# Patient Record
Sex: FEMALE | Race: WHITE | NOT HISPANIC OR LATINO | Employment: OTHER | URBAN - METROPOLITAN AREA
[De-identification: names, ages, dates, MRNs, and addresses within clinical notes are randomized per-mention and may not be internally consistent; named-entity substitution may affect disease eponyms.]

---

## 2017-01-24 ENCOUNTER — APPOINTMENT (OUTPATIENT)
Dept: WOMENS IMAGING | Facility: HOSPITAL | Age: 72
End: 2017-01-24

## 2017-01-24 PROCEDURE — G0279 TOMOSYNTHESIS, MAMMO: HCPCS | Performed by: RADIOLOGY

## 2017-01-24 PROCEDURE — 76641 ULTRASOUND BREAST COMPLETE: CPT | Performed by: RADIOLOGY

## 2017-01-24 PROCEDURE — G0206 DX MAMMO INCL CAD UNI: HCPCS | Performed by: RADIOLOGY

## 2017-01-24 PROCEDURE — MDREVIEWSP: Performed by: RADIOLOGY

## 2017-03-22 ENCOUNTER — APPOINTMENT (OUTPATIENT)
Dept: WOMENS IMAGING | Facility: HOSPITAL | Age: 72
End: 2017-03-22

## 2017-03-22 PROCEDURE — G0202 SCR MAMMO BI INCL CAD: HCPCS | Performed by: RADIOLOGY

## 2017-03-22 PROCEDURE — 77063 BREAST TOMOSYNTHESIS BI: CPT | Performed by: RADIOLOGY

## 2017-07-24 ENCOUNTER — APPOINTMENT (OUTPATIENT)
Dept: WOMENS IMAGING | Facility: HOSPITAL | Age: 72
End: 2017-07-24

## 2017-07-24 PROCEDURE — 76641 ULTRASOUND BREAST COMPLETE: CPT | Performed by: RADIOLOGY

## 2017-10-27 ENCOUNTER — OFFICE VISIT (OUTPATIENT)
Dept: FAMILY MEDICINE CLINIC | Facility: CLINIC | Age: 72
End: 2017-10-27

## 2017-10-27 VITALS
RESPIRATION RATE: 16 BRPM | HEIGHT: 64 IN | WEIGHT: 145 LBS | DIASTOLIC BLOOD PRESSURE: 60 MMHG | HEART RATE: 62 BPM | TEMPERATURE: 98.5 F | OXYGEN SATURATION: 98 % | BODY MASS INDEX: 24.75 KG/M2 | SYSTOLIC BLOOD PRESSURE: 120 MMHG

## 2017-10-27 DIAGNOSIS — E78.2 MIXED HYPERLIPIDEMIA: Primary | ICD-10-CM

## 2017-10-27 DIAGNOSIS — Z12.31 ENCOUNTER FOR SCREENING MAMMOGRAM FOR BREAST CANCER: ICD-10-CM

## 2017-10-27 DIAGNOSIS — J30.2 CHRONIC SEASONAL ALLERGIC RHINITIS, UNSPECIFIED TRIGGER: ICD-10-CM

## 2017-10-27 DIAGNOSIS — N60.02 BREAST CYST, LEFT: ICD-10-CM

## 2017-10-27 DIAGNOSIS — Z87.898 HX OF ABNORMAL MAMMOGRAM: ICD-10-CM

## 2017-10-27 DIAGNOSIS — E55.9 VITAMIN D DEFICIENCY: ICD-10-CM

## 2017-10-27 DIAGNOSIS — E89.0 S/P THYROIDECTOMY: ICD-10-CM

## 2017-10-27 DIAGNOSIS — R73.01 IMPAIRED FASTING GLUCOSE: ICD-10-CM

## 2017-10-27 PROBLEM — I11.9 HYPERTENSIVE LEFT VENTRICULAR HYPERTROPHY, WITHOUT HEART FAILURE: Status: ACTIVE | Noted: 2017-10-27

## 2017-10-27 PROBLEM — M85.89 OSTEOPENIA OF MULTIPLE SITES: Status: ACTIVE | Noted: 2017-10-27

## 2017-10-27 PROBLEM — Z86.79 HISTORY OF HEART DISEASE: Status: ACTIVE | Noted: 2017-10-27

## 2017-10-27 PROBLEM — E03.9 HYPOTHYROIDISM: Status: ACTIVE | Noted: 2017-10-27

## 2017-10-27 PROBLEM — Z90.89 S/P THYROIDECTOMY: Status: ACTIVE | Noted: 2017-10-27

## 2017-10-27 PROBLEM — Z87.19 HISTORY OF DIVERTICULOSIS: Status: ACTIVE | Noted: 2017-10-27

## 2017-10-27 PROBLEM — Z98.890 S/P THYROIDECTOMY: Status: ACTIVE | Noted: 2017-10-27

## 2017-10-27 PROBLEM — R91.1 NODULE OF LEFT LUNG: Status: ACTIVE | Noted: 2017-10-27

## 2017-10-27 PROCEDURE — 99203 OFFICE O/P NEW LOW 30 MIN: CPT | Performed by: PHYSICIAN ASSISTANT

## 2017-10-27 RX ORDER — LEVOTHYROXINE SODIUM 0.07 MG/1
75 TABLET ORAL DAILY
COMMUNITY
End: 2018-01-05 | Stop reason: SDUPTHER

## 2017-10-27 RX ORDER — ASPIRIN 81 MG/1
81 TABLET ORAL DAILY
COMMUNITY

## 2017-10-27 RX ORDER — LISINOPRIL AND HYDROCHLOROTHIAZIDE 12.5; 1 MG/1; MG/1
1 TABLET ORAL DAILY
COMMUNITY
End: 2017-12-15 | Stop reason: SDUPTHER

## 2017-10-27 RX ORDER — CETIRIZINE HYDROCHLORIDE 10 MG/1
10 TABLET ORAL AS NEEDED
COMMUNITY

## 2017-10-27 RX ORDER — ATORVASTATIN CALCIUM 40 MG/1
40 TABLET, FILM COATED ORAL DAILY
COMMUNITY
End: 2018-01-19 | Stop reason: SDUPTHER

## 2017-10-27 RX ORDER — ALENDRONATE SODIUM 70 MG/1
70 TABLET ORAL
COMMUNITY
End: 2018-01-19 | Stop reason: SDUPTHER

## 2017-10-27 NOTE — PATIENT INSTRUCTIONS
Low glycemic index diet  Exercise 30 minutes most days of the week  Make sure you get results on any labs or tests we ordered today  We discussed medications and how to take them as prescribed  Sleep 6-8 hours each night if possible  If you have not signed up for [employer], please activate your code ASAP from your After Visit Summary today    LDL goal <100  LDL goal if heart disease <70  HDL goal >60  Triglyceride goal <150  BP goal =<130/80  Fasting glucose <100

## 2017-10-27 NOTE — PROGRESS NOTES
Subjective   Mami Srivastava is a 72 y.o. female.     History of Present Illness     Mami Srivastava 72 y.o. female who presents today for a new patient appointment.    she has a history of   Patient Active Problem List   Diagnosis   • S/P thyroidectomy   • Mixed hyperlipidemia   • Vitamin D deficiency   • Impaired fasting glucose   • Hypothyroidism   • Osteopenia of multiple sites   • Hypertensive left ventricular hypertrophy, without heart failure   • Chronic seasonal allergic rhinitis   • History of heart disease   • Hx of abnormal mammogram   • Nodule of left lung   • History of diverticulosis   .  she is here to establish care I reviewed the PFSH recorded today by my MA/LPN staff.   she   She has been feeling well.    Mami Srivastvaa 72 y.o. female who presents today for routine follow up check and medication refills.  she has a history of   Patient Active Problem List   Diagnosis   • S/P thyroidectomy   • Mixed hyperlipidemia   • Vitamin D deficiency   • Impaired fasting glucose   • Hypothyroidism   • Osteopenia of multiple sites   • Hypertensive left ventricular hypertrophy, without heart failure   • Chronic seasonal allergic rhinitis   • History of heart disease   • Hx of abnormal mammogram   • Nodule of left lung   • History of diverticulosis   .   she has overall felt well.  She has Hypertenision and is well controlled on medication, Hyperlipidemia and is well controlled on medication, CAD and is under care of their Cardiologist for medical management and hypothyroidism and is well controlled on Rx.  Labs are in desired treatment range.  she has been compliant with current medications have reviewed them.  The patient denies medication side effects.    Had labs 9-6-17:  CBC with hbg 12.7, gluc 117,creat 0.92, K+ 4.2, ALT 34; LDL at 58.  TSH 2.51,  A1C March 2017 was 5.6  9-2015 neg RBC urine  She is having breast u/s for left breast July and probably benign and repeat 6mos;  [facility]  On Fosamax for about 3 years    Niece had breast cancer with BRCA  AAA screen neg 10-4-16    Hx skin basal cell cancer and sees Dr Wilson  Sees cardio once a year, Dr Rick and Dx LVH, diastolic dysfunction, CAD by calcium scan    I spent 30 minutes with her and reviewing records; I will order labs for March; reviewed several labs    She goes to the gym or exercise with working  Was on Lexapro once  The following portions of the patient's history were reviewed and updated as appropriate: allergies, current medications, past family history, past medical history, past social history, past surgical history and problem list.    Review of Systems   Constitutional: Negative for activity change, appetite change and unexpected weight change.   HENT: Negative for nosebleeds and trouble swallowing.    Eyes: Negative for pain and visual disturbance.   Respiratory: Negative for chest tightness, shortness of breath and wheezing.    Cardiovascular: Negative for chest pain and palpitations.   Gastrointestinal: Negative for abdominal pain and blood in stool.   Endocrine: Negative.    Genitourinary: Negative for difficulty urinating and hematuria.   Musculoskeletal: Positive for back pain. Negative for joint swelling.   Skin: Negative for color change and rash.   Allergic/Immunologic: Positive for environmental allergies.   Neurological: Negative for syncope and speech difficulty.   Hematological: Negative for adenopathy.   Psychiatric/Behavioral: Negative for agitation and confusion.   All other systems reviewed and are negative.      Objective   Physical Exam   Constitutional: She is oriented to person, place, and time. She appears well-developed and well-nourished. No distress.   HENT:   Head: Normocephalic and atraumatic.   Right Ear: External ear normal.   Left Ear: External ear normal.   Nose: Nose normal.   Mouth/Throat: Oropharynx is clear and moist. No oropharyngeal exudate.   Eyes: Conjunctivae and EOM  are normal. Pupils are equal, round, and reactive to light. Right eye exhibits no discharge. Left eye exhibits no discharge. No scleral icterus.   Neck: Normal range of motion. Neck supple. No tracheal deviation present. No thyromegaly present.   Cardiovascular: Normal rate, regular rhythm, normal heart sounds, intact distal pulses and normal pulses.  Exam reveals no gallop.    No murmur heard.  Pulmonary/Chest: Effort normal and breath sounds normal. No respiratory distress. She has no wheezes. She has no rales.   Abdominal: Soft. Bowel sounds are normal. She exhibits no distension and no mass. There is no tenderness. There is no rebound and no guarding. No hernia.   Musculoskeletal: Normal range of motion.   Lymphadenopathy:     She has no cervical adenopathy.   Neurological: She is alert and oriented to person, place, and time. She exhibits normal muscle tone. Coordination normal.   Skin: Skin is warm. No rash noted. No erythema. No pallor.   Psychiatric: She has a normal mood and affect. Her behavior is normal. Judgment and thought content normal.   Nursing note and vitals reviewed.      Assessment/Plan   Mami was seen today for establish care and hypertension.    Diagnoses and all orders for this visit:    Mixed hyperlipidemia  -     Comprehensive Metabolic Panel; Future  -     Hemoglobin A1c; Future  -     Vitamin D 25 Hydroxy; Future    S/P thyroidectomy  -     Comprehensive Metabolic Panel; Future  -     Hemoglobin A1c; Future  -     Vitamin D 25 Hydroxy; Future    Vitamin D deficiency  -     Comprehensive Metabolic Panel; Future  -     Hemoglobin A1c; Future  -     Vitamin D 25 Hydroxy; Future    Impaired fasting glucose  -     Comprehensive Metabolic Panel; Future  -     Hemoglobin A1c; Future  -     Vitamin D 25 Hydroxy; Future    Chronic seasonal allergic rhinitis, unspecified trigger  -     Comprehensive Metabolic Panel; Future  -     Hemoglobin A1c; Future  -     Vitamin D 25 Hydroxy; Future    Hx  of abnormal mammogram    Breast cyst, left  -     US Breast Left Complete; Future    Encounter for screening mammogram for breast cancer  -     Mammo Screening Bilateral With CAD; Future

## 2017-12-15 RX ORDER — LISINOPRIL AND HYDROCHLOROTHIAZIDE 12.5; 1 MG/1; MG/1
1 TABLET ORAL DAILY
Qty: 30 TABLET | Refills: 4 | Status: SHIPPED | OUTPATIENT
Start: 2017-12-15 | End: 2018-01-19 | Stop reason: SDUPTHER

## 2017-12-21 ENCOUNTER — OFFICE VISIT (OUTPATIENT)
Dept: RETAIL CLINIC | Facility: CLINIC | Age: 72
End: 2017-12-21

## 2017-12-21 VITALS
SYSTOLIC BLOOD PRESSURE: 117 MMHG | HEART RATE: 77 BPM | DIASTOLIC BLOOD PRESSURE: 68 MMHG | OXYGEN SATURATION: 98 % | RESPIRATION RATE: 18 BRPM | TEMPERATURE: 98 F

## 2017-12-21 DIAGNOSIS — J06.9 ACUTE URI: Primary | ICD-10-CM

## 2017-12-21 DIAGNOSIS — J32.9 SINUSITIS, UNSPECIFIED CHRONICITY, UNSPECIFIED LOCATION: ICD-10-CM

## 2017-12-21 PROCEDURE — 99213 OFFICE O/P EST LOW 20 MIN: CPT | Performed by: NURSE PRACTITIONER

## 2017-12-21 RX ORDER — GUAIFENESIN 600 MG/1
600 TABLET, EXTENDED RELEASE ORAL EVERY 12 HOURS SCHEDULED
Qty: 28 TABLET | Refills: 0 | Status: SHIPPED | OUTPATIENT
Start: 2017-12-21 | End: 2018-01-04

## 2017-12-21 RX ORDER — AZITHROMYCIN 250 MG/1
TABLET, FILM COATED ORAL
Qty: 6 TABLET | Refills: 0 | Status: SHIPPED | OUTPATIENT
Start: 2017-12-21 | End: 2018-04-02

## 2017-12-21 NOTE — PATIENT INSTRUCTIONS
Sinusitis, Adult  Sinusitis is soreness and inflammation of your sinuses. Sinuses are hollow spaces in the bones around your face. Your sinuses are located:  · Around your eyes.  · In the middle of your forehead.  · Behind your nose.  · In your cheekbones.  Your sinuses and nasal passages are lined with a stringy fluid (mucus). Mucus normally drains out of your sinuses. When your nasal tissues become inflamed or swollen, the mucus can become trapped or blocked so air cannot flow through your sinuses. This allows bacteria, viruses, and funguses to grow, which leads to infection.  Sinusitis can develop quickly and last for 7-10 days (acute) or for more than 12 weeks (chronic). Sinusitis often develops after a cold.  CAUSES  This condition is caused by anything that creates swelling in the sinuses or stops mucus from draining, including:  · Allergies.  · Asthma.  · Bacterial or viral infection.  · Abnormally shaped bones between the nasal passages.  · Nasal growths that contain mucus (nasal polyps).  · Narrow sinus openings.  · Pollutants, such as chemicals or irritants in the air.  · A foreign object stuck in the nose.  · A fungal infection. This is rare.  RISK FACTORS  The following factors may make you more likely to develop this condition:  · Having allergies or asthma.  · Having had a recent cold or respiratory tract infection.  · Having structural deformities or blockages in your nose or sinuses.  · Having a weak immune system.  · Doing a lot of swimming or diving.  · Overusing nasal sprays.  · Smoking.  SYMPTOMS  The main symptoms of this condition are pain and a feeling of pressure around the affected sinuses. Other symptoms include:  · Upper toothache.  · Earache.  · Headache.  · Bad breath.  · Decreased sense of smell and taste.  · A cough that may get worse at night.  · Fatigue.  · Fever.  · Thick drainage from your nose. The drainage is often green and it may contain pus (purulent).  · Stuffy nose or  congestion.  · Postnasal drip. This is when extra mucus collects in the throat or back of the nose.  · Swelling and warmth over the affected sinuses.  · Sore throat.  · Sensitivity to light.  DIAGNOSIS  This condition is diagnosed based on symptoms, a medical history, and a physical exam. To find out if your condition is acute or chronic, your health care provider may:  · Look in your nose for signs of nasal polyps.  · Tap over the affected sinus to check for signs of infection.  · View the inside of your sinuses using an imaging device that has a light attached (endoscope).  If your health care provider suspects that you have chronic sinusitis, you may also:  · Be tested for allergies.  · Have a sample of mucus taken from your nose (nasal culture) and checked for bacteria.  · Have a mucus sample examined to see if your sinusitis is related to an allergy.  If your sinusitis does not respond to treatment and it lasts longer than 8 weeks, you may have an MRI or CT scan to check your sinuses. These scans also help to determine how severe your infection is.  In rare cases, a bone biopsy may be done to rule out more serious types of fungal sinus disease.  TREATMENT  Treatment for sinusitis depends on the cause and whether your condition is chronic or acute. If a virus is causing your sinusitis, your symptoms will go away on their own within 10 days. You may be given medicines to relieve your symptoms, including:  · Topical nasal decongestants. They shrink swollen nasal passages and let mucus drain from your sinuses.  · Antihistamines. These drugs block inflammation that is triggered by allergies. This can help to ease swelling in your nose and sinuses.  · Topical nasal corticosteroids. These are nasal sprays that ease inflammation and swelling in your nose and sinuses.  · Nasal saline washes. These rinses can help to get rid of thick mucus in your nose.  If your condition is caused by bacteria, you will be given an  antibiotic medicine. If your condition is caused by a fungus, you will be given an antifungal medicine.  Surgery may be needed to correct underlying conditions, such as narrow nasal passages. Surgery may also be needed to remove polyps.  HOME CARE INSTRUCTIONS  Medicines  · Take, use, or apply over-the-counter and prescription medicines only as told by your health care provider. These may include nasal sprays.  · If you were prescribed an antibiotic medicine, take it as told by your health care provider. Do not stop taking the antibiotic even if you start to feel better.  Hydrate and Humidify  · Drink enough water to keep your urine clear or pale yellow. Staying hydrated will help to thin your mucus.  · Use a cool mist humidifier to keep the humidity level in your home above 50%.  · Inhale steam for 10-15 minutes, 3-4 times a day or as told by your health care provider. You can do this in the bathroom while a hot shower is running.  · Limit your exposure to cool or dry air.  Rest  · Rest as much as possible.  · Sleep with your head raised (elevated).  · Make sure to get enough sleep each night.  General Instructions  · Apply a warm, moist washcloth to your face 3-4 times a day or as told by your health care provider. This will help with discomfort.  · Wash your hands often with soap and water to reduce your exposure to viruses and other germs. If soap and water are not available, use hand .  · Do not smoke. Avoid being around people who are smoking (secondhand smoke).  · Keep all follow-up visits as told by your health care provider. This is important.  SEEK MEDICAL CARE IF:  · You have a fever.  · Your symptoms get worse.  · Your symptoms do not improve within 10 days.  SEEK IMMEDIATE MEDICAL CARE IF:  · You have a severe headache.  · You have persistent vomiting.  · You have pain or swelling around your face or eyes.  · You have vision problems.  · You develop confusion.  · Your neck is stiff.  · You have  "trouble breathing.     This information is not intended to replace advice given to you by your health care provider. Make sure you discuss any questions you have with your health care provider.     Document Released: 12/18/2006 Document Revised: 04/10/2017 Document Reviewed: 10/12/2016  [employer] Interactive Patient Education ©2017 [employer]  Upper Respiratory Infection, Adult  Most upper respiratory infections (URIs) are a viral infection of the air passages leading to the lungs. A URI affects the nose, throat, and upper air passages. The most common type of URI is nasopharyngitis and is typically referred to as \"the common cold.\"  URIs run their course and usually go away on their own. Most of the time, a URI does not require medical attention, but sometimes a bacterial infection in the upper airways can follow a viral infection. This is called a secondary infection. Sinus and middle ear infections are common types of secondary upper respiratory infections.  Bacterial pneumonia can also complicate a URI. A URI can worsen asthma and chronic obstructive pulmonary disease (COPD). Sometimes, these complications can require emergency medical care and may be life threatening.   CAUSES  Almost all URIs are caused by viruses. A virus is a type of germ and can spread from one person to another.   RISKS FACTORS  You may be at risk for a URI if:   · You smoke.    · You have chronic heart or lung disease.  · You have a weakened defense (immune) system.    · You are very young or very old.    · You have nasal allergies or asthma.  · You work in crowded or poorly ventilated areas.  · You work in health care facilities or schools.  SIGNS AND SYMPTOMS   Symptoms typically develop 2-3 days after you come in contact with a cold virus. Most viral URIs last 7-10 days. However, viral URIs from the influenza virus (flu virus) can last 14-18 days and are typically more severe. Symptoms may include:   · Runny or stuffy (congested) " nose.    · Sneezing.    · Cough.    · Sore throat.    · Headache.    · Fatigue.    · Fever.    · Loss of appetite.    · Pain in your forehead, behind your eyes, and over your cheekbones (sinus pain).  · Muscle aches.    DIAGNOSIS   Your health care provider may diagnose a URI by:  · Physical exam.  · Tests to check that your symptoms are not due to another condition such as:  ¨ Strep throat.  ¨ Sinusitis.  ¨ Pneumonia.  ¨ Asthma.  TREATMENT   A URI goes away on its own with time. It cannot be cured with medicines, but medicines may be prescribed or recommended to relieve symptoms. Medicines may help:  · Reduce your fever.  · Reduce your cough.  · Relieve nasal congestion.  HOME CARE INSTRUCTIONS   · Take medicines only as directed by your health care provider.    · Gargle warm saltwater or take cough drops to comfort your throat as directed by your health care provider.  · Use a warm mist humidifier or inhale steam from a shower to increase air moisture. This may make it easier to breathe.  · Drink enough fluid to keep your urine clear or pale yellow.    · Eat soups and other clear broths and maintain good nutrition.    · Rest as needed.    · Return to work when your temperature has returned to normal or as your health care provider advises. You may need to stay home longer to avoid infecting others. You can also use a face mask and careful hand washing to prevent spread of the virus.  · Increase the usage of your inhaler if you have asthma.    · Do not use any tobacco products, including cigarettes, chewing tobacco, or electronic cigarettes. If you need help quitting, ask your health care provider.  PREVENTION   The best way to protect yourself from getting a cold is to practice good hygiene.   · Avoid oral or hand contact with people with cold symptoms.    · Wash your hands often if contact occurs.    There is no clear evidence that vitamin C, vitamin E, echinacea, or exercise reduces the chance of developing a  cold. However, it is always recommended to get plenty of rest, exercise, and practice good nutrition.   SEEK MEDICAL CARE IF:   · You are getting worse rather than better.    · Your symptoms are not controlled by medicine.    · You have chills.  · You have worsening shortness of breath.  · You have brown or red mucus.  · You have yellow or brown nasal discharge.  · You have pain in your face, especially when you bend forward.  · You have a fever.  · You have swollen neck glands.  · You have pain while swallowing.  · You have white areas in the back of your throat.  SEEK IMMEDIATE MEDICAL CARE IF:   · You have severe or persistent:    Headache.    Ear pain.    Sinus pain.    Chest pain.  · You have chronic lung disease and any of the following:    Wheezing.    Prolonged cough.    Coughing up blood.    A change in your usual mucus.  · You have a stiff neck.  · You have changes in your:    Vision.    Hearing.    Thinking.    Mood.  MAKE SURE YOU:   · Understand these instructions.  · Will watch your condition.  · Will get help right away if you are not doing well or get worse.     This information is not intended to replace advice given to you by your health care provider. Make sure you discuss any questions you have with your health care provider.     Document Released: 06/13/2002 Document Revised: 05/03/2016 Document Reviewed: 03/25/2015  [employer] Patient Education ©2017 [employer]

## 2017-12-21 NOTE — PROGRESS NOTES
"Subjective:     Mami Srivastava is a 72 y.o.     Sinus Problem   This is a new problem. The current episode started 1 to 4 weeks ago. There has been no fever. Associated symptoms include congestion, ear pain and sinus pressure (left sided). Pertinent negatives include no shortness of breath. Cough: from drainage. Headaches: \"sinus headache\" Sore throat: from drainage. Treatments tried: jenni pot, mucinex. The treatment provided mild relief.         The following portions of the patient's history were reviewed and updated as appropriate: allergies, current medications, past family history, past medical history, past social history, past surgical history and problem list.      Review of Systems   Constitutional: Negative for appetite change, fatigue and fever.   HENT: Positive for congestion, ear pain and sinus pressure (left sided). Sore throat: from drainage.    Eyes: Negative for discharge and redness.   Respiratory: Negative for shortness of breath and wheezing. Cough: from drainage.    Cardiovascular: Negative.         Hx: hypertension   Gastrointestinal: Negative for diarrhea, nausea and vomiting.   Musculoskeletal: Negative for myalgias.   Skin: Negative for color change, pallor and rash.   Neurological: Negative for dizziness and numbness. Headaches: \"sinus headache\"         Objective:      Physical Exam   Constitutional: She is oriented to person, place, and time. She appears well-developed and well-nourished. She is cooperative.   HENT:   Head: Normocephalic and atraumatic.   Right Ear: Ear canal normal. A middle ear effusion (mild mucoid effusion) is present.   Left Ear: Ear canal normal. A middle ear effusion (mild mucoid effusion) is present.   Nose: Right sinus exhibits frontal sinus tenderness. Left sinus exhibits maxillary sinus tenderness and frontal sinus tenderness.   Mouth/Throat: No oropharyngeal exudate.   Mild purulent nasal congestion   Eyes: EOM and lids are normal. Pupils are equal, " round, and reactive to light.   Neck: Normal range of motion. Neck supple.   Cardiovascular: Normal rate, regular rhythm, S1 normal, S2 normal and normal heart sounds.    Pulmonary/Chest: Effort normal and breath sounds normal.   Abdominal: Soft. Normal appearance and bowel sounds are normal.   Musculoskeletal: Normal range of motion.   Lymphadenopathy:     She has no cervical adenopathy.   Neurological: She is alert and oriented to person, place, and time.   Skin: Skin is warm, dry and intact.   Psychiatric: She has a normal mood and affect. Her speech is normal and behavior is normal. Thought content normal.   Vitals reviewed.          Diagnoses and all orders for this visit:    Acute URI    Sinusitis, unspecified chronicity, unspecified location    Other orders  -     azithromycin (ZITHROMAX) 250 MG tablet; Take 2 tablets the first day, then 1 tablet daily for 4 days.  -     guaiFENesin (MUCINEX) 600 MG 12 hr tablet; Take 1 tablet by mouth Every 12 (Twelve) Hours for 14 days.  If symptoms worsen or persist follow up with primary care provider.

## 2018-01-05 RX ORDER — LEVOTHYROXINE SODIUM 0.07 MG/1
75 TABLET ORAL DAILY
Qty: 90 TABLET | Refills: 1 | Status: SHIPPED | OUTPATIENT
Start: 2018-01-05 | End: 2018-01-31 | Stop reason: SDUPTHER

## 2018-01-19 RX ORDER — ATORVASTATIN CALCIUM 40 MG/1
40 TABLET, FILM COATED ORAL DAILY
Qty: 30 TABLET | Refills: 0 | Status: SHIPPED | OUTPATIENT
Start: 2018-01-19 | End: 2018-01-31 | Stop reason: SDUPTHER

## 2018-01-19 RX ORDER — ALENDRONATE SODIUM 70 MG/1
70 TABLET ORAL
Qty: 4 TABLET | Refills: 0 | Status: SHIPPED | OUTPATIENT
Start: 2018-01-19 | End: 2018-01-31 | Stop reason: SDUPTHER

## 2018-01-19 RX ORDER — LISINOPRIL AND HYDROCHLOROTHIAZIDE 12.5; 1 MG/1; MG/1
1 TABLET ORAL DAILY
Qty: 30 TABLET | Refills: 0 | Status: SHIPPED | OUTPATIENT
Start: 2018-01-19 | End: 2018-01-31 | Stop reason: SDUPTHER

## 2018-01-26 ENCOUNTER — APPOINTMENT (OUTPATIENT)
Dept: WOMENS IMAGING | Facility: HOSPITAL | Age: 73
End: 2018-01-26

## 2018-01-26 PROCEDURE — 76641 ULTRASOUND BREAST COMPLETE: CPT | Performed by: RADIOLOGY

## 2018-01-31 RX ORDER — ATORVASTATIN CALCIUM 40 MG/1
40 TABLET, FILM COATED ORAL DAILY
Qty: 90 TABLET | Refills: 0 | Status: SHIPPED | OUTPATIENT
Start: 2018-01-31 | End: 2018-03-18 | Stop reason: SDUPTHER

## 2018-01-31 RX ORDER — ALENDRONATE SODIUM 70 MG/1
70 TABLET ORAL
Qty: 13 TABLET | Refills: 0 | Status: SHIPPED | OUTPATIENT
Start: 2018-01-31 | End: 2018-02-14

## 2018-01-31 RX ORDER — LEVOTHYROXINE SODIUM 0.07 MG/1
75 TABLET ORAL DAILY
Qty: 90 TABLET | Refills: 1 | Status: SHIPPED | OUTPATIENT
Start: 2018-01-31 | End: 2018-02-05 | Stop reason: SDUPTHER

## 2018-01-31 RX ORDER — LISINOPRIL AND HYDROCHLOROTHIAZIDE 12.5; 1 MG/1; MG/1
1 TABLET ORAL DAILY
Qty: 90 TABLET | Refills: 0 | Status: SHIPPED | OUTPATIENT
Start: 2018-01-31 | End: 2018-03-18 | Stop reason: SDUPTHER

## 2018-02-05 RX ORDER — LEVOTHYROXINE SODIUM 0.07 MG/1
75 TABLET ORAL DAILY
Qty: 30 TABLET | Refills: 0 | Status: SHIPPED | OUTPATIENT
Start: 2018-02-05 | End: 2018-02-09 | Stop reason: SDUPTHER

## 2018-02-09 ENCOUNTER — TELEPHONE (OUTPATIENT)
Dept: FAMILY MEDICINE CLINIC | Facility: CLINIC | Age: 73
End: 2018-02-09

## 2018-02-09 RX ORDER — LEVOTHYROXINE SODIUM 0.07 MG/1
75 TABLET ORAL DAILY
Qty: 90 TABLET | Refills: 1 | Status: SHIPPED | OUTPATIENT
Start: 2018-02-09 | End: 2018-04-02 | Stop reason: SDUPTHER

## 2018-02-14 RX ORDER — ALENDRONATE SODIUM 70 MG/1
TABLET ORAL
Qty: 4 TABLET | Refills: 0 | Status: SHIPPED | OUTPATIENT
Start: 2018-02-14 | End: 2018-04-02 | Stop reason: SDUPTHER

## 2018-03-01 ENCOUNTER — RESULTS ENCOUNTER (OUTPATIENT)
Dept: FAMILY MEDICINE CLINIC | Facility: CLINIC | Age: 73
End: 2018-03-01

## 2018-03-01 DIAGNOSIS — E78.2 MIXED HYPERLIPIDEMIA: ICD-10-CM

## 2018-03-01 DIAGNOSIS — J30.2 CHRONIC SEASONAL ALLERGIC RHINITIS, UNSPECIFIED TRIGGER: ICD-10-CM

## 2018-03-01 DIAGNOSIS — E89.0 S/P THYROIDECTOMY: ICD-10-CM

## 2018-03-01 DIAGNOSIS — E55.9 VITAMIN D DEFICIENCY: ICD-10-CM

## 2018-03-01 DIAGNOSIS — R73.01 IMPAIRED FASTING GLUCOSE: ICD-10-CM

## 2018-03-18 RX ORDER — LISINOPRIL AND HYDROCHLOROTHIAZIDE 12.5; 1 MG/1; MG/1
1 TABLET ORAL DAILY
Qty: 30 TABLET | Refills: 0 | Status: SHIPPED | OUTPATIENT
Start: 2018-03-18 | End: 2018-04-02 | Stop reason: SDUPTHER

## 2018-03-18 RX ORDER — ATORVASTATIN CALCIUM 40 MG/1
40 TABLET, FILM COATED ORAL DAILY
Qty: 30 TABLET | Refills: 0 | Status: SHIPPED | OUTPATIENT
Start: 2018-03-18 | End: 2018-04-02 | Stop reason: SDUPTHER

## 2018-03-20 ENCOUNTER — CLINICAL SUPPORT (OUTPATIENT)
Dept: FAMILY MEDICINE CLINIC | Facility: CLINIC | Age: 73
End: 2018-03-20

## 2018-03-20 DIAGNOSIS — Z23 IMMUNIZATION DUE: Primary | ICD-10-CM

## 2018-03-20 PROCEDURE — 90471 IMMUNIZATION ADMIN: CPT | Performed by: PHYSICIAN ASSISTANT

## 2018-03-20 PROCEDURE — 90715 TDAP VACCINE 7 YRS/> IM: CPT | Performed by: PHYSICIAN ASSISTANT

## 2018-03-26 ENCOUNTER — APPOINTMENT (OUTPATIENT)
Dept: WOMENS IMAGING | Facility: HOSPITAL | Age: 73
End: 2018-03-26

## 2018-03-26 PROCEDURE — MDREVIEWSP: Performed by: RADIOLOGY

## 2018-03-26 PROCEDURE — 77063 BREAST TOMOSYNTHESIS BI: CPT | Performed by: RADIOLOGY

## 2018-03-26 PROCEDURE — 77067 SCR MAMMO BI INCL CAD: CPT | Performed by: RADIOLOGY

## 2018-04-02 ENCOUNTER — OFFICE VISIT (OUTPATIENT)
Dept: FAMILY MEDICINE CLINIC | Facility: CLINIC | Age: 73
End: 2018-04-02

## 2018-04-02 VITALS
WEIGHT: 143 LBS | SYSTOLIC BLOOD PRESSURE: 128 MMHG | HEIGHT: 64 IN | RESPIRATION RATE: 16 BRPM | OXYGEN SATURATION: 98 % | DIASTOLIC BLOOD PRESSURE: 72 MMHG | BODY MASS INDEX: 24.41 KG/M2 | TEMPERATURE: 97.6 F | HEART RATE: 70 BPM

## 2018-04-02 DIAGNOSIS — M85.89 OSTEOPENIA OF MULTIPLE SITES: ICD-10-CM

## 2018-04-02 DIAGNOSIS — Z00.00 MEDICARE ANNUAL WELLNESS VISIT, SUBSEQUENT: Primary | ICD-10-CM

## 2018-04-02 DIAGNOSIS — E03.9 ACQUIRED HYPOTHYROIDISM: ICD-10-CM

## 2018-04-02 DIAGNOSIS — E78.2 MIXED HYPERLIPIDEMIA: ICD-10-CM

## 2018-04-02 DIAGNOSIS — R73.01 IMPAIRED FASTING GLUCOSE: ICD-10-CM

## 2018-04-02 DIAGNOSIS — E55.9 VITAMIN D DEFICIENCY: ICD-10-CM

## 2018-04-02 DIAGNOSIS — Z86.79 HISTORY OF HEART DISEASE: ICD-10-CM

## 2018-04-02 DIAGNOSIS — Z78.0 POSTMENOPAUSAL: ICD-10-CM

## 2018-04-02 PROCEDURE — G0439 PPPS, SUBSEQ VISIT: HCPCS | Performed by: PHYSICIAN ASSISTANT

## 2018-04-02 PROCEDURE — 99214 OFFICE O/P EST MOD 30 MIN: CPT | Performed by: PHYSICIAN ASSISTANT

## 2018-04-02 PROCEDURE — 90732 PPSV23 VACC 2 YRS+ SUBQ/IM: CPT | Performed by: PHYSICIAN ASSISTANT

## 2018-04-02 PROCEDURE — G0009 ADMIN PNEUMOCOCCAL VACCINE: HCPCS | Performed by: PHYSICIAN ASSISTANT

## 2018-04-02 PROCEDURE — 96160 PT-FOCUSED HLTH RISK ASSMT: CPT | Performed by: PHYSICIAN ASSISTANT

## 2018-04-02 RX ORDER — LISINOPRIL AND HYDROCHLOROTHIAZIDE 12.5; 1 MG/1; MG/1
1 TABLET ORAL DAILY
Qty: 90 TABLET | Refills: 1 | Status: SHIPPED | OUTPATIENT
Start: 2018-04-02 | End: 2018-09-05 | Stop reason: SDUPTHER

## 2018-04-02 RX ORDER — LEVOTHYROXINE SODIUM 0.07 MG/1
75 TABLET ORAL DAILY
Qty: 90 TABLET | Refills: 3 | Status: SHIPPED | OUTPATIENT
Start: 2018-04-02 | End: 2019-05-28 | Stop reason: SDUPTHER

## 2018-04-02 RX ORDER — ATORVASTATIN CALCIUM 40 MG/1
40 TABLET, FILM COATED ORAL DAILY
Qty: 90 TABLET | Refills: 3 | Status: SHIPPED | OUTPATIENT
Start: 2018-04-02 | End: 2018-06-01 | Stop reason: SDUPTHER

## 2018-04-02 RX ORDER — ALENDRONATE SODIUM 70 MG/1
70 TABLET ORAL
Qty: 16 TABLET | Refills: 3 | Status: SHIPPED | OUTPATIENT
Start: 2018-04-02 | End: 2018-10-24

## 2018-04-02 NOTE — PROGRESS NOTES
QUICK REFERENCE INFORMATION:  The ABCs of the Annual Wellness Visit    Subsequent Medicare Wellness Visit    HEALTH RISK ASSESSMENT    1945    Recent Hospitalizations:  No hospitalization(s) within the last year..        Current Medical Providers:  Patient Care Team:  Jacqui Booth PA-C as PCP - General (Family Medicine)  No Known Provider as PCP - Family Medicine  Tressa Howard MD as Consulting Physician (Internal Medicine)  Pradip Wilson MD (Dermatology)  Luis Rick MD as Consulting Physician (Cardiology)  Shiva Hein MD as Consulting Physician (Gastroenterology)        Smoking Status:  History   Smoking Status   • Former Smoker   Smokeless Tobacco   • Never Used       Alcohol Consumption:  History   Alcohol Use   • Yes       Depression Screen:   PHQ-2/PHQ-9 Depression Screening 4/2/2018   Little interest or pleasure in doing things 0   Feeling down, depressed, or hopeless 2   Trouble falling or staying asleep, or sleeping too much 1   Feeling tired or having little energy 1   Poor appetite or overeating 0   Feeling bad about yourself - or that you are a failure or have let yourself or your family down 0   Trouble concentrating on things, such as reading the newspaper or watching television 0   Moving or speaking so slowly that other people could have noticed. Or the opposite - being so fidgety or restless that you have been moving around a lot more than usual 0   Thoughts that you would be better off dead, or of hurting yourself in some way 0   Total Score 4       Health Habits and Functional and Cognitive Screening:  Functional & Cognitive Status 4/2/2018   Do you have difficulty preparing food and eating? No   Do you have difficulty bathing yourself, getting dressed or grooming yourself? No   Do you have difficulty using the toilet? No   Do you have difficulty moving around from place to place? No   Do you have trouble with steps or getting out of a bed or a chair? No   In  the past year have you fallen or experienced a near fall? No   Current Diet Well Balanced Diet   Dental Exam Up to date   Eye Exam Up to date   Exercise (times per week) 0 times per week   Do you need help using the phone?  No   Are you deaf or do you have serious difficulty hearing?  No   Do you need help with transportation? No   Do you need help shopping? No   Do you need help preparing meals?  No   Do you need help with housework?  No   Do you need help with laundry? No   Do you need help taking your medications? No   Do you need help managing money? No   Do you ever drive or ride in a car without wearing a seat belt? No   Have you felt unusual stress, anger or loneliness in the last month? Yes   If you need help, do you have trouble finding someone available to you? No   Have you been bothered in the last four weeks by sexual problems? No   Do you have difficulty concentrating, remembering or making decisions? No           Does the patient have evidence of cognitive impairment? No    Aspirin use counseling: Taking ASA appropriately as indicated      Recent Lab Results:  CMP:     Lipid Panel:     HbA1c:       Visual Acuity:  No exam data present    Age-appropriate Screening Schedule:  Refer to the list below for future screening recommendations based on patient's age, sex and/or medical conditions. Orders for these recommended tests are listed in the plan section. The patient has been provided with a written plan.    Health Maintenance   Topic Date Due   • LIPID PANEL  10/27/2017   • PNEUMOCOCCAL VACCINES (65+ LOW/MEDIUM RISK) (2 of 2 - PPSV23) 11/01/2017   • DXA SCAN  10/03/2018   • MAMMOGRAM  03/26/2020   • COLONOSCOPY  10/27/2024   • TDAP/TD VACCINES (2 - Td) 03/20/2028   • INFLUENZA VACCINE  Completed   • ZOSTER VACCINE  Completed        Subjective   History of Present Illness    Mami Srivastava is a 72 y.o. female who presents for an Subsequent Wellness Visit.    The following portions of the patient's  history were reviewed and updated as appropriate: allergies, current medications, past family history, past medical history, past social history, past surgical history and problem list.    Outpatient Medications Prior to Visit   Medication Sig Dispense Refill   • alendronate (FOSAMAX) 70 MG tablet TAKE 1 TABLET BY MOUTH EVERY 7 (SEVEN) DAYS. 4 tablet 0   • aspirin 81 MG EC tablet Take 81 mg by mouth Daily.     • atorvastatin (LIPITOR) 40 MG tablet TAKE 1 TABLET BY MOUTH DAILY. 30 tablet 0   • cetirizine (zyrTEC) 10 MG tablet Take 10 mg by mouth Daily.     • fluticasone (VERAMYST) 27.5 MCG/SPRAY nasal spray 2 sprays into each nostril Daily.     • levothyroxine (SYNTHROID, LEVOTHROID) 75 MCG tablet Take 1 tablet by mouth Daily. 90 tablet 1   • lisinopril-hydrochlorothiazide (PRINZIDE,ZESTORETIC) 10-12.5 MG per tablet TAKE 1 TABLET BY MOUTH DAILY. 30 tablet 0   • Multiple Vitamin (MULTI VITAMIN DAILY PO) Take  by mouth.     • azithromycin (ZITHROMAX) 250 MG tablet Take 2 tablets the first day, then 1 tablet daily for 4 days. 6 tablet 0     No facility-administered medications prior to visit.        Patient Active Problem List   Diagnosis   • S/P thyroidectomy   • Mixed hyperlipidemia   • Vitamin D deficiency   • Impaired fasting glucose   • Hypothyroidism   • Osteopenia of multiple sites   • Hypertensive left ventricular hypertrophy, without heart failure   • Chronic seasonal allergic rhinitis   • History of heart disease   • Hx of abnormal mammogram   • Nodule of left lung   • History of diverticulosis       Advance Care Planning:  has NO advance directive - not interested in additional information    Identification of Risk Factors:  Risk factors include: weight .    Review of Systems    Compared to one year ago, the patient feels her physical health is the same.  Compared to one year ago, the patient feels her mental health is the same.    Objective     Physical Exam    Vitals:    04/02/18 1108   BP: 128/72   BP  "Location: Right arm   Patient Position: Sitting   Cuff Size: Adult   Pulse: 70   Resp: 16   Temp: 97.6 °F (36.4 °C)   TempSrc: Oral   SpO2: 98%   Weight: 64.9 kg (143 lb)   Height: 162.6 cm (64\")   PainSc: 0-No pain       Body mass index is 24.55 kg/m².  Discussed the patient's BMI with her. BMI is above normal parameters. Follow-up plan includes:  educational material.    Assessment/Plan   Patient Self-Management and Personalized Health Advice  The patient has been provided with information about: exercise, fall prevention and designing advance directives and preventive services including:   · Advance directive, Bone densitometry screening, Pneumococcal vaccine , the pneumovax was at age 63 and needs repeat on this d/t given before age 65 and 5 years has passed.    Visit Diagnoses:  No diagnosis found.    No orders of the defined types were placed in this encounter.      Outpatient Encounter Prescriptions as of 4/2/2018   Medication Sig Dispense Refill   • alendronate (FOSAMAX) 70 MG tablet TAKE 1 TABLET BY MOUTH EVERY 7 (SEVEN) DAYS. 4 tablet 0   • aspirin 81 MG EC tablet Take 81 mg by mouth Daily.     • atorvastatin (LIPITOR) 40 MG tablet TAKE 1 TABLET BY MOUTH DAILY. 30 tablet 0   • cetirizine (zyrTEC) 10 MG tablet Take 10 mg by mouth Daily.     • fluticasone (VERAMYST) 27.5 MCG/SPRAY nasal spray 2 sprays into each nostril Daily.     • levothyroxine (SYNTHROID, LEVOTHROID) 75 MCG tablet Take 1 tablet by mouth Daily. 90 tablet 1   • lisinopril-hydrochlorothiazide (PRINZIDE,ZESTORETIC) 10-12.5 MG per tablet TAKE 1 TABLET BY MOUTH DAILY. 30 tablet 0   • Multiple Vitamin (MULTI VITAMIN DAILY PO) Take  by mouth.     • [DISCONTINUED] azithromycin (ZITHROMAX) 250 MG tablet Take 2 tablets the first day, then 1 tablet daily for 4 days. 6 tablet 0     No facility-administered encounter medications on file as of 4/2/2018.        Reviewed use of high risk medication in the elderly: yes  Reviewed for potential of harmful " drug interactions in the elderly: yes    Follow Up:  No Follow-up on file.     An After Visit Summary and PPPS with all of these plans were given to the patient.

## 2018-04-02 NOTE — PROGRESS NOTES
Subjective   Mami Srivastava is a 72 y.o. female.     History of Present Illness   Mami Srivastava 72 y.o. female who presents today for routine follow up check and medication refills.  she has a history of   Patient Active Problem List   Diagnosis   • S/P thyroidectomy   • Mixed hyperlipidemia   • Vitamin D deficiency   • Impaired fasting glucose   • Hypothyroidism   • Osteopenia of multiple sites   • Hypertensive left ventricular hypertrophy, without heart failure   • Chronic seasonal allergic rhinitis   • History of heart disease   • Hx of abnormal mammogram   • Nodule of left lung   • History of diverticulosis   .  Since the last visit, she has overall felt well.  She has Hypertenision and is well controlled on medication, Impaired fasting glucose and will continue close lab follow up to watch for DMII, Hyperlipidemia and is well controlled on medication and Hypothyroidism and is well controlled on Rx.  Labs are in desired treatment range.  she has been compliant with current medications have reviewed them.  The patient denies medication side effects.  I will order labs and refill meds as separate visit  No results found for this or any previous visit.  I will get DEXA for Oct; pneumovax 23 was done before age 65 and needs this today  Will update labs  Had labs 9-6-17:  CBC with hbg 12.7, gluc 117,creat 0.92, K+ 4.2, ALT 34; LDL at 58.  TSH 2.51,  A1C March 2017 was 5.6  Had f/u breast and was neg     Niece had breast cancer with BRCA  AAA screen neg 10-4-16     Hx skin basal cell cancer and sees Dr Wilson  Sees cardio once a year, Dr Rick and Dx LVH, diastolic dysfunction, CAD by calcium scan  The following portions of the patient's history were reviewed and updated as appropriate: allergies, current medications, past family history, past medical history, past social history, past surgical history and problem list.    Review of Systems   Constitutional: Negative for activity change, appetite change and  unexpected weight change.   HENT: Positive for congestion and postnasal drip. Negative for nosebleeds and trouble swallowing.    Eyes: Negative for pain and visual disturbance.   Respiratory: Negative for chest tightness, shortness of breath and wheezing.    Cardiovascular: Negative for chest pain and palpitations.   Gastrointestinal: Negative for abdominal pain and blood in stool.   Endocrine: Negative.    Genitourinary: Negative for difficulty urinating and hematuria.   Musculoskeletal: Negative for joint swelling.   Skin: Negative for color change and rash.   Allergic/Immunologic: Negative.    Neurological: Negative for syncope and speech difficulty.   Hematological: Negative for adenopathy.   Psychiatric/Behavioral: Negative for agitation and confusion.   All other systems reviewed and are negative.      Objective   Physical Exam   Constitutional: She is oriented to person, place, and time. She appears well-developed and well-nourished. No distress.   HENT:   Head: Normocephalic and atraumatic.   Eyes: Conjunctivae and EOM are normal. Pupils are equal, round, and reactive to light. Right eye exhibits no discharge. Left eye exhibits no discharge. No scleral icterus.   Neck: Normal range of motion. Neck supple. No tracheal deviation present. No thyromegaly present.   Cardiovascular: Normal rate, regular rhythm, normal heart sounds, intact distal pulses and normal pulses.  Exam reveals no gallop.    No murmur heard.  Pulmonary/Chest: Effort normal and breath sounds normal. No respiratory distress. She has no wheezes. She has no rales.   Musculoskeletal: Normal range of motion.   Neurological: She is alert and oriented to person, place, and time. She exhibits normal muscle tone. Coordination normal.   Skin: Skin is warm. No rash noted. No erythema. No pallor.   Psychiatric: She has a normal mood and affect. Her behavior is normal. Judgment and thought content normal.   Nursing note and vitals  reviewed.      Assessment/Plan   Diagnoses and all orders for this visit:    Medicare annual wellness visit, subsequent  -     Comprehensive metabolic panel  -     Lipid panel  -     CBC and Differential  -     TSH  -     T4, Free  -     Hemoglobin A1c  -     Vitamin D 25 Hydroxy  -     Vitamin B12  -     Folate    Postmenopausal  -     Cancel: Dexa Bone Density, Axial (Every 2 Years); Future  -     Comprehensive metabolic panel  -     Lipid panel  -     CBC and Differential  -     TSH  -     T4, Free  -     Hemoglobin A1c  -     Vitamin D 25 Hydroxy  -     Vitamin B12  -     Folate  -     DEXA Bone Density Axial; Future    Vitamin D deficiency  -     Comprehensive metabolic panel  -     Lipid panel  -     CBC and Differential  -     TSH  -     T4, Free  -     Hemoglobin A1c  -     Vitamin D 25 Hydroxy  -     Vitamin B12  -     Folate    History of heart disease  -     Comprehensive metabolic panel  -     Lipid panel  -     CBC and Differential  -     TSH  -     T4, Free  -     Hemoglobin A1c  -     Vitamin D 25 Hydroxy  -     Vitamin B12  -     Folate    Acquired hypothyroidism  -     Comprehensive metabolic panel  -     Lipid panel  -     CBC and Differential  -     TSH  -     T4, Free  -     Hemoglobin A1c  -     Vitamin D 25 Hydroxy  -     Vitamin B12  -     Folate    Impaired fasting glucose  -     Comprehensive metabolic panel  -     Lipid panel  -     CBC and Differential  -     TSH  -     T4, Free  -     Hemoglobin A1c  -     Vitamin D 25 Hydroxy  -     Vitamin B12  -     Folate    Mixed hyperlipidemia  -     Comprehensive metabolic panel  -     Lipid panel  -     CBC and Differential  -     TSH  -     T4, Free  -     Hemoglobin A1c  -     Vitamin D 25 Hydroxy  -     Vitamin B12  -     Folate    Osteopenia of multiple sites  -     Comprehensive metabolic panel  -     Lipid panel  -     CBC and Differential  -     TSH  -     T4, Free  -     Hemoglobin A1c  -     Vitamin D 25 Hydroxy  -     Vitamin B12  -      Folate    Other orders  -     alendronate (FOSAMAX) 70 MG tablet; Take 1 tablet by mouth Every 7 (Seven) Days. For Osteopenia  -     lisinopril-hydrochlorothiazide (PRINZIDE,ZESTORETIC) 10-12.5 MG per tablet; Take 1 tablet by mouth Daily. For BP  -     levothyroxine (SYNTHROID, LEVOTHROID) 75 MCG tablet; Take 1 tablet by mouth Daily. For thyroid (generic accepted)  -     atorvastatin (LIPITOR) 40 MG tablet; Take 1 tablet by mouth Daily. For cholesterol  -     Pneumococcal Polysaccharide Vaccine 23-Valent Greater Than or Equal To 1yo Subcutaneous / IM

## 2018-04-02 NOTE — PATIENT INSTRUCTIONS
Exercising to Lose Weight  Exercising can help you to lose weight. In order to lose weight through exercise, you need to do vigorous-intensity exercise. You can tell that you are exercising with vigorous intensity if you are breathing very hard and fast and cannot hold a conversation while exercising.  Moderate-intensity exercise helps to maintain your current weight. You can tell that you are exercising at a moderate level if you have a higher heart rate and faster breathing, but you are still able to hold a conversation.  How often should I exercise?  Choose an activity that you enjoy and set realistic goals. Your health care provider can help you to make an activity plan that works for you. Exercise regularly as directed by your health care provider. This may include:  · Doing resistance training twice each week, such as:  ¨ Push-ups.  ¨ Sit-ups.  ¨ Lifting weights.  ¨ Using resistance bands.  · Doing a given intensity of exercise for a given amount of time. Choose from these options:  ¨ 150 minutes of moderate-intensity exercise every week.  ¨ 75 minutes of vigorous-intensity exercise every week.  ¨ A mix of moderate-intensity and vigorous-intensity exercise every week.  Children, pregnant women, people who are out of shape, people who are overweight, and older adults may need to consult a health care provider for individual recommendations. If you have any sort of medical condition, be sure to consult your health care provider before starting a new exercise program.  What are some activities that can help me to lose weight?  · Walking at a rate of at least 4.5 miles an hour.  · Jogging or running at a rate of 5 miles per hour.  · Biking at a rate of at least 10 miles per hour.  · Lap swimming.  · Roller-skating or in-line skating.  · Cross-country skiing.  · Vigorous competitive sports, such as football, basketball, and soccer.  · Jumping rope.  · Aerobic dancing.  How can I be more active in my day-to-day  activities?  · Use the stairs instead of the elevator.  · Take a walk during your lunch break.  · If you drive, park your car farther away from work or school.  · If you take public transportation, get off one stop early and walk the rest of the way.  · Make all of your phone calls while standing up and walking around.  · Get up, stretch, and walk around every 30 minutes throughout the day.  What guidelines should I follow while exercising?  · Do not exercise so much that you hurt yourself, feel dizzy, or get very short of breath.  · Consult your health care provider prior to starting a new exercise program.  · Wear comfortable clothes and shoes with good support.  · Drink plenty of water while you exercise to prevent dehydration or heat stroke. Body water is lost during exercise and must be replaced.  · Work out until you breathe faster and your heart beats faster.  This information is not intended to replace advice given to you by your health care provider. Make sure you discuss any questions you have with your health care provider.  Document Released: 01/20/2012 Document Revised: 05/25/2017 Document Reviewed: 05/21/2015  [employer] Interactive Patient Education © 2017 [employer]    Fall Prevention in the Home  Falls can cause injuries and can affect people from all age groups. There are many simple things that you can do to make your home safe and to help prevent falls.  What can I do on the outside of my home?  · Regularly repair the edges of walkways and driveways and fix any cracks.  · Remove high doorway thresholds.  · Trim any shrubbery on the main path into your home.  · Use bright outdoor lighting.  · Clear walkways of debris and clutter, including tools and rocks.  · Regularly check that handrails are securely fastened and in good repair. Both sides of any steps should have handrails.  · Install guardrails along the edges of any raised decks or porches.  · Have leaves, snow, and ice cleared  regularly.  · Use sand or salt on walkways during winter months.  · In the garage, clean up any spills right away, including grease or oil spills.  What can I do in the bathroom?  · Use night lights.  · Install grab bars by the toilet and in the tub and shower. Do not use towel bars as grab bars.  · Use non-skid mats or decals on the floor of the tub or shower.  · If you need to sit down while you are in the shower, use a plastic, non-slip stool.  · Keep the floor dry. Immediately clean up any water that spills on the floor.  · Remove soap buildup in the tub or shower on a regular basis.  · Attach bath mats securely with double-sided non-slip rug tape.  · Remove throw rugs and other tripping hazards from the floor.  What can I do in the bedroom?  · Use night lights.  · Make sure that a bedside light is easy to reach.  · Do not use oversized bedding that drapes onto the floor.  · Have a firm chair that has side arms to use for getting dressed.  · Remove throw rugs and other tripping hazards from the floor.  What can I do in the kitchen?  · Clean up any spills right away.  · Avoid walking on wet floors.  · Place frequently used items in easy-to-reach places.  · If you need to reach for something above you, use a sturdy step stool that has a grab bar.  · Keep electrical cables out of the way.  · Do not use floor polish or wax that makes floors slippery. If you have to use wax, make sure that it is non-skid floor wax.  · Remove throw rugs and other tripping hazards from the floor.  What can I do in the stairways?  · Do not leave any items on the stairs.  · Make sure that there are handrails on both sides of the stairs. Fix handrails that are broken or loose. Make sure that handrails are as long as the stairways.  · Check any carpeting to make sure that it is firmly attached to the stairs. Fix any carpet that is loose or worn.  · Avoid having throw rugs at the top or bottom of stairways, or secure the rugs with carpet  tape to prevent them from moving.  · Make sure that you have a light switch at the top of the stairs and the bottom of the stairs. If you do not have them, have them installed.  What are some other fall prevention tips?  · Wear closed-toe shoes that fit well and support your feet. Wear shoes that have rubber soles or low heels.  · When you use a stepladder, make sure that it is completely opened and that the sides are firmly locked. Have someone hold the ladder while you are using it. Do not climb a closed stepladder.  · Add color or contrast paint or tape to grab bars and handrails in your home. Place contrasting color strips on the first and last steps.  · Use mobility aids as needed, such as canes, walkers, scooters, and crutches.  · Turn on lights if it is dark. Replace any light bulbs that burn out.  · Set up furniture so that there are clear paths. Keep the furniture in the same spot.  · Fix any uneven floor surfaces.  · Choose a carpet design that does not hide the edge of steps of a stairway.  · Be aware of any and all pets.  · Review your medicines with your healthcare provider. Some medicines can cause dizziness or changes in blood pressure, which increase your risk of falling.  Talk with your health care provider about other ways that you can decrease your risk of falls. This may include working with a physical therapist or  to improve your strength, balance, and endurance.  This information is not intended to replace advice given to you by your health care provider. Make sure you discuss any questions you have with your health care provider.  Document Released: 12/08/2003 Document Revised: 05/16/2017 Document Reviewed: 01/22/2016  [employer] Interactive Patient Education © 2017 [employer]

## 2018-04-16 LAB
25(OH)D3+25(OH)D2 SERPL-MCNC: 38.5 NG/ML (ref 30–100)
ALBUMIN SERPL-MCNC: 4.4 G/DL (ref 3.5–4.8)
ALBUMIN/GLOB SERPL: 1.9 {RATIO} (ref 1.2–2.2)
ALP SERPL-CCNC: 88 IU/L (ref 39–117)
ALT SERPL-CCNC: 51 IU/L (ref 0–32)
AST SERPL-CCNC: 48 IU/L (ref 0–40)
BILIRUB SERPL-MCNC: 1.4 MG/DL (ref 0–1.2)
BUN SERPL-MCNC: 17 MG/DL (ref 8–27)
BUN/CREAT SERPL: 18 (ref 12–28)
CALCIUM SERPL-MCNC: 9.6 MG/DL (ref 8.7–10.3)
CHLORIDE SERPL-SCNC: 99 MMOL/L (ref 96–106)
CO2 SERPL-SCNC: 28 MMOL/L (ref 18–29)
CREAT SERPL-MCNC: 0.96 MG/DL (ref 0.57–1)
GFR SERPLBLD CREATININE-BSD FMLA CKD-EPI: 59 ML/MIN/1.73
GFR SERPLBLD CREATININE-BSD FMLA CKD-EPI: 68 ML/MIN/1.73
GLOBULIN SER CALC-MCNC: 2.3 G/DL (ref 1.5–4.5)
GLUCOSE SERPL-MCNC: 105 MG/DL (ref 65–99)
HBA1C MFR BLD: 5.8 % (ref 4.8–5.6)
POTASSIUM SERPL-SCNC: 4.3 MMOL/L (ref 3.5–5.2)
PROT SERPL-MCNC: 6.7 G/DL (ref 6–8.5)
SODIUM SERPL-SCNC: 142 MMOL/L (ref 134–144)

## 2018-04-18 DIAGNOSIS — R79.89 LFT ELEVATION: Primary | ICD-10-CM

## 2018-04-18 LAB
HAV IGM SERPL QL IA: ABNORMAL
HBV CORE IGM SERPL QL IA: NEGATIVE
HBV SURFACE AG SERPL QL IA: NEGATIVE
HCV AB S/CO SERPL IA: <0.1 S/CO RATIO (ref 0–0.9)
Lab: NORMAL
WRITTEN AUTHORIZATION: NORMAL

## 2018-04-20 LAB
ALBUMIN SERPL-MCNC: 4.8 G/DL (ref 3.5–5.2)
ALP SERPL-CCNC: 70 U/L (ref 39–117)
ALT SERPL-CCNC: 45 U/L (ref 1–33)
AST SERPL-CCNC: 48 U/L (ref 1–32)
BILIRUB DIRECT SERPL-MCNC: 0.3 MG/DL (ref 0–0.3)
BILIRUB SERPL-MCNC: 1.7 MG/DL (ref 0.1–1.2)
HAV IGM SERPL QL IA: ABNORMAL

## 2018-04-23 ENCOUNTER — TELEPHONE (OUTPATIENT)
Dept: FAMILY MEDICINE CLINIC | Facility: CLINIC | Age: 73
End: 2018-04-23

## 2018-04-23 NOTE — TELEPHONE ENCOUNTER
Pt was sent home from work due to her test results. She called Dr Hein office for appointment which is not until May 1. She is wanting to know if we could try to get her in earlier or have another test done so she can go back to work.

## 2018-04-23 NOTE — PROGRESS NOTES
I sent message to Dr Hein about this and if I can order additional labs to prep for May 1 appt and that she cannot work.

## 2018-04-23 NOTE — TELEPHONE ENCOUNTER
This patient tested intermediate for Hep A and not allowed to work until sees you May 1.  LFTs were up and that is why I did lab .  Is there a lab I can do now to prep for appt?;  I want to be ready  Lab Results   Component Value Date    BUN 17 04/14/2018    CREATININE 0.96 04/14/2018    EGFRIFNONA 59 (L) 04/14/2018    EGFRIFAFRI 68 04/14/2018    BCR 18 04/14/2018    K 4.3 04/14/2018    CO2 28 04/14/2018    CALCIUM 9.6 04/14/2018    PROTENTOTREF 6.7 04/14/2018    ALBUMIN 4.80 04/19/2018    LABIL2 1.9 04/14/2018    AST 48 (H) 04/19/2018    ALT 45 (H) 04/19/2018     ThanksJacqui

## 2018-04-25 ENCOUNTER — HOSPITAL ENCOUNTER (OUTPATIENT)
Dept: ULTRASOUND IMAGING | Facility: HOSPITAL | Age: 73
Discharge: HOME | End: 2018-04-25
Admitting: PHYSICIAN ASSISTANT

## 2018-04-25 DIAGNOSIS — R79.89 LFT ELEVATION: ICD-10-CM

## 2018-04-25 PROCEDURE — 76705 ECHO EXAM OF ABDOMEN: CPT

## 2018-04-27 ENCOUNTER — OFFICE VISIT (OUTPATIENT)
Dept: FAMILY MEDICINE CLINIC | Facility: CLINIC | Age: 73
End: 2018-04-27

## 2018-04-27 VITALS
DIASTOLIC BLOOD PRESSURE: 60 MMHG | SYSTOLIC BLOOD PRESSURE: 110 MMHG | HEART RATE: 82 BPM | BODY MASS INDEX: 23.9 KG/M2 | HEIGHT: 64 IN | WEIGHT: 140 LBS | OXYGEN SATURATION: 97 % | RESPIRATION RATE: 16 BRPM | TEMPERATURE: 97.4 F

## 2018-04-27 DIAGNOSIS — I10 ESSENTIAL HYPERTENSION: ICD-10-CM

## 2018-04-27 DIAGNOSIS — E89.0 S/P THYROIDECTOMY: ICD-10-CM

## 2018-04-27 DIAGNOSIS — M85.89 OSTEOPENIA OF MULTIPLE SITES: ICD-10-CM

## 2018-04-27 DIAGNOSIS — E78.2 MIXED HYPERLIPIDEMIA: ICD-10-CM

## 2018-04-27 DIAGNOSIS — R73.01 IMPAIRED FASTING GLUCOSE: ICD-10-CM

## 2018-04-27 DIAGNOSIS — Z86.79 HISTORY OF HEART DISEASE: ICD-10-CM

## 2018-04-27 DIAGNOSIS — E03.9 ACQUIRED HYPOTHYROIDISM: Primary | ICD-10-CM

## 2018-04-27 PROCEDURE — 99214 OFFICE O/P EST MOD 30 MIN: CPT | Performed by: PHYSICIAN ASSISTANT

## 2018-04-27 NOTE — PATIENT INSTRUCTIONS
Low glycemic index diet  Exercise 30 minutes most days of the week  Make sure you get results on any labs or tests we ordered today  We discussed medications and how to take them as prescribed  Sleep 6-8 hours each night if possible  If you have not signed up for [employer], please activate your code ASAP from your After Visit Summary today    LDL goal <100  LDL goal if heart disease <70  HDL goal >60  Triglyceride goal <150  BP goal =<130/80  Fasting glucose <100

## 2018-04-27 NOTE — PROGRESS NOTES
Subjective   Mami Srivastava is a 72 y.o. female.     History of Present Illness     Mami Srivastava 72 y.o. female who presents today for routine follow up check and medication refills.  she has a history of   Patient Active Problem List   Diagnosis   • S/P thyroidectomy   • Mixed hyperlipidemia   • Vitamin D deficiency   • Impaired fasting glucose   • Hypothyroidism   • Osteopenia of multiple sites   • Hypertensive left ventricular hypertrophy, without heart failure   • Chronic seasonal allergic rhinitis   • History of heart disease   • Hx of abnormal mammogram   • Nodule of left lung   • History of diverticulosis   • Hypertension   .  Since the last visit, she has overall felt well.  She has Hypertenision and is well controlled on medication, Impaired fasting glucose and will continue close lab follow up to watch for DMII, Hyperlipidemia and is well controlled on medication and Vitamin D deficiency and well controlled on medication and labs at goal >30.  she has been compliant with current medications have reviewed them.  The patient denies medication side effects.  LDL on 9-12-17 was 58 on prior labs    Results for orders placed or performed in visit on 04/18/18   Hepatitis A Antibody, IgM   Result Value Ref Range    Hep A IgM Indeterminate (A) Negative   Hepatic Function Panel (6) ([facility])   Result Value Ref Range    Albumin 4.80 3.50 - 5.20 g/dL    Total Bilirubin 1.7 (H) 0.1 - 1.2 mg/dL    Bilirubin, Direct 0.3 0.0 - 0.3 mg/dL    Alkaline Phosphatase 70 39 - 117 U/L    AST (SGOT) 48 (H) 1 - 32 U/L    ALT (SGPT) 45 (H) 1 - 33 U/L     Lab Results   Component Value Date    BUN 17 04/14/2018    CREATININE 0.96 04/14/2018    EGFRIFNONA 59 (L) 04/14/2018    EGFRIFAFRI 68 04/14/2018    BCR 18 04/14/2018    K 4.3 04/14/2018    CO2 28 04/14/2018    CALCIUM 9.6 04/14/2018    PROTENTOTREF 6.7 04/14/2018    ALBUMIN 4.80 04/19/2018    LABIL2 1.9 04/14/2018    AST 48 (H) 04/19/2018    ALT 45 (H) 04/19/2018     Lab  Results   Component Value Date    HGBA1C 5.8 (H) 04/14/2018     No results found for: CHOL, CHLPL, TRIG, HDL, LDL, LDLDIRECT        Her LFTs have been up and I did acute hepatitis profile d/t this and hep A indeterminate and repeated test and same result  I have referral in to GI, has appt Dr Hein 5-1-18.  I sent Dr Hein a message on [employer] about if I could order labs or any studies prior to appt to prep.  He did NOT answer me.  She is not allowed to work during this time.    Her u/s showed gallstones and no current symptoms; occas RUQ and refers to back pain;  Will talk to Dr Hein about this at appt  No fatty liver    I will have her hold Atorvastatin with elevated LFTs    The following portions of the patient's history were reviewed and updated as appropriate: allergies, current medications, past family history, past medical history, past social history, past surgical history and problem list.    Review of Systems   Constitutional: Negative for activity change, appetite change and unexpected weight change.   HENT: Negative for nosebleeds and trouble swallowing.    Eyes: Negative for pain and visual disturbance.   Respiratory: Negative for chest tightness, shortness of breath and wheezing.    Cardiovascular: Negative for chest pain and palpitations.   Gastrointestinal: Negative for abdominal pain and blood in stool.   Endocrine: Negative.    Genitourinary: Negative for difficulty urinating and hematuria.   Musculoskeletal: Negative for joint swelling.   Skin: Negative for color change and rash.   Allergic/Immunologic: Negative.    Neurological: Negative for syncope and speech difficulty.   Hematological: Negative for adenopathy.   Psychiatric/Behavioral: Negative for agitation and confusion.   All other systems reviewed and are negative.      Objective   Physical Exam   Constitutional: She is oriented to person, place, and time. She appears well-developed and well-nourished. No distress.   HENT:   Head:  Normocephalic and atraumatic.   Eyes: Conjunctivae and EOM are normal. Pupils are equal, round, and reactive to light. Right eye exhibits no discharge. Left eye exhibits no discharge. No scleral icterus.   Neck: Normal range of motion. Neck supple. No tracheal deviation present. No thyromegaly present.   Cardiovascular: Normal rate, regular rhythm, normal heart sounds, intact distal pulses and normal pulses.  Exam reveals no gallop.    No murmur heard.  Pulmonary/Chest: Effort normal and breath sounds normal. No respiratory distress. She has no wheezes. She has no rales.   Musculoskeletal: Normal range of motion.   Neurological: She is alert and oriented to person, place, and time. She exhibits normal muscle tone. Coordination normal.   Skin: Skin is warm. No rash noted. No erythema. No pallor.   Psychiatric: She has a normal mood and affect. Her behavior is normal. Judgment and thought content normal.   Nursing note and vitals reviewed.      Assessment/Plan   Mami was seen today for hyperlipidemia.    Diagnoses and all orders for this visit:    Acquired hypothyroidism  -     Comprehensive metabolic panel; Future  -     Lipid panel; Future  -     CBC and Differential; Future  -     TSH; Future  -     T4, Free; Future  -     Vitamin B12; Future  -     Folate; Future  -     Hemoglobin A1c; Future    Essential hypertension  -     Comprehensive metabolic panel; Future  -     Lipid panel; Future  -     CBC and Differential; Future  -     TSH; Future  -     T4, Free; Future  -     Vitamin B12; Future  -     Folate; Future  -     Hemoglobin A1c; Future    History of heart disease  -     Comprehensive metabolic panel; Future  -     Lipid panel; Future  -     CBC and Differential; Future  -     TSH; Future  -     T4, Free; Future  -     Vitamin B12; Future  -     Folate; Future  -     Hemoglobin A1c; Future    S/P thyroidectomy  -     Comprehensive metabolic panel; Future  -     Lipid panel; Future  -     CBC and  Differential; Future  -     TSH; Future  -     T4, Free; Future  -     Vitamin B12; Future  -     Folate; Future  -     Hemoglobin A1c; Future    Mixed hyperlipidemia  -     Comprehensive metabolic panel; Future  -     Lipid panel; Future  -     CBC and Differential; Future  -     TSH; Future  -     T4, Free; Future  -     Vitamin B12; Future  -     Folate; Future  -     Hemoglobin A1c; Future    Impaired fasting glucose  -     Comprehensive metabolic panel; Future  -     Lipid panel; Future  -     CBC and Differential; Future  -     TSH; Future  -     T4, Free; Future  -     Vitamin B12; Future  -     Folate; Future  -     Hemoglobin A1c; Future    Osteopenia of multiple sites  -     Comprehensive metabolic panel; Future  -     Lipid panel; Future  -     CBC and Differential; Future  -     TSH; Future  -     T4, Free; Future  -     Vitamin B12; Future  -     Folate; Future  -     Hemoglobin A1c; Future

## 2018-05-01 ENCOUNTER — LAB (OUTPATIENT)
Dept: LAB | Facility: HOSPITAL | Age: 73
End: 2018-05-01

## 2018-05-01 ENCOUNTER — OFFICE VISIT (OUTPATIENT)
Dept: GASTROENTEROLOGY | Facility: CLINIC | Age: 73
End: 2018-05-01

## 2018-05-01 VITALS
TEMPERATURE: 97.8 F | HEIGHT: 64 IN | BODY MASS INDEX: 23.9 KG/M2 | SYSTOLIC BLOOD PRESSURE: 142 MMHG | WEIGHT: 140 LBS | DIASTOLIC BLOOD PRESSURE: 82 MMHG

## 2018-05-01 DIAGNOSIS — R79.89 ELEVATED LFTS: ICD-10-CM

## 2018-05-01 DIAGNOSIS — R79.89 ELEVATED LFTS: Primary | ICD-10-CM

## 2018-05-01 LAB
ALBUMIN SERPL-MCNC: 4.5 G/DL (ref 3.5–5.2)
ALBUMIN/GLOB SERPL: 1.4 G/DL
ALP SERPL-CCNC: 78 U/L (ref 39–117)
ALT SERPL W P-5'-P-CCNC: 42 U/L (ref 1–33)
ANION GAP SERPL CALCULATED.3IONS-SCNC: 14.4 MMOL/L
AST SERPL-CCNC: 48 U/L (ref 1–32)
BILIRUB SERPL-MCNC: 1.3 MG/DL (ref 0.1–1.2)
BUN BLD-MCNC: 21 MG/DL (ref 8–23)
BUN/CREAT SERPL: 20.2 (ref 7–25)
CALCIUM SPEC-SCNC: 9.9 MG/DL (ref 8.6–10.5)
CHLORIDE SERPL-SCNC: 99 MMOL/L (ref 98–107)
CO2 SERPL-SCNC: 26.6 MMOL/L (ref 22–29)
CREAT BLD-MCNC: 1.04 MG/DL (ref 0.57–1)
GFR SERPL CREATININE-BSD FRML MDRD: 52 ML/MIN/1.73
GLOBULIN UR ELPH-MCNC: 3.2 GM/DL
GLUCOSE BLD-MCNC: 94 MG/DL (ref 65–99)
HBV SURFACE AB SER RIA-ACNC: NORMAL
IRON 24H UR-MRATE: 56 MCG/DL (ref 37–145)
IRON SATN MFR SERPL: 14 % (ref 20–50)
POTASSIUM BLD-SCNC: 3.9 MMOL/L (ref 3.5–5.2)
PROT SERPL-MCNC: 7.7 G/DL (ref 6–8.5)
SODIUM BLD-SCNC: 140 MMOL/L (ref 136–145)
TIBC SERPL-MCNC: 389 MCG/DL (ref 298–536)
TRANSFERRIN SERPL-MCNC: 261 MG/DL (ref 200–360)

## 2018-05-01 PROCEDURE — 82103 ALPHA-1-ANTITRYPSIN TOTAL: CPT

## 2018-05-01 PROCEDURE — 80053 COMPREHEN METABOLIC PANEL: CPT

## 2018-05-01 PROCEDURE — 86706 HEP B SURFACE ANTIBODY: CPT

## 2018-05-01 PROCEDURE — 83540 ASSAY OF IRON: CPT

## 2018-05-01 PROCEDURE — 99203 OFFICE O/P NEW LOW 30 MIN: CPT | Performed by: INTERNAL MEDICINE

## 2018-05-01 PROCEDURE — 36415 COLL VENOUS BLD VENIPUNCTURE: CPT

## 2018-05-01 PROCEDURE — 84466 ASSAY OF TRANSFERRIN: CPT

## 2018-05-01 PROCEDURE — 83516 IMMUNOASSAY NONANTIBODY: CPT

## 2018-05-01 PROCEDURE — 86038 ANTINUCLEAR ANTIBODIES: CPT

## 2018-05-01 PROCEDURE — 86708 HEPATITIS A ANTIBODY: CPT

## 2018-05-02 LAB — ANA SER QL: NEGATIVE

## 2018-05-03 LAB
A1AT SERPL-MCNC: 128 MG/DL (ref 90–200)
ACTIN IGG SERPL-ACNC: 29 UNITS (ref 0–19)
DEPRECATED MITOCHONDRIA M2 IGG SER-ACNC: <20 UNITS (ref 0–20)
HAV AB SER QL IA: POSITIVE

## 2018-05-14 ENCOUNTER — TELEPHONE (OUTPATIENT)
Dept: GASTROENTEROLOGY | Facility: CLINIC | Age: 73
End: 2018-05-14

## 2018-05-14 DIAGNOSIS — R79.89 ELEVATED LIVER FUNCTION TESTS: Primary | ICD-10-CM

## 2018-05-14 NOTE — TELEPHONE ENCOUNTER
----- Message from Brooke Cason sent at 5/14/2018  2:15 PM EDT -----  Regarding: lab results  Contact: [phone]  Pt had labs done on 5-1 can see that they are back on mychart wanted to know when she will get a call on what they mean and what the next step is. Thank you!

## 2018-05-14 NOTE — TELEPHONE ENCOUNTER
Called pt and advised that most of the labs have resulted, but still waiting for the Hep A IgM to result. Advised can send message to MD to sign off on results we have so far and see what MD can recommend so far. Pt verb understanding.

## 2018-05-17 NOTE — TELEPHONE ENCOUNTER
"Per a message form pt via [employer]: \"Hi again Yocasta,   Yes please arrange for liver biopsy asap as this is really beginning to worry me. \"    Order for liver biopsy placed.           "

## 2018-05-17 NOTE — TELEPHONE ENCOUNTER
Results appeared to show patient is immune to hepatitis A and will likely need liver biopsy to assess cause of elevated liver functions.  But still need to await the state lab evaluation of her hepatitis A IgM antibody

## 2018-05-22 ENCOUNTER — HOSPITAL ENCOUNTER (OUTPATIENT)
Dept: CT IMAGING | Facility: HOSPITAL | Age: 73
Discharge: HOME | End: 2018-05-22
Attending: INTERNAL MEDICINE | Admitting: INTERNAL MEDICINE

## 2018-05-22 VITALS
HEIGHT: 64 IN | SYSTOLIC BLOOD PRESSURE: 111 MMHG | DIASTOLIC BLOOD PRESSURE: 61 MMHG | TEMPERATURE: 97.2 F | HEART RATE: 69 BPM | BODY MASS INDEX: 23.9 KG/M2 | OXYGEN SATURATION: 97 % | WEIGHT: 140 LBS | RESPIRATION RATE: 16 BRPM

## 2018-05-22 DIAGNOSIS — R79.89 ELEVATED LIVER FUNCTION TESTS: ICD-10-CM

## 2018-05-22 LAB
INR PPP: 0.9 (ref 0.8–1.2)
PROTHROMBIN TIME: 11.2 SECONDS (ref 12.8–15.2)

## 2018-05-22 PROCEDURE — 77012 CT SCAN FOR NEEDLE BIOPSY: CPT

## 2018-05-22 PROCEDURE — 88307 TISSUE EXAM BY PATHOLOGIST: CPT | Performed by: INTERNAL MEDICINE

## 2018-05-22 PROCEDURE — 88313 SPECIAL STAINS GROUP 2: CPT | Performed by: INTERNAL MEDICINE

## 2018-05-22 RX ORDER — LIDOCAINE HYDROCHLORIDE 10 MG/ML
20 INJECTION, SOLUTION INFILTRATION; PERINEURAL ONCE
Status: COMPLETED | OUTPATIENT
Start: 2018-05-22 | End: 2018-05-22

## 2018-05-22 RX ORDER — FLUTICASONE PROPIONATE 50 MCG
1 SPRAY, SUSPENSION (ML) NASAL DAILY PRN
COMMUNITY
End: 2020-09-19

## 2018-05-22 RX ORDER — ALPRAZOLAM 0.5 MG/1
0.5 TABLET ORAL ONCE
Status: DISCONTINUED | OUTPATIENT
Start: 2018-05-22 | End: 2018-05-23 | Stop reason: HOSPADM

## 2018-05-22 RX ORDER — SODIUM CHLORIDE 0.9 % (FLUSH) 0.9 %
1-10 SYRINGE (ML) INJECTION AS NEEDED
Status: DISCONTINUED | OUTPATIENT
Start: 2018-05-22 | End: 2018-05-23 | Stop reason: HOSPADM

## 2018-05-22 RX ADMIN — LIDOCAINE HYDROCHLORIDE 20 ML: 10 INJECTION, SOLUTION INFILTRATION; PERINEURAL at 10:23

## 2018-05-22 NOTE — NURSING NOTE
Returned to Triage post biopsy. Drsg to right lower side of abdoment dry and intact. No c/o pain. Positioned for comfort. Breakfast served.

## 2018-05-22 NOTE — H&P (VIEW-ONLY)
Chief Complaint   Patient presents with   • Elevated Hepatic Enzymes     AST: 48, ALT: 45     Mami Srivastava is a 72 y.o. female who presents with a History of elevated liver enzymes and an indeterminant hepatitis A antibody level   HPI     Patient 72-year-old female with history of hypertension and hypothyroidism found with mildly elevated LFTs.  Patient under evaluation found with an indeterminant hepatitis A antibody level.  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  Patient's liver enzymes however are minimal and not typical for hepatitis A infection.  Patient denies abdominal pain no nausea vomiting no flulike symptoms.  Patient denies jaundice or darkening of the urine or lightening of the stool.  Patient now here for further assessment.    Past Medical History:   Diagnosis Date   • Cholelithiasis 04-    Ultrasound   • Coronary artery disease     Dr Luis Rick   • Hernia 2017    Hiatal hernia-Prabhjot test   • Hyperlipidemia     Dr Luis Rick-atorvastatin   • Hypertension    • Hyperthyroidism    • Hypothyroidism    • Skin cancer     face   • Ulcerative colitis 2014    Colitis/[facility]       Current Outpatient Prescriptions:   •  alendronate (FOSAMAX) 70 MG tablet, Take 1 tablet by mouth Every 7 (Seven) Days. For Osteopenia, Disp: 16 tablet, Rfl: 3  •  aspirin 81 MG EC tablet, Take 81 mg by mouth Daily., Disp: , Rfl:   •  cetirizine (zyrTEC) 10 MG tablet, Take 10 mg by mouth Daily., Disp: , Rfl:   •  levothyroxine (SYNTHROID, LEVOTHROID) 75 MCG tablet, Take 1 tablet by mouth Daily. For thyroid (generic accepted), Disp: 90 tablet, Rfl: 3  •  lisinopril-hydrochlorothiazide (PRINZIDE,ZESTORETIC) 10-12.5 MG per tablet, Take 1 tablet by mouth Daily. For BP, Disp: 90 tablet, Rfl: 1  •  Multiple Vitamin (MULTI VITAMIN DAILY PO), Take  by mouth., Disp: , Rfl:   •  atorvastatin (LIPITOR) 40 MG tablet, Take 1 tablet  by mouth Daily. For cholesterol, Disp: 90 tablet, Rfl: 3  Allergies   Allergen Reactions   • Augmentin [Amoxicillin-Pot Clavulanate] Hives   • Keflex [Cephalexin] Hives     Social History     Social History   • Marital status: Single     Spouse name: N/A   • Number of children: N/A   • Years of education: N/A     Occupational History   • Not on file.     Social History Main Topics   • Smoking status: Former Smoker     Packs/day: 1.50     Years: 10.00     Start date: 1963     Quit date: 1976   • Smokeless tobacco: Never Used      Comment: Chrinic bronchitis...smoke allergy   • Alcohol use Yes      Comment: Seldom   • Drug use: No   • Sexual activity: Not Currently     Other Topics Concern   • Not on file     Social History Narrative   • No narrative on file     Family History   Problem Relation Age of Onset   • Heart disease Mother    • Hypertension Mother    • Lung cancer Sister    • Thyroid disease Sister    • Lupus Sister    • Thyroid disease Brother    • Alcohol abuse Brother    • Glaucoma Maternal Grandmother    • Stomach cancer Maternal Grandmother          • Cirrhosis Father            Review of Systems   Constitutional: Negative.    HENT: Negative.    Eyes: Negative.    Respiratory: Negative.    Cardiovascular: Negative.    Gastrointestinal: Negative.    Endocrine: Negative.    Musculoskeletal: Negative.    Skin: Negative.    Allergic/Immunologic: Positive for environmental allergies. Negative for food allergies and immunocompromised state.   Hematological: Negative.      Vitals:    18 1605   BP: 142/82   Temp: 97.8 °F (36.6 °C)     Physical Exam   Constitutional: She is oriented to person, place, and time. She appears well-developed and well-nourished.   HENT:   Head: Normocephalic and atraumatic.   Eyes: Pupils are equal, round, and reactive to light. No scleral icterus.   Neck: Normal range of motion.   Cardiovascular: Normal rate, regular rhythm and normal heart sounds.   Exam reveals no gallop and no friction rub.    No murmur heard.  Pulmonary/Chest: Effort normal and breath sounds normal. She has no wheezes. She has no rales.   Abdominal: Soft. Bowel sounds are normal. She exhibits no shifting dullness, no distension, no pulsatile liver, no fluid wave, no abdominal bruit, no ascites, no pulsatile midline mass and no mass. There is no hepatosplenomegaly. There is no tenderness. There is no rigidity and no guarding. No hernia.   Musculoskeletal: Normal range of motion. She exhibits no edema.   Lymphadenopathy:     She has no cervical adenopathy.   Neurological: She is alert and oriented to person, place, and time. No cranial nerve deficit.   Skin: Skin is warm and dry. No rash noted.   Psychiatric: She has a normal mood and affect. Her behavior is normal.   Nursing note and vitals reviewed.    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  Currently patient not allowed to return to work until cleared the possibility of hepatitis A.  Patient reports no GI complaints denies any abdominal pain no right upper quadrant pain does have some left back pain and medication but unrelated to eating her bowels.  At this point will check total hepatitis A antibody levels as this may be a case where the the IgG is partially interacting with the IgM evaluation that would indicate patient is new and not infected.  We'll also check serologies for other causes of mild elevated liver test abnormalities and include hepatitis B antibody  as she may be interested in getting both a and B vaccine if she is not immune.  We'll follow up based on results of blood tests.

## 2018-05-22 NOTE — NURSING NOTE
Discharge instructions discussed and understood by patient and family. Ghulam D&I. No c/o pain. No distress. Home per vehicle.

## 2018-05-22 NOTE — DISCHARGE INSTRUCTIONS
EDUCATION /DISCHARGE INSTRUCTIONS  CT/US guided biopsy:  A biopsy is a procedure done to remove tissue for further analysis.  Before images are taken to locate the target area.  Images can be obtained using ultrasound, CT or MRI.  A physician will clean your skin with antiseptic soap, place a sterile towel around the site and administer a local anesthetic to numb the area.  The physician will then insert a special needle.  Sometimes images are taken of the needle after it is inserted to ensure the needle is in the correct area to be biopsied.   A sample is obtained and sent to the laboratory for study.  Occasionally the laboratory is unable to make a diagnosis from the sample and the procedure may need to be repeated.  Within a week the radiologist will send a report to your physician.  A pathologist will also examine the tissue and send a report.      Risks of the procedure include but are not limited to:   *  Bleeding    *  Infection   *  Puncture of surrounding organs *  Death         Benefits of the procedure:  Using x-ray helps to locate the area that requires a biopsy. The procedure is less invasive than a surgical procedure, there are no large incisions and it does not require anesthesia.      Alternatives to the procedure:  A biopsy can be performed surgically.  Risks of a surgical biopsy include exposure to anesthesia, infection, excessive bleeding and injury to abdominal organs.  A benefit of surgical biopsy is the ability to see the area to be biopsied and remove of a larger piece of tissue.    THIS EDUCATION INFORMATION WAS REVIEWED PRIOR TO PROCEDURE AND CONSENT. Patient initials__________________Time___0907________________    Post Procedure:    *  Expect the biopsy site may be tender up to one week.    *  Rest today (no pushing pulling or straining).   *  Slowly increase activity tomorrow.    *  If you received sedation do not drive for 24 hours.   *  Keep dressing clean and dry.   *  Leave dressing on  puncture site for 24 hours.    *  You may shower when dressing removed.    Call your doctor if experiencing:   *  Signs of infection such as redness, swelling, excessive pain and / or foul        smelling drainage from the puncture site.   *  Chills or fever over 101 degrees (by mouth).   *  Unrelieved pain.   *  Any new or severe symptoms.   *  If experiencing sudden / severe shortness of breath or chest pain go to the       nearest emergency room.     Following the procedure:     Follow-up with the ordering physician as directed.    Continue to take other medications as directed by your physician unless    otherwise instructed.   If applicable, resume taking your blood thinners or Aspirin on __Resume Aspirin on 5/23/18 after 1 pm _________.      If you have any concerns please call the Radiology Nurses Desk at [phone].  You are the most important factor in your recovery.  Follow the above instructions carefully.

## 2018-05-23 ENCOUNTER — TELEPHONE (OUTPATIENT)
Dept: INTERVENTIONAL RADIOLOGY/VASCULAR | Facility: HOSPITAL | Age: 73
End: 2018-05-23

## 2018-05-23 LAB
CYTO UR: NORMAL
LAB AP CASE REPORT: NORMAL
LAB AP CLINICAL INFORMATION: NORMAL
LAB AP INTRADEPARTMENTAL CONSULT: NORMAL
LAB AP SPECIAL STAINS: NORMAL
Lab: NORMAL
PATH REPORT.FINAL DX SPEC: NORMAL
PATH REPORT.GROSS SPEC: NORMAL

## 2018-05-24 ENCOUNTER — HOSPITAL ENCOUNTER (EMERGENCY)
Facility: HOSPITAL | Age: 73
Discharge: HOME | End: 2018-05-24
Attending: EMERGENCY MEDICINE | Admitting: EMERGENCY MEDICINE

## 2018-05-24 ENCOUNTER — APPOINTMENT (OUTPATIENT)
Dept: CT IMAGING | Facility: HOSPITAL | Age: 73
End: 2018-05-24

## 2018-05-24 ENCOUNTER — TELEPHONE (OUTPATIENT)
Dept: FAMILY MEDICINE CLINIC | Facility: CLINIC | Age: 73
End: 2018-05-24

## 2018-05-24 ENCOUNTER — APPOINTMENT (OUTPATIENT)
Dept: ULTRASOUND IMAGING | Facility: HOSPITAL | Age: 73
End: 2018-05-24

## 2018-05-24 VITALS
BODY MASS INDEX: 23.9 KG/M2 | WEIGHT: 140 LBS | HEIGHT: 64 IN | DIASTOLIC BLOOD PRESSURE: 74 MMHG | HEART RATE: 66 BPM | OXYGEN SATURATION: 98 % | RESPIRATION RATE: 16 BRPM | SYSTOLIC BLOOD PRESSURE: 178 MMHG | TEMPERATURE: 97.8 F

## 2018-05-24 DIAGNOSIS — G89.18 POST PROCEDURE DISCOMFORT: ICD-10-CM

## 2018-05-24 DIAGNOSIS — R10.13 EPIGASTRIC PAIN: Primary | ICD-10-CM

## 2018-05-24 LAB
ALBUMIN SERPL-MCNC: 4.2 G/DL (ref 3.5–5.2)
ALBUMIN/GLOB SERPL: 1.7 G/DL
ALP SERPL-CCNC: 126 U/L (ref 39–117)
ALT SERPL W P-5'-P-CCNC: 151 U/L (ref 1–33)
ANION GAP SERPL CALCULATED.3IONS-SCNC: 12.8 MMOL/L
AST SERPL-CCNC: 101 U/L (ref 1–32)
BACTERIA UR QL AUTO: ABNORMAL /HPF
BASOPHILS # BLD AUTO: 0.01 10*3/MM3 (ref 0–0.2)
BASOPHILS NFR BLD AUTO: 0.2 % (ref 0–1.5)
BILIRUB SERPL-MCNC: 1.1 MG/DL (ref 0.1–1.2)
BILIRUB UR QL STRIP: NEGATIVE
BUN BLD-MCNC: 19 MG/DL (ref 8–23)
BUN/CREAT SERPL: 22.4 (ref 7–25)
CALCIUM SPEC-SCNC: 8.9 MG/DL (ref 8.6–10.5)
CHLORIDE SERPL-SCNC: 100 MMOL/L (ref 98–107)
CLARITY UR: CLEAR
CO2 SERPL-SCNC: 25.2 MMOL/L (ref 22–29)
COLOR UR: YELLOW
CREAT BLD-MCNC: 0.85 MG/DL (ref 0.57–1)
DEPRECATED RDW RBC AUTO: 39.9 FL (ref 37–54)
EOSINOPHIL # BLD AUTO: 0.26 10*3/MM3 (ref 0–0.7)
EOSINOPHIL NFR BLD AUTO: 4.3 % (ref 0.3–6.2)
ERYTHROCYTE [DISTWIDTH] IN BLOOD BY AUTOMATED COUNT: 12.3 % (ref 11.7–13)
GFR SERPL CREATININE-BSD FRML MDRD: 66 ML/MIN/1.73
GLOBULIN UR ELPH-MCNC: 2.5 GM/DL
GLUCOSE BLD-MCNC: 158 MG/DL (ref 65–99)
GLUCOSE UR STRIP-MCNC: NEGATIVE MG/DL
HCT VFR BLD AUTO: 39 % (ref 35.6–45.5)
HGB BLD-MCNC: 13 G/DL (ref 11.9–15.5)
HGB UR QL STRIP.AUTO: NEGATIVE
HYALINE CASTS UR QL AUTO: ABNORMAL /LPF
IMM GRANULOCYTES # BLD: 0.01 10*3/MM3 (ref 0–0.03)
IMM GRANULOCYTES NFR BLD: 0.2 % (ref 0–0.5)
KETONES UR QL STRIP: NEGATIVE
LEUKOCYTE ESTERASE UR QL STRIP.AUTO: ABNORMAL
LIPASE SERPL-CCNC: 55 U/L (ref 13–60)
LYMPHOCYTES # BLD AUTO: 2.21 10*3/MM3 (ref 0.9–4.8)
LYMPHOCYTES NFR BLD AUTO: 36.2 % (ref 19.6–45.3)
MCH RBC QN AUTO: 29.6 PG (ref 26.9–32)
MCHC RBC AUTO-ENTMCNC: 33.3 G/DL (ref 32.4–36.3)
MCV RBC AUTO: 88.8 FL (ref 80.5–98.2)
MONOCYTES # BLD AUTO: 0.34 10*3/MM3 (ref 0.2–1.2)
MONOCYTES NFR BLD AUTO: 5.6 % (ref 5–12)
NEUTROPHILS # BLD AUTO: 3.29 10*3/MM3 (ref 1.9–8.1)
NEUTROPHILS NFR BLD AUTO: 53.7 % (ref 42.7–76)
NITRITE UR QL STRIP: NEGATIVE
PH UR STRIP.AUTO: <=5 [PH] (ref 5–8)
PLATELET # BLD AUTO: 162 10*3/MM3 (ref 140–500)
PMV BLD AUTO: 10.1 FL (ref 6–12)
POTASSIUM BLD-SCNC: 3.4 MMOL/L (ref 3.5–5.2)
PROT SERPL-MCNC: 6.7 G/DL (ref 6–8.5)
PROT UR QL STRIP: NEGATIVE
RBC # BLD AUTO: 4.39 10*6/MM3 (ref 3.9–5.2)
RBC # UR: ABNORMAL /HPF
REF LAB TEST METHOD: ABNORMAL
SODIUM BLD-SCNC: 138 MMOL/L (ref 136–145)
SP GR UR STRIP: 1.01 (ref 1–1.03)
SQUAMOUS #/AREA URNS HPF: ABNORMAL /HPF
UROBILINOGEN UR QL STRIP: ABNORMAL
WBC NRBC COR # BLD: 6.11 10*3/MM3 (ref 4.5–10.7)
WBC UR QL AUTO: ABNORMAL /HPF

## 2018-05-24 PROCEDURE — 83690 ASSAY OF LIPASE: CPT | Performed by: EMERGENCY MEDICINE

## 2018-05-24 PROCEDURE — 81001 URINALYSIS AUTO W/SCOPE: CPT | Performed by: EMERGENCY MEDICINE

## 2018-05-24 PROCEDURE — 96360 HYDRATION IV INFUSION INIT: CPT

## 2018-05-24 PROCEDURE — 25010000002 IOPAMIDOL 61 % SOLUTION: Performed by: EMERGENCY MEDICINE

## 2018-05-24 PROCEDURE — 36415 COLL VENOUS BLD VENIPUNCTURE: CPT

## 2018-05-24 PROCEDURE — 99284 EMERGENCY DEPT VISIT MOD MDM: CPT

## 2018-05-24 PROCEDURE — 80053 COMPREHEN METABOLIC PANEL: CPT | Performed by: EMERGENCY MEDICINE

## 2018-05-24 PROCEDURE — 96361 HYDRATE IV INFUSION ADD-ON: CPT

## 2018-05-24 PROCEDURE — 85025 COMPLETE CBC W/AUTO DIFF WBC: CPT | Performed by: EMERGENCY MEDICINE

## 2018-05-24 PROCEDURE — 74177 CT ABD & PELVIS W/CONTRAST: CPT

## 2018-05-24 RX ORDER — TRAMADOL HYDROCHLORIDE 50 MG/1
50 TABLET ORAL EVERY 6 HOURS PRN
Qty: 16 TABLET | Refills: 0 | Status: SHIPPED | OUTPATIENT
Start: 2018-05-24 | End: 2018-06-19

## 2018-05-24 RX ORDER — SODIUM CHLORIDE 0.9 % (FLUSH) 0.9 %
10 SYRINGE (ML) INJECTION AS NEEDED
Status: DISCONTINUED | OUTPATIENT
Start: 2018-05-24 | End: 2018-05-24 | Stop reason: HOSPADM

## 2018-05-24 RX ADMIN — SODIUM CHLORIDE 1000 ML: 9 INJECTION, SOLUTION INTRAVENOUS at 03:36

## 2018-05-24 RX ADMIN — IOPAMIDOL 85 ML: 612 INJECTION, SOLUTION INTRAVENOUS at 03:47

## 2018-05-24 NOTE — ED PROVIDER NOTES
Pt presents to the ED c/o upper abd pain that began tonight at 23:00. Pt had a liver biopsy performed yesterday s/t to elevated liver enzymes. On exam, Pt is resting comfortably, in no distress, and without focal neurologic deficit. Cardiovascular exam is within normal limits and without murmur. There is mild epigastric abd tenderness. I agree with the plan for labs and CT abd/pelvis.      Attestation:  The SAVANNAH and I have discussed this patient's history, physical exam, and treatment plan.  I have reviewed the documentation and personally had a face to face interaction with the patient. I affirm the documentation and agree with the treatment and plan.  The attached note describes my personal findings.      Documentation assistance provided by viet Christensen for Dr Conway Information recorded by the viet was done at my direction and has been verified and validated by me.     Francisca Christensen  05/24/18 0501       Kb Conway MD  05/24/18 1361

## 2018-05-24 NOTE — TELEPHONE ENCOUNTER
Pt is wanting to know if you would give her a note to be off work until she follows up with you on 6/1/18.

## 2018-05-24 NOTE — ED TRIAGE NOTES
Pt reports epigastric abdominal pain with nausea and radiation to back that started at 2330. Pt seen here yesterday for liver biopsy due to elevated LFT. Pt denies fever, urinary symptoms.

## 2018-05-24 NOTE — ED PROVIDER NOTES
EMERGENCY DEPARTMENT ENCOUNTER    CHIEF COMPLAINT  Chief Complaint: Abdominal pain  History given by: Patient  History limited by: None  Room Number: 15/15  PMD: Jacqui Booth PA-C      HPI:  Pt is a 72 y.o. female who presents complaining of heavy abdominal pain which started today at 2100 after she ate dinner and took her cholesterol medication. Pt reports that the pain radiates into her back and also notes nausea but denies vomiting, diarrhea, fever, frequent tylenol use, SOA and chest pain. Pt reports having a liver biopsy done yesterday by Dr. Hein due to elevated liver enzymes and has a history of gallstones.     Duration:  5 hours  Onset: Sudden  Timing: Constant  Location: Abdomen  Radiation: Back  Quality: Heavy pain  Intensity/Severity: Moderate  Progression: Unchanged  Associated Symptoms: Nausea  Aggravating Factors: None specified  Alleviating Factors: None specified  Previous Episodes: Pt had liver biopsy done yesterday by Dr. Hein due to elevated liver enzymes and has gallstones   Treatment before arrival: Nones specified    PAST MEDICAL HISTORY  Active Ambulatory Problems     Diagnosis Date Noted   • S/P thyroidectomy 10/27/2017   • Mixed hyperlipidemia 10/27/2017   • Vitamin D deficiency 10/27/2017   • Impaired fasting glucose 10/27/2017   • Hypothyroidism 10/27/2017   • Osteopenia of multiple sites 10/27/2017   • Hypertensive left ventricular hypertrophy, without heart failure 10/27/2017   • Chronic seasonal allergic rhinitis 10/27/2017   • History of heart disease 10/27/2017   • Hx of abnormal mammogram 10/27/2017   • Nodule of left lung 10/27/2017   • History of diverticulosis 10/27/2017   • Hypertension 04/27/2018     Resolved Ambulatory Problems     Diagnosis Date Noted   • No Resolved Ambulatory Problems     Past Medical History:   Diagnosis Date   • Cholelithiasis 04-   • Coronary artery disease    • Hemangioma of liver    • Hernia 2017   • Hyperlipidemia    • Hypertension    •  Hyperthyroidism    • Hypothyroidism    • Laceration of finger of right hand 05/15/2018   • Skin cancer    • Ulcerative colitis        PAST SURGICAL HISTORY  Past Surgical History:   Procedure Laterality Date   • BUNIONECTOMY     • COLONOSCOPY     • LIVER BIOPSY     • THYROIDECTOMY         FAMILY HISTORY  Family History   Problem Relation Age of Onset   • Heart disease Mother    • Hypertension Mother    • Lung cancer Sister    • Thyroid disease Sister    • Lupus Sister    • Thyroid disease Brother    • Alcohol abuse Brother    • Glaucoma Maternal Grandmother    • Stomach cancer Maternal Grandmother          ’s   • Cirrhosis Father                 SOCIAL HISTORY  Social History     Social History   • Marital status: Single     Spouse name: N/A   • Number of children: N/A   • Years of education: N/A     Occupational History   • Not on file.     Social History Main Topics   • Smoking status: Former Smoker     Packs/day: 1.50     Years: 10.00     Start date: 1963     Quit date: 1976   • Smokeless tobacco: Never Used      Comment: Chrinic bronchitis...smoke allergy   • Alcohol use Yes      Comment: Seldom   • Drug use: No   • Sexual activity: Not Currently     Other Topics Concern   • Not on file     Social History Narrative   • No narrative on file       ALLERGIES  Augmentin [amoxicillin-pot clavulanate] and Keflex [cephalexin]    REVIEW OF SYSTEMS  Review of Systems   Constitutional: Negative for fever.   HENT: Negative for sore throat.    Eyes: Negative.    Respiratory: Negative for cough and shortness of breath.    Cardiovascular: Negative for chest pain.   Gastrointestinal: Positive for abdominal pain and nausea. Negative for diarrhea and vomiting.   Genitourinary: Negative for dysuria.   Musculoskeletal: Positive for back pain. Negative for neck pain.   Skin: Negative for rash.   Allergic/Immunologic: Negative.    Neurological: Negative for weakness, numbness and headaches.    Hematological: Negative.    Psychiatric/Behavioral: Negative.    All other systems reviewed and are negative.      PHYSICAL EXAM  ED Triage Vitals [05/24/18 0217]   Temp Heart Rate Resp BP SpO2   97.6 °F (36.4 °C) 93 18 -- 99 %      Temp src Heart Rate Source Patient Position BP Location FiO2 (%)   Tympanic -- -- -- --       Physical Exam   Constitutional: She is oriented to person, place, and time and well-developed, well-nourished, and in no distress.   HENT:   Head: Normocephalic and atraumatic.   Eyes: EOM are normal. Pupils are equal, round, and reactive to light. No scleral icterus.   Neck: Normal range of motion.   Cardiovascular: Normal rate and regular rhythm.    Pulmonary/Chest: Effort normal and breath sounds normal. No respiratory distress.   Abdominal: Soft. There is tenderness (mild, epigastric). There is no rebound and no guarding.   Neurological: She is alert and oriented to person, place, and time. She has normal sensation and normal strength.   Skin: Skin is warm and dry.   Psychiatric: Affect normal.   Nursing note and vitals reviewed.      LAB RESULTS  Lab Results (last 24 hours)     Procedure Component Value Units Date/Time    CBC & Differential [[id number]] Collected:  05/24/18 0245    Specimen:  Blood Updated:  05/24/18 0309    Narrative:       The following orders were created for panel order CBC & Differential.  Procedure                               Abnormality         Status                     ---------                               -----------         ------                     CBC Auto Differential[[id number]]        Normal              Final result                 Please view results for these tests on the individual orders.    Comprehensive Metabolic Panel [[id number]]  (Abnormal) Collected:  05/24/18 0245    Specimen:  Blood Updated:  05/24/18 0322     Glucose 158 (H) mg/dL      BUN 19 mg/dL      Creatinine 0.85 mg/dL      Sodium 138 mmol/L      Potassium 3.4 (L) mmol/L      Chloride  100 mmol/L      CO2 25.2 mmol/L      Calcium 8.9 mg/dL      Total Protein 6.7 g/dL      Albumin 4.20 g/dL      ALT (SGPT) 151 (H) U/L      AST (SGOT) 101 (H) U/L      Alkaline Phosphatase 126 (H) U/L      Total Bilirubin 1.1 mg/dL      eGFR Non African Amer 66 mL/min/1.73      Globulin 2.5 gm/dL      A/G Ratio 1.7 g/dL      BUN/Creatinine Ratio 22.4     Anion Gap 12.8 mmol/L     Narrative:       The MDRD GFR formula is only valid for adults with stable renal function between ages 18 and 70.    Lipase [[id number]]  (Normal) Collected:  05/24/18 0245    Specimen:  Blood Updated:  05/24/18 0319     Lipase 55 U/L     CBC Auto Differential [[id number]]  (Normal) Collected:  05/24/18 0245    Specimen:  Blood Updated:  05/24/18 0309     WBC 6.11 10*3/mm3      RBC 4.39 10*6/mm3      Hemoglobin 13.0 g/dL      Hematocrit 39.0 %      MCV 88.8 fL      MCH 29.6 pg      MCHC 33.3 g/dL      RDW 12.3 %      RDW-SD 39.9 fl      MPV 10.1 fL      Platelets 162 10*3/mm3      Neutrophil % 53.7 %      Lymphocyte % 36.2 %      Monocyte % 5.6 %      Eosinophil % 4.3 %      Basophil % 0.2 %      Immature Grans % 0.2 %      Neutrophils, Absolute 3.29 10*3/mm3      Lymphocytes, Absolute 2.21 10*3/mm3      Monocytes, Absolute 0.34 10*3/mm3      Eosinophils, Absolute 0.26 10*3/mm3      Basophils, Absolute 0.01 10*3/mm3      Immature Grans, Absolute 0.01 10*3/mm3     Urinalysis With / Microscopic If Indicated (No Culture) - Urine, Clean Catch [[id number]]  (Abnormal) Collected:  05/24/18 0302    Specimen:  Urine from Urine, Clean Catch Updated:  05/24/18 0314     Color, UA Yellow     Appearance, UA Clear     pH, UA <=5.0     Specific Gravity, UA 1.015     Glucose, UA Negative     Ketones, UA Negative     Bilirubin, UA Negative     Blood, UA Negative     Protein, UA Negative     Leuk Esterase, UA Moderate (2+) (A)     Nitrite, UA Negative     Urobilinogen, UA 0.2 E.U./dL    Urinalysis, Microscopic Only - Urine, Clean Catch [[id number]]  (Abnormal)  Collected:  05/24/18 0302    Specimen:  Urine from Urine, Clean Catch Updated:  05/24/18 0314     RBC, UA 0-2 /HPF      WBC, UA 6-12 (A) /HPF      Bacteria, UA None Seen /HPF      Squamous Epithelial Cells, UA 0-2 /HPF      Hyaline Casts, UA 0-2 /LPF      Methodology Automated Microscopy          I ordered the above labs and reviewed the results      PROCEDURES  Procedures      PROGRESS AND CONSULTS   0240-Checked patient and discussed plan to order blood work, UA, IVF for hydartion and CT abd for further evaluation. Pt understands and agrees with the plan, all questions answered.    0326-Ordered IVF for hydration and CT abd/pel.    0434-Rechecked patient who notes improved pain levels and discussed plan to discharge home with mild pain medication. Pt understands and agrees with the plan, all questions answered.     MEDICAL DECISION MAKING  Results were reviewed/discussed with the patient and they were also made aware of online access. Pt also made aware that some labs, such as cultures, will not be resulted during ER visit and follow up with PMD is necessary.     MDM  Number of Diagnoses or Management Options     Amount and/or Complexity of Data Reviewed  Clinical lab tests: reviewed (UA WBC=6-12, UA Leuk esterase=2+, Lipase=55)  Tests in the radiology section of CPT®: ordered and reviewed (CT abd shows stable small pancreatic cyst, stable small hiatal hernia, and moderate colonic diverticulosis)    Patient Progress  Patient progress: stable       DIAGNOSIS  Final diagnoses:   Epigastric pain   Post procedure discomfort     DISPOSITION  DISCHARGE    Patient discharged in stable condition.    Reviewed implications of results, diagnosis, meds, responsibility to follow up, warning signs and symptoms of possible worsening, potential complications and reasons to return to ER.    Patient/Family voiced understanding of above instructions.    Discussed plan for discharge, as there is no emergent indication for admission.  Patient referred to primary care provider for BP management due to today's BP. Pt/family is agreeable and understands need for follow up and repeat testing.  Pt is aware that discharge does not mean that nothing is wrong but it indicates no emergency is present that requires admission and they must continue care with follow-up as given below or physician of their choice.     FOLLOW-UP  Jacqui Booth PA-C  [address] [address]  [place] [facility] [address]  [phone]    Schedule an appointment as soon as possible for a visit       Shiva Hein MD  [address]  [place] [facility] [address]  [phone]      As scheduled         Medication List      New Prescriptions    traMADol 50 MG tablet  Commonly known as:  ULTRAM  Take 1 tablet by mouth Every 6 (Six) Hours As Needed for Moderate Pain .        Changed    atorvastatin 40 MG tablet  Commonly known as:  LIPITOR  Take 1 tablet by mouth Daily. For cholesterol  What changed:  when to take this  additional instructions        Latest Documented Vital Signs:  As of 5:25 AM  BP- 178/74 HR- 66 Temp- 97.8 °F (36.6 °C) (Oral) O2 sat- 98%    --  Documentation assistance provided by viet Mahoney for Camacho Billingsley (PA).  Information recorded by the scribe was done at my direction and has been verified and validated by me.     Cristy Mahoney  05/24/18 6207       INDY Linda  05/24/18 2498

## 2018-05-24 NOTE — DISCHARGE INSTRUCTIONS
Home, rest, home medicine as prescribed, follow up with your gastroenterologist and PCP for recheck. Return to care with further concerns.

## 2018-05-29 ENCOUNTER — TELEPHONE (OUTPATIENT)
Dept: GASTROENTEROLOGY | Facility: CLINIC | Age: 73
End: 2018-05-29

## 2018-05-29 LAB — REF LAB TEST RESULTS: NORMAL

## 2018-05-29 NOTE — TELEPHONE ENCOUNTER
----- Message from Anshu Zabala sent at 5/29/2018  2:43 PM EDT -----  Regarding: TEST REPORT   Contact: [phone]  PT CALLED FOR LABS & LIVER BIOPSY

## 2018-05-29 NOTE — TELEPHONE ENCOUNTER
"Called [facility] laboratory and spoke with Emily. Advised that the Hep A IgM states it resulted on 5/22/18 and it says to \"see attached report\" from [facility] but I do not see any kind of report in pt's chart. She states she will look into the matter and call back once she has located the report.   "

## 2018-05-31 ENCOUNTER — TELEPHONE (OUTPATIENT)
Dept: FAMILY MEDICINE CLINIC | Facility: CLINIC | Age: 73
End: 2018-05-31

## 2018-05-31 RX ORDER — PREDNISONE 20 MG/1
20 TABLET ORAL DAILY
Qty: 30 TABLET | Refills: 0 | Status: SHIPPED | OUTPATIENT
Start: 2018-05-31 | End: 2018-06-19

## 2018-05-31 NOTE — TELEPHONE ENCOUNTER
Per Dr. Hein: Labs negative for hepatitis a. patient okay to go back to work.  Biopsy consistent with autoimmune hepatitis.  Begin prednisone 20 mg daily and follow-up office visit in 4 weeks.

## 2018-05-31 NOTE — TELEPHONE ENCOUNTER
Patient called, advised as per Dr. Hein's note. She verb understanding and is agreeable to the plan. Prednisone 20 mg one tablet daily e-scribed to the patient's pharmacy.

## 2018-06-01 ENCOUNTER — OFFICE VISIT (OUTPATIENT)
Dept: FAMILY MEDICINE CLINIC | Facility: CLINIC | Age: 73
End: 2018-06-01

## 2018-06-01 VITALS
HEART RATE: 82 BPM | OXYGEN SATURATION: 97 % | DIASTOLIC BLOOD PRESSURE: 60 MMHG | RESPIRATION RATE: 16 BRPM | WEIGHT: 138 LBS | BODY MASS INDEX: 23.56 KG/M2 | HEIGHT: 64 IN | SYSTOLIC BLOOD PRESSURE: 110 MMHG | TEMPERATURE: 97.9 F

## 2018-06-01 DIAGNOSIS — R79.89 LFT ELEVATION: ICD-10-CM

## 2018-06-01 DIAGNOSIS — Z09 HOSPITAL DISCHARGE FOLLOW-UP: Primary | ICD-10-CM

## 2018-06-01 PROCEDURE — 99213 OFFICE O/P EST LOW 20 MIN: CPT | Performed by: PHYSICIAN ASSISTANT

## 2018-06-01 RX ORDER — ATORVASTATIN CALCIUM 40 MG/1
40 TABLET, FILM COATED ORAL DAILY
Qty: 90 TABLET | Refills: 3 | Status: SHIPPED | OUTPATIENT
Start: 2018-06-01 | End: 2018-06-19

## 2018-06-01 NOTE — PROGRESS NOTES
Subjective   Mami Srivastava is a 72 y.o. female.     History of Present Illness   Mami Srivastava 72 y.o. female presents today for Emergency Room follow up.  she was treated 5-24-18 for abd pain  .  I reviewed all of the labs and diagnostic testing and noted:  Labs done and still LFT elevation;   ER notes that her pain was from the liver bx and could not find any other reason for pain.  This did resolve    The patient's medications were not changed:  Current outpatient and discharge medications have been reconciled for the patient.  Reviewed by: Jacqui Booth PA-C  CT SCANS ABDOMEN AND PELVIS WITH IV CONTRAST.     HISTORY: epigastric abd pain s/p liver biopsy yesterday     COMPARISON: Abdomen only, July 23, 2010.     TECHNIQUE: Radiation dose reduction techniques were utilized, including  automated exposure control and exposure modulation based on body size.  Axial images were obtained from the lung bases to the symphysis pubis  with IV contrast only.. Oral contrast was not administered per request.     FINDINGS ABDOMEN CT:  There is a stable 8 mm pancreatic cyst, image 44.  Remaining solid organs have an unremarkable appearance. There is a small  hiatal hernia. It is unchanged.     FINDINGS PELVIS CT:  No ascites. Normal aorta and appendix. Moderate  diverticulosis. The GI tract not opacified for assessment but non  obstructive in appearance.              IMPRESSION:  IMPRESSION :   1. Stable small pancreatic cyst.  2. Stable small hiatal hernia.  3. Moderate colonic diverticulosis  she does have a follow up appointment with a specialist:     She had further liver labs and neg for Hep A RNA; did have liver bx Dr Hein and does have autoimmune hepatitis  She is now on Pred.    The following portions of the patient's history were reviewed and updated as appropriate: allergies, current medications, past family history, past medical history, past social history, past surgical history and problem  list.    Review of Systems   Constitutional: Positive for activity change and fatigue. Negative for appetite change.   HENT: Negative for nosebleeds and trouble swallowing.    Eyes: Negative for pain and visual disturbance.   Respiratory: Negative for chest tightness, shortness of breath and wheezing.    Cardiovascular: Negative for chest pain and palpitations.   Gastrointestinal: Negative for abdominal pain and blood in stool.   Endocrine: Negative.    Genitourinary: Negative for difficulty urinating and hematuria.   Musculoskeletal: Negative for joint swelling.   Skin: Negative for color change and rash.   Allergic/Immunologic: Positive for environmental allergies.   Neurological: Negative for syncope and speech difficulty.   Hematological: Negative for adenopathy.   Psychiatric/Behavioral: Positive for sleep disturbance. Negative for agitation and confusion.   All other systems reviewed and are negative.      Objective   Physical Exam   Constitutional: She is oriented to person, place, and time. She appears well-developed and well-nourished. No distress.   HENT:   Head: Normocephalic and atraumatic.   Eyes: Conjunctivae and EOM are normal. Pupils are equal, round, and reactive to light. Right eye exhibits no discharge. Left eye exhibits no discharge. No scleral icterus.   Neck: Normal range of motion. Neck supple. No tracheal deviation present. No thyromegaly present.   Cardiovascular: Normal rate, regular rhythm, normal heart sounds, intact distal pulses and normal pulses.  Exam reveals no gallop.    No murmur heard.  Pulmonary/Chest: Effort normal and breath sounds normal. No respiratory distress. She has no wheezes. She has no rales.   Musculoskeletal: Normal range of motion.   Neurological: She is alert and oriented to person, place, and time. She exhibits normal muscle tone. Coordination normal.   Skin: Skin is warm. No rash noted. No erythema. No pallor.   Psychiatric: She has a normal mood and affect. Her  behavior is normal. Judgment and thought content normal.   Nursing note and vitals reviewed.      Assessment/Plan   Mami was seen today for follow-up and hyperlipidemia.    Diagnoses and all orders for this visit:    Hospital discharge follow-up    LFT elevation    Other orders  -     atorvastatin (LIPITOR) 40 MG tablet; Take 1 tablet by mouth Daily. For cholesterol---on hold

## 2018-06-01 NOTE — PATIENT INSTRUCTIONS
Low glycemic index diet  Exercise 30 minutes most days of the week  Make sure you get results on any labs or tests we ordered today  We discussed medications and how to take them as prescribed  Sleep 6-8 hours each night if possible  If you have not signed up for [employer], please activate your code ASAP from your After Visit Summary today    LDL goal <100  LDL goal if heart disease <70  HDL goal >60  Triglyceride goal <150  BP goal =<130/80  Fasting glucose <100

## 2018-06-19 ENCOUNTER — APPOINTMENT (OUTPATIENT)
Dept: LAB | Facility: HOSPITAL | Age: 73
End: 2018-06-19

## 2018-06-19 ENCOUNTER — OFFICE VISIT (OUTPATIENT)
Dept: GASTROENTEROLOGY | Facility: CLINIC | Age: 73
End: 2018-06-19

## 2018-06-19 VITALS
DIASTOLIC BLOOD PRESSURE: 74 MMHG | HEIGHT: 64 IN | BODY MASS INDEX: 24.07 KG/M2 | TEMPERATURE: 98.2 F | WEIGHT: 141 LBS | SYSTOLIC BLOOD PRESSURE: 142 MMHG

## 2018-06-19 DIAGNOSIS — K75.4 AUTOIMMUNE HEPATITIS TREATED WITH STEROIDS (HCC): Primary | ICD-10-CM

## 2018-06-19 LAB
ALBUMIN SERPL-MCNC: 4.5 G/DL (ref 3.5–5.2)
ALBUMIN/GLOB SERPL: 1.6 G/DL
ALP SERPL-CCNC: 70 U/L (ref 39–117)
ALT SERPL W P-5'-P-CCNC: 47 U/L (ref 1–33)
ANION GAP SERPL CALCULATED.3IONS-SCNC: 12.7 MMOL/L
AST SERPL-CCNC: 33 U/L (ref 1–32)
BILIRUB SERPL-MCNC: 1.2 MG/DL (ref 0.1–1.2)
BUN BLD-MCNC: 23 MG/DL (ref 8–23)
BUN/CREAT SERPL: 20.9 (ref 7–25)
CALCIUM SPEC-SCNC: 10 MG/DL (ref 8.6–10.5)
CHLORIDE SERPL-SCNC: 97 MMOL/L (ref 98–107)
CO2 SERPL-SCNC: 29.3 MMOL/L (ref 22–29)
CREAT BLD-MCNC: 1.1 MG/DL (ref 0.57–1)
GFR SERPL CREATININE-BSD FRML MDRD: 49 ML/MIN/1.73
GLOBULIN UR ELPH-MCNC: 2.9 GM/DL
GLUCOSE BLD-MCNC: 146 MG/DL (ref 65–99)
POTASSIUM BLD-SCNC: 4.8 MMOL/L (ref 3.5–5.2)
PROT SERPL-MCNC: 7.4 G/DL (ref 6–8.5)
SODIUM BLD-SCNC: 139 MMOL/L (ref 136–145)

## 2018-06-19 PROCEDURE — 99213 OFFICE O/P EST LOW 20 MIN: CPT | Performed by: INTERNAL MEDICINE

## 2018-06-19 PROCEDURE — 36415 COLL VENOUS BLD VENIPUNCTURE: CPT | Performed by: INTERNAL MEDICINE

## 2018-06-19 PROCEDURE — 80053 COMPREHEN METABOLIC PANEL: CPT | Performed by: INTERNAL MEDICINE

## 2018-06-20 ENCOUNTER — TELEPHONE (OUTPATIENT)
Dept: GASTROENTEROLOGY | Facility: CLINIC | Age: 73
End: 2018-06-20

## 2018-06-27 RX ORDER — PREDNISONE 10 MG/1
10 TABLET ORAL DAILY
Qty: 5 TABLET | Refills: 0 | Status: SHIPPED | OUTPATIENT
Start: 2018-06-27 | End: 2018-06-28 | Stop reason: DRUGHIGH

## 2018-06-27 RX ORDER — PREDNISONE 20 MG/1
TABLET ORAL
Qty: 30 TABLET | Refills: 0 | Status: SHIPPED | OUTPATIENT
Start: 2018-06-27 | End: 2018-06-27 | Stop reason: DRUGHIGH

## 2018-06-27 NOTE — TELEPHONE ENCOUNTER
Patient called and states she has only 2 Prednisone pills left. Questions if she need to continue her Prednisone and would like the results of her lab work that was drawn last week. Advised an update will be sent to the NP for advisement. She verb understanding.

## 2018-06-27 NOTE — TELEPHONE ENCOUNTER
According to Dr. Hein's note she has autoimmune hepatitis and she will need to stay on the prednisone until she discusses treatment plan with Dr. Hein. He might want to start her on Imuran and taper off the prednisone but I don't see any further notes from him on this. I would keep her on the prednisone and have her follow up with him in 4-6 weeks. Thanks.

## 2018-06-28 ENCOUNTER — TELEPHONE (OUTPATIENT)
Dept: FAMILY MEDICINE CLINIC | Facility: CLINIC | Age: 73
End: 2018-06-28

## 2018-06-28 NOTE — TELEPHONE ENCOUNTER
If she is going low at times, do NOT want to increase;  Will want her to check and record bp daily and email me one week;  If >160/100 either number, call

## 2018-06-28 NOTE — TELEPHONE ENCOUNTER
Patient called, she states she called back yesterday. Advised patient this RN never received a message she had called back.  She questioned the dose of Prednisone she should be taking. Advised patient had received answer from 2 different ARNP's Mariana and Karma. Advised to follow Karma's order for Prednisone 20 mg everyday until seen by Dr. Hein. Patient verb understanding.  F/u appointment scheduled for 7/31.

## 2018-06-28 NOTE — TELEPHONE ENCOUNTER
She called at 11:30 last PM and reported bp 154/64;  And asked if need more medication.  ? What bp is today?  Taking Rx?

## 2018-06-28 NOTE — TELEPHONE ENCOUNTER
I talked with PT her BP this morning was 88/61 then about half hour 120/66. She does take lisinopril-hctz 10-12.5 mg. She is on prednisone which she will be on for about another month. She states that her BP has been a little elevated since 6/18/18. She does have some tightness in her head that is when she checks her BP.

## 2018-07-05 ENCOUNTER — OFFICE VISIT (OUTPATIENT)
Dept: RETAIL CLINIC | Facility: CLINIC | Age: 73
End: 2018-07-05

## 2018-07-05 VITALS
DIASTOLIC BLOOD PRESSURE: 78 MMHG | HEART RATE: 78 BPM | RESPIRATION RATE: 18 BRPM | OXYGEN SATURATION: 99 % | SYSTOLIC BLOOD PRESSURE: 142 MMHG | TEMPERATURE: 98.4 F

## 2018-07-05 DIAGNOSIS — N30.01 ACUTE CYSTITIS WITH HEMATURIA: Primary | ICD-10-CM

## 2018-07-05 LAB
BILIRUB BLD-MCNC: NEGATIVE MG/DL
CLARITY, POC: CLEAR
COLOR UR: YELLOW
GLUCOSE UR STRIP-MCNC: NEGATIVE MG/DL
KETONES UR QL: NEGATIVE
LEUKOCYTE EST, POC: ABNORMAL
NITRITE UR-MCNC: NEGATIVE MG/ML
PH UR: 5 [PH] (ref 5–8)
PROT UR STRIP-MCNC: NEGATIVE MG/DL
RBC # UR STRIP: ABNORMAL /UL
SP GR UR: 1.01 (ref 1–1.03)
UROBILINOGEN UR QL: NORMAL

## 2018-07-05 PROCEDURE — 99213 OFFICE O/P EST LOW 20 MIN: CPT | Performed by: NURSE PRACTITIONER

## 2018-07-05 PROCEDURE — 81003 URINALYSIS AUTO W/O SCOPE: CPT | Performed by: NURSE PRACTITIONER

## 2018-07-05 RX ORDER — PREDNISONE 20 MG/1
5 TABLET ORAL EVERY OTHER DAY
COMMUNITY
End: 2018-10-24

## 2018-07-05 RX ORDER — NITROFURANTOIN 25; 75 MG/1; MG/1
100 CAPSULE ORAL EVERY 12 HOURS SCHEDULED
Qty: 14 CAPSULE | Refills: 0 | Status: SHIPPED | OUTPATIENT
Start: 2018-07-05 | End: 2018-07-12

## 2018-07-05 NOTE — PROGRESS NOTES
Subjective   Mami Srivastava is a 73 y.o. female.     Urinary Tract Infection    This is a new problem. The current episode started today. The problem has been gradually worsening. The quality of the pain is described as aching. The pain is mild. There has been no fever. There is no history of pyelonephritis. Pertinent negatives include no chills, flank pain, hematuria, nausea or urgency. The treatment provided no relief.        The following portions of the patient's history were reviewed and updated as appropriate: allergies, current medications, past family history, past medical history, past social history, past surgical history and problem list.    Review of Systems   Constitutional: Negative for chills.   HENT: Negative.    Respiratory: Negative.    Cardiovascular: Negative.    Gastrointestinal: Negative.  Negative for nausea.   Genitourinary: Negative.  Negative for flank pain, hematuria and urgency.   Neurological: Negative.        Objective   Physical Exam   Constitutional: She is cooperative. No distress.   HENT:   Head: Normocephalic.   Right Ear: Hearing, tympanic membrane, external ear and ear canal normal.   Left Ear: Hearing, tympanic membrane, external ear and ear canal normal.   Nose: Nose normal.   Mouth/Throat: Oropharynx is clear and moist.   Eyes: Conjunctivae, EOM and lids are normal. Pupils are equal, round, and reactive to light.   Neck: Trachea normal and full passive range of motion without pain.   Cardiovascular: Normal rate, regular rhythm and normal pulses.    Pulmonary/Chest: Effort normal and breath sounds normal.   Abdominal: There is CVA tenderness (right side, mild).   Neurological: She is alert.   Skin: Skin is warm. Capillary refill takes less than 2 seconds.   Psychiatric: She has a normal mood and affect. Her speech is normal and behavior is normal.   Vitals reviewed.        Assessment/Plan   Mami was seen today for urinary tract infection.    Diagnoses and all orders for  this visit:    Acute cystitis with hematuria  -     POC Urinalysis Dipstick, Automated  -     nitrofurantoin, macrocrystal-monohydrate, (MACROBID) 100 MG capsule; Take 1 capsule by mouth Every 12 (Twelve) Hours for 7 days.        Urinary Tract Infection, Adult  A urinary tract infection (UTI) is an infection of any part of the urinary tract. The urinary tract includes the:  · Kidneys.  · Ureters.  · Bladder.  · Urethra.    These organs make, store, and get rid of pee (urine) in the body.  Follow these instructions at home:  · Take over-the-counter and prescription medicines only as told by your doctor.  · If you were prescribed an antibiotic medicine, take it as told by your doctor. Do not stop taking the antibiotic even if you start to feel better.  · Avoid the following drinks:  ? Alcohol.  ? Caffeine.  ? Tea.  ? Carbonated drinks.  · Drink enough fluid to keep your pee clear or pale yellow.  · Keep all follow-up visits as told by your doctor. This is important.  · Make sure to:  ? Empty your bladder often and completely. Do not to hold pee for long periods of time.  ? Empty your bladder before and after sex.  ? Wipe from front to back after a bowel movement if you are female. Use each tissue one time when you wipe.  Contact a doctor if:  · You have back pain.  · You have a fever.  · You feel sick to your stomach (nauseous).  · You throw up (vomit).  · Your symptoms do not get better after 3 days.  · Your symptoms go away and then come back.  Get help right away if:  · You have very bad back pain.  · You have very bad lower belly (abdominal) pain.  · You are throwing up and cannot keep down any medicines or water.  This information is not intended to replace advice given to you by your health care provider. Make sure you discuss any questions you have with your health care provider.  Document Released: 06/05/2009 Document Revised: 05/25/2017 Document Reviewed: 11/07/2016  [employer] Patient Education ©  2018 [employer]

## 2018-07-05 NOTE — PATIENT INSTRUCTIONS
Urinary Tract Infection, Adult  A urinary tract infection (UTI) is an infection of any part of the urinary tract. The urinary tract includes the:  · Kidneys.  · Ureters.  · Bladder.  · Urethra.    These organs make, store, and get rid of pee (urine) in the body.  Follow these instructions at home:  · Take over-the-counter and prescription medicines only as told by your doctor.  · If you were prescribed an antibiotic medicine, take it as told by your doctor. Do not stop taking the antibiotic even if you start to feel better.  · Avoid the following drinks:  ? Alcohol.  ? Caffeine.  ? Tea.  ? Carbonated drinks.  · Drink enough fluid to keep your pee clear or pale yellow.  · Keep all follow-up visits as told by your doctor. This is important.  · Make sure to:  ? Empty your bladder often and completely. Do not to hold pee for long periods of time.  ? Empty your bladder before and after sex.  ? Wipe from front to back after a bowel movement if you are female. Use each tissue one time when you wipe.  Contact a doctor if:  · You have back pain.  · You have a fever.  · You feel sick to your stomach (nauseous).  · You throw up (vomit).  · Your symptoms do not get better after 3 days.  · Your symptoms go away and then come back.  Get help right away if:  · You have very bad back pain.  · You have very bad lower belly (abdominal) pain.  · You are throwing up and cannot keep down any medicines or water.  This information is not intended to replace advice given to you by your health care provider. Make sure you discuss any questions you have with your health care provider.  Document Released: 06/05/2009 Document Revised: 05/25/2017 Document Reviewed: 11/07/2016  [employer] Patient Education © 2018 [employer]

## 2018-07-10 NOTE — PROGRESS NOTES
Chief Complaint   Patient presents with   • Follow-up     Liver biopsy (path: chronic hepatitis)   • Abnormal Lab     ALT: 151, AST: 101, Alkaline phosphatase: 126       Mami Srivastava is a  73 y.o. female here for a follow up visit for hepatitis.    HPI    Pt returns with no complaint.  Labs revealed patint does not have hep A.  Biopsies suggest chronic hepatitis c/w autoimmune.  Pt currently with no fever or chills.  Denies bleeding diathesis.    Past Medical History:   Diagnosis Date   • Autoimmune hepatitis (CMS/HCC)    • Cholelithiasis 04-    Ultrasound   • Coronary artery disease     Dr Luis Rick   • Hemangioma of liver    • Hernia 2017    Hiatal hernia-Prabhjot test   • Hyperlipidemia     Dr Luis Rick-atorvastatin   • Hypertension    • Hyperthyroidism    • Hypothyroidism    • Laceration of finger of right hand 05/15/2018    lac with knife and had 3 stitches   • Skin cancer     face   • Ulcerative colitis (CMS/HCC) 2014    Colitis/[facility]         Current Outpatient Prescriptions:   •  alendronate (FOSAMAX) 70 MG tablet, Take 1 tablet by mouth Every 7 (Seven) Days. For Osteopenia, Disp: 16 tablet, Rfl: 3  •  aspirin 81 MG EC tablet, Take 81 mg by mouth Daily., Disp: , Rfl:   •  cetirizine (zyrTEC) 10 MG tablet, Take 10 mg by mouth Every Night., Disp: , Rfl:   •  fluticasone (FLONASE) 50 MCG/ACT nasal spray, 1 spray into each nostril Daily., Disp: , Rfl:   •  levothyroxine (SYNTHROID, LEVOTHROID) 75 MCG tablet, Take 1 tablet by mouth Daily. For thyroid (generic accepted), Disp: 90 tablet, Rfl: 3  •  lisinopril-hydrochlorothiazide (PRINZIDE,ZESTORETIC) 10-12.5 MG per tablet, Take 1 tablet by mouth Daily. For BP, Disp: 90 tablet, Rfl: 1  •  Multiple Vitamin (MULTI VITAMIN DAILY PO), Take  by mouth., Disp: , Rfl:   •  nitrofurantoin, macrocrystal-monohydrate, (MACROBID) 100 MG capsule, Take 1 capsule by mouth Every 12 (Twelve) Hours for 7 days., Disp: 14 capsule, Rfl: 0  •  predniSONE (DELTASONE) 20 MG  tablet, Take 20 mg by mouth Daily., Disp: , Rfl:     Allergies   Allergen Reactions   • Augmentin [Amoxicillin-Pot Clavulanate] Hives   • Keflex [Cephalexin] Hives       Social History     Social History   • Marital status: Single     Spouse name: N/A   • Number of children: N/A   • Years of education: N/A     Occupational History   • Not on file.     Social History Main Topics   • Smoking status: Former Smoker     Packs/day: 1.50     Years: 12.00     Start date: 1963     Quit date: 1976   • Smokeless tobacco: Never Used      Comment: Chrinic bronchitis...smoke allergy   • Alcohol use Yes      Comment: Seldom   • Drug use: No   • Sexual activity: Not Currently     Other Topics Concern   • Not on file     Social History Narrative   • No narrative on file       Family History   Problem Relation Age of Onset   • Heart disease Mother    • Hypertension Mother    • Lung cancer Sister    • Thyroid disease Sister    • Lupus Sister    • Thyroid disease Brother    • Alcohol abuse Brother    • Glaucoma Maternal Grandmother    • Stomach cancer Maternal Grandmother          ’s   • Cirrhosis Father             • Liver disease Father        Review of Systems   Constitutional: Negative.    Respiratory: Negative.    Cardiovascular: Negative.    Gastrointestinal: Negative.    Musculoskeletal: Negative.    Skin: Negative.    Hematological: Negative.        Vitals:    18 1522   BP: 142/74   Temp: 98.2 °F (36.8 °C)       Physical Exam   Constitutional: She is oriented to person, place, and time. She appears well-developed and well-nourished.   HENT:   Head: Normocephalic and atraumatic.   Eyes: Pupils are equal, round, and reactive to light. No scleral icterus.   Cardiovascular: Exam reveals no gallop and no friction rub.    No murmur heard.  Pulmonary/Chest: She has no wheezes. She has no rales.   Abdominal: Soft. Bowel sounds are normal. She exhibits no distension and no mass. There is no tenderness. No  hernia.   Neurological: She is alert and oriented to person, place, and time.   Skin: Skin is warm and dry. No rash noted.   Psychiatric: She has a normal mood and affect. Her behavior is normal.   Vitals reviewed.      Admission on 05/24/2018, Discharged on 05/24/2018   Component Date Value Ref Range Status   • Glucose 05/24/2018 158* 65 - 99 mg/dL Final   • BUN 05/24/2018 19  8 - 23 mg/dL Final   • Creatinine 05/24/2018 0.85  0.57 - 1.00 mg/dL Final   • Sodium 05/24/2018 138  136 - 145 mmol/L Final   • Potassium 05/24/2018 3.4* 3.5 - 5.2 mmol/L Final   • Chloride 05/24/2018 100  98 - 107 mmol/L Final   • CO2 05/24/2018 25.2  22.0 - 29.0 mmol/L Final   • Calcium 05/24/2018 8.9  8.6 - 10.5 mg/dL Final   • Total Protein 05/24/2018 6.7  6.0 - 8.5 g/dL Final   • Albumin 05/24/2018 4.20  3.50 - 5.20 g/dL Final   • ALT (SGPT) 05/24/2018 151* 1 - 33 U/L Final   • AST (SGOT) 05/24/2018 101* 1 - 32 U/L Final   • Alkaline Phosphatase 05/24/2018 126* 39 - 117 U/L Final   • Total Bilirubin 05/24/2018 1.1  0.1 - 1.2 mg/dL Final   • eGFR Non  Amer 05/24/2018 66  >60 mL/min/1.73 Final   • Globulin 05/24/2018 2.5  gm/dL Final   • A/G Ratio 05/24/2018 1.7  g/dL Final   • BUN/Creatinine Ratio 05/24/2018 22.4  7.0 - 25.0 Final   • Anion Gap 05/24/2018 12.8  mmol/L Final   • Lipase 05/24/2018 55  13 - 60 U/L Final   • Color, UA 05/24/2018 Yellow  Yellow, Straw Final   • Appearance, UA 05/24/2018 Clear  Clear Final   • pH, UA 05/24/2018 <=5.0  5.0 - 8.0 Final   • Specific Gravity, UA 05/24/2018 1.015  1.005 - 1.030 Final   • Glucose, UA 05/24/2018 Negative  Negative Final   • Ketones, UA 05/24/2018 Negative  Negative Final   • Bilirubin, UA 05/24/2018 Negative  Negative Final   • Blood, UA 05/24/2018 Negative  Negative Final   • Protein, UA 05/24/2018 Negative  Negative Final   • Leuk Esterase, UA 05/24/2018 Moderate (2+)* Negative Final   • Nitrite, UA 05/24/2018 Negative  Negative Final   • Urobilinogen, UA 05/24/2018 0.2  E.U./dL  0.2 - 1.0 E.U./dL Final   • WBC 05/24/2018 6.11  4.50 - 10.70 10*3/mm3 Final   • RBC 05/24/2018 4.39  3.90 - 5.20 10*6/mm3 Final   • Hemoglobin 05/24/2018 13.0  11.9 - 15.5 g/dL Final   • Hematocrit 05/24/2018 39.0  35.6 - 45.5 % Final   • MCV 05/24/2018 88.8  80.5 - 98.2 fL Final   • MCH 05/24/2018 29.6  26.9 - 32.0 pg Final   • MCHC 05/24/2018 33.3  32.4 - 36.3 g/dL Final   • RDW 05/24/2018 12.3  11.7 - 13.0 % Final   • RDW-SD 05/24/2018 39.9  37.0 - 54.0 fl Final   • MPV 05/24/2018 10.1  6.0 - 12.0 fL Final   • Platelets 05/24/2018 162  140 - 500 10*3/mm3 Final   • Neutrophil % 05/24/2018 53.7  42.7 - 76.0 % Final   • Lymphocyte % 05/24/2018 36.2  19.6 - 45.3 % Final   • Monocyte % 05/24/2018 5.6  5.0 - 12.0 % Final   • Eosinophil % 05/24/2018 4.3  0.3 - 6.2 % Final   • Basophil % 05/24/2018 0.2  0.0 - 1.5 % Final   • Immature Grans % 05/24/2018 0.2  0.0 - 0.5 % Final   • Neutrophils, Absolute 05/24/2018 3.29  1.90 - 8.10 10*3/mm3 Final   • Lymphocytes, Absolute 05/24/2018 2.21  0.90 - 4.80 10*3/mm3 Final   • Monocytes, Absolute 05/24/2018 0.34  0.20 - 1.20 10*3/mm3 Final   • Eosinophils, Absolute 05/24/2018 0.26  0.00 - 0.70 10*3/mm3 Final   • Basophils, Absolute 05/24/2018 0.01  0.00 - 0.20 10*3/mm3 Final   • Immature Grans, Absolute 05/24/2018 0.01  0.00 - 0.03 10*3/mm3 Final   • RBC, UA 05/24/2018 0-2  None Seen, 0-2 /HPF Final   • WBC, UA 05/24/2018 6-12* None Seen, 0-2 /HPF Final   • Bacteria, UA 05/24/2018 None Seen  None Seen /HPF Final   • Squamous Epithelial Cells, UA 05/24/2018 0-2  None Seen, 0-2 /HPF Final   • Hyaline Casts, UA 05/24/2018 0-2  None Seen /LPF Final   • Methodology 05/24/2018 Automated Microscopy   Final   Hospital Outpatient Visit on 05/22/2018   Component Date Value Ref Range Status   • Protime 05/22/2018 11.2* 12.8 - 15.2 seconds Final   • INR 05/22/2018 0.9  0.8 - 1.2 Final   • Case Report 05/22/2018    Final                    Value:Surgical Pathology Report                          Case: [id number]                                  Authorizing Provider:  Shiva Hein MD     Collected:           05/22/2018 10:20 AM          Ordering Location:     [facility]  Received:            05/22/2018 11:52 AM                                 CT                                                                           Pathologist:           Eddi Walden MD                                                          Specimen:    Liver, liver bx                                                                           • Clinical Information 05/22/2018    Final                    Value:This result contains rich text formatting which cannot be displayed here.   • Final Diagnosis 05/22/2018    Final                    Value:This result contains rich text formatting which cannot be displayed here.   • Intradepartmental Consult 05/22/2018    Final                    Value:This result contains rich text formatting which cannot be displayed here.   • Gross Description 05/22/2018    Final                    Value:This result contains rich text formatting which cannot be displayed here.   • Special Stains 05/22/2018    Final                    Value:This result contains rich text formatting which cannot be displayed here.   • Microscopic Description 05/22/2018    Final                    Value:This result contains rich text formatting which cannot be displayed here.   Lab on 05/01/2018   Component Date Value Ref Range Status   • Glucose 05/01/2018 94  65 - 99 mg/dL Final   • BUN 05/01/2018 21  8 - 23 mg/dL Final   • Creatinine 05/01/2018 1.04* 0.57 - 1.00 mg/dL Final   • Sodium 05/01/2018 140  136 - 145 mmol/L Final   • Potassium 05/01/2018 3.9  3.5 - 5.2 mmol/L Final   • Chloride 05/01/2018 99  98 - 107 mmol/L Final   • CO2 05/01/2018 26.6  22.0 - 29.0 mmol/L Final   • Calcium 05/01/2018 9.9  8.6 - 10.5 mg/dL Final   • Total Protein 05/01/2018 7.7  6.0 - 8.5 g/dL Final   • Albumin  05/01/2018 4.50  3.50 - 5.20 g/dL Final   • ALT (SGPT) 05/01/2018 42* 1 - 33 U/L Final   • AST (SGOT) 05/01/2018 48* 1 - 32 U/L Final   • Alkaline Phosphatase 05/01/2018 78  39 - 117 U/L Final   • Total Bilirubin 05/01/2018 1.3* 0.1 - 1.2 mg/dL Final   • eGFR Non African Amer 05/01/2018 52* >60 mL/min/1.73 Final   • Globulin 05/01/2018 3.2  gm/dL Final   • A/G Ratio 05/01/2018 1.4  g/dL Final   • BUN/Creatinine Ratio 05/01/2018 20.2  7.0 - 25.0 Final   • Anion Gap 05/01/2018 14.4  mmol/L Final   • Hep A Total Ab 05/01/2018 Positive* Negative Final   • Hep B S Ab 05/01/2018 Non-Reactive  Non-Reactive Final   • Iron 05/01/2018 56  37 - 145 mcg/dL Final   • Iron Saturation 05/01/2018 14* 20 - 50 % Final   • Transferrin 05/01/2018 261  200 - 360 mg/dL Final   • TIBC 05/01/2018 389  298 - 536 mcg/dL Final   • Mitochondrial Ab 05/01/2018 <20.0  0.0 - 20.0 Units Final   • Smooth Muscle Ab 05/01/2018 29* 0 - 19 Units Final   • Antinuclear Antibodies (MALIKA) 05/01/2018 Negative  Negative, <1:40 Final   • A-1 Antitrypsin 05/01/2018 128  90 - 200 mg/dL Final   • Miscellaneous Lab Test Result 05/01/2018 See attached report   Final       Mami was seen today for follow-up and abnormal lab.    Diagnoses and all orders for this visit:    Autoimmune hepatitis treated with steroids (CMS/HCC)  -     Comprehensive Metabolic Panel      Biopsies c/w chronic hepatitis in this setting most c/w autoimmune hepatitis.  Will begin steroid therapy and monitor resopnse.  If improves as expected will add imuran and taper steroids.

## 2018-07-25 ENCOUNTER — TELEPHONE (OUTPATIENT)
Dept: GASTROENTEROLOGY | Facility: CLINIC | Age: 73
End: 2018-07-25

## 2018-07-25 RX ORDER — PREDNISONE 20 MG/1
20 TABLET ORAL DAILY
Qty: 30 TABLET | Refills: 0 | Status: SHIPPED | OUTPATIENT
Start: 2018-07-25 | End: 2018-07-31 | Stop reason: SDUPTHER

## 2018-07-25 NOTE — TELEPHONE ENCOUNTER
----- Message from Anshu Zabala sent at 7/25/2018  8:50 AM EDT -----  Regarding: PREDNISONE   Contact: [phone]  PT NEED AN REFILL FOR PREDNISONE

## 2018-07-25 NOTE — TELEPHONE ENCOUNTER
"See previous telephone encounter dated 6/27/18 entitled \"refill\". Pt was instructed to continue prednisone at 20mg daily until seen by Dr Hein on 7/31/18.   Med e-scribed.   "

## 2018-07-31 ENCOUNTER — OFFICE VISIT (OUTPATIENT)
Dept: GASTROENTEROLOGY | Facility: CLINIC | Age: 73
End: 2018-07-31

## 2018-07-31 ENCOUNTER — TELEPHONE (OUTPATIENT)
Dept: GASTROENTEROLOGY | Facility: CLINIC | Age: 73
End: 2018-07-31

## 2018-07-31 VITALS
TEMPERATURE: 98 F | BODY MASS INDEX: 25.48 KG/M2 | HEIGHT: 63 IN | SYSTOLIC BLOOD PRESSURE: 116 MMHG | DIASTOLIC BLOOD PRESSURE: 78 MMHG | WEIGHT: 143.8 LBS

## 2018-07-31 DIAGNOSIS — K75.4 AUTOIMMUNE HEPATITIS TREATED WITH STEROIDS (HCC): Primary | ICD-10-CM

## 2018-07-31 LAB
ALBUMIN SERPL-MCNC: 4.3 G/DL (ref 3.5–5.2)
ALBUMIN/GLOB SERPL: 2.2 G/DL
ALP SERPL-CCNC: 60 U/L (ref 39–117)
ALT SERPL-CCNC: 26 U/L (ref 1–33)
AST SERPL-CCNC: 19 U/L (ref 1–32)
BILIRUB SERPL-MCNC: 1.2 MG/DL (ref 0.1–1.2)
BUN SERPL-MCNC: 23 MG/DL (ref 8–23)
BUN/CREAT SERPL: 20.2 (ref 7–25)
CALCIUM SERPL-MCNC: 10 MG/DL (ref 8.6–10.5)
CHLORIDE SERPL-SCNC: 99 MMOL/L (ref 98–107)
CO2 SERPL-SCNC: 29.3 MMOL/L (ref 22–29)
CREAT SERPL-MCNC: 1.14 MG/DL (ref 0.57–1)
GLOBULIN SER CALC-MCNC: 2 GM/DL
GLUCOSE SERPL-MCNC: 131 MG/DL (ref 65–99)
POTASSIUM SERPL-SCNC: 4.1 MMOL/L (ref 3.5–5.2)
PROT SERPL-MCNC: 6.3 G/DL (ref 6–8.5)
SODIUM SERPL-SCNC: 143 MMOL/L (ref 136–145)

## 2018-07-31 PROCEDURE — 99212 OFFICE O/P EST SF 10 MIN: CPT | Performed by: INTERNAL MEDICINE

## 2018-07-31 NOTE — TELEPHONE ENCOUNTER
At office visit this morning, patient forgot to mention that she has also being having constipation and wanted to know what Dr. Hein recommended. She can be reached at [phone].

## 2018-07-31 NOTE — PROGRESS NOTES
Chief Complaint   Patient presents with   • Follow-up   • GI Problem     autoimmune hepatitis   • Constipation       Mami Srivastava is a  73 y.o. female here for a follow up visit for autoimmune hepatitis.    HPI    Patient 73-year-old female with history of elevated LFTs along with hypertension and hypothyroidism and coronary artery disease presenting for follow-up.  Patient reports no specific GI complaints at this time.  Patient here for elevated liver enzymes noted on last visit with improvement on prednisone.  Patient currently denies abdominal pain no nausea vomiting fever or chills.  Patient here for further recommendations.    Past Medical History:   Diagnosis Date   • Autoimmune hepatitis (CMS/HCC)    • Cholelithiasis 04-    Ultrasound   • Coronary artery disease     Dr Luis Rick   • Hemangioma of liver    • Hernia 2017    Hiatal hernia-Prabhjot test   • Hyperlipidemia     Dr Luis Rick-atorvastatin   • Hypertension    • Hyperthyroidism    • Hypothyroidism    • Laceration of finger of right hand 05/15/2018    lac with knife and had 3 stitches   • Skin cancer     face   • Ulcerative colitis (CMS/HCC) 2014    Colitis/DrKaplan         Current Outpatient Prescriptions:   •  alendronate (FOSAMAX) 70 MG tablet, Take 1 tablet by mouth Every 7 (Seven) Days. For Osteopenia, Disp: 16 tablet, Rfl: 3  •  aspirin 81 MG EC tablet, Take 81 mg by mouth Daily., Disp: , Rfl:   •  cetirizine (zyrTEC) 10 MG tablet, Take 10 mg by mouth Every Night., Disp: , Rfl:   •  fluticasone (FLONASE) 50 MCG/ACT nasal spray, 1 spray into each nostril Daily., Disp: , Rfl:   •  levothyroxine (SYNTHROID, LEVOTHROID) 75 MCG tablet, Take 1 tablet by mouth Daily. For thyroid (generic accepted), Disp: 90 tablet, Rfl: 3  •  lisinopril-hydrochlorothiazide (PRINZIDE,ZESTORETIC) 10-12.5 MG per tablet, Take 1 tablet by mouth Daily. For BP, Disp: 90 tablet, Rfl: 1  •  Multiple Vitamin (MULTI VITAMIN DAILY PO), Take  by mouth., Disp: , Rfl:    •  predniSONE (DELTASONE) 20 MG tablet, Take 20 mg by mouth Daily., Disp: , Rfl:     Allergies   Allergen Reactions   • Augmentin [Amoxicillin-Pot Clavulanate] Hives   • Keflex [Cephalexin] Hives       Social History     Social History   • Marital status: Single     Spouse name: N/A   • Number of children: N/A   • Years of education: N/A     Occupational History   • Not on file.     Social History Main Topics   • Smoking status: Former Smoker     Packs/day: 1.50     Years: 12.00     Start date: 1963     Quit date: 1976   • Smokeless tobacco: Never Used      Comment: Chrinic bronchitis...smoke allergy   • Alcohol use Yes      Comment: Seldom   • Drug use: No   • Sexual activity: Not Currently     Other Topics Concern   • Not on file     Social History Narrative   • No narrative on file       Family History   Problem Relation Age of Onset   • Heart disease Mother    • Hypertension Mother    • Lung cancer Sister    • Thyroid disease Sister    • Lupus Sister    • Thyroid disease Brother    • Alcohol abuse Brother    • Glaucoma Maternal Grandmother    • Stomach cancer Maternal Grandmother             • Cirrhosis Father             • Liver disease Father        Review of Systems   Constitutional: Negative.    HENT: Negative.    Respiratory: Negative.    Cardiovascular: Negative.    Gastrointestinal: Negative.    Skin: Negative.    Hematological: Negative.        Vitals:    18 1053   BP: 116/78   Temp: 98 °F (36.7 °C)       Physical Exam   Constitutional: She is oriented to person, place, and time. She appears well-developed and well-nourished.   HENT:   Head: Normocephalic and atraumatic.   Eyes: Pupils are equal, round, and reactive to light. No scleral icterus.   Cardiovascular: Normal rate.  Exam reveals no gallop and no friction rub.    No murmur heard.  Pulmonary/Chest: Effort normal and breath sounds normal.   Abdominal: Soft. Bowel sounds are normal. She exhibits no distension  and no mass. There is no tenderness. No hernia.   Neurological: She is alert and oriented to person, place, and time. No cranial nerve deficit.   Skin: Skin is warm and dry. No rash noted.   Psychiatric: She has a normal mood and affect. Her behavior is normal.   Vitals reviewed.      Office Visit on 07/05/2018   Component Date Value Ref Range Status   • Color 07/05/2018 Yellow  Yellow, Straw, Dark Yellow, Bryanna Final   • Clarity, UA 07/05/2018 Clear  Clear Final   • Specific Gravity  07/05/2018 1.010  1.005 - 1.030 Final   • pH, Urine 07/05/2018 5.0  5.0 - 8.0 Final   • Leukocytes 07/05/2018 Small (1+)* Negative Final   • Nitrite, UA 07/05/2018 Negative  Negative Final   • Protein, POC 07/05/2018 Negative  Negative mg/dL Final   • Glucose, UA 07/05/2018 Negative  Negative, 1000 mg/dL (3+) mg/dL Final   • Ketones, UA 07/05/2018 Negative  Negative Final   • Urobilinogen, UA 07/05/2018 Normal  Normal Final   • Bilirubin 07/05/2018 Negative  Negative Final   • Blood, UA 07/05/2018 Trace* Negative Final   Office Visit on 06/19/2018   Component Date Value Ref Range Status   • Glucose 06/19/2018 146* 65 - 99 mg/dL Final   • BUN 06/19/2018 23  8 - 23 mg/dL Final   • Creatinine 06/19/2018 1.10* 0.57 - 1.00 mg/dL Final   • Sodium 06/19/2018 139  136 - 145 mmol/L Final   • Potassium 06/19/2018 4.8  3.5 - 5.2 mmol/L Final   • Chloride 06/19/2018 97* 98 - 107 mmol/L Final   • CO2 06/19/2018 29.3* 22.0 - 29.0 mmol/L Final   • Calcium 06/19/2018 10.0  8.6 - 10.5 mg/dL Final   • Total Protein 06/19/2018 7.4  6.0 - 8.5 g/dL Final   • Albumin 06/19/2018 4.50  3.50 - 5.20 g/dL Final   • ALT (SGPT) 06/19/2018 47* 1 - 33 U/L Final   • AST (SGOT) 06/19/2018 33* 1 - 32 U/L Final   • Alkaline Phosphatase 06/19/2018 70  39 - 117 U/L Final   • Total Bilirubin 06/19/2018 1.2  0.1 - 1.2 mg/dL Final   • eGFR Non African Amer 06/19/2018 49* >60 mL/min/1.73 Final   • Globulin 06/19/2018 2.9  gm/dL Final   • A/G Ratio 06/19/2018 1.6  g/dL Final    • BUN/Creatinine Ratio 06/19/2018 20.9  7.0 - 25.0 Final   • Anion Gap 06/19/2018 12.7  mmol/L Final       Mami was seen today for follow-up, gi problem and constipation.    Diagnoses and all orders for this visit:    Autoimmune hepatitis treated with steroids (CMS/HCC)  -     Comprehensive Metabolic Panel  -     Thiopurine Methyltransferase      Patient 73-year-old female with history of autoimmune hepatitis here for follow-up.  Patient reports doing well.  Patient on last labs noted with improvement in her liver enzymes here for follow-up.  Today we'll draw her CMP again make sure that continued improvement is noted.  We'll also send thiopurine metabolism value to confirm patient can tolerate Imuran and we will change her meds to that.  We will follow-up in 3 months for further recommendations.

## 2018-08-05 ENCOUNTER — APPOINTMENT (OUTPATIENT)
Dept: CT IMAGING | Facility: HOSPITAL | Age: 73
End: 2018-08-05

## 2018-08-05 ENCOUNTER — HOSPITAL ENCOUNTER (INPATIENT)
Facility: HOSPITAL | Age: 73
LOS: 2 days | Discharge: HOME | End: 2018-08-07
Attending: EMERGENCY MEDICINE | Admitting: INTERNAL MEDICINE

## 2018-08-05 DIAGNOSIS — K57.32 DIVERTICULITIS OF LARGE INTESTINE WITHOUT PERFORATION OR ABSCESS WITHOUT BLEEDING: Primary | ICD-10-CM

## 2018-08-05 PROBLEM — Z79.52 CURRENT CHRONIC USE OF SYSTEMIC STEROIDS: Status: ACTIVE | Noted: 2018-08-05

## 2018-08-05 PROBLEM — R73.9 HYPERGLYCEMIA: Status: ACTIVE | Noted: 2018-08-05

## 2018-08-05 PROBLEM — N17.9 AKI (ACUTE KIDNEY INJURY): Status: ACTIVE | Noted: 2018-08-05

## 2018-08-05 LAB
ALBUMIN SERPL-MCNC: 4.1 G/DL (ref 3.5–5.2)
ALBUMIN/GLOB SERPL: 1.5 G/DL
ALP SERPL-CCNC: 57 U/L (ref 39–117)
ALT SERPL W P-5'-P-CCNC: 33 U/L (ref 1–33)
ANION GAP SERPL CALCULATED.3IONS-SCNC: 14.2 MMOL/L
AST SERPL-CCNC: 18 U/L (ref 1–32)
BACTERIA UR QL AUTO: NORMAL /HPF
BASOPHILS # BLD AUTO: 0.01 10*3/MM3 (ref 0–0.2)
BASOPHILS NFR BLD AUTO: 0.1 % (ref 0–1.5)
BILIRUB SERPL-MCNC: 1.4 MG/DL (ref 0.1–1.2)
BILIRUB UR QL STRIP: NEGATIVE
BUN BLD-MCNC: 17 MG/DL (ref 8–23)
BUN/CREAT SERPL: 16 (ref 7–25)
CALCIUM SPEC-SCNC: 10 MG/DL (ref 8.6–10.5)
CHLORIDE SERPL-SCNC: 94 MMOL/L (ref 98–107)
CLARITY UR: CLEAR
CO2 SERPL-SCNC: 25.8 MMOL/L (ref 22–29)
COLOR UR: YELLOW
CREAT BLD-MCNC: 1.06 MG/DL (ref 0.57–1)
DEPRECATED RDW RBC AUTO: 44.3 FL (ref 37–54)
EOSINOPHIL # BLD AUTO: 0.09 10*3/MM3 (ref 0–0.7)
EOSINOPHIL NFR BLD AUTO: 0.9 % (ref 0.3–6.2)
ERYTHROCYTE [DISTWIDTH] IN BLOOD BY AUTOMATED COUNT: 13.5 % (ref 11.7–13)
GFR SERPL CREATININE-BSD FRML MDRD: 51 ML/MIN/1.73
GLOBULIN UR ELPH-MCNC: 2.8 GM/DL
GLUCOSE BLD-MCNC: 239 MG/DL (ref 65–99)
GLUCOSE BLDC GLUCOMTR-MCNC: 113 MG/DL (ref 70–130)
GLUCOSE UR STRIP-MCNC: ABNORMAL MG/DL
HCT VFR BLD AUTO: 38.8 % (ref 35.6–45.5)
HGB BLD-MCNC: 12.8 G/DL (ref 11.9–15.5)
HGB UR QL STRIP.AUTO: NEGATIVE
HYALINE CASTS UR QL AUTO: NORMAL /LPF
IMM GRANULOCYTES # BLD: 0.04 10*3/MM3 (ref 0–0.03)
IMM GRANULOCYTES NFR BLD: 0.4 % (ref 0–0.5)
KETONES UR QL STRIP: NEGATIVE
LEUKOCYTE ESTERASE UR QL STRIP.AUTO: ABNORMAL
LIPASE SERPL-CCNC: 42 U/L (ref 13–60)
LYMPHOCYTES # BLD AUTO: 0.75 10*3/MM3 (ref 0.9–4.8)
LYMPHOCYTES NFR BLD AUTO: 7.7 % (ref 19.6–45.3)
MCH RBC QN AUTO: 29.8 PG (ref 26.9–32)
MCHC RBC AUTO-ENTMCNC: 33 G/DL (ref 32.4–36.3)
MCV RBC AUTO: 90.2 FL (ref 80.5–98.2)
MONOCYTES # BLD AUTO: 0.51 10*3/MM3 (ref 0.2–1.2)
MONOCYTES NFR BLD AUTO: 5.2 % (ref 5–12)
NEUTROPHILS # BLD AUTO: 8.4 10*3/MM3 (ref 1.9–8.1)
NEUTROPHILS NFR BLD AUTO: 85.7 % (ref 42.7–76)
NITRITE UR QL STRIP: NEGATIVE
PH UR STRIP.AUTO: 6.5 [PH] (ref 5–8)
PLATELET # BLD AUTO: 180 10*3/MM3 (ref 140–500)
PMV BLD AUTO: 9.6 FL (ref 6–12)
POTASSIUM BLD-SCNC: 4.4 MMOL/L (ref 3.5–5.2)
PROT SERPL-MCNC: 6.9 G/DL (ref 6–8.5)
PROT UR QL STRIP: NEGATIVE
RBC # BLD AUTO: 4.3 10*6/MM3 (ref 3.9–5.2)
RBC # UR: NORMAL /HPF
REF LAB TEST METHOD: NORMAL
SODIUM BLD-SCNC: 134 MMOL/L (ref 136–145)
SP GR UR STRIP: 1.01 (ref 1–1.03)
SQUAMOUS #/AREA URNS HPF: NORMAL /HPF
UROBILINOGEN UR QL STRIP: ABNORMAL
WBC NRBC COR # BLD: 9.8 10*3/MM3 (ref 4.5–10.7)
WBC UR QL AUTO: NORMAL /HPF

## 2018-08-05 PROCEDURE — 25010000002 HEPARIN (PORCINE) PER 1000 UNITS: Performed by: INTERNAL MEDICINE

## 2018-08-05 PROCEDURE — 80053 COMPREHEN METABOLIC PANEL: CPT | Performed by: EMERGENCY MEDICINE

## 2018-08-05 PROCEDURE — 82962 GLUCOSE BLOOD TEST: CPT

## 2018-08-05 PROCEDURE — 81001 URINALYSIS AUTO W/SCOPE: CPT | Performed by: EMERGENCY MEDICINE

## 2018-08-05 PROCEDURE — 25010000002 IOPAMIDOL 61 % SOLUTION: Performed by: EMERGENCY MEDICINE

## 2018-08-05 PROCEDURE — 83690 ASSAY OF LIPASE: CPT | Performed by: EMERGENCY MEDICINE

## 2018-08-05 PROCEDURE — 85025 COMPLETE CBC W/AUTO DIFF WBC: CPT | Performed by: EMERGENCY MEDICINE

## 2018-08-05 PROCEDURE — 74177 CT ABD & PELVIS W/CONTRAST: CPT

## 2018-08-05 PROCEDURE — 99284 EMERGENCY DEPT VISIT MOD MDM: CPT

## 2018-08-05 PROCEDURE — 25010000002 LEVOFLOXACIN PER 250 MG: Performed by: EMERGENCY MEDICINE

## 2018-08-05 RX ORDER — SODIUM CHLORIDE 9 MG/ML
50 INJECTION, SOLUTION INTRAVENOUS CONTINUOUS
Status: DISCONTINUED | OUTPATIENT
Start: 2018-08-05 | End: 2018-08-07 | Stop reason: HOSPADM

## 2018-08-05 RX ORDER — NALOXONE HCL 0.4 MG/ML
0.4 VIAL (ML) INJECTION
Status: DISCONTINUED | OUTPATIENT
Start: 2018-08-05 | End: 2018-08-07 | Stop reason: HOSPADM

## 2018-08-05 RX ORDER — NICOTINE POLACRILEX 4 MG
15 LOZENGE BUCCAL
Status: DISCONTINUED | OUTPATIENT
Start: 2018-08-05 | End: 2018-08-07 | Stop reason: HOSPADM

## 2018-08-05 RX ORDER — SODIUM CHLORIDE 0.9 % (FLUSH) 0.9 %
10 SYRINGE (ML) INJECTION AS NEEDED
Status: DISCONTINUED | OUTPATIENT
Start: 2018-08-05 | End: 2018-08-07 | Stop reason: HOSPADM

## 2018-08-05 RX ORDER — ACETAMINOPHEN 325 MG/1
650 TABLET ORAL EVERY 4 HOURS PRN
Status: DISCONTINUED | OUTPATIENT
Start: 2018-08-05 | End: 2018-08-07 | Stop reason: HOSPADM

## 2018-08-05 RX ORDER — PREDNISONE 10 MG/1
10 TABLET ORAL DAILY
Status: DISCONTINUED | OUTPATIENT
Start: 2018-08-06 | End: 2018-08-07 | Stop reason: HOSPADM

## 2018-08-05 RX ORDER — ONDANSETRON 2 MG/ML
4 INJECTION INTRAMUSCULAR; INTRAVENOUS EVERY 6 HOURS PRN
Status: DISCONTINUED | OUTPATIENT
Start: 2018-08-05 | End: 2018-08-07 | Stop reason: HOSPADM

## 2018-08-05 RX ORDER — LEVOTHYROXINE SODIUM 0.07 MG/1
75 TABLET ORAL DAILY
Status: DISCONTINUED | OUTPATIENT
Start: 2018-08-06 | End: 2018-08-07 | Stop reason: HOSPADM

## 2018-08-05 RX ORDER — HEPARIN SODIUM 5000 [USP'U]/ML
5000 INJECTION, SOLUTION INTRAVENOUS; SUBCUTANEOUS EVERY 12 HOURS SCHEDULED
Status: DISCONTINUED | OUTPATIENT
Start: 2018-08-05 | End: 2018-08-07 | Stop reason: HOSPADM

## 2018-08-05 RX ORDER — SODIUM CHLORIDE 0.9 % (FLUSH) 0.9 %
1-10 SYRINGE (ML) INJECTION AS NEEDED
Status: DISCONTINUED | OUTPATIENT
Start: 2018-08-05 | End: 2018-08-07 | Stop reason: HOSPADM

## 2018-08-05 RX ORDER — MORPHINE SULFATE 2 MG/ML
1 INJECTION, SOLUTION INTRAMUSCULAR; INTRAVENOUS EVERY 4 HOURS PRN
Status: DISCONTINUED | OUTPATIENT
Start: 2018-08-05 | End: 2018-08-07 | Stop reason: HOSPADM

## 2018-08-05 RX ORDER — FLUTICASONE PROPIONATE 50 MCG
1 SPRAY, SUSPENSION (ML) NASAL DAILY
Status: DISCONTINUED | OUTPATIENT
Start: 2018-08-06 | End: 2018-08-07 | Stop reason: HOSPADM

## 2018-08-05 RX ORDER — CETIRIZINE HYDROCHLORIDE 10 MG/1
5 TABLET ORAL NIGHTLY
Status: DISCONTINUED | OUTPATIENT
Start: 2018-08-05 | End: 2018-08-07 | Stop reason: HOSPADM

## 2018-08-05 RX ORDER — DEXTROSE MONOHYDRATE 25 G/50ML
25 INJECTION, SOLUTION INTRAVENOUS
Status: DISCONTINUED | OUTPATIENT
Start: 2018-08-05 | End: 2018-08-07 | Stop reason: HOSPADM

## 2018-08-05 RX ORDER — LEVOFLOXACIN 5 MG/ML
500 INJECTION, SOLUTION INTRAVENOUS EVERY 24 HOURS
Status: DISCONTINUED | OUTPATIENT
Start: 2018-08-06 | End: 2018-08-07 | Stop reason: HOSPADM

## 2018-08-05 RX ORDER — LEVOFLOXACIN 5 MG/ML
750 INJECTION, SOLUTION INTRAVENOUS ONCE
Status: COMPLETED | OUTPATIENT
Start: 2018-08-05 | End: 2018-08-05

## 2018-08-05 RX ORDER — ASPIRIN 81 MG/1
81 TABLET ORAL DAILY
Status: DISCONTINUED | OUTPATIENT
Start: 2018-08-06 | End: 2018-08-07 | Stop reason: HOSPADM

## 2018-08-05 RX ADMIN — SODIUM CHLORIDE 100 ML/HR: 9 INJECTION, SOLUTION INTRAVENOUS at 22:49

## 2018-08-05 RX ADMIN — CETIRIZINE HYDROCHLORIDE 5 MG: 10 TABLET, FILM COATED ORAL at 22:49

## 2018-08-05 RX ADMIN — IOPAMIDOL 85 ML: 612 INJECTION, SOLUTION INTRAVENOUS at 16:46

## 2018-08-05 RX ADMIN — HEPARIN SODIUM 5000 UNITS: 5000 INJECTION INTRAVENOUS; SUBCUTANEOUS at 22:52

## 2018-08-05 RX ADMIN — METRONIDAZOLE 500 MG: 500 INJECTION, SOLUTION INTRAVENOUS at 18:47

## 2018-08-05 RX ADMIN — LEVOFLOXACIN 750 MG: 5 INJECTION, SOLUTION INTRAVENOUS at 17:25

## 2018-08-05 RX ADMIN — SODIUM CHLORIDE 1000 ML: 9 INJECTION, SOLUTION INTRAVENOUS at 15:51

## 2018-08-05 NOTE — ED PROVIDER NOTES
" EMERGENCY DEPARTMENT ENCOUNTER    CHIEF COMPLAINT  Chief Complaint: RLQ abd pain  History given by: Pt  History limited by: Nothing  Room Number: 21/21  PMD: Jacqui Booth PA-C      HPI:  Pt is a 73 y.o. female who presents complaining of RLQ abd pain that radiates to her mid lower abd and started earlier today. The pt confirms fever, chills, mild constipation and denies diarrhea, nausea, dysuria or vomiting. The pt states that drinking a lot of water relieves some of her pain.    Duration:  Around 5 hours  Onset: Gradual  Timing: Constant  Location: RLQ  Radiation: To min lower abd  Quality: \"pain\"  Intensity/Severity: Moderate  Progression: No change  Associated Symptoms: Fever, chills, mild constipation  Aggravating Factors: Unknown  Alleviating Factors: Drinking a lot of water  Previous Episodes: Unknown  Treatment before arrival: None    PAST MEDICAL HISTORY  Active Ambulatory Problems     Diagnosis Date Noted   • S/P thyroidectomy 10/27/2017   • Mixed hyperlipidemia 10/27/2017   • Vitamin D deficiency 10/27/2017   • Impaired fasting glucose 10/27/2017   • Hypothyroidism 10/27/2017   • Osteopenia of multiple sites 10/27/2017   • Hypertensive left ventricular hypertrophy, without heart failure 10/27/2017   • Chronic seasonal allergic rhinitis 10/27/2017   • History of heart disease 10/27/2017   • Hx of abnormal mammogram 10/27/2017   • Nodule of left lung 10/27/2017   • History of diverticulosis 10/27/2017   • Hypertension 04/27/2018     Resolved Ambulatory Problems     Diagnosis Date Noted   • No Resolved Ambulatory Problems     Past Medical History:   Diagnosis Date   • Autoimmune hepatitis (CMS/HCC)    • Cholelithiasis 04-   • Coronary artery disease    • Hemangioma of liver    • Hernia 2017   • Hyperlipidemia    • Hypertension    • Hyperthyroidism    • Hypothyroidism    • Laceration of finger of right hand 05/15/2018   • Skin cancer    • Ulcerative colitis (CMS/HCC) 2014       PAST SURGICAL " HISTORY  Past Surgical History:   Procedure Laterality Date   • BUNIONECTOMY     • COLONOSCOPY  2014    Diverticulosis in the sigmoid colon, in the descending colon, in the transverse colon and in the ascending colon (Pathology: Collagenous colitis)   • COLONOSCOPY  2005    Hemorrhoids, pandiverticulosis, status post diverticulitis   • LIVER BIOPSY     • LIVER BIOPSY     • THYROIDECTOMY         FAMILY HISTORY  Family History   Problem Relation Age of Onset   • Heart disease Mother    • Hypertension Mother    • Lung cancer Sister    • Thyroid disease Sister    • Lupus Sister    • Thyroid disease Brother    • Alcohol abuse Brother    • Glaucoma Maternal Grandmother    • Stomach cancer Maternal Grandmother             • Cirrhosis Father             • Liver disease Father        SOCIAL HISTORY  Social History     Social History   • Marital status: Single     Spouse name: N/A   • Number of children: N/A   • Years of education: N/A     Occupational History   • Not on file.     Social History Main Topics   • Smoking status: Former Smoker     Packs/day: 1.50     Years: 12.00     Start date: 1963     Quit date: 1976   • Smokeless tobacco: Never Used      Comment: Chrinic bronchitis...smoke allergy   • Alcohol use Yes      Comment: Seldom   • Drug use: No   • Sexual activity: Not Currently     Other Topics Concern   • Not on file     Social History Narrative   • No narrative on file       ALLERGIES  Augmentin [amoxicillin-pot clavulanate] and Keflex [cephalexin]    REVIEW OF SYSTEMS  Review of Systems   Constitutional: Positive for chills and fever.   HENT: Negative for sore throat.    Eyes: Negative.    Respiratory: Negative for cough and shortness of breath.    Cardiovascular: Negative for chest pain.   Gastrointestinal: Positive for abdominal pain and constipation. Negative for diarrhea and vomiting.   Genitourinary: Negative for dysuria.   Musculoskeletal: Negative for neck  pain.   Skin: Negative for rash.   Allergic/Immunologic: Negative.    Neurological: Negative for weakness, numbness and headaches.   Hematological: Negative.    Psychiatric/Behavioral: Negative.    All other systems reviewed and are negative.      PHYSICAL EXAM  ED Triage Vitals [08/05/18 1531]   Temp Heart Rate Resp BP SpO2   99.7 °F (37.6 °C) 110 18 -- 95 %      Temp src Heart Rate Source Patient Position BP Location FiO2 (%)   Tympanic Monitor -- -- --       Physical Exam   Constitutional: She is oriented to person, place, and time. No distress.   HENT:   Head: Normocephalic and atraumatic.   Eyes: Pupils are equal, round, and reactive to light. EOM are normal.   Neck: Normal range of motion. Neck supple.   Cardiovascular: Regular rhythm and normal heart sounds.  Tachycardia present.    Pulmonary/Chest: Effort normal and breath sounds normal. No respiratory distress.   Abdominal: Soft. There is tenderness (Mild) in the right lower quadrant and suprapubic area. There is no rebound, no guarding and no CVA tenderness.   Musculoskeletal: Normal range of motion. She exhibits no edema.   Neurological: She is alert and oriented to person, place, and time. She has normal sensation and normal strength.   Skin: Skin is warm and dry. No rash noted.   Psychiatric: Mood and affect normal.   Nursing note and vitals reviewed.      LAB RESULTS  Lab Results (last 24 hours)     Procedure Component Value Units Date/Time    CBC & Differential [[id number]] Collected:  08/05/18 1551    Specimen:  Blood Updated:  08/05/18 1601    Narrative:       The following orders were created for panel order CBC & Differential.  Procedure                               Abnormality         Status                     ---------                               -----------         ------                     CBC Auto Differential[[id number]]        Abnormal            Final result                 Please view results for these tests on the individual orders.     Comprehensive Metabolic Panel [[id number]]  (Abnormal) Collected:  08/05/18 1551    Specimen:  Blood Updated:  08/05/18 1622     Glucose 239 (H) mg/dL      BUN 17 mg/dL      Creatinine 1.06 (H) mg/dL      Sodium 134 (L) mmol/L      Potassium 4.4 mmol/L      Chloride 94 (L) mmol/L      CO2 25.8 mmol/L      Calcium 10.0 mg/dL      Total Protein 6.9 g/dL      Albumin 4.10 g/dL      ALT (SGPT) 33 U/L      AST (SGOT) 18 U/L      Alkaline Phosphatase 57 U/L      Total Bilirubin 1.4 (H) mg/dL      eGFR Non African Amer 51 (L) mL/min/1.73      Globulin 2.8 gm/dL      A/G Ratio 1.5 g/dL      BUN/Creatinine Ratio 16.0     Anion Gap 14.2 mmol/L     Narrative:       The MDRD GFR formula is only valid for adults with stable renal function between ages 18 and 70.    Urinalysis With Microscopic If Indicated (No Culture) - Urine, Clean Catch [[id number]]  (Abnormal) Collected:  08/05/18 1551    Specimen:  Urine from Urine, Clean Catch Updated:  08/05/18 1629     Color, UA Yellow     Appearance, UA Clear     pH, UA 6.5     Specific Gravity, UA 1.006     Glucose,  mg/dL (Trace) (A)     Ketones, UA Negative     Bilirubin, UA Negative     Blood, UA Negative     Protein, UA Negative     Leuk Esterase, UA Small (1+) (A)     Nitrite, UA Negative     Urobilinogen, UA 0.2 E.U./dL    Lipase [[id number]]  (Normal) Collected:  08/05/18 1551    Specimen:  Blood Updated:  08/05/18 1622     Lipase 42 U/L     CBC Auto Differential [[id number]]  (Abnormal) Collected:  08/05/18 1551    Specimen:  Blood Updated:  08/05/18 1601     WBC 9.80 10*3/mm3      RBC 4.30 10*6/mm3      Hemoglobin 12.8 g/dL      Hematocrit 38.8 %      MCV 90.2 fL      MCH 29.8 pg      MCHC 33.0 g/dL      RDW 13.5 (H) %      RDW-SD 44.3 fl      MPV 9.6 fL      Platelets 180 10*3/mm3      Neutrophil % 85.7 (H) %      Lymphocyte % 7.7 (L) %      Monocyte % 5.2 %      Eosinophil % 0.9 %      Basophil % 0.1 %      Immature Grans % 0.4 %      Neutrophils, Absolute 8.40 (H)  10*3/mm3      Lymphocytes, Absolute 0.75 (L) 10*3/mm3      Monocytes, Absolute 0.51 10*3/mm3      Eosinophils, Absolute 0.09 10*3/mm3      Basophils, Absolute 0.01 10*3/mm3      Immature Grans, Absolute 0.04 (H) 10*3/mm3     Urinalysis, Microscopic Only - Urine, Clean Catch [[id number]] Collected:  08/05/18 1551    Specimen:  Urine from Urine, Clean Catch Updated:  08/05/18 1629     RBC, UA 0-2 /HPF      WBC, UA 0-2 /HPF      Bacteria, UA None Seen /HPF      Squamous Epithelial Cells, UA 0-2 /HPF      Hyaline Casts, UA None Seen /LPF      Methodology Automated Microscopy          I ordered the above labs and reviewed the results    RADIOLOGY  CT Abdomen Pelvis With Contrast   Preliminary Result   Acute sigmoid diverticulitis associated with wall thickening   and perisigmoid inflammation. Inflammation is centered about 2   diverticuli that extend just above the involved sigmoid segment. The   larger and more distal of these 2 exhibits a thin, 1 cm crescentic band   of edema or early phlegmon or microabscess formation adjacent to the   diverticulum (as best demonstrated on the coronal reconstruction   sequence).  There is no drainable collection or extraluminal gas.       Discussed with Dr. Light in the emergency department on 08/05/2018 at   5:05 PM.        Radiation dose reduction techniques were utilized, including automated   exposure control and exposure modulation based on body size.                   I ordered the above noted radiological studies. Interpreted by radiologist. Discussed with radiologist (Dr. Madsen). Reviewed by me in PACS.       PROCEDURES  Procedures      PROGRESS AND CONSULTS     1540 Ordered CMP, UA, lipase, CBC, and CT abd pelvis for further evaluation. Ordered 1L fluid.     1709 Rechecked the patient and she is resting and in no acute distress. Discussed pertinent lab and imaging results, including CT abd pelvis which shows that the pt has diverticulitis. Discussed the plan to admit the pt  for antibiotics to ensure improvement of symptoms. Pt declines pain medication at this time. Patient agrees with plan and all questions were addressed.     1711 Spoke with the pharmacist about the pt and she recommends Levaquin and flagyl.     1713 Ordered Levaquin and flagyl for antibiotics. Placed call to [facility] for admission.     1732 Discussed the pt with Dr. Moseley ([facility]) who agrees to admit the pt.     1736 Ordered med surg bed and inpatient admission.     MEDICAL DECISION MAKING  Results were reviewed/discussed with the patient and they were also made aware of online access. Pt also made aware that some labs, such as cultures, will not be resulted during ER visit and follow up with PMD is necessary.     MDM  Number of Diagnoses or Management Options  Diverticulitis of large intestine without perforation or abscess without bleeding:   Diagnosis management comments: CT scan showed diverticulitis.  There was one focal area of edema which was felt to represent wall edema versus possible microabscess.  There was no drainable abscess or free air.  Patient was given IV fluids, IV Levaquin, and IV Flagyl.  Case was discussed with Dr. Moseley and he agreed to admit the patient.       Amount and/or Complexity of Data Reviewed  Clinical lab tests: reviewed and ordered (Glucose: 239, creatinine: 1.06, sodium: 134)  Tests in the radiology section of CPT®: ordered and reviewed (CT abd pelvis: Diverticulitis of sigmoid colon, 1cm area of adjacent edema, no abscess, no free air)  Discussion of test results with the performing providers: yes (Dr. Madsen (Radiology))  Decide to obtain previous medical records or to obtain history from someone other than the patient: yes  Review and summarize past medical records: yes (The pt had a CT abd pelvis in May 2018 that showed moderate colonic diverticulosis.   The pt had a liver US in April 2018 that showed a gallstone. )  Discuss the patient with other providers: yes (Dr. Moseley  ([facility]))    Patient Progress  Patient progress: stable         DIAGNOSIS  Final diagnoses:   Diverticulitis of large intestine without perforation or abscess without bleeding       DISPOSITION  ADMISSION    Discussed treatment plan and reason for admission with pt/family and admitting physician.  Pt/family voiced understanding of the plan for admission for further testing/treatment as needed.     Latest Documented Vital Signs:  As of 5:36 PM  BP- 137/56 HR- 110 Temp- 99.7 °F (37.6 °C) (Tympanic) O2 sat- 95%    --  Documentation assistance provided by viet Sanders for Dr. Light.  Information recorded by the viet was done at my direction and has been verified and validated by me.       Kaia Sanders  08/05/18 1052       Joao Light MD  08/05/18 7287

## 2018-08-06 LAB
ALBUMIN SERPL-MCNC: 3.3 G/DL (ref 3.5–5.2)
ALBUMIN/GLOB SERPL: 1.4 G/DL
ALP SERPL-CCNC: 41 U/L (ref 39–117)
ALT SERPL W P-5'-P-CCNC: 25 U/L (ref 1–33)
ANION GAP SERPL CALCULATED.3IONS-SCNC: 11.5 MMOL/L
AST SERPL-CCNC: 16 U/L (ref 1–32)
BASOPHILS # BLD AUTO: 0.01 10*3/MM3 (ref 0–0.2)
BASOPHILS NFR BLD AUTO: 0.1 % (ref 0–1.5)
BILIRUB SERPL-MCNC: 1.3 MG/DL (ref 0.1–1.2)
BUN BLD-MCNC: 14 MG/DL (ref 8–23)
BUN/CREAT SERPL: 18.4 (ref 7–25)
CALCIUM SPEC-SCNC: 8.7 MG/DL (ref 8.6–10.5)
CHLORIDE SERPL-SCNC: 103 MMOL/L (ref 98–107)
CO2 SERPL-SCNC: 25.5 MMOL/L (ref 22–29)
CREAT BLD-MCNC: 0.76 MG/DL (ref 0.57–1)
DEPRECATED RDW RBC AUTO: 45.4 FL (ref 37–54)
EOSINOPHIL # BLD AUTO: 0.09 10*3/MM3 (ref 0–0.7)
EOSINOPHIL NFR BLD AUTO: 1.1 % (ref 0.3–6.2)
ERYTHROCYTE [DISTWIDTH] IN BLOOD BY AUTOMATED COUNT: 13.6 % (ref 11.7–13)
GFR SERPL CREATININE-BSD FRML MDRD: 75 ML/MIN/1.73
GLOBULIN UR ELPH-MCNC: 2.3 GM/DL
GLUCOSE BLD-MCNC: 92 MG/DL (ref 65–99)
GLUCOSE BLDC GLUCOMTR-MCNC: 115 MG/DL (ref 70–130)
GLUCOSE BLDC GLUCOMTR-MCNC: 123 MG/DL (ref 70–130)
GLUCOSE BLDC GLUCOMTR-MCNC: 144 MG/DL (ref 70–130)
GLUCOSE BLDC GLUCOMTR-MCNC: 88 MG/DL (ref 70–130)
HBA1C MFR BLD: 6.4 % (ref 4.8–5.6)
HCT VFR BLD AUTO: 34 % (ref 35.6–45.5)
HGB BLD-MCNC: 11.2 G/DL (ref 11.9–15.5)
IMM GRANULOCYTES # BLD: 0.03 10*3/MM3 (ref 0–0.03)
IMM GRANULOCYTES NFR BLD: 0.4 % (ref 0–0.5)
LYMPHOCYTES # BLD AUTO: 1.19 10*3/MM3 (ref 0.9–4.8)
LYMPHOCYTES NFR BLD AUTO: 15.1 % (ref 19.6–45.3)
MCH RBC QN AUTO: 30 PG (ref 26.9–32)
MCHC RBC AUTO-ENTMCNC: 32.9 G/DL (ref 32.4–36.3)
MCV RBC AUTO: 91.2 FL (ref 80.5–98.2)
MONOCYTES # BLD AUTO: 0.53 10*3/MM3 (ref 0.2–1.2)
MONOCYTES NFR BLD AUTO: 6.7 % (ref 5–12)
NEUTROPHILS # BLD AUTO: 6.01 10*3/MM3 (ref 1.9–8.1)
NEUTROPHILS NFR BLD AUTO: 76.6 % (ref 42.7–76)
PLATELET # BLD AUTO: 162 10*3/MM3 (ref 140–500)
PMV BLD AUTO: 9.7 FL (ref 6–12)
POTASSIUM BLD-SCNC: 3.7 MMOL/L (ref 3.5–5.2)
PROT SERPL-MCNC: 5.6 G/DL (ref 6–8.5)
RBC # BLD AUTO: 3.73 10*6/MM3 (ref 3.9–5.2)
SODIUM BLD-SCNC: 140 MMOL/L (ref 136–145)
WBC NRBC COR # BLD: 7.86 10*3/MM3 (ref 4.5–10.7)

## 2018-08-06 PROCEDURE — 82962 GLUCOSE BLOOD TEST: CPT

## 2018-08-06 PROCEDURE — 63710000001 PREDNISONE PER 5 MG: Performed by: INTERNAL MEDICINE

## 2018-08-06 PROCEDURE — 83036 HEMOGLOBIN GLYCOSYLATED A1C: CPT | Performed by: INTERNAL MEDICINE

## 2018-08-06 PROCEDURE — 25010000002 LEVOFLOXACIN PER 250 MG: Performed by: INTERNAL MEDICINE

## 2018-08-06 PROCEDURE — 80053 COMPREHEN METABOLIC PANEL: CPT | Performed by: INTERNAL MEDICINE

## 2018-08-06 PROCEDURE — 85025 COMPLETE CBC W/AUTO DIFF WBC: CPT | Performed by: INTERNAL MEDICINE

## 2018-08-06 PROCEDURE — 25010000002 HEPARIN (PORCINE) PER 1000 UNITS: Performed by: INTERNAL MEDICINE

## 2018-08-06 PROCEDURE — 99253 IP/OBS CNSLTJ NEW/EST LOW 45: CPT | Performed by: INTERNAL MEDICINE

## 2018-08-06 RX ADMIN — LEVOTHYROXINE SODIUM 75 MCG: 75 TABLET ORAL at 06:34

## 2018-08-06 RX ADMIN — LISINOPRIL: 10 TABLET ORAL at 09:43

## 2018-08-06 RX ADMIN — METRONIDAZOLE 500 MG: 500 INJECTION, SOLUTION INTRAVENOUS at 17:40

## 2018-08-06 RX ADMIN — HEPARIN SODIUM 5000 UNITS: 5000 INJECTION INTRAVENOUS; SUBCUTANEOUS at 21:09

## 2018-08-06 RX ADMIN — PREDNISONE 10 MG: 10 TABLET ORAL at 09:43

## 2018-08-06 RX ADMIN — METRONIDAZOLE 500 MG: 500 INJECTION, SOLUTION INTRAVENOUS at 09:43

## 2018-08-06 RX ADMIN — METRONIDAZOLE 500 MG: 500 INJECTION, SOLUTION INTRAVENOUS at 02:48

## 2018-08-06 RX ADMIN — ASPIRIN 81 MG: 81 TABLET, DELAYED RELEASE ORAL at 09:44

## 2018-08-06 RX ADMIN — SODIUM CHLORIDE 75 ML/HR: 9 INJECTION, SOLUTION INTRAVENOUS at 12:12

## 2018-08-06 RX ADMIN — CETIRIZINE HYDROCHLORIDE 5 MG: 10 TABLET, FILM COATED ORAL at 21:09

## 2018-08-06 RX ADMIN — HEPARIN SODIUM 5000 UNITS: 5000 INJECTION INTRAVENOUS; SUBCUTANEOUS at 09:44

## 2018-08-06 RX ADMIN — LEVOFLOXACIN 500 MG: 5 INJECTION, SOLUTION INTRAVENOUS at 16:23

## 2018-08-06 NOTE — PROGRESS NOTES
Name: Mami Srivastava ADMIT: 2018   : 1945  PCP: Jacqui Booth PA-C    MRN: [id number] LOS: 1 days   AGE/SEX: 73 y.o. female  ROOM: [facility]   Subjective   Right lower quadrant pain has nearly resolved.  She is feeling hungry and wants to eat.  No nausea, vomiting, diarrhea.  Small blood in her stool today    Objective   Vital Signs  Temp:  [96.6 °F (35.9 °C)-98.2 °F (36.8 °C)] 97.5 °F (36.4 °C)  Heart Rate:  [63-80] 65  Resp:  [18] 18  BP: (100-126)/(57-69) 100/69  SpO2:  [95 %-98 %] 96 %  on   ;   Device (Oxygen Therapy): room air  Body mass index is 25.15 kg/m².    Physical Exam   Constitutional: She is oriented to person, place, and time. No distress.   Eyes: Pupils are equal, round, and reactive to light. EOM are normal.   Cardiovascular: Normal rate and regular rhythm.  Exam reveals no friction rub.    No murmur heard.  Pulmonary/Chest: Effort normal and breath sounds normal. No respiratory distress. She has no wheezes.   Abdominal: Soft. Bowel sounds are normal. She exhibits no distension. There is tenderness (Mild right lower and right upper quadrant).   Musculoskeletal: She exhibits no edema or tenderness.   Neurological: She is alert and oriented to person, place, and time.   Skin: She is not diaphoretic. No erythema. No pallor.   Psychiatric: She has a normal mood and affect. Her behavior is normal.       Results Review:       I reviewed the patient's new clinical results.    Results from last 7 days  Lab Units 18  0441 18  1551   WBC 10*3/mm3 7.86 9.80   HEMOGLOBIN g/dL 11.2* 12.8   PLATELETS 10*3/mm3 162 180     Results from last 7 days  Lab Units 18  0441 18  1551 18  1154   SODIUM mmol/L 140 134* 143   POTASSIUM mmol/L 3.7 4.4 4.1   CHLORIDE mmol/L 103 94* 99   CO2 mmol/L 25.5 25.8  --    TOTAL CO2, ARTERIAL mmol/L  --   --  29.3*   BUN mg/dL 14 17 23   CREATININE mg/dL 0.76 1.06* 1.14*   GLUCOSE mg/dL 92 239*  --    Estimated Creatinine Clearance: 56.6  mL/min (by C-G formula based on SCr of 0.76 mg/dL).  Results from last 7 days  Lab Units 08/06/18  0441 08/05/18  1551 07/31/18  1154   CALCIUM mg/dL 8.7 10.0 10.0   ALBUMIN g/dL 3.30* 4.10 4.30     Lab Results   Component Value Date    HGBA1C 6.40 (H) 08/06/2018    HGBA1C 5.8 (H) 04/14/2018     Glucose   Date/Time Value Ref Range Status   08/06/2018 1630 123 70 - 130 mg/dL Final   08/06/2018 1131 115 70 - 130 mg/dL Final   08/06/2018 0618 88 70 - 130 mg/dL Final   08/05/2018 2140 113 70 - 130 mg/dL Final         aspirin 81 mg Oral Daily   cetirizine 5 mg Oral Nightly   fluticasone 1 spray Nasal Daily   heparin (porcine) 5,000 Units Subcutaneous Q12H   insulin aspart 0-9 Units Subcutaneous 4x Daily With Meals & Nightly   levoFLOXacin 500 mg Intravenous Q24H   levothyroxine 75 mcg Oral Daily   lisinopril-hydroCHLOROthiazide (ZESTORETIC) 10-12.5 mg combo dose  Oral Q24H   metroNIDAZOLE 500 mg Intravenous Q8H   predniSONE 10 mg Oral Daily       sodium chloride 75 mL/hr Last Rate: 75 mL/hr (08/06/18 1212)   Diet Full Liquid      Assessment/Plan      Active Hospital Problems    Diagnosis Date Noted   • **Diverticulitis of large intestine without perforation or abscess without bleeding [K57.32] 08/05/2018   • Hyperglycemia [R73.9] 08/05/2018   • KEVIN (acute kidney injury) (CMS/HCC) [N17.9] 08/05/2018   • Current chronic use of systemic steroids [Z79.52] 08/05/2018   • Hypertension [I10] 04/27/2018   • History of diverticulosis [Z87.19] 10/27/2017   • Hypothyroidism [E03.9] 10/27/2017      Resolved Hospital Problems    Diagnosis Date Noted Date Resolved   No resolved problems to display.         · Acute diverticulitis: Continue IV antibiotics, changed oral tomorrow.  On chronic steroids but these are tapering off.  She has improved greatly and is almost back to normal.  Advance diet appreciate general surgery and GI  · Autoimmune hepatitis: Continue prednisone, labs look good, care per GI  · Hypertension: Borderline low,  stop her lisinopril and diuretics for now  · Stop IV fluids since appetite has returned  · Hyperglycemia and prediabetes likely from her steroids, these are titrating off per GI    Given her rapid improvement I suspect she could go home tomorrow if she continues to improve.    Cruz Fairbanks MD  [facility]  08/06/18  4:54 PM

## 2018-08-06 NOTE — PLAN OF CARE
Problem: Patient Care Overview  Goal: Plan of Care Review  Outcome: Ongoing (interventions implemented as appropriate)   08/06/18 0424   Coping/Psychosocial   Plan of Care Reviewed With patient   Plan of Care Review   Progress no change   OTHER   Outcome Summary rested quietly through night. ivf's and antibiotics continue. remains npo x ice and meds. no c/o n,v, or pain. await surgical consultation.      Goal: Individualization and Mutuality  Outcome: Ongoing (interventions implemented as appropriate)    Goal: Discharge Needs Assessment  Outcome: Ongoing (interventions implemented as appropriate)    Goal: Interprofessional Rounds/Family Conf  Outcome: Ongoing (interventions implemented as appropriate)      Problem: Pain, Acute (Adult)  Goal: Identify Related Risk Factors and Signs and Symptoms  Outcome: Outcome(s) achieved Date Met: 08/06/18    Goal: Acceptable Pain Control/Comfort Level  Outcome: Ongoing (interventions implemented as appropriate)

## 2018-08-06 NOTE — CONSULTS
Adult Nutrition  Assessment/PES    Patient Name:  Mami Srivastava  YOB: 1945  MRN: [id number]  Admit Date:  8/5/2018    Assessment Date:  8/6/2018    Comments:  Educated patient on a low fiber/low residue diet. Given copy for home use. Encouraged to call with questions.          Reason for Assessment     Row Name 08/06/18 1132          Reason for Assessment    Reason For Assessment physician consult     Diagnosis gastrointestinal disease                           Problem/Interventions:        Problem 1     Row Name 08/06/18 1132          Nutrition Diagnoses Problem 1    Problem 1 Knowledge Deficit     Etiology (related to) Medical Diagnosis     Gastrointestinal Diverticulitis     Signs/Symptoms (evidenced by) Reported  Information Deficit                     Intervention Goal     Row Name 08/06/18 1132          Intervention Goal    General Provide information regarding MNT for treatment/condition             Nutrition Intervention     Row Name 08/06/18 1133          Nutrition Intervention    RD/Tech Action Care plan reviewd               Education/Evaluation     Row Name 08/06/18 1133          Education    Education Provided education regarding;Education topics     Provided education regarding Key food habit change     Education Topics Low residue        Monitor/Evaluation    Monitor Per protocol     Education Follow-up Reinforce PRN         Electronically signed by:  Yocasta Erickson RD  08/06/18 11:34 AM

## 2018-08-06 NOTE — CONSULTS
August 6, 2018    Mami Srivastava  [id number]    GENERAL SURGERY CONSULTATION    CONSULTING PHYSICIAN:  Freeman Barrera M.D.    REQUESTING PHYSICIAN:  Kurtis Moseley M.D.    PRIMARY PHYSICIAN:  Jacqui Booth PA-C.    REASON FOR CONSULTATION:    Acute sigmoid diverticulitis.    HISTORY:  Mami Srivastava is a 73 y.o. female who is admitted to the hospital with acute sigmoid diverticulitis.  Patient gives a history of having diverticulitis about 15 years ago that required at least a visit to the hospital perhaps for CT scan but it does not sound like an admission.  She's been fine ever since.  She gets routine colonoscopies by Dr. Hein.  Her last one was about 3 years ago.  Apparently, 4:00 in the morning on the day of her presentation, she developed severe suprapubic abdominal cramps that she felt were very consistent with the diverticulitis she had in the past.  She toughed it out for a few hours and then for 5 hours later was bad enough that she presented to the hospital for further evaluation.  CT scan confirmed evidence of acute sigmoid diverticulitis with perhaps a mild developing phlegmon.  Surgery was consulted and she was admitted to medicine.  She does feel better this morning after receiving a few doses of antibiotics and pain medicine.  Her last bowel movement was normal.  She denies blood from above or below, diarrhea, urinary or gynecologic complaints.  She has had some chills but no fever.        Past Medical History:   Diagnosis Date   • Autoimmune hepatitis (CMS/HCC)    • Cholelithiasis 04-    Ultrasound   • Coronary artery disease     Dr Luis Rick   • Hemangioma of liver    • Hernia 2017    Hiatal hernia-Prabhjot test   • Hyperlipidemia     Dr Luis Rick-atorvastatin   • Hypertension    • Hyperthyroidism    • Hypothyroidism    • Laceration of finger of right hand 05/15/2018    lac with knife and had 3 stitches   • Skin cancer     face   • Ulcerative colitis (CMS/HCC) 2014     "Colitis/[facility]Kaplan     Past Surgical History:   Procedure Laterality Date   • BUNIONECTOMY     • COLONOSCOPY  2014    Diverticulosis in the sigmoid colon, in the descending colon, in the transverse colon and in the ascending colon (Pathology: Collagenous colitis)   • COLONOSCOPY  2005    Hemorrhoids, pandiverticulosis, status post diverticulitis   • LIVER BIOPSY     • LIVER BIOPSY     • THYROIDECTOMY       Family History   Problem Relation Age of Onset   • Heart disease Mother    • Hypertension Mother    • Lung cancer Sister    • Thyroid disease Sister    • Lupus Sister    • Thyroid disease Brother    • Alcohol abuse Brother    • Glaucoma Maternal Grandmother    • Stomach cancer Maternal Grandmother          ’s   • Cirrhosis Father             • Liver disease Father      Social History   Substance Use Topics   • Smoking status: Former Smoker     Packs/day: 1.50     Years: 12.00     Start date: 1963     Quit date: 1976   • Smokeless tobacco: Never Used      Comment: Chrinic bronchitis...smoke allergy   • Alcohol use Yes      Comment: Seldom       Review of Systems  On questioning, the patient denies any weight loss, fatigue or headache, no visual changes or hearing complaints, no new cardiovascular or pulmonary complaints, no  complaints, no GYN complaints, no musculoskeletal or neurologic complaints, no skin, bleeding, psychiatric or endocrine complaints.    Vitals:    18 1744 18 1829 18 2320   BP: 126/57 105/60 107/63 112/65   BP Location: Right arm Right arm Right arm Right arm   Patient Position: Sitting Sitting Lying Lying   Pulse: 72 80 70 68   Resp:    Temp: 98.2 °F (36.8 °C) 97.4 °F (36.3 °C) 96.6 °F (35.9 °C) 97.1 °F (36.2 °C)   TempSrc: Tympanic Oral Oral Oral   SpO2: 96% 97% 95% 97%   Weight:       Height:  160 cm (63\")         Physical Exam  /65 (BP Location: Right arm, Patient Position: Lying)   Pulse 68   Temp " "97.1 °F (36.2 °C) (Oral)   Resp 18   Ht 160 cm (63\")   Wt 64.4 kg (142 lb)   SpO2 97%   BMI 25.15 kg/m²     On exam, patient is alert oriented and appropriate and is in no acute distress.  HEENT:   Head is normocephalic, both external ears are normal and sclerae are nonicteric.  Neck:  Supple without lymphadenopathy.  Heart:  Regular without obvious murmur.  Chest:  Good respiratory effort bilaterally.  Abdomen:  Soft, protuberant, nondistended, tenderness mild, moderate - suprapubic.  Rectal:  Deferred.  Extremities:  Without edema.  Skin:  Negative.    Diagnostic Imaging: CT scan images and results reviewed.      CT Abdomen Pelvis With Contrast   Final Result   Acute sigmoid diverticulitis associated with wall thickening   and perisigmoid inflammation. Inflammation is centered about 2   diverticuli that extend cephalad to the involved sigmoid segment. The   larger and more distal of these 2 exhibits a thin, 1 cm crescentic band   of edema or early phlegmon or microabscess formation adjacent to the   diverticulum (as best demonstrated on the coronal reconstruction   sequence).  There is no drainable collection or extraluminal gas.       Discussed with Dr. Light in the emergency department on 08/05/2018 at   5:05 PM.        Radiation dose reduction techniques were utilized, including automated   exposure control and exposure modulation based on body size.       This report was finalized on 8/5/2018 7:13 PM by Dr. Wes Doss M.D.              Labs:   Lab Results   Component Value Date    WBC 7.86 08/06/2018    HGB 11.2 (L) 08/06/2018    HCT 34.0 (L) 08/06/2018     08/06/2018     Lab Results   Component Value Date     08/06/2018    K 3.7 08/06/2018     08/06/2018    CO2 25.5 08/06/2018    BUN 14 08/06/2018    CREATININE 0.76 08/06/2018    GLUCOSE 92 08/06/2018       Assessment/Plan:  Patient is a 73 y.o. female who is admitted to the hospital withUncomplicated acute sigmoid " diverticulitis.  Her WBC is normal and She looks really good this morning so at this point we are pre-much treating her CT scan.  I would recommend we turn down her IV fluids, start her on clears with potentially advancing to full's later today and consult nutrition to instruct her on a low residue diet.  I also encouraged a colonoscopy by Dr. Hein when she is over this episode.  Finally, I explained to her the surgical indications for diverticulitis including intractability recurrence and complication which thankfully she does not have at the present time.    Thank you very much for the consult,     Freeman Barrera M.D.

## 2018-08-06 NOTE — H&P
Internal medicine history and physical    INTERNAL MEDICINE   [facility]       Patient Identification:  Name: Mami Srivastava  Age: 73 y.o.  Sex: female  :  1945  MRN: [id number]                   Primary Care Physician: Jacqui Booth, ARIELA                                   Chief Complaint:  Woke up early this morning around 4 or 5 AM with abdominal cramps followed by feeling poorly and worsening abdominal discomfort and chills at [facility] around 9:30 this morning.    History of Present Illness:   Patient is a 73-year-old fairly active female with past medical history remarkable for colitis 4 years ago that resolved on its own, history of autoimmune hepatitis for which she is on tapering doses of prednisone while Dr. Hein is working on 2 place her on steroid sparing agents, history of hypertension and hypothyroidism and a history of diverticulitis 15 years ago was in her usual state of her health until a few weeks ago when she started having some constipation.  It was not bad enough to hold her from doing anything in fact she was able to [place] in [place] and recently spent a few days in [facility] with her son and came back this past Monday.  She went to see her gastroenterologist Dr. Hein on Monday and mentioned mild constipation issues with him.  At that time she was told to start tapering her steroids and now she is taking prednisone 10 mg daily while awaiting the final decision from Dr. Hein to place her on steroid sparing immunosuppressive agents to address her abdomen hepatitis.  In this background patient went to bed feeling fine otherwise last night and woke up early in the morning noticing that she has abdominal cramps and discomfort.  This was more localized to the right side of her abdomen.  She initially thought this was abdominal cramps and maybe after bowel movement she would get better.  She did have a bowel movement early morning he was more mucus  appearing.  She felt somewhat better prepared herself and went to [facility] and while at [facility] she started having chills and then had a fever best thing she knew that it is very similar to an episode that occurred 15 years ago and she needs to go to the hospital.  She did call Dr. Hein and after speaking to him she decided to come to the emergency room as recommended.  In the emergency room the workup did reveal acute sigmoid diverticulitis with wall thickening and micheal-sigmoid inflammation.  There was a concern that there may be developing microabscesses.  Patient is appetite is preserved and she denies any joint aches and pain or any skin nodules that are painful or tender.  She denies any vision changes.      Past Medical History:  Past Medical History:   Diagnosis Date   • Autoimmune hepatitis (CMS/HCC)    • Cholelithiasis 04-    Ultrasound   • Coronary artery disease     Dr Luis Rick   • Hemangioma of liver    • Hernia 2017    Hiatal hernia-Prabhjot test   • Hyperlipidemia     Dr Luis Rick-atorvastatin   • Hypertension    • Hyperthyroidism    • Hypothyroidism    • Laceration of finger of right hand 05/15/2018    lac with knife and had 3 stitches   • Skin cancer     face   • Ulcerative colitis (CMS/HCC) 2014    Colitis/[facility]     Past Surgical History:  Past Surgical History:   Procedure Laterality Date   • BUNIONECTOMY     • COLONOSCOPY  04/11/2014    Diverticulosis in the sigmoid colon, in the descending colon, in the transverse colon and in the ascending colon (Pathology: Collagenous colitis)   • COLONOSCOPY  12/09/2005    Hemorrhoids, pandiverticulosis, status post diverticulitis   • LIVER BIOPSY     • LIVER BIOPSY     • THYROIDECTOMY  2008      Home Meds:  Prescriptions Prior to Admission   Medication Sig Dispense Refill Last Dose   • aspirin 81 MG EC tablet Take 81 mg by mouth Daily.   8/5/2018 at Unknown time   • cetirizine (zyrTEC) 10 MG tablet Take 10 mg by mouth Every Night.   8/5/2018 at  Unknown time   • fluticasone (FLONASE) 50 MCG/ACT nasal spray 1 spray into each nostril Daily.   2018 at Unknown time   • levothyroxine (SYNTHROID, LEVOTHROID) 75 MCG tablet Take 1 tablet by mouth Daily. For thyroid (generic accepted) 90 tablet 3 2018 at Unknown time   • lisinopril-hydrochlorothiazide (PRINZIDE,ZESTORETIC) 10-12.5 MG per tablet Take 1 tablet by mouth Daily. For BP 90 tablet 1 2018 at Unknown time   • Multiple Vitamin (MULTI VITAMIN DAILY PO) Take  by mouth.   2018 at Unknown time   • predniSONE (DELTASONE) 20 MG tablet Take 10 mg by mouth Daily.   2018 at Unknown time   • alendronate (FOSAMAX) 70 MG tablet Take 1 tablet by mouth Every 7 (Seven) Days. For Osteopenia 16 tablet 3 Taking     Current Meds:     Current Facility-Administered Medications:   •  Insert peripheral IV, , , Once **AND** sodium chloride 0.9 % flush 10 mL, 10 mL, Intravenous, PRN, Joao Light MD  Allergies:  Allergies   Allergen Reactions   • Augmentin [Amoxicillin-Pot Clavulanate] Hives   • Keflex [Cephalexin] Hives     Social History:   Social History   Substance Use Topics   • Smoking status: Former Smoker     Packs/day: 1.50     Years: 12.00     Start date: 1963     Quit date: 1976   • Smokeless tobacco: Never Used      Comment: Chrinic bronchitis...smoke allergy   • Alcohol use Yes      Comment: Seldom      Family History:  Family History   Problem Relation Age of Onset   • Heart disease Mother    • Hypertension Mother    • Lung cancer Sister    • Thyroid disease Sister    • Lupus Sister    • Thyroid disease Brother    • Alcohol abuse Brother    • Glaucoma Maternal Grandmother    • Stomach cancer Maternal Grandmother          ’s   • Cirrhosis Father             • Liver disease Father           Review of Systems  See history of present illness and past medical history.    Constitutional: Positive for chills and fever.   HENT: Negative for sore throat.    Eyes: Negative.   "  Respiratory: Negative for cough and shortness of breath.    Cardiovascular: Negative for chest pain.   Gastrointestinal: Positive for abdominal pain and constipation. Negative for diarrhea and vomiting.   Genitourinary: Negative for dysuria.   Musculoskeletal: Negative for neck pain.   Skin: Negative for rash.   Allergic/Immunologic: Negative.    Neurological: Negative for weakness, numbness and headaches.   Hematological: Negative.    Psychiatric/Behavioral: Negative.      Vitals:   /63 (BP Location: Right arm, Patient Position: Lying)   Pulse 70   Temp 96.6 °F (35.9 °C) (Oral) Comment: patient just ate ice  Resp 18   Ht 160 cm (63\")   Wt 64.4 kg (142 lb)   SpO2 95%   BMI 25.15 kg/m²   I/O:   Intake/Output Summary (Last 24 hours) at 08/05/18 2121  Last data filed at 08/05/18 2020   Gross per 24 hour   Intake             2150 ml   Output              450 ml   Net             1700 ml     Exam:  General Appearance:    Alert, cooperative, no distress, appears Younger than the stated age and does not appear to be in any acute distress.     Head:    Normocephalic, without obvious abnormality, atraumatic   Eyes:    PERRL, conjunctiva/corneas clear, EOM's intact, both eyes   Ears:    Normal external ear canals, both ears   Nose:   Nares normal, septum midline, mucosa normal, no drainage    or sinus tenderness   Throat:   Lips, tongue, gums normal; oral mucosa pink and moist   Neck:   Supple, symmetrical, trachea midline, no adenopathy;     thyroid:  no enlargement/tenderness/nodules; no carotid    bruit or JVD   Back:     Symmetric, no curvature, ROM normal, no CVA tenderness   Lungs:     Clear to auscultation bilaterally, respirations unlabored   Chest Wall:    No tenderness or deformity    Heart:    Regular rate and rhythm, S1 and S2 normal, no murmur, rub   or gallop   Abdomen:     Generalized soft abdomen with mild tenderness in the right side of her abdomen.  No obvious guarding or rigidity noted.   "   Extremities:   Extremities normal, atraumatic, no cyanosis or edema   Pulses:   Pulses palpable in all extremities; symmetric all extremities   Skin:   Skin color normal, Skin is warm and dry,  no rashes or palpable lesions   Neurologic:   CNII-XII intact, motor strength grossly intact, sensation grossly intact to light touch, no focal deficits noted       Data Review:      I reviewed the patient's new clinical results.    Results from last 7 days  Lab Units 08/05/18  1551   WBC 10*3/mm3 9.80   HEMOGLOBIN g/dL 12.8   PLATELETS 10*3/mm3 180       Results from last 7 days  Lab Units 08/05/18  1551 07/31/18  1154   SODIUM mmol/L 134* 143   POTASSIUM mmol/L 4.4 4.1   CHLORIDE mmol/L 94* 99   CO2 mmol/L 25.8  --    TOTAL CO2, ARTERIAL mmol/L  --  29.3*   BUN mg/dL 17 23   CREATININE mg/dL 1.06* 1.14*   CALCIUM mg/dL 10.0 10.0   GLUCOSE mg/dL 239*  --      Ct Abdomen Pelvis With Contrast    Result Date: 8/5/2018  Acute sigmoid diverticulitis associated with wall thickening and perisigmoid inflammation. Inflammation is centered about 2 diverticuli that extend cephalad to the involved sigmoid segment. The larger and more distal of these 2 exhibits a thin, 1 cm crescentic band of edema or early phlegmon or microabscess formation adjacent to the diverticulum (as best demonstrated on the coronal reconstruction sequence).  There is no drainable collection or extraluminal gas.  Discussed with Dr. Light in the emergency department on 08/05/2018 at 5:05 PM.  Radiation dose reduction techniques were utilized, including automated exposure control and exposure modulation based on body size.  This report was finalized on 8/5/2018 7:13 PM by Dr. Wes Doss M.D.        Assessment:  Active Hospital Problems    Diagnosis Date Noted   • **Diverticulitis of large intestine without perforation or abscess without bleeding [K57.32] 08/05/2018   • Hyperglycemia [R73.9] 08/05/2018   • KEVIN (acute kidney injury) (CMS/HCC) [N17.9]  08/05/2018   • Hypertension [I10] 04/27/2018   • History of diverticulosis [Z87.19] 10/27/2017   • Hypothyroidism [E03.9] 10/27/2017       Plan:  Acute sigmoid diverticulitis with possible evolving microabscess formation - plan is to admit the patient control her pain will keep her nothing by mouth except meds and clears, general surgery consultation and IV antibiotics.  It appears that this is a second episode of diverticulitis in the last 15 years.  The first episode was 15 years ago.  Hyperglycemia likely reactive as well as due to concomitant use of steroid-check hemoglobin A1c and provide her with Accu-Cheks and sliding scale coverage.  Patient has been listed as patient with impaired glucose tolerance.  Autoimmune hepatitis currently being managed by on call,  Tapering Steroid with No Obvious Symptoms.  I Consulted Dr. Hein and Continue Tapering Steroids and Monitor.  Acute kidney injury likely prerenal and dehydration plan is to provide her with IV fluids.  Hypothyroidism continue with thyroid replacement therapy.  Hypertension continue her antihypertensive regimen.  Kurtis Moseley MD   8/5/2018  9:21 PM  Much of this encounter note is an electronic transcription/translation of spoken language to printed text. The electronic translation of spoken language may permit erroneous, or at times, nonsensical words or phrases to be inadvertently transcribed; Although I have reviewed the note for such errors, some may still exist

## 2018-08-06 NOTE — PAYOR COMM NOTE
"Mami Srivastava (73 y.o. Female)     ATTN: NURSE REVIEW   REF#[id number]  PLEASE CALL BACK TO JANET [email or web address]@[phone] OR [phone]  THANKS!   JANET      Date of Birth Social Security Number Address Home Phone MRN    1945  [address] [place] [address] [phone] [id number]    Scientologist Marital Status          Non-Mandaeism Single       Admission Date Admission Type Admitting Provider Attending Provider Department, Room/Bed    8/5/18 Emergency Kurtis Moseley MD [place]amp, David Ryan, MD [address], [address]    Discharge Date Discharge Disposition Discharge Destination                       Attending Provider:  Cruz Fairbanks MD    Allergies:  Augmentin [Amoxicillin-pot Clavulanate], Keflex [Cephalexin]    Isolation:  None   Infection:  None   Code Status:  CPR    Ht:  160 cm (63\")   Wt:  64.4 kg (142 lb)    Admission Cmt:  None   Principal Problem:  Diverticulitis of large intestine without perforation or abscess without bleeding [K57.32]                 Active Insurance as of 8/5/2018     Primary Coverage     Payor Plan Insurance Group Employer/Plan Group    [employer] EMPLOYEE [id number]     Payor Plan Address Payor Plan Phone Number Effective From Effective To    [address] [phone] 1/1/2018     [facility] [place] [address]       Subscriber Name Subscriber Birth Date Member ID       MAMI SRIVASTAVA 1945 [id number]           Secondary Coverage     Payor Plan Insurance Group Employer/Plan Group    HUMANA MEDICARE REPLACEMENT HUMANA MEDICARE REPL [id number]     Payor Plan Address Payor Plan Phone Number Effective From Effective To    [address] [address] [phone] 1/1/2018     [facility] [address]       Subscriber Name Subscriber Birth Date Member ID       MAMI SRIVASTAVA 1945 [id number]                 Emergency Contacts      (Rel.) Home Phone Work Phone Mobile Phone    Temitope Evans (Friend) " [phone] -- [phone]    Maria E James (Relative) [phone] -- [phone]               History & Physical      Kurtis Moseley MD at 2018  9:21 PM          Internal medicine history and physical    INTERNAL MEDICINE   [facility]       Patient Identification:  Name: Mami Srivastava  Age: 73 y.o.  Sex: female  :  1945  MRN: [id number]                   Primary Care Physician: Jacqui Booth PA-C                                   Chief Complaint:  Woke up early this morning around 4 or 5 AM with abdominal cramps followed by feeling poorly and worsening abdominal discomfort and chills at [facility] around 9:30 this morning.    History of Present Illness:   Patient is a 73-year-old fairly active female with past medical history remarkable for colitis 4 years ago that resolved on its own, history of autoimmune hepatitis for which she is on tapering doses of prednisone while Dr. Hein is working on 2 place her on steroid sparing agents, history of hypertension and hypothyroidism and a history of diverticulitis 15 years ago was in her usual state of her health until a few weeks ago when she started having some constipation.  It was not bad enough to hold her from doing anything in fact she was able [place] and recently spent a few days in [facility] with her son and came back this past Monday.  She went to see her gastroenterologist Dr. Hein on Monday and mentioned mild constipation issues with him.  At that time she was told to start tapering her steroids and now she is taking prednisone 10 mg daily while awaiting the final decision from Dr. Hein to place her on steroid sparing immunosuppressive agents to address her abdomen hepatitis.  In this background patient went to bed feeling fine otherwise last night and woke up early in the morning noticing that she has abdominal cramps and discomfort.  This was more localized to the right side of her  abdomen.  She initially thought this was abdominal cramps and maybe after bowel movement she would get better.  She did have a bowel movement early morning he was more mucus appearing.  She felt somewhat better prepared herself and went to [employer] and while at [employer] she started having chills and then had a fever best thing she knew that it is very similar to an episode that occurred 15 years ago and she needs to go to the hospital.  She did call Dr. Hein and after speaking to him she decided to come to the emergency room as recommended.  In the emergency room the workup did reveal acute sigmoid diverticulitis with wall thickening and micheal-sigmoid inflammation.  There was a concern that there may be developing microabscesses.  Patient is appetite is preserved and she denies any joint aches and pain or any skin nodules that are painful or tender.  She denies any vision changes.      Past Medical History:  Past Medical History:   Diagnosis Date   • Autoimmune hepatitis (CMS/HCC)    • Cholelithiasis 04-    Ultrasound   • Coronary artery disease     Dr Luis Rick   • Hemangioma of liver    • Hernia 2017    Hiatal hernia-Prabhjot test   • Hyperlipidemia     Dr Luis Rick-atorvastatin   • Hypertension    • Hyperthyroidism    • Hypothyroidism    • Laceration of finger of right hand 05/15/2018    lac with knife and had 3 stitches   • Skin cancer     face   • Ulcerative colitis (CMS/HCC) 2014    Colitis/DrKaplan     Past Surgical History:  Past Surgical History:   Procedure Laterality Date   • BUNIONECTOMY     • COLONOSCOPY  04/11/2014    Diverticulosis in the sigmoid colon, in the descending colon, in the transverse colon and in the ascending colon (Pathology: Collagenous colitis)   • COLONOSCOPY  12/09/2005    Hemorrhoids, pandiverticulosis, status post diverticulitis   • LIVER BIOPSY     • LIVER BIOPSY     • THYROIDECTOMY  2008      Home Meds:  Prescriptions Prior to Admission   Medication Sig Dispense Refill Last  Dose   • aspirin 81 MG EC tablet Take 81 mg by mouth Daily.   2018 at Unknown time   • cetirizine (zyrTEC) 10 MG tablet Take 10 mg by mouth Every Night.   2018 at Unknown time   • fluticasone (FLONASE) 50 MCG/ACT nasal spray 1 spray into each nostril Daily.   2018 at Unknown time   • levothyroxine (SYNTHROID, LEVOTHROID) 75 MCG tablet Take 1 tablet by mouth Daily. For thyroid (generic accepted) 90 tablet 3 2018 at Unknown time   • lisinopril-hydrochlorothiazide (PRINZIDE,ZESTORETIC) 10-12.5 MG per tablet Take 1 tablet by mouth Daily. For BP 90 tablet 1 2018 at Unknown time   • Multiple Vitamin (MULTI VITAMIN DAILY PO) Take  by mouth.   2018 at Unknown time   • predniSONE (DELTASONE) 20 MG tablet Take 10 mg by mouth Daily.   2018 at Unknown time   • alendronate (FOSAMAX) 70 MG tablet Take 1 tablet by mouth Every 7 (Seven) Days. For Osteopenia 16 tablet 3 Taking     Current Meds:     Current Facility-Administered Medications:   •  Insert peripheral IV, , , Once **AND** sodium chloride 0.9 % flush 10 mL, 10 mL, Intravenous, PRN, Joao Light MD  Allergies:  Allergies   Allergen Reactions   • Augmentin [Amoxicillin-Pot Clavulanate] Hives   • Keflex [Cephalexin] Hives     Social History:   Social History   Substance Use Topics   • Smoking status: Former Smoker     Packs/day: 1.50     Years: 12.00     Start date: 1963     Quit date: 1976   • Smokeless tobacco: Never Used      Comment: Chrinic bronchitis...smoke allergy   • Alcohol use Yes      Comment: Seldom      Family History:  Family History   Problem Relation Age of Onset   • Heart disease Mother    • Hypertension Mother    • Lung cancer Sister    • Thyroid disease Sister    • Lupus Sister    • Thyroid disease Brother    • Alcohol abuse Brother    • Glaucoma Maternal Grandmother    • Stomach cancer Maternal Grandmother          ’s   • Cirrhosis Father             • Liver disease Father           Review of  "Systems  See history of present illness and past medical history.    Constitutional: Positive for chills and fever.   HENT: Negative for sore throat.    Eyes: Negative.    Respiratory: Negative for cough and shortness of breath.    Cardiovascular: Negative for chest pain.   Gastrointestinal: Positive for abdominal pain and constipation. Negative for diarrhea and vomiting.   Genitourinary: Negative for dysuria.   Musculoskeletal: Negative for neck pain.   Skin: Negative for rash.   Allergic/Immunologic: Negative.    Neurological: Negative for weakness, numbness and headaches.   Hematological: Negative.    Psychiatric/Behavioral: Negative.      Vitals:   /63 (BP Location: Right arm, Patient Position: Lying)   Pulse 70   Temp 96.6 °F (35.9 °C) (Oral) Comment: patient just ate ice  Resp 18   Ht 160 cm (63\")   Wt 64.4 kg (142 lb)   SpO2 95%   BMI 25.15 kg/m²    I/O:   Intake/Output Summary (Last 24 hours) at 08/05/18 2121  Last data filed at 08/05/18 2020   Gross per 24 hour   Intake             2150 ml   Output              450 ml   Net             1700 ml     Exam:  General Appearance:    Alert, cooperative, no distress, appears Younger than the stated age and does not appear to be in any acute distress.     Head:    Normocephalic, without obvious abnormality, atraumatic   Eyes:    PERRL, conjunctiva/corneas clear, EOM's intact, both eyes   Ears:    Normal external ear canals, both ears   Nose:   Nares normal, septum midline, mucosa normal, no drainage    or sinus tenderness   Throat:   Lips, tongue, gums normal; oral mucosa pink and moist   Neck:   Supple, symmetrical, trachea midline, no adenopathy;     thyroid:  no enlargement/tenderness/nodules; no carotid    bruit or JVD   Back:     Symmetric, no curvature, ROM normal, no CVA tenderness   Lungs:     Clear to auscultation bilaterally, respirations unlabored   Chest Wall:    No tenderness or deformity    Heart:    Regular rate and rhythm, S1 and S2 " normal, no murmur, rub   or gallop   Abdomen:     Generalized soft abdomen with mild tenderness in the right side of her abdomen.  No obvious guarding or rigidity noted.     Extremities:   Extremities normal, atraumatic, no cyanosis or edema   Pulses:   Pulses palpable in all extremities; symmetric all extremities   Skin:   Skin color normal, Skin is warm and dry,  no rashes or palpable lesions   Neurologic:   CNII-XII intact, motor strength grossly intact, sensation grossly intact to light touch, no focal deficits noted       Data Review:      I reviewed the patient's new clinical results.    Results from last 7 days  Lab Units 08/05/18  1551   WBC 10*3/mm3 9.80   HEMOGLOBIN g/dL 12.8   PLATELETS 10*3/mm3 180       Results from last 7 days  Lab Units 08/05/18  1551 07/31/18  1154   SODIUM mmol/L 134* 143   POTASSIUM mmol/L 4.4 4.1   CHLORIDE mmol/L 94* 99   CO2 mmol/L 25.8  --    TOTAL CO2, ARTERIAL mmol/L  --  29.3*   BUN mg/dL 17 23   CREATININE mg/dL 1.06* 1.14*   CALCIUM mg/dL 10.0 10.0   GLUCOSE mg/dL 239*  --      Ct Abdomen Pelvis With Contrast    Result Date: 8/5/2018  Acute sigmoid diverticulitis associated with wall thickening and perisigmoid inflammation. Inflammation is centered about 2 diverticuli that extend cephalad to the involved sigmoid segment. The larger and more distal of these 2 exhibits a thin, 1 cm crescentic band of edema or early phlegmon or microabscess formation adjacent to the diverticulum (as best demonstrated on the coronal reconstruction sequence).  There is no drainable collection or extraluminal gas.  Discussed with Dr. Light in the emergency department on 08/05/2018 at 5:05 PM.  Radiation dose reduction techniques were utilized, including automated exposure control and exposure modulation based on body size.  This report was finalized on 8/5/2018 7:13 PM by Dr. Wes Doss M.D.        Assessment:  Active Hospital Problems    Diagnosis Date Noted   • **Diverticulitis of  large intestine without perforation or abscess without bleeding [K57.32] 08/05/2018   • Hyperglycemia [R73.9] 08/05/2018   • KEVIN (acute kidney injury) (CMS/HCC) [N17.9] 08/05/2018   • Hypertension [I10] 04/27/2018   • History of diverticulosis [Z87.19] 10/27/2017   • Hypothyroidism [E03.9] 10/27/2017       Plan:  Acute sigmoid diverticulitis with possible evolving microabscess formation - plan is to admit the patient control her pain will keep her nothing by mouth except meds and clears, general surgery consultation and IV antibiotics.  It appears that this is a second episode of diverticulitis in the last 15 years.  The first episode was 15 years ago.  Hyperglycemia likely reactive as well as due to concomitant use of steroid-check hemoglobin A1c and provide her with Accu-Cheks and sliding scale coverage.  Patient has been listed as patient with impaired glucose tolerance.  Autoimmune hepatitis currently being managed by on call,  Tapering Steroid with No Obvious Symptoms.  I Consulted Dr. Hein and Continue Tapering Steroids and Monitor.  Acute kidney injury likely prerenal and dehydration plan is to provide her with IV fluids.  Hypothyroidism continue with thyroid replacement therapy.  Hypertension continue her antihypertensive regimen.  Kurtis Moseley MD   8/5/2018  9:21 PM  Much of this encounter note is an electronic transcription/translation of spoken language to printed text. The electronic translation of spoken language may permit erroneous, or at times, nonsensical words or phrases to be inadvertently transcribed; Although I have reviewed the note for such errors, some may still exist      Electronically signed by Kurtis Moseley MD at 8/6/2018 12:20 AM          Emergency Department Notes      Joao Light MD at 8/5/2018  3:34 PM           EMERGENCY DEPARTMENT ENCOUNTER    CHIEF COMPLAINT  Chief Complaint: RLQ abd pain  History given by: Pt  History limited by: Nothing  Room Number: 21/21  PMD:  "Jacqui Booth PA-C      HPI:  Pt is a 73 y.o. female who presents complaining of RLQ abd pain that radiates to her mid lower abd and started earlier today. The pt confirms fever, chills, mild constipation and denies diarrhea, nausea, dysuria or vomiting. The pt states that drinking a lot of water relieves some of her pain.    Duration:  Around 5 hours  Onset: Gradual  Timing: Constant  Location: RLQ  Radiation: To min lower abd  Quality: \"pain\"  Intensity/Severity: Moderate  Progression: No change  Associated Symptoms: Fever, chills, mild constipation  Aggravating Factors: Unknown  Alleviating Factors: Drinking a lot of water  Previous Episodes: Unknown  Treatment before arrival: None    PAST MEDICAL HISTORY  Active Ambulatory Problems     Diagnosis Date Noted   • S/P thyroidectomy 10/27/2017   • Mixed hyperlipidemia 10/27/2017   • Vitamin D deficiency 10/27/2017   • Impaired fasting glucose 10/27/2017   • Hypothyroidism 10/27/2017   • Osteopenia of multiple sites 10/27/2017   • Hypertensive left ventricular hypertrophy, without heart failure 10/27/2017   • Chronic seasonal allergic rhinitis 10/27/2017   • History of heart disease 10/27/2017   • Hx of abnormal mammogram 10/27/2017   • Nodule of left lung 10/27/2017   • History of diverticulosis 10/27/2017   • Hypertension 04/27/2018     Resolved Ambulatory Problems     Diagnosis Date Noted   • No Resolved Ambulatory Problems     Past Medical History:   Diagnosis Date   • Autoimmune hepatitis (CMS/HCC)    • Cholelithiasis 04-   • Coronary artery disease    • Hemangioma of liver    • Hernia 2017   • Hyperlipidemia    • Hypertension    • Hyperthyroidism    • Hypothyroidism    • Laceration of finger of right hand 05/15/2018   • Skin cancer    • Ulcerative colitis (CMS/HCC) 2014       PAST SURGICAL HISTORY  Past Surgical History:   Procedure Laterality Date   • BUNIONECTOMY     • COLONOSCOPY  04/11/2014    Diverticulosis in the sigmoid colon, in the descending " colon, in the transverse colon and in the ascending colon (Pathology: Collagenous colitis)   • COLONOSCOPY  2005    Hemorrhoids, pandiverticulosis, status post diverticulitis   • LIVER BIOPSY     • LIVER BIOPSY     • THYROIDECTOMY         FAMILY HISTORY  Family History   Problem Relation Age of Onset   • Heart disease Mother    • Hypertension Mother    • Lung cancer Sister    • Thyroid disease Sister    • Lupus Sister    • Thyroid disease Brother    • Alcohol abuse Brother    • Glaucoma Maternal Grandmother    • Stomach cancer Maternal Grandmother          ’s   • Cirrhosis Father             • Liver disease Father        SOCIAL HISTORY  Social History     Social History   • Marital status: Single     Spouse name: N/A   • Number of children: N/A   • Years of education: N/A     Occupational History   • Not on file.     Social History Main Topics   • Smoking status: Former Smoker     Packs/day: 1.50     Years: 12.00     Start date: 1963     Quit date: 1976   • Smokeless tobacco: Never Used      Comment: Chrinic bronchitis...smoke allergy   • Alcohol use Yes      Comment: Seldom   • Drug use: No   • Sexual activity: Not Currently     Other Topics Concern   • Not on file     Social History Narrative   • No narrative on file       ALLERGIES  Augmentin [amoxicillin-pot clavulanate] and Keflex [cephalexin]    REVIEW OF SYSTEMS  Review of Systems   Constitutional: Positive for chills and fever.   HENT: Negative for sore throat.    Eyes: Negative.    Respiratory: Negative for cough and shortness of breath.    Cardiovascular: Negative for chest pain.   Gastrointestinal: Positive for abdominal pain and constipation. Negative for diarrhea and vomiting.   Genitourinary: Negative for dysuria.   Musculoskeletal: Negative for neck pain.   Skin: Negative for rash.   Allergic/Immunologic: Negative.    Neurological: Negative for weakness, numbness and headaches.   Hematological: Negative.     Psychiatric/Behavioral: Negative.    All other systems reviewed and are negative.      PHYSICAL EXAM  ED Triage Vitals [08/05/18 1531]   Temp Heart Rate Resp BP SpO2   99.7 °F (37.6 °C) 110 18 -- 95 %      Temp src Heart Rate Source Patient Position BP Location FiO2 (%)   Tympanic Monitor -- -- --       Physical Exam   Constitutional: She is oriented to person, place, and time. No distress.   HENT:   Head: Normocephalic and atraumatic.   Eyes: Pupils are equal, round, and reactive to light. EOM are normal.   Neck: Normal range of motion. Neck supple.   Cardiovascular: Regular rhythm and normal heart sounds.  Tachycardia present.    Pulmonary/Chest: Effort normal and breath sounds normal. No respiratory distress.   Abdominal: Soft. There is tenderness (Mild) in the right lower quadrant and suprapubic area. There is no rebound, no guarding and no CVA tenderness.   Musculoskeletal: Normal range of motion. She exhibits no edema.   Neurological: She is alert and oriented to person, place, and time. She has normal sensation and normal strength.   Skin: Skin is warm and dry. No rash noted.   Psychiatric: Mood and affect normal.   Nursing note and vitals reviewed.      LAB RESULTS  Lab Results (last 24 hours)     Procedure Component Value Units Date/Time    CBC & Differential [[id number]] Collected:  08/05/18 1551    Specimen:  Blood Updated:  08/05/18 1601    Narrative:       The following orders were created for panel order CBC & Differential.  Procedure                               Abnormality         Status                     ---------                               -----------         ------                     CBC Auto Differential[[id number]]        Abnormal            Final result                 Please view results for these tests on the individual orders.    Comprehensive Metabolic Panel [[id number]]  (Abnormal) Collected:  08/05/18 1551    Specimen:  Blood Updated:  08/05/18 1622     Glucose 239 (H) mg/dL       BUN 17 mg/dL      Creatinine 1.06 (H) mg/dL      Sodium 134 (L) mmol/L      Potassium 4.4 mmol/L      Chloride 94 (L) mmol/L      CO2 25.8 mmol/L      Calcium 10.0 mg/dL      Total Protein 6.9 g/dL      Albumin 4.10 g/dL      ALT (SGPT) 33 U/L      AST (SGOT) 18 U/L      Alkaline Phosphatase 57 U/L      Total Bilirubin 1.4 (H) mg/dL      eGFR Non African Amer 51 (L) mL/min/1.73      Globulin 2.8 gm/dL      A/G Ratio 1.5 g/dL      BUN/Creatinine Ratio 16.0     Anion Gap 14.2 mmol/L     Narrative:       The MDRD GFR formula is only valid for adults with stable renal function between ages 18 and 70.    Urinalysis With Microscopic If Indicated (No Culture) - Urine, Clean Catch [[id number]]  (Abnormal) Collected:  08/05/18 1551    Specimen:  Urine from Urine, Clean Catch Updated:  08/05/18 1629     Color, UA Yellow     Appearance, UA Clear     pH, UA 6.5     Specific Gravity, UA 1.006     Glucose,  mg/dL (Trace) (A)     Ketones, UA Negative     Bilirubin, UA Negative     Blood, UA Negative     Protein, UA Negative     Leuk Esterase, UA Small (1+) (A)     Nitrite, UA Negative     Urobilinogen, UA 0.2 E.U./dL    Lipase [[id number]]  (Normal) Collected:  08/05/18 1551    Specimen:  Blood Updated:  08/05/18 1622     Lipase 42 U/L     CBC Auto Differential [[id number]]  (Abnormal) Collected:  08/05/18 1551    Specimen:  Blood Updated:  08/05/18 1601     WBC 9.80 10*3/mm3      RBC 4.30 10*6/mm3      Hemoglobin 12.8 g/dL      Hematocrit 38.8 %      MCV 90.2 fL      MCH 29.8 pg      MCHC 33.0 g/dL      RDW 13.5 (H) %      RDW-SD 44.3 fl      MPV 9.6 fL      Platelets 180 10*3/mm3      Neutrophil % 85.7 (H) %      Lymphocyte % 7.7 (L) %      Monocyte % 5.2 %      Eosinophil % 0.9 %      Basophil % 0.1 %      Immature Grans % 0.4 %      Neutrophils, Absolute 8.40 (H) 10*3/mm3      Lymphocytes, Absolute 0.75 (L) 10*3/mm3      Monocytes, Absolute 0.51 10*3/mm3      Eosinophils, Absolute 0.09 10*3/mm3      Basophils, Absolute 0.01  10*3/mm3      Immature Grans, Absolute 0.04 (H) 10*3/mm3     Urinalysis, Microscopic Only - Urine, Clean Catch [[id number]] Collected:  08/05/18 1551    Specimen:  Urine from Urine, Clean Catch Updated:  08/05/18 1629     RBC, UA 0-2 /HPF      WBC, UA 0-2 /HPF      Bacteria, UA None Seen /HPF      Squamous Epithelial Cells, UA 0-2 /HPF      Hyaline Casts, UA None Seen /LPF      Methodology Automated Microscopy          I ordered the above labs and reviewed the results    RADIOLOGY  CT Abdomen Pelvis With Contrast   Preliminary Result   Acute sigmoid diverticulitis associated with wall thickening   and perisigmoid inflammation. Inflammation is centered about 2   diverticuli that extend just above the involved sigmoid segment. The   larger and more distal of these 2 exhibits a thin, 1 cm crescentic band   of edema or early phlegmon or microabscess formation adjacent to the   diverticulum (as best demonstrated on the coronal reconstruction   sequence).  There is no drainable collection or extraluminal gas.       Discussed with Dr. Light in the emergency department on 08/05/2018 at   5:05 PM.        Radiation dose reduction techniques were utilized, including automated   exposure control and exposure modulation based on body size.                   I ordered the above noted radiological studies. Interpreted by radiologist. Discussed with radiologist (Dr. Madsen). Reviewed by me in PACS.       PROCEDURES  Procedures      PROGRESS AND CONSULTS     1540 Ordered CMP, UA, lipase, CBC, and CT abd pelvis for further evaluation. Ordered 1L fluid.     1709 Rechecked the patient and she is resting and in no acute distress. Discussed pertinent lab and imaging results, including CT abd pelvis which shows that the pt has diverticulitis. Discussed the plan to admit the pt for antibiotics to ensure improvement of symptoms. Pt declines pain medication at this time. Patient agrees with plan and all questions were addressed.     1711  Spoke with the pharmacist about the pt and she recommends Levaquin and flagyl.     1713 Ordered Levaquin and flagyl for antibiotics. Placed call to [facility] for admission.     1732 Discussed the pt with Dr. Moseley ([facility]) who agrees to admit the pt.     1736 Ordered med surg bed and inpatient admission.     MEDICAL DECISION MAKING  Results were reviewed/discussed with the patient and they were also made aware of online access. Pt also made aware that some labs, such as cultures, will not be resulted during ER visit and follow up with PMD is necessary.     MDM  Number of Diagnoses or Management Options  Diverticulitis of large intestine without perforation or abscess without bleeding:   Diagnosis management comments: CT scan showed diverticulitis.  There was one focal area of edema which was felt to represent wall edema versus possible microabscess.  There was no drainable abscess or free air.  Patient was given IV fluids, IV Levaquin, and IV Flagyl.  Case was discussed with Dr. Moseley and he agreed to admit the patient.       Amount and/or Complexity of Data Reviewed  Clinical lab tests: reviewed and ordered (Glucose: 239, creatinine: 1.06, sodium: 134)  Tests in the radiology section of CPT®:  ordered and reviewed (CT abd pelvis: Diverticulitis of sigmoid colon, 1cm area of adjacent edema, no abscess, no free air)  Discussion of test results with the performing providers: yes (Dr. Madsen (Radiology))  Decide to obtain previous medical records or to obtain history from someone other than the patient: yes  Review and summarize past medical records: yes (The pt had a CT abd pelvis in May 2018 that showed moderate colonic diverticulosis.   The pt had a liver US in April 2018 that showed a gallstone. )  Discuss the patient with other providers: yes (Dr. Moseley ([facility]))    Patient Progress  Patient progress: stable         DIAGNOSIS  Final diagnoses:   Diverticulitis of large intestine without perforation or abscess without bleeding        DISPOSITION  ADMISSION    Discussed treatment plan and reason for admission with pt/family and admitting physician.  Pt/family voiced understanding of the plan for admission for further testing/treatment as needed.     Latest Documented Vital Signs:  As of 5:36 PM  BP- 137/56 HR- 110 Temp- 99.7 °F (37.6 °C) (Tympanic) O2 sat- 95%    --  Documentation assistance provided by viet Sanders for Dr. Light.  Information recorded by the scribe was done at my direction and has been verified and validated by me.       Kaia Sanders  08/05/18 4264       Joao Light MD  08/05/18 8888      Electronically signed by Joao Light MD at 8/5/2018  6:53 PM       Hospital Medications (all)       Dose Frequency Start End    acetaminophen (TYLENOL) tablet 650 mg 650 mg Every 4 Hours PRN 8/5/2018     Sig - Route: Take 2 tablets by mouth Every 4 (Four) Hours As Needed for Mild Pain . - Oral    aspirin EC tablet 81 mg 81 mg Daily 8/6/2018     Sig - Route: Take 1 tablet by mouth Daily. - Oral    cetirizine (zyrTEC) tablet 5 mg 5 mg Nightly 8/5/2018     Sig - Route: Take 0.5 tablets by mouth Every Night. - Oral    dextrose (D50W) solution 25 g 25 g Every 15 Minutes PRN 8/5/2018     Sig - Route: Infuse 50 mL into a venous catheter Every 15 (Fifteen) Minutes As Needed for Low Blood Sugar (Blood Sugar Less Than 70). - Intravenous    dextrose (GLUTOSE) oral gel 15 g 15 g Every 15 Minutes PRN 8/5/2018     Sig - Route: Take 15 g by mouth Every 15 (Fifteen) Minutes As Needed for Low Blood Sugar (Blood sugar less than 70). - Oral    fluticasone (FLONASE) 50 MCG/ACT nasal spray 1 spray 1 spray Daily 8/6/2018     Sig - Route: 1 spray into the nostril(s) as directed by provider Daily. - Nasal    glucagon (human recombinant) (GLUCAGEN DIAGNOSTIC) injection 1 mg 1 mg As Needed 8/5/2018     Sig - Route: Inject 1 mg under the skin into the appropriate area as directed As Needed (Blood Glucose Less Than 70). - Subcutaneous  "   heparin (porcine) 5000 UNIT/ML injection 5,000 Units 5,000 Units Every 12 Hours Scheduled 8/5/2018     Sig - Route: Inject 1 mL under the skin into the appropriate area as directed Every 12 (Twelve) Hours. - Subcutaneous    insulin aspart (novoLOG) injection 0-9 Units 0-9 Units 4 Times Daily With Meals & Nightly 8/5/2018     Sig - Route: Inject 0-9 Units under the skin into the appropriate area as directed 4 (Four) Times a Day With Meals & at Bedtime. - Subcutaneous    iopamidol (ISOVUE-300) 61 % injection 100 mL 100 mL Once in Imaging 8/5/2018 8/5/2018    Sig - Route: Infuse 100 mL into a venous catheter Once. - Intravenous    levoFLOXacin (LEVAQUIN) 500 mg/100 mL D5W (premix) (LEVAQUIN) 500 mg 500 mg Every 24 Hours 8/6/2018 8/16/2018    Sig - Route: Infuse 100 mL into a venous catheter Daily. - Intravenous    levoFLOXacin (LEVAQUIN) 750 mg/150 mL D5W (premix) (LEVAQUIN) 750 mg 750 mg Once 8/5/2018 8/5/2018    Sig - Route: Infuse 150 mL into a venous catheter 1 (One) Time. - Intravenous    levothyroxine (SYNTHROID, LEVOTHROID) tablet 75 mcg 75 mcg Daily 8/6/2018     Sig - Route: Take 1 tablet by mouth Daily. - Oral    lisinopril (PRINIVIL,ZESTRIL) 10 mg, hydrochlorothiazide (HYDRODIURIL) 12.5 mg for ZESTORTIC 10-12.5  Every 24 Hours Scheduled 8/6/2018     Sig - Route: Take  by mouth Daily. - Oral    metroNIDAZOLE (FLAGYL) IVPB 500 mg 500 mg Once 8/5/2018 8/5/2018    Sig - Route: Infuse 100 mL into a venous catheter 1 (One) Time. - Intravenous    metroNIDAZOLE (FLAGYL) IVPB 500 mg 500 mg Every 8 Hours 8/6/2018 8/9/2018    Sig - Route: Infuse 100 mL into a venous catheter Every 8 (Eight) Hours. - Intravenous    morphine injection 1 mg 1 mg Every 4 Hours PRN 8/5/2018 8/15/2018    Sig - Route: Infuse 0.5 mL into a venous catheter Every 4 (Four) Hours As Needed for Moderate Pain . - Intravenous    Linked Group 1:  \"And\" Linked Group Details        naloxone (NARCAN) injection 0.4 mg 0.4 mg Every 5 Minutes PRN 8/5/2018 " "    Sig - Route: Infuse 1 mL into a venous catheter Every 5 (Five) Minutes As Needed for Respiratory Depression. - Intravenous    Linked Group 1:  \"And\" Linked Group Details        ondansetron (ZOFRAN) injection 4 mg 4 mg Every 6 Hours PRN 8/5/2018     Sig - Route: Infuse 2 mL into a venous catheter Every 6 (Six) Hours As Needed for Nausea or Vomiting. - Intravenous    predniSONE (DELTASONE) tablet 10 mg 10 mg Daily 8/6/2018     Sig - Route: Take 1 tablet by mouth Daily. - Oral    sodium chloride 0.9 % bolus 1,000 mL 1,000 mL Once 8/5/2018 8/5/2018    Sig - Route: Infuse 1,000 mL into a venous catheter 1 (One) Time. - Intravenous    sodium chloride 0.9 % flush 1-10 mL 1-10 mL As Needed 8/5/2018     Sig - Route: Infuse 1-10 mL into a venous catheter As Needed for Line Care. - Intravenous    sodium chloride 0.9 % flush 10 mL 10 mL As Needed 8/5/2018     Sig - Route: Infuse 10 mL into a venous catheter As Needed for Line Care. - Intravenous    Linked Group 2:  \"And\" Linked Group Details        sodium chloride 0.9 % infusion 75 mL/hr Continuous 8/5/2018     Sig - Route: Infuse 75 mL/hr into a venous catheter Continuous. - Intravenous          Operative/Procedure Notes (all)     No notes of this type exist for this encounter.        Physician Progress Notes (last 72 hours) (Notes from 8/3/2018 12:18 PM through 8/6/2018 12:18 PM)     No notes of this type exist for this encounter.           Consult Notes (last 72 hours) (Notes from 8/3/2018 12:18 PM through 8/6/2018 12:18 PM)      Freeman Donaldson MD at 8/6/2018  8:14 AM      Consult Orders:    1. Inpatient Gastroenterology Consult [[id number]] ordered by Kurtis Moseley MD at 08/06/18 0542                [facility]  Initial Inpatient Consult Note    Referring Provider: Dr. Fairbanks    Reason for Consultation: Autoimmune hepatitis    Subjective     History of present illness:    73 y.o. female who is known to Dr. SPENSER Hein who diagnosed her in 5/18 " with autoimmune hepatitis. She was admitted 8/5/18 with abdominal cramps and chills with a CT abdomen/pelvis consistent with sigmoid diverticulitis:  IMPRESSION:  Acute sigmoid diverticulitis associated with wall thickening  and perisigmoid inflammation. Inflammation is centered about 2  diverticuli that extend cephalad to the involved sigmoid segment. The  larger and more distal of these 2 exhibits a thin, 1 cm crescentic band  of edema or early phlegmon or microabscess formation adjacent to the  diverticulum (as best demonstrated on the coronal reconstruction  sequence).  There is no drainable collection or extraluminal gas.     Discussed with Dr. Light in the emergency department on 08/05/2018 at  5:05 PM    She is now on Levaquin and Flagyl IV. Her last colonoscopy was in 2014. She has had one other bout of diverticulitis 10-15 yrs ago that felt similar to this. She has not had any diarrhea but was constipated last week. No rectal bleeding or melena. No nausea or vomiting. She had a fever to 100 yesterday. On admission she was on prednisone 10 mg/day. The highest dose of prednisone that she was on when first diagnosed was 20 mg/day. Dr. Hein did order tests to decide if he could safely put her on imuran in addition to the prednisone to treat her autoimmune hepatitis. Her 5/18 liver biopsy showed:  Notes recorded by Shiva Hein MD on 5/31/2018 at 8:18 AM EDT  Labs negative for hepatitis a. patient okay to go back to work.  Biopsy consistent with autoimmune hepatitis.  Begin prednisone 20 mg daily and follow-up office visit in 4 weeks   Component 2mo ago   Clinical Information    Elevated liver enzymes, elevated smooth muscle antibody   Final Diagnosis   LIVER, NEEDLE BIOPSY:               CHRONIC HEPATITIS WITH MODERATE ACTIVITY (SEE COMMENT).               MIXED INFLAMMATORY INFILTRATE IN PORTAL AREAS AND FOCALLY WITHIN HEPATIC LOBULES.               NO SIGNIFICANT STEATOSIS.               MILD  PORTAL AND PERIPORTAL FIBROSIS (CONFIRMED WITH TRICHROME AND RETICULIN SPECIAL                       STAINS).               MINIMAL (1+) HEPATIC IRON (CONFIRMED WITH IRON SPECIAL STAIN).               NO EVIDENCE OF TUMOR OR GRANULOMA.            COMMENT: The histologic features are not entirely specific.  There is a moderate portal and focally lobular inflammatory infiltrate composed predominantly of lymphocytes and occasional plasma cells.  There are also some scattered eosinophils and neutrophils in some areas.  There are rare foci of single-cell hepatocellular necrosis.  These features, in the setting of positive anti-smooth muscle antibodies, could represent an autoimmune hepatitis.  The possibility of other etiologies, including but not limited to a viral hepatitis or drug reaction, cannot be entirely excluded.  Correlation with clinical and laboratory findings is recommended.                  Past Medical History:  Past Medical History:   Diagnosis Date   • Autoimmune hepatitis (CMS/HCC)    • Cholelithiasis 04-    Ultrasound   • Coronary artery disease     Dr Luis Rick   • Hemangioma of liver    • Hernia 2017    Hiatal hernia-Prabhjot test   • Hyperlipidemia     Dr Luis Rick-atorvastatin   • Hypertension    • Hyperthyroidism    • Hypothyroidism    • Laceration of finger of right hand 05/15/2018    lac with knife and had 3 stitches   • Skin cancer     face   • Ulcerative colitis (CMS/HCC) 2014    Colitis/DrKaplan     Past Surgical History:  Past Surgical History:   Procedure Laterality Date   • BUNIONECTOMY     • COLONOSCOPY  04/11/2014    Diverticulosis in the sigmoid colon, in the descending colon, in the transverse colon and in the ascending colon (Pathology: Collagenous colitis)   • COLONOSCOPY  12/09/2005    Hemorrhoids, pandiverticulosis, status post diverticulitis   • LIVER BIOPSY     • LIVER BIOPSY     • THYROIDECTOMY  2008      Social History:   Social History   Substance Use Topics   •  Smoking status: Former Smoker     Packs/day: 1.50     Years: 12.00     Start date: 1963     Quit date: 1976   • Smokeless tobacco: Never Used      Comment: Chrinic bronchitis...smoke allergy   • Alcohol use Yes      Comment: Seldom      Family History:  Family History   Problem Relation Age of Onset   • Heart disease Mother    • Hypertension Mother    • Lung cancer Sister    • Thyroid disease Sister    • Lupus Sister    • Thyroid disease Brother    • Alcohol abuse Brother    • Glaucoma Maternal Grandmother    • Stomach cancer Maternal Grandmother          ’s   • Cirrhosis Father             • Liver disease Father        Home Meds:  Prescriptions Prior to Admission   Medication Sig Dispense Refill Last Dose   • aspirin 81 MG EC tablet Take 81 mg by mouth Daily.   2018 at Unknown time   • cetirizine (zyrTEC) 10 MG tablet Take 10 mg by mouth Every Night.   2018 at Unknown time   • fluticasone (FLONASE) 50 MCG/ACT nasal spray 1 spray into each nostril Daily.   2018 at Unknown time   • levothyroxine (SYNTHROID, LEVOTHROID) 75 MCG tablet Take 1 tablet by mouth Daily. For thyroid (generic accepted) 90 tablet 3 2018 at Unknown time   • lisinopril-hydrochlorothiazide (PRINZIDE,ZESTORETIC) 10-12.5 MG per tablet Take 1 tablet by mouth Daily. For BP 90 tablet 1 2018 at Unknown time   • Multiple Vitamin (MULTI VITAMIN DAILY PO) Take  by mouth.   2018 at Unknown time   • predniSONE (DELTASONE) 20 MG tablet Take 10 mg by mouth Daily.   2018 at Unknown time   • alendronate (FOSAMAX) 70 MG tablet Take 1 tablet by mouth Every 7 (Seven) Days. For Osteopenia 16 tablet 3 Taking     Current Meds:     aspirin 81 mg Oral Daily   cetirizine 5 mg Oral Nightly   fluticasone 1 spray Nasal Daily   heparin (porcine) 5,000 Units Subcutaneous Q12H   insulin aspart 0-9 Units Subcutaneous 4x Daily With Meals & Nightly   levoFLOXacin 500 mg Intravenous Q24H   levothyroxine 75 mcg Oral Daily    lisinopril-hydroCHLOROthiazide (ZESTORETIC) 10-12.5 mg combo dose  Oral Q24H   metroNIDAZOLE 500 mg Intravenous Q8H   predniSONE 10 mg Oral Daily     Allergies:  Allergies   Allergen Reactions   • Augmentin [Amoxicillin-Pot Clavulanate] Hives   • Keflex [Cephalexin] Hives     Review of Systems  The following systems were reviewed and negative;  respiratory, cardiovascular, musculoskeletal and neurological     Objective     Vital Signs  Temp:  [96.6 °F (35.9 °C)-99.7 °F (37.6 °C)] 97.3 °F (36.3 °C)  Heart Rate:  [] 63  Resp:  [18] 18  BP: (105-137)/(56-67) 124/67  Physical Exam:  General Appearance:    Alert, cooperative, in no acute distress   Head:    Normocephalic, without obvious abnormality, atraumatic   Eyes:            Lids and lashes normal, conjunctivae and sclerae normal, no   icterus   Throat:   No oral lesions, no thrush, oral mucosa moist   Neck:   No adenopathy, supple, trachea midline, no thyromegaly, no   carotid bruit, no JVD   Lungs:     Clear to auscultation,respirations regular, even and                   unlabored    Heart:    Regular rhythm and normal rate, normal S1 and S2, no            murmur, no gallop, no rub, no click   Chest Wall:    No abnormalities observed   Abdomen:     Normal bowel sounds, no masses, no organomegaly, soft        non-tender, non-distended, no guarding, no rebound                 tenderness   Rectal:     Deferred   Extremities:   no edema, no cyanosis, no redness   Skin:   No bleeding, bruising or rash   Lymph nodes:   No palpable adenopathy   Psychiatric:  Judgement and insight: normal   Orientation to person place and time: normal   Mood and affect: normal   Results Review:   I reviewed the patient's new clinical results.      Results from last 7 days  Lab Units 08/06/18  0441 08/05/18  1551   WBC 10*3/mm3 7.86 9.80   HEMOGLOBIN g/dL 11.2* 12.8   HEMATOCRIT % 34.0* 38.8   PLATELETS 10*3/mm3 162 180       Results from last 7 days  Lab Units 08/06/18 0441  08/05/18  1551 07/31/18  1154   SODIUM mmol/L 140 134* 143   POTASSIUM mmol/L 3.7 4.4 4.1   CHLORIDE mmol/L 103 94* 99   CO2 mmol/L 25.5 25.8  --    TOTAL CO2, ARTERIAL mmol/L  --   --  29.3*   BUN mg/dL 14 17 23   CREATININE mg/dL 0.76 1.06* 1.14*   CALCIUM mg/dL 8.7 10.0 10.0   BILIRUBIN mg/dL 1.3* 1.4* 1.2   ALK PHOS U/L 41 57 60   ALT (SGPT) U/L 25 33 26   AST (SGOT) U/L 16 18 19   GLUCOSE mg/dL 92 239*  --            Lab Results  Lab Value Date/Time   LIPASE 42 08/05/2018 1551   LIPASE 55 05/24/2018 0245       Radiology:  CT Abdomen Pelvis With Contrast   Final Result   Acute sigmoid diverticulitis associated with wall thickening   and perisigmoid inflammation. Inflammation is centered about 2   diverticuli that extend cephalad to the involved sigmoid segment. The   larger and more distal of these 2 exhibits a thin, 1 cm crescentic band   of edema or early phlegmon or microabscess formation adjacent to the   diverticulum (as best demonstrated on the coronal reconstruction   sequence).  There is no drainable collection or extraluminal gas.       Discussed with Dr. Light in the emergency department on 08/05/2018 at   5:05 PM.        Radiation dose reduction techniques were utilized, including automated   exposure control and exposure modulation based on body size.       This report was finalized on 8/5/2018 7:13 PM by Dr. Wes Doss M.D.              Assessment/Plan   Patient Active Problem List   Diagnosis   • S/P thyroidectomy   • Mixed hyperlipidemia   • Vitamin D deficiency   • Impaired fasting glucose   • Hypothyroidism   • Osteopenia of multiple sites   • Hypertensive left ventricular hypertrophy, without heart failure   • Chronic seasonal allergic rhinitis   • History of heart disease   • Hx of abnormal mammogram   • Nodule of left lung   • History of diverticulosis   • Hypertension   • Diverticulitis of large intestine without perforation or abscess without bleeding   • Hyperglycemia   • KEVIN  (acute kidney injury) (CMS/HCC)   • Current chronic use of systemic steroids       I discussed the patients findings and my recommendations with patient.    Assessment:  1) Sigmoid diverticulitis. This is her second bout of diverticulitis in her life. Dr. Logan does not feel that this is complicated at all.  2) Autoimmune hepatitis - diagnosed in 5/18. Dr. Hein is awaiting lab results to see if the patient could safely be put on imuran in addition to the prednisone to treat her autoimmune hepatitis. Her LFT's were pretty normal on admission.    Recommendations:  1) Continue the prednisone 10 mg/day for now. Awaiting lab results to decide if she could be started safely on imuran 50 mg/day in addition to the prednisone.  2) Continue the IV antibiotics and continue bowel rest for now.  3) She will eventually need a colonoscopy as an outpatient, possibly in 8ish weeks after this diverticulitis has resolved.     Freeman Donaldson MD            Electronically signed by Freeman Donaldson MD at 8/6/2018  8:28 AM     Freeman Barrera MD at 8/6/2018  6:58 AM      Consult Orders:    1. Inpatient General Surgery Consult [[id number]] ordered by Kurtis Moseley MD at 08/06/18 0021                August 6, 2018    Mami Srivastava  [id number]    GENERAL SURGERY CONSULTATION    CONSULTING PHYSICIAN:  Freeman Barrera M.D.    REQUESTING PHYSICIAN:  Kurtis Moseley M.D.    PRIMARY PHYSICIAN:  Jacqui Booth PA-C.    REASON FOR CONSULTATION:    Acute sigmoid diverticulitis.    HISTORY:  Mami Srivastava is a 73 y.o. female who is admitted to the hospital with acute sigmoid diverticulitis.  Patient gives a history of having diverticulitis about 15 years ago that required at least a visit to the hospital perhaps for CT scan but it does not sound like an admission.  She's been fine ever since.  She gets routine colonoscopies by Dr. Hein.  Her last one was about 3 years ago.  Apparently, 4:00 in the morning on the day of her presentation, she  developed severe suprapubic abdominal cramps that she felt were very consistent with the diverticulitis she had in the past.  She toughed it out for a few hours and then for 5 hours later was bad enough that she presented to the hospital for further evaluation.  CT scan confirmed evidence of acute sigmoid diverticulitis with perhaps a mild developing phlegmon.  Surgery was consulted and she was admitted to medicine.  She does feel better this morning after receiving a few doses of antibiotics and pain medicine.  Her last bowel movement was normal.  She denies blood from above or below, diarrhea, urinary or gynecologic complaints.  She has had some chills but no fever.        Past Medical History:   Diagnosis Date   • Autoimmune hepatitis (CMS/HCC)    • Cholelithiasis 2018    Ultrasound   • Coronary artery disease     Dr Luis Rick   • Hemangioma of liver    • Hernia     Hiatal hernia-Prabhjot test   • Hyperlipidemia     Dr Luis Rick-atorvastatin   • Hypertension    • Hyperthyroidism    • Hypothyroidism    • Laceration of finger of right hand 05/15/2018    lac with knife and had 3 stitches   • Skin cancer     face   • Ulcerative colitis (CMS/HCC)     Colitis/DrKaplan     Past Surgical History:   Procedure Laterality Date   • BUNIONECTOMY     • COLONOSCOPY  2014    Diverticulosis in the sigmoid colon, in the descending colon, in the transverse colon and in the ascending colon (Pathology: Collagenous colitis)   • COLONOSCOPY  2005    Hemorrhoids, pandiverticulosis, status post diverticulitis   • LIVER BIOPSY     • LIVER BIOPSY     • THYROIDECTOMY       Family History   Problem Relation Age of Onset   • Heart disease Mother    • Hypertension Mother    • Lung cancer Sister    • Thyroid disease Sister    • Lupus Sister    • Thyroid disease Brother    • Alcohol abuse Brother    • Glaucoma Maternal Grandmother    • Stomach cancer Maternal Grandmother             • Cirrhosis Father      "        • Liver disease Father      Social History   Substance Use Topics   • Smoking status: Former Smoker     Packs/day: 1.50     Years: 12.00     Start date: 1963     Quit date: 1976   • Smokeless tobacco: Never Used      Comment: Chrinic bronchitis...smoke allergy   • Alcohol use Yes      Comment: Seldom       Review of Systems  On questioning, the patient denies any weight loss, fatigue or headache, no visual changes or hearing complaints, no new cardiovascular or pulmonary complaints, no  complaints, no GYN complaints, no musculoskeletal or neurologic complaints, no skin, bleeding, psychiatric or endocrine complaints.    Vitals:    18 1744 18 1829 18 2320   BP: 126/57 105/60 107/63 112/65   BP Location: Right arm Right arm Right arm Right arm   Patient Position: Sitting Sitting Lying Lying   Pulse: 72 80 70 68   Resp: 18 18 18 18   Temp: 98.2 °F (36.8 °C) 97.4 °F (36.3 °C) 96.6 °F (35.9 °C) 97.1 °F (36.2 °C)   TempSrc: Tympanic Oral Oral Oral   SpO2: 96% 97% 95% 97%   Weight:       Height:  160 cm (63\")         Physical Exam  /65 (BP Location: Right arm, Patient Position: Lying)   Pulse 68   Temp 97.1 °F (36.2 °C) (Oral)   Resp 18   Ht 160 cm (63\")   Wt 64.4 kg (142 lb)   SpO2 97%   BMI 25.15 kg/m²      On exam, patient is alert oriented and appropriate and is in no acute distress.  HEENT:   Head is normocephalic, both external ears are normal and sclerae are nonicteric.  Neck:  Supple without lymphadenopathy.  Heart:  Regular without obvious murmur.  Chest:  Good respiratory effort bilaterally.  Abdomen:  Soft, protuberant, nondistended, tenderness mild, moderate - suprapubic.  Rectal:  Deferred.  Extremities:  Without edema.  Skin:  Negative.    Diagnostic Imaging: CT scan images and results reviewed.      CT Abdomen Pelvis With Contrast   Final Result   Acute sigmoid diverticulitis associated with wall thickening   and perisigmoid " inflammation. Inflammation is centered about 2   diverticuli that extend cephalad to the involved sigmoid segment. The   larger and more distal of these 2 exhibits a thin, 1 cm crescentic band   of edema or early phlegmon or microabscess formation adjacent to the   diverticulum (as best demonstrated on the coronal reconstruction   sequence).  There is no drainable collection or extraluminal gas.       Discussed with Dr. Light in the emergency department on 08/05/2018 at   5:05 PM.        Radiation dose reduction techniques were utilized, including automated   exposure control and exposure modulation based on body size.       This report was finalized on 8/5/2018 7:13 PM by Dr. Wes Doss M.D.              Labs:   Lab Results   Component Value Date    WBC 7.86 08/06/2018    HGB 11.2 (L) 08/06/2018    HCT 34.0 (L) 08/06/2018     08/06/2018     Lab Results   Component Value Date     08/06/2018    K 3.7 08/06/2018     08/06/2018    CO2 25.5 08/06/2018    BUN 14 08/06/2018    CREATININE 0.76 08/06/2018    GLUCOSE 92 08/06/2018       Assessment/Plan:  Patient is a 73 y.o. female who is admitted to the hospital withUncomplicated acute sigmoid diverticulitis.  Her WBC is normal and She looks really good this morning so at this point we are pre-much treating her CT scan.  I would recommend we turn down her IV fluids, start her on clears with potentially advancing to full's later today and consult nutrition to instruct her on a low residue diet.  I also encouraged a colonoscopy by Dr. Hein when she is over this episode.  Finally, I explained to her the surgical indications for diverticulitis including intractability recurrence and complication which thankfully she does not have at the present time.    Thank you very much for the consult,     Freeman Barrera M.D.    Electronically signed by Freeman Barrera MD at 8/6/2018  7:04 AM

## 2018-08-06 NOTE — CONSULTS
[facility]  Initial Inpatient Consult Note    Referring Provider: Dr. Fairbanks    Reason for Consultation: Autoimmune hepatitis    Subjective     History of present illness:    73 y.o. female who is known to Dr. SPENSER Hein who diagnosed her in 5/18 with autoimmune hepatitis. She was admitted 8/5/18 with abdominal cramps and chills with a CT abdomen/pelvis consistent with sigmoid diverticulitis:  IMPRESSION:  Acute sigmoid diverticulitis associated with wall thickening  and perisigmoid inflammation. Inflammation is centered about 2  diverticuli that extend cephalad to the involved sigmoid segment. The  larger and more distal of these 2 exhibits a thin, 1 cm crescentic band  of edema or early phlegmon or microabscess formation adjacent to the  diverticulum (as best demonstrated on the coronal reconstruction  sequence).  There is no drainable collection or extraluminal gas.     Discussed with Dr. Light in the emergency department on 08/05/2018 at  5:05 PM    She is now on Levaquin and Flagyl IV. Her last colonoscopy was in 2014. She has had one other bout of diverticulitis 10-15 yrs ago that felt similar to this. She has not had any diarrhea but was constipated last week. No rectal bleeding or melena. No nausea or vomiting. She had a fever to 100 yesterday. On admission she was on prednisone 10 mg/day. The highest dose of prednisone that she was on when first diagnosed was 20 mg/day. Dr. Hein did order tests to decide if he could safely put her on imuran in addition to the prednisone to treat her autoimmune hepatitis. Her 5/18 liver biopsy showed:  Notes recorded by Shiva Hein MD on 5/31/2018 at 8:18 AM EDT  Labs negative for hepatitis a. patient okay to go back to work.  Biopsy consistent with autoimmune hepatitis.  Begin prednisone 20 mg daily and follow-up office visit in 4 weeks   Component 2mo ago   Clinical Information    Elevated liver enzymes, elevated smooth muscle  antibody   Final Diagnosis   LIVER, NEEDLE BIOPSY:               CHRONIC HEPATITIS WITH MODERATE ACTIVITY (SEE COMMENT).               MIXED INFLAMMATORY INFILTRATE IN PORTAL AREAS AND FOCALLY WITHIN HEPATIC LOBULES.               NO SIGNIFICANT STEATOSIS.               MILD PORTAL AND PERIPORTAL FIBROSIS (CONFIRMED WITH TRICHROME AND RETICULIN SPECIAL                       STAINS).               MINIMAL (1+) HEPATIC IRON (CONFIRMED WITH IRON SPECIAL STAIN).               NO EVIDENCE OF TUMOR OR GRANULOMA.            COMMENT: The histologic features are not entirely specific.  There is a moderate portal and focally lobular inflammatory infiltrate composed predominantly of lymphocytes and occasional plasma cells.  There are also some scattered eosinophils and neutrophils in some areas.  There are rare foci of single-cell hepatocellular necrosis.  These features, in the setting of positive anti-smooth muscle antibodies, could represent an autoimmune hepatitis.  The possibility of other etiologies, including but not limited to a viral hepatitis or drug reaction, cannot be entirely excluded.  Correlation with clinical and laboratory findings is recommended.                  Past Medical History:  Past Medical History:   Diagnosis Date   • Autoimmune hepatitis (CMS/HCC)    • Cholelithiasis 04-    Ultrasound   • Coronary artery disease     Dr Luis Rick   • Hemangioma of liver    • Hernia 2017    Hiatal hernia-Fredislo test   • Hyperlipidemia     Dr Luis Rick-atorvastatin   • Hypertension    • Hyperthyroidism    • Hypothyroidism    • Laceration of finger of right hand 05/15/2018    lac with knife and had 3 stitches   • Skin cancer     face   • Ulcerative colitis (CMS/HCC) 2014    Colitis/Arben     Past Surgical History:  Past Surgical History:   Procedure Laterality Date   • BUNIONECTOMY     • COLONOSCOPY  04/11/2014    Diverticulosis in the sigmoid colon, in the descending colon, in the transverse colon and in  the ascending colon (Pathology: Collagenous colitis)   • COLONOSCOPY  2005    Hemorrhoids, pandiverticulosis, status post diverticulitis   • LIVER BIOPSY     • LIVER BIOPSY     • THYROIDECTOMY        Social History:   Social History   Substance Use Topics   • Smoking status: Former Smoker     Packs/day: 1.50     Years: 12.00     Start date: 1963     Quit date: 1976   • Smokeless tobacco: Never Used      Comment: Chrinic bronchitis...smoke allergy   • Alcohol use Yes      Comment: Seldom      Family History:  Family History   Problem Relation Age of Onset   • Heart disease Mother    • Hypertension Mother    • Lung cancer Sister    • Thyroid disease Sister    • Lupus Sister    • Thyroid disease Brother    • Alcohol abuse Brother    • Glaucoma Maternal Grandmother    • Stomach cancer Maternal Grandmother          ’s   • Cirrhosis Father             • Liver disease Father        Home Meds:  Prescriptions Prior to Admission   Medication Sig Dispense Refill Last Dose   • aspirin 81 MG EC tablet Take 81 mg by mouth Daily.   2018 at Unknown time   • cetirizine (zyrTEC) 10 MG tablet Take 10 mg by mouth Every Night.   2018 at Unknown time   • fluticasone (FLONASE) 50 MCG/ACT nasal spray 1 spray into each nostril Daily.   2018 at Unknown time   • levothyroxine (SYNTHROID, LEVOTHROID) 75 MCG tablet Take 1 tablet by mouth Daily. For thyroid (generic accepted) 90 tablet 3 2018 at Unknown time   • lisinopril-hydrochlorothiazide (PRINZIDE,ZESTORETIC) 10-12.5 MG per tablet Take 1 tablet by mouth Daily. For BP 90 tablet 1 2018 at Unknown time   • Multiple Vitamin (MULTI VITAMIN DAILY PO) Take  by mouth.   2018 at Unknown time   • predniSONE (DELTASONE) 20 MG tablet Take 10 mg by mouth Daily.   2018 at Unknown time   • alendronate (FOSAMAX) 70 MG tablet Take 1 tablet by mouth Every 7 (Seven) Days. For Osteopenia 16 tablet 3 Taking     Current Meds:     aspirin 81 mg Oral  Daily   cetirizine 5 mg Oral Nightly   fluticasone 1 spray Nasal Daily   heparin (porcine) 5,000 Units Subcutaneous Q12H   insulin aspart 0-9 Units Subcutaneous 4x Daily With Meals & Nightly   levoFLOXacin 500 mg Intravenous Q24H   levothyroxine 75 mcg Oral Daily   lisinopril-hydroCHLOROthiazide (ZESTORETIC) 10-12.5 mg combo dose  Oral Q24H   metroNIDAZOLE 500 mg Intravenous Q8H   predniSONE 10 mg Oral Daily     Allergies:  Allergies   Allergen Reactions   • Augmentin [Amoxicillin-Pot Clavulanate] Hives   • Keflex [Cephalexin] Hives     Review of Systems  The following systems were reviewed and negative;  respiratory, cardiovascular, musculoskeletal and neurological     Objective     Vital Signs  Temp:  [96.6 °F (35.9 °C)-99.7 °F (37.6 °C)] 97.3 °F (36.3 °C)  Heart Rate:  [] 63  Resp:  [18] 18  BP: (105-137)/(56-67) 124/67  Physical Exam:  General Appearance:    Alert, cooperative, in no acute distress   Head:    Normocephalic, without obvious abnormality, atraumatic   Eyes:            Lids and lashes normal, conjunctivae and sclerae normal, no   icterus   Throat:   No oral lesions, no thrush, oral mucosa moist   Neck:   No adenopathy, supple, trachea midline, no thyromegaly, no   carotid bruit, no JVD   Lungs:     Clear to auscultation,respirations regular, even and                   unlabored    Heart:    Regular rhythm and normal rate, normal S1 and S2, no            murmur, no gallop, no rub, no click   Chest Wall:    No abnormalities observed   Abdomen:     Normal bowel sounds, no masses, no organomegaly, soft        non-tender, non-distended, no guarding, no rebound                 tenderness   Rectal:     Deferred   Extremities:   no edema, no cyanosis, no redness   Skin:   No bleeding, bruising or rash   Lymph nodes:   No palpable adenopathy   Psychiatric:  Judgement and insight: normal   Orientation to person place and time: normal   Mood and affect: normal   Results Review:   I reviewed the patient's  new clinical results.      Results from last 7 days  Lab Units 08/06/18  0441 08/05/18  1551   WBC 10*3/mm3 7.86 9.80   HEMOGLOBIN g/dL 11.2* 12.8   HEMATOCRIT % 34.0* 38.8   PLATELETS 10*3/mm3 162 180       Results from last 7 days  Lab Units 08/06/18  0441 08/05/18  1551 07/31/18  1154   SODIUM mmol/L 140 134* 143   POTASSIUM mmol/L 3.7 4.4 4.1   CHLORIDE mmol/L 103 94* 99   CO2 mmol/L 25.5 25.8  --    TOTAL CO2, ARTERIAL mmol/L  --   --  29.3*   BUN mg/dL 14 17 23   CREATININE mg/dL 0.76 1.06* 1.14*   CALCIUM mg/dL 8.7 10.0 10.0   BILIRUBIN mg/dL 1.3* 1.4* 1.2   ALK PHOS U/L 41 57 60   ALT (SGPT) U/L 25 33 26   AST (SGOT) U/L 16 18 19   GLUCOSE mg/dL 92 239*  --            Lab Results  Lab Value Date/Time   LIPASE 42 08/05/2018 1551   LIPASE 55 05/24/2018 0245       Radiology:  CT Abdomen Pelvis With Contrast   Final Result   Acute sigmoid diverticulitis associated with wall thickening   and perisigmoid inflammation. Inflammation is centered about 2   diverticuli that extend cephalad to the involved sigmoid segment. The   larger and more distal of these 2 exhibits a thin, 1 cm crescentic band   of edema or early phlegmon or microabscess formation adjacent to the   diverticulum (as best demonstrated on the coronal reconstruction   sequence).  There is no drainable collection or extraluminal gas.       Discussed with Dr. Light in the emergency department on 08/05/2018 at   5:05 PM.        Radiation dose reduction techniques were utilized, including automated   exposure control and exposure modulation based on body size.       This report was finalized on 8/5/2018 7:13 PM by Dr. Wes Doss M.D.              Assessment/Plan   Patient Active Problem List   Diagnosis   • S/P thyroidectomy   • Mixed hyperlipidemia   • Vitamin D deficiency   • Impaired fasting glucose   • Hypothyroidism   • Osteopenia of multiple sites   • Hypertensive left ventricular hypertrophy, without heart failure   • Chronic seasonal  allergic rhinitis   • History of heart disease   • Hx of abnormal mammogram   • Nodule of left lung   • History of diverticulosis   • Hypertension   • Diverticulitis of large intestine without perforation or abscess without bleeding   • Hyperglycemia   • KEVIN (acute kidney injury) (CMS/HCC)   • Current chronic use of systemic steroids       I discussed the patients findings and my recommendations with patient.    Assessment:  1) Sigmoid diverticulitis. This is her second bout of diverticulitis in her life. Dr. Logan does not feel that this is complicated at all.  2) Autoimmune hepatitis - diagnosed in 5/18. Dr. Hein is awaiting lab results to see if the patient could safely be put on imuran in addition to the prednisone to treat her autoimmune hepatitis. Her LFT's were pretty normal on admission.    Recommendations:  1) Continue the prednisone 10 mg/day for now. Awaiting lab results to decide if she could be started safely on imuran 50 mg/day in addition to the prednisone.  2) Continue the IV antibiotics and continue bowel rest for now.  3) She will eventually need a colonoscopy as an outpatient, possibly in 8ish weeks after this diverticulitis has resolved.     Freeman Donaldson MD

## 2018-08-07 VITALS
TEMPERATURE: 97.7 F | RESPIRATION RATE: 18 BRPM | OXYGEN SATURATION: 98 % | SYSTOLIC BLOOD PRESSURE: 144 MMHG | HEART RATE: 69 BPM | DIASTOLIC BLOOD PRESSURE: 67 MMHG | WEIGHT: 142 LBS | HEIGHT: 63 IN | BODY MASS INDEX: 25.16 KG/M2

## 2018-08-07 PROBLEM — N17.9 AKI (ACUTE KIDNEY INJURY): Status: RESOLVED | Noted: 2018-08-05 | Resolved: 2018-08-07

## 2018-08-07 LAB
ALBUMIN SERPL-MCNC: 3.2 G/DL (ref 3.5–5.2)
ALBUMIN/GLOB SERPL: 1.4 G/DL
ALP SERPL-CCNC: 41 U/L (ref 39–117)
ALT SERPL W P-5'-P-CCNC: 26 U/L (ref 1–33)
ANION GAP SERPL CALCULATED.3IONS-SCNC: 12 MMOL/L
AST SERPL-CCNC: 13 U/L (ref 1–32)
BASOPHILS # BLD AUTO: 0.01 10*3/MM3 (ref 0–0.2)
BASOPHILS NFR BLD AUTO: 0.2 % (ref 0–1.5)
BILIRUB SERPL-MCNC: 0.9 MG/DL (ref 0.1–1.2)
BUN BLD-MCNC: 12 MG/DL (ref 8–23)
BUN/CREAT SERPL: 14 (ref 7–25)
CALCIUM SPEC-SCNC: 8.3 MG/DL (ref 8.6–10.5)
CHLORIDE SERPL-SCNC: 105 MMOL/L (ref 98–107)
CO2 SERPL-SCNC: 25 MMOL/L (ref 22–29)
CREAT BLD-MCNC: 0.86 MG/DL (ref 0.57–1)
DEPRECATED RDW RBC AUTO: 45 FL (ref 37–54)
EOSINOPHIL # BLD AUTO: 0.15 10*3/MM3 (ref 0–0.7)
EOSINOPHIL NFR BLD AUTO: 2.4 % (ref 0.3–6.2)
ERYTHROCYTE [DISTWIDTH] IN BLOOD BY AUTOMATED COUNT: 13.5 % (ref 11.7–13)
GFR SERPL CREATININE-BSD FRML MDRD: 65 ML/MIN/1.73
GLOBULIN UR ELPH-MCNC: 2.3 GM/DL
GLUCOSE BLD-MCNC: 90 MG/DL (ref 65–99)
GLUCOSE BLDC GLUCOMTR-MCNC: 126 MG/DL (ref 70–130)
GLUCOSE BLDC GLUCOMTR-MCNC: 95 MG/DL (ref 70–130)
HAV IGM SERPL QL IA: ABNORMAL
HBV CORE IGM SERPL QL IA: ABNORMAL
HBV SURFACE AG SERPL QL IA: ABNORMAL
HCT VFR BLD AUTO: 33.3 % (ref 35.6–45.5)
HCV AB SER DONR QL: ABNORMAL
HGB BLD-MCNC: 10.8 G/DL (ref 11.9–15.5)
IMM GRANULOCYTES # BLD: 0.04 10*3/MM3 (ref 0–0.03)
IMM GRANULOCYTES NFR BLD: 0.6 % (ref 0–0.5)
LYMPHOCYTES # BLD AUTO: 1.31 10*3/MM3 (ref 0.9–4.8)
LYMPHOCYTES NFR BLD AUTO: 21 % (ref 19.6–45.3)
MCH RBC QN AUTO: 29.6 PG (ref 26.9–32)
MCHC RBC AUTO-ENTMCNC: 32.4 G/DL (ref 32.4–36.3)
MCV RBC AUTO: 91.2 FL (ref 80.5–98.2)
MONOCYTES # BLD AUTO: 0.38 10*3/MM3 (ref 0.2–1.2)
MONOCYTES NFR BLD AUTO: 6.1 % (ref 5–12)
NEUTROPHILS # BLD AUTO: 4.35 10*3/MM3 (ref 1.9–8.1)
NEUTROPHILS NFR BLD AUTO: 69.7 % (ref 42.7–76)
PLATELET # BLD AUTO: 151 10*3/MM3 (ref 140–500)
PMV BLD AUTO: 9.5 FL (ref 6–12)
POTASSIUM BLD-SCNC: 3.8 MMOL/L (ref 3.5–5.2)
PROT SERPL-MCNC: 5.5 G/DL (ref 6–8.5)
RBC # BLD AUTO: 3.65 10*6/MM3 (ref 3.9–5.2)
SODIUM BLD-SCNC: 142 MMOL/L (ref 136–145)
WBC NRBC COR # BLD: 6.24 10*3/MM3 (ref 4.5–10.7)

## 2018-08-07 PROCEDURE — 82962 GLUCOSE BLOOD TEST: CPT

## 2018-08-07 PROCEDURE — 25010000002 HEPARIN (PORCINE) PER 1000 UNITS: Performed by: INTERNAL MEDICINE

## 2018-08-07 PROCEDURE — 80074 ACUTE HEPATITIS PANEL: CPT | Performed by: INTERNAL MEDICINE

## 2018-08-07 PROCEDURE — 80053 COMPREHEN METABOLIC PANEL: CPT | Performed by: INTERNAL MEDICINE

## 2018-08-07 PROCEDURE — 99232 SBSQ HOSP IP/OBS MODERATE 35: CPT | Performed by: INTERNAL MEDICINE

## 2018-08-07 PROCEDURE — 63710000001 PREDNISONE PER 5 MG: Performed by: INTERNAL MEDICINE

## 2018-08-07 PROCEDURE — 85025 COMPLETE CBC W/AUTO DIFF WBC: CPT | Performed by: SURGERY

## 2018-08-07 PROCEDURE — 84165 PROTEIN E-PHORESIS SERUM: CPT | Performed by: INTERNAL MEDICINE

## 2018-08-07 RX ORDER — HYDROCODONE BITARTRATE AND ACETAMINOPHEN 7.5; 325 MG/1; MG/1
1 TABLET ORAL EVERY 4 HOURS PRN
Status: DISCONTINUED | OUTPATIENT
Start: 2018-08-07 | End: 2018-08-07 | Stop reason: HOSPADM

## 2018-08-07 RX ORDER — METRONIDAZOLE 500 MG/1
500 TABLET ORAL 3 TIMES DAILY
Qty: 23 TABLET | Refills: 0 | Status: SHIPPED | OUTPATIENT
Start: 2018-08-07 | End: 2018-08-15

## 2018-08-07 RX ORDER — LEVOFLOXACIN 500 MG/1
500 TABLET, FILM COATED ORAL DAILY
Qty: 7 TABLET | Refills: 0 | Status: SHIPPED | OUTPATIENT
Start: 2018-08-08 | End: 2018-08-15

## 2018-08-07 RX ADMIN — SODIUM CHLORIDE 75 ML/HR: 9 INJECTION, SOLUTION INTRAVENOUS at 03:55

## 2018-08-07 RX ADMIN — METRONIDAZOLE 500 MG: 500 INJECTION, SOLUTION INTRAVENOUS at 03:56

## 2018-08-07 RX ADMIN — LEVOTHYROXINE SODIUM 75 MCG: 75 TABLET ORAL at 08:38

## 2018-08-07 RX ADMIN — ASPIRIN 81 MG: 81 TABLET, DELAYED RELEASE ORAL at 08:38

## 2018-08-07 RX ADMIN — LISINOPRIL: 10 TABLET ORAL at 08:38

## 2018-08-07 RX ADMIN — HEPARIN SODIUM 5000 UNITS: 5000 INJECTION INTRAVENOUS; SUBCUTANEOUS at 08:39

## 2018-08-07 RX ADMIN — METRONIDAZOLE 500 MG: 500 INJECTION, SOLUTION INTRAVENOUS at 10:21

## 2018-08-07 RX ADMIN — PREDNISONE 10 MG: 10 TABLET ORAL at 08:38

## 2018-08-07 NOTE — DISCHARGE SUMMARY
"Date of Admission: 8/5/2018  Date of Discharge:  8/7/2018  Primary Care Physician: Jacqui Booth, PA-C     Discharge Diagnosis:  Active Hospital Problems    Diagnosis Date Noted   • **Diverticulitis of large intestine without perforation or abscess without bleeding [K57.32] 08/05/2018   • Hyperglycemia [R73.9] 08/05/2018   • Current chronic use of systemic steroids [Z79.52] 08/05/2018   • Hypertension [I10] 04/27/2018   • History of diverticulosis [Z87.19] 10/27/2017   • Hypothyroidism [E03.9] 10/27/2017      Resolved Hospital Problems    Diagnosis Date Noted Date Resolved   • KEVIN (acute kidney injury) (CMS/HCC) [N17.9] 08/05/2018 08/07/2018       Presenting Problem/History of Present Illness:  Diverticulitis of large intestine without perforation or abscess without bleeding [K57.32]     Hospital Course:  The patient is a 73 y.o. female who presented with abdominal pain.  She was found to have sigmoid diverticulitis, 2nd episode, without complicating features.  Please see admission H&P from 8/5/18 for further details.  On admission the patient was started on fluids, antibiotics, and gut rest.  She rapidly improved and was tolerating a normal diet and having normal bowel movements at the time of discharge.  Dr. Barrera with general surgery was consulted and he did not think procedural intervention is warranted at this time.  She will follow up with him in about 10 days.  Dr. Hein with GI follows her for autoimmune hepatitis.  There were no issues with this in the hospital, and her steroid taper was continued.  She will follow up with Dr. Hein in about 4 weeks.  She will need a follow up colonoscopy, and this will be discussed at her 4 wk follow up.    Exam Today:  Blood pressure 144/67, pulse 69, temperature 97.7 °F (36.5 °C), temperature source Oral, resp. rate 18, height 160 cm (63\"), weight 64.4 kg (142 lb), SpO2 98 %.  Constitutional: She is oriented to person, place, and time. No distress.   Eyes: Pupils are " equal, round, and reactive to light. EOM are normal.   Cardiovascular: Normal rate and regular rhythm.  Exam reveals no friction rub.    No murmur heard.  Pulmonary/Chest: Effort normal and breath sounds normal. No respiratory distress. She has no wheezes.   Abdominal: Soft. Bowel sounds are normal. She exhibits no distension. There is mild RLQ tenderness without guarding or rebound.  Musculoskeletal: She exhibits no edema or tenderness.   Neurological: She is alert and oriented to person, place, and time.   Skin: She is not diaphoretic. No erythema. No pallor.   Psychiatric: She has a normal mood and affect. Her behavior is normal.     Consults:   Consults     Date and Time Order Name Status Description    8/6/2018 0542 Inpatient Gastroenterology Consult Completed     8/6/2018 0021 Inpatient General Surgery Consult Completed     8/5/2018 1713 [facility] (on-call MD unless specified) Completed            Discharge Disposition:  Home or Self Care    Discharge Medications:     Discharge Medications      New Medications      Instructions Start Date   levoFLOXacin 500 MG tablet  Commonly known as:  LEVAQUIN   500 mg, Oral, Daily      metroNIDAZOLE 500 MG tablet  Commonly known as:  FLAGYL   500 mg, Oral, 3 Times Daily         Continue These Medications      Instructions Start Date   alendronate 70 MG tablet  Commonly known as:  FOSAMAX   70 mg, Oral, Every 7 Days, For Osteopenia      aspirin 81 MG EC tablet   81 mg, Oral, Daily      cetirizine 10 MG tablet  Commonly known as:  zyrTEC   10 mg, Oral, Nightly      fluticasone 50 MCG/ACT nasal spray  Commonly known as:  FLONASE   1 spray, Nasal, Daily      levothyroxine 75 MCG tablet  Commonly known as:  SYNTHROID, LEVOTHROID   75 mcg, Oral, Daily, For thyroid (generic accepted)      lisinopril-hydrochlorothiazide 10-12.5 MG per tablet  Commonly known as:  PRINZIDE,ZESTORETIC   1 tablet, Oral, Daily, For BP      MULTI VITAMIN DAILY PO   Oral      predniSONE 20 MG tablet  Commonly  known as:  DELTASONE   10 mg, Oral, Daily             Discharge Diet:   Diet Instructions     Diet: Regular       Discharge Diet:  Regular    Low residue diet until seen by me in the office.          Activity at Discharge:   Activity Instructions     Activity as Tolerated             Follow-up Appointments:  Future Appointments  Date Time Provider Department Center   9/5/2018 2:45 PM Jacqui Booth PA-C [id number] None   9/13/2018 3:30 PM Shiva Hein MD [facility] None   10/8/2018 2:30 PM [facility] 1 [facility]     Additional Instructions for the Follow-ups that You Need to Schedule     Discharge Follow-up with Specified Provider: Dr. Hein (Gastroenterology); 1 Month    As directed      To:  Dr. Hein (Gastroenterology)    Follow Up:  1 Month         Discharge Follow-up with Specified Provider: Dr. Suazo in 10-12 days.    As directed      To:  Dr. Suazo in 10-12 days.    Follow Up Details:  Call for appointment.               Test Results Pending at Discharge:   Order Current Status    Hepatitis A IgM Confimrmation In process    Protein Elec + Interp, Serum In process           Talha Serrano MD  08/07/18  1:22 PM    Time Spent on Discharge Activities: Greater than 30 minutes.

## 2018-08-07 NOTE — PROGRESS NOTES
[facility]  Inpatient Progress Note    Reason for Follow Up:  Diverticulitis    Subjective     Interval History:   Much improved.  Tolerating diet.  Minimal pain    Current Facility-Administered Medications:   •  acetaminophen (TYLENOL) tablet 650 mg, 650 mg, Oral, Q4H PRN, Kurtis Moseley MD  •  aspirin EC tablet 81 mg, 81 mg, Oral, Daily, Kurtis Moseley MD, 81 mg at 08/07/18 0838  •  cetirizine (zyrTEC) tablet 5 mg, 5 mg, Oral, Nightly, Kurtis Moseley MD, 5 mg at 08/06/18 2109  •  dextrose (D50W) solution 25 g, 25 g, Intravenous, Q15 Min PRN, Kurtis Moseley MD  •  dextrose (GLUTOSE) oral gel 15 g, 15 g, Oral, Q15 Min PRN, Kurtis Moseley MD  •  fluticasone (FLONASE) 50 MCG/ACT nasal spray 1 spray, 1 spray, Nasal, Daily, Kurtis Moseley MD  •  glucagon (human recombinant) (GLUCAGEN DIAGNOSTIC) injection 1 mg, 1 mg, Subcutaneous, PRN, Kurtis Moseley MD  •  heparin (porcine) 5000 UNIT/ML injection 5,000 Units, 5,000 Units, Subcutaneous, Q12H, Kurtis Moseley MD, 5,000 Units at 08/07/18 0839  •  HYDROcodone-acetaminophen (NORCO) 7.5-325 MG per tablet 1 tablet, 1 tablet, Oral, Q4H PRN, Freeman Barrera MD  •  insulin aspart (novoLOG) injection 0-9 Units, 0-9 Units, Subcutaneous, 4x Daily With Meals & Nightly, Kurtis Moseley MD  •  levoFLOXacin (LEVAQUIN) 500 mg/100 mL D5W (premix) (LEVAQUIN) 500 mg, 500 mg, Intravenous, Q24H, Kurtis Moseley MD, 500 mg at 08/06/18 1623  •  levothyroxine (SYNTHROID, LEVOTHROID) tablet 75 mcg, 75 mcg, Oral, Daily, Kurtis Moseley MD, 75 mcg at 08/07/18 0838  •  lisinopril (PRINIVIL,ZESTRIL) 10 mg, hydrochlorothiazide (HYDRODIURIL) 12.5 mg for ZESTORTIC 10-12.5, , Oral, Q24H, Kurtis Moseley MD  •  metroNIDAZOLE (FLAGYL) IVPB 500 mg, 500 mg, Intravenous, Q8H, Kurtis Moseley MD, 500 mg at 08/07/18 0356  •  morphine injection 1 mg, 1 mg, Intravenous, Q4H PRN **AND** naloxone (NARCAN) injection 0.4 mg, 0.4 mg, Intravenous, Q5 Min PRN, Kurtis Moseley MD  •  ondansetron (ZOFRAN) injection  4 mg, 4 mg, Intravenous, Q6H PRN, Kurtis Moseley MD  •  predniSONE (DELTASONE) tablet 10 mg, 10 mg, Oral, Daily, Kurtis Moseley MD, 10 mg at 08/07/18 0838  •  sodium chloride 0.9 % flush 1-10 mL, 1-10 mL, Intravenous, PRN, Kurtis Moseley MD  •  Insert peripheral IV, , , Once **AND** sodium chloride 0.9 % flush 10 mL, 10 mL, Intravenous, PRN, Joao Light MD  •  sodium chloride 0.9 % infusion, 50 mL/hr, Intravenous, Continuous, Freeman Barrera MD, Last Rate: 50 mL/hr at 08/07/18 0837, 50 mL/hr at 08/07/18 0837  Review of Systems:    The following systems were reviewed and negative;  constitution and gastrointestinal    Objective     Vital Signs  Temp:  [97 °F (36.1 °C)-97.7 °F (36.5 °C)] 97.7 °F (36.5 °C)  Heart Rate:  [64-74] 72  Resp:  [16-18] 18  BP: (100-135)/(68-72) 135/68  Body mass index is 25.15 kg/m².    Intake/Output Summary (Last 24 hours) at 08/07/18 0845  Last data filed at 08/07/18 0355   Gross per 24 hour   Intake             2448 ml   Output             1400 ml   Net             1048 ml     No intake/output data recorded.     Physical Exam:   General: patient awake, alert and cooperative   Cardiovascular: regular rhythm and rate, no murmurs auscultated   Pulm: clear to auscultation bilaterally, regular and unlabored   Abdomen: soft, nontender, nondistended; normal bowel sounds   Rectal: deferred   Extremities: no rash or edema   Psychiatric: Normal mood and behavior; memory intact     Results Review:     I reviewed the patient's new clinical results.      Results from last 7 days  Lab Units 08/07/18  0628 08/06/18  0441 08/05/18  1551   WBC 10*3/mm3 6.24 7.86 9.80   HEMOGLOBIN g/dL 10.8* 11.2* 12.8   HEMATOCRIT % 33.3* 34.0* 38.8   PLATELETS 10*3/mm3 151 162 180       Results from last 7 days  Lab Units 08/07/18  0628 08/06/18  0441 08/05/18  1551   SODIUM mmol/L 142 140 134*   POTASSIUM mmol/L 3.8 3.7 4.4   CHLORIDE mmol/L 105 103 94*   CO2 mmol/L 25.0 25.5 25.8   BUN mg/dL 12 14 17   CREATININE  mg/dL 0.86 0.76 1.06*   CALCIUM mg/dL 8.3* 8.7 10.0   BILIRUBIN mg/dL 0.9 1.3* 1.4*   ALK PHOS U/L 41 41 57   ALT (SGPT) U/L 26 25 33   AST (SGOT) U/L 13 16 18   GLUCOSE mg/dL 90 92 239*           Lab Results  Lab Value Date/Time   LIPASE 42 08/05/2018 1551   LIPASE 55 05/24/2018 0245       Assessment/Plan   Assessment:   1) Sigmoid diverticulitis. This is her second bout of diverticulitis in her life. She has rapidly improved with IV abx  2) Autoimmune hepatitis - diagnosed in 5/18. Dr. Hein is awaiting lab results to see if the patient could safely be put on imuran in addition to the prednisone to treat her autoimmune hepatitis. Her LFT's were pretty normal on admission.    Plan:   Anticipate discharge later today with completion of outpt abx  Will arrange for f/u in office with Dr Hein in 4 weeks to discuss timing of follow up colonoscopy    I discussed the patients findings and my recommendations with patient.         Sacha Lau M.D.  [facility]  [address] [place] [address]  Office: [phone]

## 2018-08-07 NOTE — PROGRESS NOTES
"August 7, 2018    Subjective: Quiet evening.  No complaints.    Objective: Afebrile and stable.  /72 (BP Location: Right arm, Patient Position: Lying)   Pulse 64   Temp 97.2 °F (36.2 °C) (Oral)   Resp 16   Ht 160 cm (63\")   Wt 64.4 kg (142 lb)   SpO2 99%   BMI 25.15 kg/m²   In no distress.  Chest clear and heart regular.  Abdomen very soft and benign.    Labs: Reviewed.  WBC normal and differential normal.  Hemoglobin stable.    Assessment/Plan:  Acute uncomplicated sigmoid diverticulitis, stable and improving.  Patient seems to have dramatically responded to just a few doses of antibiotics and IV fluids.  She's already tolerating full's, not really having any pain or other complaints and anxious to eat and go home.  I will advance her to a low residue diet and anticipate discharge home later today.  I would just like to see her in the office in a week and a half or so to make sure she is doing all right and then we will send her back to Dr. Hein for a surveillance colonoscopy 6-8 weeks down the road.    Freeman Barrera M.D.      "

## 2018-08-07 NOTE — PROGRESS NOTES
Discharge Planning Assessment  [facility]     Patient Name: Mami Srivastava  MRN: [id number]  Today's Date: 8/7/2018    Admit Date: 8/5/2018          Discharge Needs Assessment     Row Name 08/07/18 1158       Living Environment    Lives With alone    Current Living Arrangements home/apartment/condo    Primary Care Provided by self    Provides Primary Care For no one    Family Caregiver if Needed friend(s);other relative(s)   friend or cousin    Quality of Family Relationships supportive    Able to Return to Prior Arrangements yes       Resource/Environmental Concerns    Transportation Concerns car, none       Transition Planning    Patient/Family Anticipates Transition to home    Patient/Family Anticipated Services at Transition none    Transportation Anticipated family or friend will provide       Discharge Needs Assessment    Readmission Within the Last 30 Days no previous admission in last 30 days    Concerns to be Addressed no discharge needs identified;denies needs/concerns at this time;adjustment to diagnosis/illness    Equipment Currently Used at Home none    Anticipated Changes Related to Illness none    Equipment Needed After Discharge none    Offered/Gave Vendor List yes            Discharge Plan     Row Name 08/07/18 114       Plan    Plan Home w/o needs    Plan Comments Pt confirmed the address, PCP and pharmacy are correct. Pt said she has two sons, OLIVIA lives in [place] and Yassine lives in [place]. Pt said her freind Temitope Evans or cousin Maria E James lives in the area and would be her emergency contact. Pt said she is very active, works part time, uses no DME, has never had home health or been to a SNF, pt said she can afford her Rx. Pt said she does not have a HCS or Medical POA, pt said she plans to return home at discharge and does not anticipate any discharge needs.   Karuna Benson RN               Demographic Summary     Row Name 08/07/18 115       General Information    Admission  Type inpatient    Arrived From home    Referral Source admission list    Preferred Language English     Used During This Interaction no       Contact Information    Permission Granted to Share Info With family/designee            Functional Status     Row Name 08/07/18 1158       Functional Status, IADL    Medications independent    Meal Preparation independent    Housekeeping independent    Laundry independent    Shopping independent           Patient Forms     Row Name 08/07/18 1158       Patient Forms    Provider Choice List Delivered    Delivered to Patient    Method of delivery In person          Karuna Benson RN

## 2018-08-07 NOTE — PLAN OF CARE
Problem: Patient Care Overview  Goal: Plan of Care Review  Outcome: Ongoing (interventions implemented as appropriate)   08/07/18 0532   Plan of Care Review   Progress no change   OTHER   Outcome Summary iv abt, up ad chacho, vdg, no c/o pain     Goal: Discharge Needs Assessment  Outcome: Ongoing (interventions implemented as appropriate)    Goal: Interprofessional Rounds/Family Conf  Outcome: Ongoing (interventions implemented as appropriate)      Problem: Pain, Acute (Adult)  Goal: Acceptable Pain Control/Comfort Level  Outcome: Ongoing (interventions implemented as appropriate)

## 2018-08-08 ENCOUNTER — READMISSION MANAGEMENT (OUTPATIENT)
Dept: CALL CENTER | Facility: HOSPITAL | Age: 73
End: 2018-08-08

## 2018-08-08 LAB
ALBUMIN SERPL-MCNC: 2.8 G/DL (ref 2.9–4.4)
ALBUMIN/GLOB SERPL: 1.3 {RATIO} (ref 0.7–1.7)
ALPHA1 GLOB FLD ELPH-MCNC: 0.2 G/DL (ref 0–0.4)
ALPHA2 GLOB SERPL ELPH-MCNC: 0.7 G/DL (ref 0.4–1)
B-GLOBULIN SERPL ELPH-MCNC: 0.9 G/DL (ref 0.7–1.3)
GAMMA GLOB SERPL ELPH-MCNC: 0.4 G/DL (ref 0.4–1.8)
GLOBULIN SER CALC-MCNC: 2.2 G/DL (ref 2.2–3.9)
Lab: ABNORMAL
M-SPIKE: ABNORMAL G/DL
PROT PATTERN SERPL ELPH-IMP: ABNORMAL
PROT SERPL-MCNC: 5 G/DL (ref 6–8.5)
REF LAB TEST METHOD: NORMAL
TPMT GENE PROD MET ACT IMP BLD/T-IMP: NORMAL
TPMT RBC-CCNC: 18.5 UNITS/ML RBC

## 2018-08-08 NOTE — OUTREACH NOTE
Prep Survey      Responses   Facility patient discharged from?  [facility]   Is patient eligible?  Yes   Does the patient have one of the following disease processes/diagnoses(primary or secondary)?  Other   Does the patient have Home health ordered?  No   Is there a DME ordered?  No   Prep survey completed?  Yes          Ruby Jauregui RN

## 2018-08-08 NOTE — OUTREACH NOTE
Prep Survey      Responses   Facility patient discharged from?  [facility]   Is patient eligible?  Yes   Discharge diagnosis  diverticulitis without abcess   Does the patient have one of the following disease processes/diagnoses(primary or secondary)?  Other   Does the patient have Home health ordered?  No   Is there a DME ordered?  No   Prep survey completed?  Yes          Ruby Jauregui RN

## 2018-08-09 ENCOUNTER — READMISSION MANAGEMENT (OUTPATIENT)
Dept: CALL CENTER | Facility: HOSPITAL | Age: 73
End: 2018-08-09

## 2018-08-09 NOTE — OUTREACH NOTE
Medical Week 1 Survey      Responses   Facility patient discharged from?  [facility]   Does the patient have one of the following disease processes/diagnoses(primary or secondary)?  Other   Is there a successful TCM telephone encounter documented?  No   Week 1 attempt successful?  Yes   Call start time  1123   Call end time  1128   General alerts for this patient  Works till 2 pm.   Discharge diagnosis  diverticulitis without abcess   Meds reviewed with patient/caregiver?  Yes   Is the patient having any side effects they believe may be caused by any medication additions or changes?  Yes   Side effects comments   Feels more tired.   Does the patient have all medications ordered at discharge?  Yes   Is the patient taking all medications as directed (includes completed medication regime)?  Yes   Does the patient have a primary care provider?   Yes   Does the patient have an appointment with their PCP within 7 days of discharge?  Greater than 7 days   Has the patient kept scheduled appointments due by today?  N/A   Comments  Reviewed appts. She will see MDs in the near future.   Has home health visited the patient within 72 hours of discharge?  N/A   Psychosocial issues?  No   Comments  No complaints. Back to work on the 13th.   Did the patient receive a copy of their discharge instructions?  Yes   Nursing interventions  Reviewed instructions with patient   What is the patient's perception of their health status since discharge?  Improving   Is the patient/caregiver able to teach back signs and symptoms related to disease process for when to call PCP?  Yes   Is the patient/caregiver able to teach back signs and symptoms related to disease process for when to call 911?  Yes   Is the patient/caregiver able to teach back the hierarchy of who to call/visit for symptoms/problems? PCP, Specialist, Home health nurse, Urgent Care, ED, 911  Yes   Week 1 call completed?  Yes          Cruz Zavala RN

## 2018-08-10 ENCOUNTER — TELEPHONE (OUTPATIENT)
Dept: GASTROENTEROLOGY | Facility: CLINIC | Age: 73
End: 2018-08-10

## 2018-08-10 RX ORDER — AZATHIOPRINE 50 MG/1
75 TABLET ORAL DAILY
Qty: 45 TABLET | Refills: 1 | Status: SHIPPED | OUTPATIENT
Start: 2018-08-10 | End: 2018-09-13 | Stop reason: SDUPTHER

## 2018-08-10 NOTE — TELEPHONE ENCOUNTER
Called pt and advised of the note from Dr Hein. She verb understanding and will start the Imuran on Tuesday once she finishes up her antibiotics. She will f/u as scheduled on 9/13 at 3:30 PM.

## 2018-08-10 NOTE — TELEPHONE ENCOUNTER
----- Message from Shiva Hein MD sent at 8/9/2018  9:02 PM EDT -----  Lab normal, begin imuran 75mg daily, f/u office 1 month

## 2018-08-20 ENCOUNTER — READMISSION MANAGEMENT (OUTPATIENT)
Dept: CALL CENTER | Facility: HOSPITAL | Age: 73
End: 2018-08-20

## 2018-08-20 NOTE — OUTREACH NOTE
Medical Week 2 Survey      Responses   Facility patient discharged from?  [place]   Does the patient have one of the following disease processes/diagnoses(primary or secondary)?  Other   Week 2 attempt successful?  Yes   Call start time  1446   Rescheduled  Rescheduled-pt requested   Call end time  1447          Samia Warren RN

## 2018-08-21 ENCOUNTER — READMISSION MANAGEMENT (OUTPATIENT)
Dept: CALL CENTER | Facility: HOSPITAL | Age: 73
End: 2018-08-21

## 2018-08-21 NOTE — OUTREACH NOTE
Medical Week 2 Survey      Responses   Facility patient discharged from?  [facility]   Does the patient have one of the following disease processes/diagnoses(primary or secondary)?  Other   Week 2 attempt successful?  Yes   Call start time  1820   Discharge diagnosis  diverticulitis without abcess   Call end time  1829   Meds reviewed with patient/caregiver?  Yes   Is the patient having any side effects they believe may be caused by any medication additions or changes?  No   Does the patient have all medications ordered at discharge?  Yes   Is the patient taking all medications as directed (includes completed medication regime)?  Yes   Does the patient have a primary care provider?   Yes   Does the patient have an appointment with their PCP within 7 days of discharge?  Yes   Comments regarding PCP  Patient has had followup with Dr Barrera last week.    Has the patient kept scheduled appointments due by today?  Yes   Comments  Reviewed appts. She will see MDs in the near future.   Has home health visited the patient within 72 hours of discharge?  N/A   Psychosocial issues?  No   Comments  No complaints. Back to work on the 13th. Doing great.   Did the patient receive a copy of their discharge instructions?  Yes   Nursing interventions  Reviewed instructions with patient   What is the patient's perception of their health status since discharge?  Improving   Is the patient/caregiver able to teach back signs and symptoms related to disease process for when to call PCP?  Yes   Is the patient/caregiver able to teach back signs and symptoms related to disease process for when to call 911?  Yes   Is the patient/caregiver able to teach back the hierarchy of who to call/visit for symptoms/problems? PCP, Specialist, Home health nurse, Urgent Care, ED, 911  Yes   Week 2 Call Completed?  Yes   Graduated  Yes   Did the patient feel the follow up calls were helpful during their recovery period?  Yes   Was the number of calls  appropriate?  Yes   Graduated/Revoked comments  Patient has returned to work. Upset that we have called multiple times and also called emergency contacts without leaving messages. Explained to patient our policy and HIPPA guidelines. She verbalized understanding.           William Alfaro RN

## 2018-08-22 DIAGNOSIS — R73.01 IMPAIRED FASTING GLUCOSE: ICD-10-CM

## 2018-08-22 DIAGNOSIS — Z86.79 HISTORY OF HEART DISEASE: ICD-10-CM

## 2018-08-22 DIAGNOSIS — M85.89 OSTEOPENIA OF MULTIPLE SITES: ICD-10-CM

## 2018-08-22 DIAGNOSIS — E89.0 S/P THYROIDECTOMY: ICD-10-CM

## 2018-08-22 DIAGNOSIS — E78.2 MIXED HYPERLIPIDEMIA: ICD-10-CM

## 2018-08-22 DIAGNOSIS — I10 ESSENTIAL HYPERTENSION: ICD-10-CM

## 2018-08-22 DIAGNOSIS — E03.9 ACQUIRED HYPOTHYROIDISM: ICD-10-CM

## 2018-08-22 DIAGNOSIS — Z12.31 ENCOUNTER FOR SCREENING MAMMOGRAM FOR BREAST CANCER: ICD-10-CM

## 2018-08-26 LAB
ALBUMIN SERPL-MCNC: 4 G/DL (ref 3.5–4.8)
ALBUMIN/GLOB SERPL: 2 {RATIO} (ref 1.2–2.2)
ALP SERPL-CCNC: 48 IU/L (ref 39–117)
ALT SERPL-CCNC: 32 IU/L (ref 0–32)
AST SERPL-CCNC: 31 IU/L (ref 0–40)
BASOPHILS # BLD AUTO: 0 X10E3/UL (ref 0–0.2)
BASOPHILS NFR BLD AUTO: 0 %
BILIRUB SERPL-MCNC: 0.9 MG/DL (ref 0–1.2)
BUN SERPL-MCNC: 18 MG/DL (ref 8–27)
BUN/CREAT SERPL: 19 (ref 12–28)
CALCIUM SERPL-MCNC: 9.3 MG/DL (ref 8.7–10.3)
CHLORIDE SERPL-SCNC: 98 MMOL/L (ref 96–106)
CHOLEST SERPL-MCNC: 226 MG/DL (ref 100–199)
CO2 SERPL-SCNC: 25 MMOL/L (ref 20–29)
CREAT SERPL-MCNC: 0.97 MG/DL (ref 0.57–1)
EOSINOPHIL # BLD AUTO: 0.2 X10E3/UL (ref 0–0.4)
EOSINOPHIL NFR BLD AUTO: 4 %
ERYTHROCYTE [DISTWIDTH] IN BLOOD BY AUTOMATED COUNT: 15.1 % (ref 12.3–15.4)
FOLATE SERPL-MCNC: 19.8 NG/ML
GLOBULIN SER CALC-MCNC: 2 G/DL (ref 1.5–4.5)
GLUCOSE SERPL-MCNC: 100 MG/DL (ref 65–99)
HBA1C MFR BLD: 6.2 % (ref 4.8–5.6)
HCT VFR BLD AUTO: 36 % (ref 34–46.6)
HDLC SERPL-MCNC: 66 MG/DL
HGB BLD-MCNC: 12 G/DL (ref 11.1–15.9)
IMM GRANULOCYTES # BLD: 0 X10E3/UL (ref 0–0.1)
IMM GRANULOCYTES NFR BLD: 0 %
LDLC SERPL CALC-MCNC: 128 MG/DL (ref 0–99)
LYMPHOCYTES # BLD AUTO: 1.1 X10E3/UL (ref 0.7–3.1)
LYMPHOCYTES NFR BLD AUTO: 23 %
MCH RBC QN AUTO: 30.2 PG (ref 26.6–33)
MCHC RBC AUTO-ENTMCNC: 33.3 G/DL (ref 31.5–35.7)
MCV RBC AUTO: 91 FL (ref 79–97)
MONOCYTES # BLD AUTO: 0.4 X10E3/UL (ref 0.1–0.9)
MONOCYTES NFR BLD AUTO: 8 %
NEUTROPHILS # BLD AUTO: 3.1 X10E3/UL (ref 1.4–7)
NEUTROPHILS NFR BLD AUTO: 65 %
PLATELET # BLD AUTO: 203 X10E3/UL (ref 150–379)
POTASSIUM SERPL-SCNC: 4.2 MMOL/L (ref 3.5–5.2)
PROT SERPL-MCNC: 6 G/DL (ref 6–8.5)
RBC # BLD AUTO: 3.97 X10E6/UL (ref 3.77–5.28)
SODIUM SERPL-SCNC: 140 MMOL/L (ref 134–144)
T4 FREE SERPL-MCNC: 1.47 NG/DL (ref 0.82–1.77)
TRIGL SERPL-MCNC: 159 MG/DL (ref 0–149)
TSH SERPL DL<=0.005 MIU/L-ACNC: 4.05 UIU/ML (ref 0.45–4.5)
VIT B12 SERPL-MCNC: 499 PG/ML (ref 232–1245)
VLDLC SERPL CALC-MCNC: 32 MG/DL (ref 5–40)
WBC # BLD AUTO: 4.8 X10E3/UL (ref 3.4–10.8)

## 2018-09-01 RX ORDER — VALACYCLOVIR HYDROCHLORIDE 1 G/1
TABLET, FILM COATED ORAL
Qty: 20 TABLET | Refills: 5 | Status: SHIPPED | OUTPATIENT
Start: 2018-09-01 | End: 2018-10-24

## 2018-09-05 ENCOUNTER — OFFICE VISIT (OUTPATIENT)
Dept: FAMILY MEDICINE CLINIC | Facility: CLINIC | Age: 73
End: 2018-09-05

## 2018-09-05 VITALS
HEART RATE: 77 BPM | TEMPERATURE: 97.9 F | SYSTOLIC BLOOD PRESSURE: 110 MMHG | HEIGHT: 63 IN | BODY MASS INDEX: 25.34 KG/M2 | OXYGEN SATURATION: 97 % | WEIGHT: 143 LBS | DIASTOLIC BLOOD PRESSURE: 60 MMHG | RESPIRATION RATE: 16 BRPM

## 2018-09-05 DIAGNOSIS — E89.0 S/P THYROIDECTOMY: ICD-10-CM

## 2018-09-05 DIAGNOSIS — E78.2 MIXED HYPERLIPIDEMIA: ICD-10-CM

## 2018-09-05 DIAGNOSIS — Z87.19 HISTORY OF DIVERTICULOSIS: ICD-10-CM

## 2018-09-05 DIAGNOSIS — Z09 HOSPITAL DISCHARGE FOLLOW-UP: Primary | ICD-10-CM

## 2018-09-05 DIAGNOSIS — E55.9 VITAMIN D DEFICIENCY: ICD-10-CM

## 2018-09-05 DIAGNOSIS — M85.89 OSTEOPENIA OF MULTIPLE SITES: ICD-10-CM

## 2018-09-05 DIAGNOSIS — J30.2 CHRONIC SEASONAL ALLERGIC RHINITIS, UNSPECIFIED TRIGGER: ICD-10-CM

## 2018-09-05 DIAGNOSIS — I10 ESSENTIAL HYPERTENSION: ICD-10-CM

## 2018-09-05 DIAGNOSIS — B97.89 VIRAL STOMATITIS: ICD-10-CM

## 2018-09-05 DIAGNOSIS — K12.1 VIRAL STOMATITIS: ICD-10-CM

## 2018-09-05 DIAGNOSIS — R73.01 IMPAIRED FASTING GLUCOSE: ICD-10-CM

## 2018-09-05 DIAGNOSIS — Z86.79 HISTORY OF HEART DISEASE: ICD-10-CM

## 2018-09-05 PROCEDURE — 99214 OFFICE O/P EST MOD 30 MIN: CPT | Performed by: PHYSICIAN ASSISTANT

## 2018-09-05 RX ORDER — LISINOPRIL AND HYDROCHLOROTHIAZIDE 12.5; 1 MG/1; MG/1
1 TABLET ORAL DAILY
Qty: 90 TABLET | Refills: 1 | Status: SHIPPED | OUTPATIENT
Start: 2018-09-05 | End: 2018-11-07 | Stop reason: HOSPADM

## 2018-09-05 NOTE — PATIENT INSTRUCTIONS
Low glycemic index diet  Exercise 30 minutes most days of the week  Make sure you get results on any labs or tests we ordered today  We discussed medications and how to take them as prescribed  Sleep 6-8 hours each night if possible  If you have not signed up for [employer], please activate your code ASAP from your After Visit Summary today    LDL goal <100  LDL goal if heart disease <70  HDL goal >60  Triglyceride goal <150  BP goal =<130/80  Fasting glucose <100      Diverticulitis  Diverticulitis is infection or inflammation of small pouches (diverticula) in the colon that form due to a condition called diverticulosis. Diverticula can trap stool (feces) and bacteria, causing infection and inflammation.  Diverticulitis may cause severe stomach pain and diarrhea. It may lead to tissue damage in the colon that causes bleeding. The diverticula may also burst (rupture) and cause infected stool to enter other areas of the abdomen.  Complications of diverticulitis can include:  · Bleeding.  · Severe infection.  · Severe pain.  · Rupture (perforation) of the colon.  · Blockage (obstruction) of the colon.    What are the causes?  This condition is caused by stool becoming trapped in the diverticula, which allows bacteria to grow in the diverticula. This leads to inflammation and infection.  What increases the risk?  You are more likely to develop this condition if:  · You have diverticulosis. The risk for diverticulosis increases if:  ? You are overweight or obese.  ? You use tobacco products.  ? You do not get enough exercise.  · You eat a diet that does not include enough fiber. High-fiber foods include fruits, vegetables, beans, nuts, and whole grains.    What are the signs or symptoms?  Symptoms of this condition may include:  · Pain and tenderness in the abdomen. The pain is normally located on the left side of the abdomen, but it may occur in other areas.  · Fever and  chills.  · Bloating.  · Cramping.  · Nausea.  · Vomiting.  · Changes in bowel routines.  · Blood in your stool.    How is this diagnosed?  This condition is diagnosed based on:  · Your medical history.  · A physical exam.  · Tests to make sure there is nothing else causing your condition. These tests may include:  ? Blood tests.  ? Urine tests.  ? Imaging tests of the abdomen, including X-rays, ultrasounds, MRIs, or CT scans.    How is this treated?  Most cases of this condition are mild and can be treated at home. Treatment may include:  · Taking over-the-counter pain medicines.  · Following a clear liquid diet.  · Taking antibiotic medicines by mouth.  · Rest.    More severe cases may need to be treated at a hospital. Treatment may include:  · Not eating or drinking.  · Taking prescription pain medicine.  · Receiving antibiotic medicines through an IV tube.  · Receiving fluids and nutrition through an IV tube.  · Surgery.    When your condition is under control, your health care provider may recommend that you have a colonoscopy. This is an exam to look at the entire large intestine. During the exam, a lubricated, bendable tube is inserted into the anus and then passed into the rectum, colon, and other parts of the large intestine. A colonoscopy can show how severe your diverticula are and whether something else may be causing your symptoms.  Follow these instructions at home:  Medicines  · Take over-the-counter and prescription medicines only as told by your health care provider. These include fiber supplements, probiotics, and stool softeners.  · If you were prescribed an antibiotic medicine, take it as told by your health care provider. Do not stop taking the antibiotic even if you start to feel better.  · Do not drive or use heavy machinery while taking prescription pain medicine.  General instructions  · Follow a full liquid diet or another diet as directed by your health care provider. After your symptoms  improve, your health care provider may tell you to change your diet. He or she may recommend that you eat a diet that contains at least 25 g (25 grams) of fiber daily. Fiber makes it easier to pass stool. Healthy sources of fiber include:  ? Berries. One cup contains 4-8 grams of fiber.  ? Beans or lentils. One half cup contains 5-8 grams of fiber.  ? Green vegetables. One cup contains 4 grams of fiber.  · Exercise for at least 30 minutes, 3 times each week. You should exercise hard enough to raise your heart rate and break a sweat.  · Keep all follow-up visits as told by your health care provider. This is important. You may need a colonoscopy.  Contact a health care provider if:  · Your pain does not improve.  · You have a hard time drinking or eating food.  · Your bowel movements do not return to normal.  Get help right away if:  · Your pain gets worse.  · Your symptoms do not get better with treatment.  · Your symptoms suddenly get worse.  · You have a fever.  · You vomit more than one time.  · You have stools that are bloody, black, or tarry.  Summary  · Diverticulitis is infection or inflammation of small pouches (diverticula) in the colon that form due to a condition called diverticulosis. Diverticula can trap stool (feces) and bacteria, causing infection and inflammation.  · You are at higher risk for this condition if you have diverticulosis and you eat a diet that does not include enough fiber.  · Most cases of this condition are mild and can be treated at home. More severe cases may need to be treated at a hospital.  · When your condition is under control, your health care provider may recommend that you have an exam called a colonoscopy. This exam can show how severe your diverticula are and whether something else may be causing your symptoms.  This information is not intended to replace advice given to you by your health care provider. Make sure you discuss any questions you have with your health care  provider.  Document Released: 09/27/2006 Document Revised: 01/20/2018 Document Reviewed: 01/20/2018  [employer] Patient Education © 2018 [employer]

## 2018-09-05 NOTE — PROGRESS NOTES
Transitional Care Follow Up Visit  Subjective     Mami Srivastava is a 73 y.o. female who presents for a transitional care management visit.    Within 48 business hours after discharge our office contacted her via telephone to coordinate her care and needs.      I reviewed and discussed the details of that call along with the discharge summary, hospital problems, inpatient lab results, inpatient diagnostic studies, and consultation reports with Mami.     Current outpatient and discharge medications have been reconciled for the patient.    No flowsheet data found.  Risk for Readmission (LACE) No Data Recorded    History of Present Illness   Course During Hospital Stay:  8-5 to 8-7-18       Hospital Course:  The patient is a 73 y.o. female who presented with abdominal pain.  She was found to have sigmoid diverticulitis, 2nd episode, without complicating features.  Please see admission H&P from 8/5/18 for further details.  On admission the patient was started on fluids, antibiotics, and gut rest.  She rapidly improved and was tolerating a normal diet and having normal bowel movements at the time of discharge.  Dr. Barrera with general surgery was consulted and he did not think procedural intervention is warranted at this time.  She will follow up with him in about 10 days.  Dr. Hein with GI follows her for autoimmune hepatitis.  There were no issues with this in the hospital, and her steroid taper was continued.  She will follow up with Dr. Hein in about 4 weeks.  She will need a follow up colonoscopy, and this will be discussed at her 4 wk follow up.       had CT 8-5-18  IMPRESSION:  Acute sigmoid diverticulitis associated with wall thickening  and perisigmoid inflammation. Inflammation is centered about 2  diverticuli that extend cephalad to the involved sigmoid segment. The  larger and more distal of these 2 exhibits a thin, 1 cm crescentic band  of edema or early phlegmon or microabscess formation adjacent  to the  diverticulum (as best demonstrated on the coronal reconstruction  sequence).  There is no drainable collection or extraluminal gas.  Not having abd pain    I just had her do f/u routine labs and see her hgb now 12 and in range; calcium now fine  She knows about small gallstone  I did send Valtrex for fever blisters; I see today that they are in her mouth--on gums and up nose and chin; starting to crust; had fever at onset and one week ago; blisters 5 days ago.  Grandson had hand foot mouth      Mami Srivastava 73 y.o. female who presents today for routine follow up check and medication refills.  she has a history of   Patient Active Problem List   Diagnosis   • S/P thyroidectomy   • Mixed hyperlipidemia   • Vitamin D deficiency   • Impaired fasting glucose   • Hypothyroidism   • Osteopenia of multiple sites   • Hypertensive left ventricular hypertrophy, without heart failure   • Chronic seasonal allergic rhinitis   • History of heart disease   • Hx of abnormal mammogram   • Nodule of left lung   • History of diverticulosis   • Hypertension   • Diverticulitis of large intestine without perforation or abscess without bleeding   • Hyperglycemia   • Current chronic use of systemic steroids   .  Since the last visit, she has overall felt tired.  She has Hypertenision and is well controlled on medication, Impaired fasting glucose and will continue close lab follow up to watch for DMII, Hyperlipidemia and working on this with diet and exercise, Hypothyroidism and is well controlled on Rx.  Labs are in desired treatment range and Seasonal allergies and is doing well on their medication PRN.  she has been compliant with current medications have reviewed them.  The patient had GI upset with Fosamax    Results for orders placed or performed in visit on 08/22/18   TSH   Result Value Ref Range    TSH 4.050 0.450 - 4.500 uIU/mL   Comprehensive Metabolic Panel   Result Value Ref Range    Glucose 100 (H) 65 - 99 mg/dL     BUN 18 8 - 27 mg/dL    Creatinine 0.97 0.57 - 1.00 mg/dL    eGFR Non African Am 58 (L) >59 mL/min/1.73    eGFR African Am 67 >59 mL/min/1.73    BUN/Creatinine Ratio 19 12 - 28    Sodium 140 134 - 144 mmol/L    Potassium 4.2 3.5 - 5.2 mmol/L    Chloride 98 96 - 106 mmol/L    Total CO2 25 20 - 29 mmol/L    Calcium 9.3 8.7 - 10.3 mg/dL    Total Protein 6.0 6.0 - 8.5 g/dL    Albumin 4.0 3.5 - 4.8 g/dL    Globulin 2.0 1.5 - 4.5 g/dL    A/G Ratio 2.0 1.2 - 2.2    Total Bilirubin 0.9 0.0 - 1.2 mg/dL    Alkaline Phosphatase 48 39 - 117 IU/L    AST (SGOT) 31 0 - 40 IU/L    ALT (SGPT) 32 0 - 32 IU/L   Lipid Panel   Result Value Ref Range    Total Cholesterol 226 (H) 100 - 199 mg/dL    Triglycerides 159 (H) 0 - 149 mg/dL    HDL Cholesterol 66 >39 mg/dL    VLDL Cholesterol 32 5 - 40 mg/dL    LDL Cholesterol  128 (H) 0 - 99 mg/dL   Hemoglobin A1c   Result Value Ref Range    Hemoglobin A1C 6.2 (H) 4.8 - 5.6 %   T4, Free   Result Value Ref Range    Free T4 1.47 0.82 - 1.77 ng/dL   Folate   Result Value Ref Range    Folate 19.8 >3.0 ng/mL   Vitamin B12   Result Value Ref Range    Vitamin B-12 499 232 - 1,245 pg/mL   CBC & Differential   Result Value Ref Range    WBC 4.8 3.4 - 10.8 x10E3/uL    RBC 3.97 3.77 - 5.28 x10E6/uL    Hemoglobin 12.0 11.1 - 15.9 g/dL    Hematocrit 36.0 34.0 - 46.6 %    MCV 91 79 - 97 fL    MCH 30.2 26.6 - 33.0 pg    MCHC 33.3 31.5 - 35.7 g/dL    RDW 15.1 12.3 - 15.4 %    Platelets 203 150 - 379 x10E3/uL    Neutrophil Rel % 65 Not Estab. %    Lymphocyte Rel % 23 Not Estab. %    Monocyte Rel % 8 Not Estab. %    Eosinophil Rel % 4 Not Estab. %    Basophil Rel % 0 Not Estab. %    Neutrophils Absolute 3.1 1.4 - 7.0 x10E3/uL    Lymphocytes Absolute 1.1 0.7 - 3.1 x10E3/uL    Monocytes Absolute 0.4 0.1 - 0.9 x10E3/uL    Eosinophils Absolute 0.2 0.0 - 0.4 x10E3/uL    Basophils Absolute 0.0 0.0 - 0.2 x10E3/uL    Immature Granulocyte Rel % 0 Not Estab. %    Immature Grans Absolute 0.0 0.0 - 0.1 x10E3/uL     2013 AHA  (American Heart Association) Cholesterol Risk Ratio Score Goal is <=7.5% and your score is:  13.1%-----she is off statin d/t LFTs; would need permission to restart statin from GI.  She has CAD and need LDL <70.  Will ask about Zetia if not able to take statin?    A1C better    Will see if Dr Hein will do f/u colonoscopy    The following portions of the patient's history were reviewed and updated as appropriate: allergies, current medications, past family history, past medical history, past social history, past surgical history and problem list.    Review of Systems   Constitutional: Negative for activity change, appetite change and unexpected weight change.   HENT: Negative for nosebleeds and trouble swallowing.    Eyes: Negative for pain and visual disturbance.   Respiratory: Negative for chest tightness, shortness of breath and wheezing.    Cardiovascular: Negative for chest pain and palpitations.   Gastrointestinal: Negative for abdominal pain and blood in stool.   Endocrine: Negative.    Genitourinary: Negative for difficulty urinating and hematuria.   Musculoskeletal: Negative for joint swelling.   Skin: Positive for rash and wound. Negative for color change.   Allergic/Immunologic: Negative.    Neurological: Negative for syncope and speech difficulty.   Hematological: Negative for adenopathy.   Psychiatric/Behavioral: Negative for agitation and confusion.   All other systems reviewed and are negative.      Objective   Physical Exam   Constitutional: She is oriented to person, place, and time. She appears well-developed and well-nourished. No distress.   HENT:   Head: Normocephalic and atraumatic.   Eyes: Pupils are equal, round, and reactive to light. Conjunctivae and EOM are normal. Right eye exhibits no discharge. Left eye exhibits no discharge. No scleral icterus.   Neck: Normal range of motion. Neck supple. No tracheal deviation present. No thyromegaly present.   Cardiovascular: Normal rate, regular  rhythm, normal heart sounds, intact distal pulses and normal pulses.  Exam reveals no gallop.    No murmur heard.  Pulmonary/Chest: Effort normal and breath sounds normal. No respiratory distress. She has no wheezes. She has no rales.   Musculoskeletal: Normal range of motion.   Neurological: She is alert and oriented to person, place, and time. She exhibits normal muscle tone. Coordination normal.   Skin: Skin is warm. Rash (crusted fever blisters mouth and in mouth) noted. No erythema. No pallor.   Psychiatric: She has a normal mood and affect. Her behavior is normal. Judgment and thought content normal.   Nursing note and vitals reviewed.      Assessment/Plan   Problems Addressed this Visit        Cardiovascular and Mediastinum    Mixed hyperlipidemia    Hypertension    Relevant Medications    lisinopril-hydrochlorothiazide (PRINZIDE,ZESTORETIC) 10-12.5 MG per tablet       Respiratory    Chronic seasonal allergic rhinitis       Digestive    Vitamin D deficiency    History of diverticulosis       Endocrine    Impaired fasting glucose       Musculoskeletal and Integument    Osteopenia of multiple sites       Other    S/P thyroidectomy    History of heart disease      Other Visit Diagnoses     Hospital discharge follow-up    -  Primary    Viral stomatitis

## 2018-09-13 ENCOUNTER — OFFICE VISIT (OUTPATIENT)
Dept: GASTROENTEROLOGY | Facility: CLINIC | Age: 73
End: 2018-09-13

## 2018-09-13 VITALS
DIASTOLIC BLOOD PRESSURE: 74 MMHG | BODY MASS INDEX: 25.3 KG/M2 | SYSTOLIC BLOOD PRESSURE: 132 MMHG | WEIGHT: 142.8 LBS | TEMPERATURE: 98.3 F | HEIGHT: 63 IN

## 2018-09-13 DIAGNOSIS — K75.4 AUTOIMMUNE HEPATITIS TREATED WITH STEROIDS (HCC): ICD-10-CM

## 2018-09-13 DIAGNOSIS — K57.32 DIVERTICULITIS OF LARGE INTESTINE WITHOUT PERFORATION OR ABSCESS WITHOUT BLEEDING: Primary | ICD-10-CM

## 2018-09-13 PROCEDURE — 99213 OFFICE O/P EST LOW 20 MIN: CPT | Performed by: INTERNAL MEDICINE

## 2018-09-13 RX ORDER — AZATHIOPRINE 50 MG/1
75 TABLET ORAL DAILY
Qty: 45 TABLET | Refills: 11 | Status: SHIPPED | OUTPATIENT
Start: 2018-09-13 | End: 2019-06-13

## 2018-09-13 NOTE — PROGRESS NOTES
Chief Complaint   Patient presents with   • Hepatic Disease   • diverticulitis follow up       Mami Srivastava is a  73 y.o. female here for a follow up visit for acute diverticulitis and autoimmune hepatitis.    HPI    Patient 73-year-old female with history of coronary artery disease, hyperlipidemia, hypertension and hypothyroidism status post episode of acute diverticulitis will being treated for autoimmune hepatitis.  Patient's autoimmune hepatitis been doing very well and has started Imuran when the infection occurred.  Patient currently reports feeling markedly improved.  Pain has resolved and patient doing well.  Patient currently taking Imuran 75 mg daily along with 10 mg of prednisone daily.    Past Medical History:   Diagnosis Date   • Autoimmune hepatitis (CMS/HCC)    • Cholelithiasis 04-    Ultrasound   • Coronary artery disease     Dr Luis Rick   • Hemangioma of liver    • Hernia 2017    Hiatal hernia-Prabhjot test   • Hyperlipidemia     Dr Luis Rick-atorvastatin   • Hypertension    • Hyperthyroidism    • Hypothyroidism    • Laceration of finger of right hand 05/15/2018    lac with knife and had 3 stitches   • Skin cancer     face   • Ulcerative colitis (CMS/HCC) 2014    Colitis/Arben         Current Outpatient Prescriptions:   •  aspirin 81 MG EC tablet, Take 81 mg by mouth Daily., Disp: , Rfl:   •  azaTHIOprine (IMURAN) 50 MG tablet, Take 1.5 tablets by mouth Daily., Disp: 45 tablet, Rfl: 11  •  cetirizine (zyrTEC) 10 MG tablet, Take 10 mg by mouth Every Night., Disp: , Rfl:   •  fluticasone (FLONASE) 50 MCG/ACT nasal spray, 1 spray into each nostril Daily., Disp: , Rfl:   •  levothyroxine (SYNTHROID, LEVOTHROID) 75 MCG tablet, Take 1 tablet by mouth Daily. For thyroid (generic accepted), Disp: 90 tablet, Rfl: 3  •  lisinopril-hydrochlorothiazide (PRINZIDE,ZESTORETIC) 10-12.5 MG per tablet, Take 1 tablet by mouth Daily. For BP, Disp: 90 tablet, Rfl: 1  •  Multiple Vitamin (MULTI VITAMIN  DAILY PO), Take  by mouth., Disp: , Rfl:   •  predniSONE (DELTASONE) 20 MG tablet, Take 10 mg by mouth Daily., Disp: , Rfl:   •  alendronate (FOSAMAX) 70 MG tablet, Take 1 tablet by mouth Every 7 (Seven) Days. For Osteopenia, Disp: 16 tablet, Rfl: 3  •  valACYclovir (VALTREX) 1000 MG tablet, Two PO at onset fever blister and repeat this dose X1 in 12 hours, Disp: 20 tablet, Rfl: 5    Allergies   Allergen Reactions   • Augmentin [Amoxicillin-Pot Clavulanate] Hives   • Keflex [Cephalexin] Hives       Social History     Social History   • Marital status: Single     Spouse name: N/A   • Number of children: N/A   • Years of education: N/A     Occupational History   • Not on file.     Social History Main Topics   • Smoking status: Former Smoker     Packs/day: 1.50     Years: 12.00     Start date: 1963     Quit date: 1976   • Smokeless tobacco: Never Used      Comment: Chrinic bronchitis...smoke allergy   • Alcohol use Yes      Comment: Seldom   • Drug use: No   • Sexual activity: Not Currently     Other Topics Concern   • Not on file     Social History Narrative   • No narrative on file       Family History   Problem Relation Age of Onset   • Heart disease Mother    • Hypertension Mother    • Lung cancer Sister    • Thyroid disease Sister    • Lupus Sister    • Thyroid disease Brother    • Alcohol abuse Brother    • Glaucoma Maternal Grandmother    • Stomach cancer Maternal Grandmother             • Cirrhosis Father             • Liver disease Father        Review of Systems   Constitutional: Negative.    HENT: Negative.    Respiratory: Negative.    Cardiovascular: Negative.    Gastrointestinal: Negative.    Musculoskeletal: Negative.    Skin: Negative.    Hematological: Negative.        Vitals:    18 1554   BP: 132/74   Temp: 98.3 °F (36.8 °C)       Physical Exam   Constitutional: She is oriented to person, place, and time. She appears well-developed and well-nourished.   HENT:   Head:  Normocephalic and atraumatic.   Eyes: Pupils are equal, round, and reactive to light. No scleral icterus.   Cardiovascular: Normal rate and regular rhythm.    Pulmonary/Chest: Effort normal and breath sounds normal.   Abdominal: Soft. Bowel sounds are normal. She exhibits no distension and no mass. There is no tenderness. No hernia.   Neurological: She is alert and oriented to person, place, and time.   Skin: Skin is warm and dry. No rash noted.   Psychiatric: She has a normal mood and affect. Her behavior is normal.   Vitals reviewed.      Orders Only on 08/22/2018   Component Date Value Ref Range Status   • TSH 08/25/2018 4.050  0.450 - 4.500 uIU/mL Final   • WBC 08/25/2018 4.8  3.4 - 10.8 x10E3/uL Final   • RBC 08/25/2018 3.97  3.77 - 5.28 x10E6/uL Final   • Hemoglobin 08/25/2018 12.0  11.1 - 15.9 g/dL Final   • Hematocrit 08/25/2018 36.0  34.0 - 46.6 % Final   • MCV 08/25/2018 91  79 - 97 fL Final   • MCH 08/25/2018 30.2  26.6 - 33.0 pg Final   • MCHC 08/25/2018 33.3  31.5 - 35.7 g/dL Final   • RDW 08/25/2018 15.1  12.3 - 15.4 % Final   • Platelets 08/25/2018 203  150 - 379 x10E3/uL Final   • Neutrophil Rel % 08/25/2018 65  Not Estab. % Final   • Lymphocyte Rel % 08/25/2018 23  Not Estab. % Final   • Monocyte Rel % 08/25/2018 8  Not Estab. % Final   • Eosinophil Rel % 08/25/2018 4  Not Estab. % Final   • Basophil Rel % 08/25/2018 0  Not Estab. % Final   • Neutrophils Absolute 08/25/2018 3.1  1.4 - 7.0 x10E3/uL Final   • Lymphocytes Absolute 08/25/2018 1.1  0.7 - 3.1 x10E3/uL Final   • Monocytes Absolute 08/25/2018 0.4  0.1 - 0.9 x10E3/uL Final   • Eosinophils Absolute 08/25/2018 0.2  0.0 - 0.4 x10E3/uL Final   • Basophils Absolute 08/25/2018 0.0  0.0 - 0.2 x10E3/uL Final   • Immature Granulocyte Rel % 08/25/2018 0  Not Estab. % Final   • Immature Grans Absolute 08/25/2018 0.0  0.0 - 0.1 x10E3/uL Final   • Glucose 08/25/2018 100* 65 - 99 mg/dL Final   • BUN 08/25/2018 18  8 - 27 mg/dL Final   • Creatinine  08/25/2018 0.97  0.57 - 1.00 mg/dL Final   • eGFR Non African Am 08/25/2018 58* >59 mL/min/1.73 Final   • eGFR African Am 08/25/2018 67  >59 mL/min/1.73 Final   • BUN/Creatinine Ratio 08/25/2018 19  12 - 28 Final   • Sodium 08/25/2018 140  134 - 144 mmol/L Final   • Potassium 08/25/2018 4.2  3.5 - 5.2 mmol/L Final   • Chloride 08/25/2018 98  96 - 106 mmol/L Final   • Total CO2 08/25/2018 25  20 - 29 mmol/L Final   • Calcium 08/25/2018 9.3  8.7 - 10.3 mg/dL Final   • Total Protein 08/25/2018 6.0  6.0 - 8.5 g/dL Final   • Albumin 08/25/2018 4.0  3.5 - 4.8 g/dL Final   • Globulin 08/25/2018 2.0  1.5 - 4.5 g/dL Final   • A/G Ratio 08/25/2018 2.0  1.2 - 2.2 Final   • Total Bilirubin 08/25/2018 0.9  0.0 - 1.2 mg/dL Final   • Alkaline Phosphatase 08/25/2018 48  39 - 117 IU/L Final   • AST (SGOT) 08/25/2018 31  0 - 40 IU/L Final   • ALT (SGPT) 08/25/2018 32  0 - 32 IU/L Final   • Total Cholesterol 08/25/2018 226* 100 - 199 mg/dL Final   • Triglycerides 08/25/2018 159* 0 - 149 mg/dL Final   • HDL Cholesterol 08/25/2018 66  >39 mg/dL Final   • VLDL Cholesterol 08/25/2018 32  5 - 40 mg/dL Final   • LDL Cholesterol  08/25/2018 128* 0 - 99 mg/dL Final   • Hemoglobin A1C 08/25/2018 6.2* 4.8 - 5.6 % Final   • Free T4 08/25/2018 1.47  0.82 - 1.77 ng/dL Final   • Folate 08/25/2018 19.8  >3.0 ng/mL Final   • Vitamin B-12 08/25/2018 499  232 - 1,245 pg/mL Final   Admission on 08/05/2018, Discharged on 08/07/2018   Component Date Value Ref Range Status   • Glucose 08/05/2018 239* 65 - 99 mg/dL Final   • BUN 08/05/2018 17  8 - 23 mg/dL Final   • Creatinine 08/05/2018 1.06* 0.57 - 1.00 mg/dL Final   • Sodium 08/05/2018 134* 136 - 145 mmol/L Final   • Potassium 08/05/2018 4.4  3.5 - 5.2 mmol/L Final   • Chloride 08/05/2018 94* 98 - 107 mmol/L Final   • CO2 08/05/2018 25.8  22.0 - 29.0 mmol/L Final   • Calcium 08/05/2018 10.0  8.6 - 10.5 mg/dL Final   • Total Protein 08/05/2018 6.9  6.0 - 8.5 g/dL Final   • Albumin 08/05/2018 4.10  3.50 -  5.20 g/dL Final   • ALT (SGPT) 08/05/2018 33  1 - 33 U/L Final   • AST (SGOT) 08/05/2018 18  1 - 32 U/L Final   • Alkaline Phosphatase 08/05/2018 57  39 - 117 U/L Final   • Total Bilirubin 08/05/2018 1.4* 0.1 - 1.2 mg/dL Final   • eGFR Non  Amer 08/05/2018 51* >60 mL/min/1.73 Final   • Globulin 08/05/2018 2.8  gm/dL Final   • A/G Ratio 08/05/2018 1.5  g/dL Final   • BUN/Creatinine Ratio 08/05/2018 16.0  7.0 - 25.0 Final   • Anion Gap 08/05/2018 14.2  mmol/L Final   • Color, UA 08/05/2018 Yellow  Yellow, Straw Final   • Appearance, UA 08/05/2018 Clear  Clear Final   • pH, UA 08/05/2018 6.5  5.0 - 8.0 Final   • Specific Gravity, UA 08/05/2018 1.006  1.005 - 1.030 Final   • Glucose, UA 08/05/2018 100 mg/dL (Trace)* Negative Final   • Ketones, UA 08/05/2018 Negative  Negative Final   • Bilirubin, UA 08/05/2018 Negative  Negative Final   • Blood, UA 08/05/2018 Negative  Negative Final   • Protein, UA 08/05/2018 Negative  Negative Final   • Leuk Esterase, UA 08/05/2018 Small (1+)* Negative Final   • Nitrite, UA 08/05/2018 Negative  Negative Final   • Urobilinogen, UA 08/05/2018 0.2 E.U./dL  0.2 - 1.0 E.U./dL Final   • Lipase 08/05/2018 42  13 - 60 U/L Final   • WBC 08/05/2018 9.80  4.50 - 10.70 10*3/mm3 Final   • RBC 08/05/2018 4.30  3.90 - 5.20 10*6/mm3 Final   • Hemoglobin 08/05/2018 12.8  11.9 - 15.5 g/dL Final   • Hematocrit 08/05/2018 38.8  35.6 - 45.5 % Final   • MCV 08/05/2018 90.2  80.5 - 98.2 fL Final   • MCH 08/05/2018 29.8  26.9 - 32.0 pg Final   • MCHC 08/05/2018 33.0  32.4 - 36.3 g/dL Final   • RDW 08/05/2018 13.5* 11.7 - 13.0 % Final   • RDW-SD 08/05/2018 44.3  37.0 - 54.0 fl Final   • MPV 08/05/2018 9.6  6.0 - 12.0 fL Final   • Platelets 08/05/2018 180  140 - 500 10*3/mm3 Final   • Neutrophil % 08/05/2018 85.7* 42.7 - 76.0 % Final   • Lymphocyte % 08/05/2018 7.7* 19.6 - 45.3 % Final   • Monocyte % 08/05/2018 5.2  5.0 - 12.0 % Final   • Eosinophil % 08/05/2018 0.9  0.3 - 6.2 % Final   • Basophil %  08/05/2018 0.1  0.0 - 1.5 % Final   • Immature Grans % 08/05/2018 0.4  0.0 - 0.5 % Final   • Neutrophils, Absolute 08/05/2018 8.40* 1.90 - 8.10 10*3/mm3 Final   • Lymphocytes, Absolute 08/05/2018 0.75* 0.90 - 4.80 10*3/mm3 Final   • Monocytes, Absolute 08/05/2018 0.51  0.20 - 1.20 10*3/mm3 Final   • Eosinophils, Absolute 08/05/2018 0.09  0.00 - 0.70 10*3/mm3 Final   • Basophils, Absolute 08/05/2018 0.01  0.00 - 0.20 10*3/mm3 Final   • Immature Grans, Absolute 08/05/2018 0.04* 0.00 - 0.03 10*3/mm3 Final   • RBC, UA 08/05/2018 0-2  None Seen, 0-2 /HPF Final   • WBC, UA 08/05/2018 0-2  None Seen, 0-2 /HPF Final   • Bacteria, UA 08/05/2018 None Seen  None Seen /HPF Final   • Squamous Epithelial Cells, UA 08/05/2018 0-2  None Seen, 0-2 /HPF Final   • Hyaline Casts, UA 08/05/2018 None Seen  None Seen /LPF Final   • Methodology 08/05/2018 Automated Microscopy   Final   • Glucose 08/05/2018 113  70 - 130 mg/dL Final   • Glucose 08/06/2018 92  65 - 99 mg/dL Final   • BUN 08/06/2018 14  8 - 23 mg/dL Final   • Creatinine 08/06/2018 0.76  0.57 - 1.00 mg/dL Final   • Sodium 08/06/2018 140  136 - 145 mmol/L Final   • Potassium 08/06/2018 3.7  3.5 - 5.2 mmol/L Final   • Chloride 08/06/2018 103  98 - 107 mmol/L Final   • CO2 08/06/2018 25.5  22.0 - 29.0 mmol/L Final   • Calcium 08/06/2018 8.7  8.6 - 10.5 mg/dL Final   • Total Protein 08/06/2018 5.6* 6.0 - 8.5 g/dL Final   • Albumin 08/06/2018 3.30* 3.50 - 5.20 g/dL Final   • ALT (SGPT) 08/06/2018 25  1 - 33 U/L Final   • AST (SGOT) 08/06/2018 16  1 - 32 U/L Final   • Alkaline Phosphatase 08/06/2018 41  39 - 117 U/L Final   • Total Bilirubin 08/06/2018 1.3* 0.1 - 1.2 mg/dL Final   • eGFR Non African Amer 08/06/2018 75  >60 mL/min/1.73 Final   • Globulin 08/06/2018 2.3  gm/dL Final   • A/G Ratio 08/06/2018 1.4  g/dL Final   • BUN/Creatinine Ratio 08/06/2018 18.4  7.0 - 25.0 Final   • Anion Gap 08/06/2018 11.5  mmol/L Final   • WBC 08/06/2018 7.86  4.50 - 10.70 10*3/mm3 Final   • RBC  08/06/2018 3.73* 3.90 - 5.20 10*6/mm3 Final   • Hemoglobin 08/06/2018 11.2* 11.9 - 15.5 g/dL Final   • Hematocrit 08/06/2018 34.0* 35.6 - 45.5 % Final   • MCV 08/06/2018 91.2  80.5 - 98.2 fL Final   • MCH 08/06/2018 30.0  26.9 - 32.0 pg Final   • MCHC 08/06/2018 32.9  32.4 - 36.3 g/dL Final   • RDW 08/06/2018 13.6* 11.7 - 13.0 % Final   • RDW-SD 08/06/2018 45.4  37.0 - 54.0 fl Final   • MPV 08/06/2018 9.7  6.0 - 12.0 fL Final   • Platelets 08/06/2018 162  140 - 500 10*3/mm3 Final   • Neutrophil % 08/06/2018 76.6* 42.7 - 76.0 % Final   • Lymphocyte % 08/06/2018 15.1* 19.6 - 45.3 % Final   • Monocyte % 08/06/2018 6.7  5.0 - 12.0 % Final   • Eosinophil % 08/06/2018 1.1  0.3 - 6.2 % Final   • Basophil % 08/06/2018 0.1  0.0 - 1.5 % Final   • Immature Grans % 08/06/2018 0.4  0.0 - 0.5 % Final   • Neutrophils, Absolute 08/06/2018 6.01  1.90 - 8.10 10*3/mm3 Final   • Lymphocytes, Absolute 08/06/2018 1.19  0.90 - 4.80 10*3/mm3 Final   • Monocytes, Absolute 08/06/2018 0.53  0.20 - 1.20 10*3/mm3 Final   • Eosinophils, Absolute 08/06/2018 0.09  0.00 - 0.70 10*3/mm3 Final   • Basophils, Absolute 08/06/2018 0.01  0.00 - 0.20 10*3/mm3 Final   • Immature Grans, Absolute 08/06/2018 0.03  0.00 - 0.03 10*3/mm3 Final   • Hemoglobin A1C 08/06/2018 6.40* 4.80 - 5.60 % Final   • Glucose 08/06/2018 88  70 - 130 mg/dL Final   • Glucose 08/06/2018 115  70 - 130 mg/dL Final   • Glucose 08/06/2018 123  70 - 130 mg/dL Final   • Glucose 08/06/2018 144* 70 - 130 mg/dL Final   • Total Protein 08/07/2018 5.0* 6.0 - 8.5 g/dL Final   • Albumin 08/07/2018 2.8* 2.9 - 4.4 g/dL Final   • Alpha-1-Globulin 08/07/2018 0.2  0.0 - 0.4 g/dL Final   • Alpha-2-Globulin 08/07/2018 0.7  0.4 - 1.0 g/dL Final   • Beta Globulin 08/07/2018 0.9  0.7 - 1.3 g/dL Final   • Gamma Globulin 08/07/2018 0.4  0.4 - 1.8 g/dL Final   • M-Zoran 08/07/2018 Not Observed  Not Observed g/dL Final   • Globulin 08/07/2018 2.2  2.2 - 3.9 g/dL Final   • A/G Ratio 08/07/2018 1.3  0.7 - 1.7  Final   • Please note 08/07/2018 Comment   Final   • SPE Interpretation 08/07/2018 Comment   Final   • Hepatitis B Surface Ag 08/07/2018 Non-Reactive  Non-Reactive Final   • Hep A IgM 08/07/2018 Equivocal* Non-Reactive Final   • Hep B C IgM 08/07/2018 Non-Reactive  Non-Reactive Final   • Hepatitis C Ab 08/07/2018 Non-Reactive  Non-Reactive Final   • Glucose 08/07/2018 90  65 - 99 mg/dL Final   • BUN 08/07/2018 12  8 - 23 mg/dL Final   • Creatinine 08/07/2018 0.86  0.57 - 1.00 mg/dL Final   • Sodium 08/07/2018 142  136 - 145 mmol/L Final   • Potassium 08/07/2018 3.8  3.5 - 5.2 mmol/L Final   • Chloride 08/07/2018 105  98 - 107 mmol/L Final   • CO2 08/07/2018 25.0  22.0 - 29.0 mmol/L Final   • Calcium 08/07/2018 8.3* 8.6 - 10.5 mg/dL Final   • Total Protein 08/07/2018 5.5* 6.0 - 8.5 g/dL Final   • Albumin 08/07/2018 3.20* 3.50 - 5.20 g/dL Final   • ALT (SGPT) 08/07/2018 26  1 - 33 U/L Final   • AST (SGOT) 08/07/2018 13  1 - 32 U/L Final   • Alkaline Phosphatase 08/07/2018 41  39 - 117 U/L Final   • Total Bilirubin 08/07/2018 0.9  0.1 - 1.2 mg/dL Final   • eGFR Non  Amer 08/07/2018 65  >60 mL/min/1.73 Final   • Globulin 08/07/2018 2.3  gm/dL Final   • A/G Ratio 08/07/2018 1.4  g/dL Final   • BUN/Creatinine Ratio 08/07/2018 14.0  7.0 - 25.0 Final   • Anion Gap 08/07/2018 12.0  mmol/L Final   • WBC 08/07/2018 6.24  4.50 - 10.70 10*3/mm3 Final   • RBC 08/07/2018 3.65* 3.90 - 5.20 10*6/mm3 Final   • Hemoglobin 08/07/2018 10.8* 11.9 - 15.5 g/dL Final   • Hematocrit 08/07/2018 33.3* 35.6 - 45.5 % Final   • MCV 08/07/2018 91.2  80.5 - 98.2 fL Final   • MCH 08/07/2018 29.6  26.9 - 32.0 pg Final   • MCHC 08/07/2018 32.4  32.4 - 36.3 g/dL Final   • RDW 08/07/2018 13.5* 11.7 - 13.0 % Final   • RDW-SD 08/07/2018 45.0  37.0 - 54.0 fl Final   • MPV 08/07/2018 9.5  6.0 - 12.0 fL Final   • Platelets 08/07/2018 151  140 - 500 10*3/mm3 Final   • Neutrophil % 08/07/2018 69.7  42.7 - 76.0 % Final   • Lymphocyte % 08/07/2018 21.0   19.6 - 45.3 % Final   • Monocyte % 08/07/2018 6.1  5.0 - 12.0 % Final   • Eosinophil % 08/07/2018 2.4  0.3 - 6.2 % Final   • Basophil % 08/07/2018 0.2  0.0 - 1.5 % Final   • Immature Grans % 08/07/2018 0.6* 0.0 - 0.5 % Final   • Neutrophils, Absolute 08/07/2018 4.35  1.90 - 8.10 10*3/mm3 Final   • Lymphocytes, Absolute 08/07/2018 1.31  0.90 - 4.80 10*3/mm3 Final   • Monocytes, Absolute 08/07/2018 0.38  0.20 - 1.20 10*3/mm3 Final   • Eosinophils, Absolute 08/07/2018 0.15  0.00 - 0.70 10*3/mm3 Final   • Basophils, Absolute 08/07/2018 0.01  0.00 - 0.20 10*3/mm3 Final   • Immature Grans, Absolute 08/07/2018 0.04* 0.00 - 0.03 10*3/mm3 Final   • Glucose 08/07/2018 95  70 - 130 mg/dL Final   • Glucose 08/07/2018 126  70 - 130 mg/dL Final   Office Visit on 07/31/2018   Component Date Value Ref Range Status   • Glucose 07/31/2018 131* 65 - 99 mg/dL Final   • BUN 07/31/2018 23  8 - 23 mg/dL Final   • Creatinine 07/31/2018 1.14* 0.57 - 1.00 mg/dL Final   • eGFR Non  Am 07/31/2018 47* >60 mL/min/1.73 Final   • eGFR African Am 07/31/2018 57* >60 mL/min/1.73 Final   • BUN/Creatinine Ratio 07/31/2018 20.2  7.0 - 25.0 Final   • Sodium 07/31/2018 143  136 - 145 mmol/L Final   • Potassium 07/31/2018 4.1  3.5 - 5.2 mmol/L Final   • Chloride 07/31/2018 99  98 - 107 mmol/L Final   • Total CO2 07/31/2018 29.3* 22.0 - 29.0 mmol/L Final   • Calcium 07/31/2018 10.0  8.6 - 10.5 mg/dL Final   • Total Protein 07/31/2018 6.3  6.0 - 8.5 g/dL Final   • Albumin 07/31/2018 4.30  3.50 - 5.20 g/dL Final   • Globulin 07/31/2018 2.0  gm/dL Final   • A/G Ratio 07/31/2018 2.2  g/dL Final   • Total Bilirubin 07/31/2018 1.2  0.1 - 1.2 mg/dL Final   • Alkaline Phosphatase 07/31/2018 60  39 - 117 U/L Final   • AST (SGOT) 07/31/2018 19  1 - 32 U/L Final   • ALT (SGPT) 07/31/2018 26  1 - 33 U/L Final   • TPMT Activity 07/31/2018 18.5  Units/mL RBC Final   • Interpretation 07/31/2018 Comment   Final   • Method: 07/31/2018 Comment   Final       Mami  was seen today for hepatic disease and diverticulitis follow up.    Diagnoses and all orders for this visit:    Diverticulitis of large intestine without perforation or abscess without bleeding  -     Case Request; Standing  -     Case Request    Autoimmune hepatitis treated with steroids (CMS/HCC)    Other orders  -     Follow Anesthesia Guidelines / Standing Orders; Future  -     Implement Anesthesia orders day of procedure.; Standing  -     Obtain informed consent; Standing  -     azaTHIOprine (IMURAN) 50 MG tablet; Take 1.5 tablets by mouth Daily.      Patient 73-year-old female with history of autoimmune hepatitis on steroids recently started on Imuran with good effect for her liver.  Patient however developed acute diverticulitis and was in the hospital for several days.  Patient responded very quickly to antibiotics now here for follow-up.  Patient 4 weeks post hospitalization.  Would recommend repeat colonoscopy be performed in 8 weeks to allow healing for the diverticulitis.  Patient also instructed to begin tapering the prednisone further.  Patient currently on 10 mg daily will begin 5 mg daily for 2 weeks then 5 mg every other day for 2 weeks and then to come off and only stay on Imuran.  Patient will follow up on the Imuran for colonoscopy in 8 weeks and follow-up in the office in 3 months.

## 2018-09-14 ENCOUNTER — TELEPHONE (OUTPATIENT)
Dept: GASTROENTEROLOGY | Facility: CLINIC | Age: 73
End: 2018-09-14

## 2018-09-14 RX ORDER — PREDNISONE 20 MG/1
TABLET ORAL
Qty: 30 TABLET | Refills: 0 | OUTPATIENT
Start: 2018-09-14

## 2018-09-29 ENCOUNTER — OFFICE VISIT (OUTPATIENT)
Dept: RETAIL CLINIC | Facility: CLINIC | Age: 73
End: 2018-09-29

## 2018-09-29 VITALS
TEMPERATURE: 97.8 F | SYSTOLIC BLOOD PRESSURE: 118 MMHG | RESPIRATION RATE: 18 BRPM | OXYGEN SATURATION: 98 % | HEART RATE: 78 BPM | DIASTOLIC BLOOD PRESSURE: 64 MMHG

## 2018-09-29 DIAGNOSIS — J01.10 ACUTE FRONTAL SINUSITIS, RECURRENCE NOT SPECIFIED: ICD-10-CM

## 2018-09-29 DIAGNOSIS — N30.01 ACUTE CYSTITIS WITH HEMATURIA: Primary | ICD-10-CM

## 2018-09-29 LAB
BILIRUB BLD-MCNC: NEGATIVE MG/DL
CLARITY, POC: ABNORMAL
COLOR UR: YELLOW
GLUCOSE UR STRIP-MCNC: NEGATIVE MG/DL
KETONES UR QL: NEGATIVE
LEUKOCYTE EST, POC: ABNORMAL
NITRITE UR-MCNC: NEGATIVE MG/ML
PH UR: 6 [PH] (ref 5–8)
PROT UR STRIP-MCNC: NEGATIVE MG/DL
RBC # UR STRIP: ABNORMAL /UL
SP GR UR: 1.01 (ref 1–1.03)
UROBILINOGEN UR QL: NORMAL

## 2018-09-29 PROCEDURE — 81003 URINALYSIS AUTO W/O SCOPE: CPT | Performed by: NURSE PRACTITIONER

## 2018-09-29 PROCEDURE — 99213 OFFICE O/P EST LOW 20 MIN: CPT | Performed by: NURSE PRACTITIONER

## 2018-09-29 RX ORDER — SULFAMETHOXAZOLE AND TRIMETHOPRIM 800; 160 MG/1; MG/1
1 TABLET ORAL 2 TIMES DAILY
Qty: 14 TABLET | Refills: 0 | Status: SHIPPED | OUTPATIENT
Start: 2018-09-29 | End: 2018-10-06

## 2018-09-29 NOTE — PATIENT INSTRUCTIONS
Sinusitis, Adult  Sinusitis is soreness and inflammation of your sinuses. Sinuses are hollow spaces in the bones around your face. Your sinuses are located:  · Around your eyes.  · In the middle of your forehead.  · Behind your nose.  · In your cheekbones.    Your sinuses and nasal passages are lined with a stringy fluid (mucus). Mucus normally drains out of your sinuses. When your nasal tissues become inflamed or swollen, the mucus can become trapped or blocked so air cannot flow through your sinuses. This allows bacteria, viruses, and funguses to grow, which leads to infection.  Sinusitis can develop quickly and last for 7?10 days (acute) or for more than 12 weeks (chronic). Sinusitis often develops after a cold.  What are the causes?  This condition is caused by anything that creates swelling in the sinuses or stops mucus from draining, including:  · Allergies.  · Asthma.  · Bacterial or viral infection.  · Abnormally shaped bones between the nasal passages.  · Nasal growths that contain mucus (nasal polyps).  · Narrow sinus openings.  · Pollutants, such as chemicals or irritants in the air.  · A foreign object stuck in the nose.  · A fungal infection. This is rare.    What increases the risk?  The following factors may make you more likely to develop this condition:  · Having allergies or asthma.  · Having had a recent cold or respiratory tract infection.  · Having structural deformities or blockages in your nose or sinuses.  · Having a weak immune system.  · Doing a lot of swimming or diving.  · Overusing nasal sprays.  · Smoking.    What are the signs or symptoms?  The main symptoms of this condition are pain and a feeling of pressure around the affected sinuses. Other symptoms include:  · Upper toothache.  · Earache.  · Headache.  · Bad breath.  · Decreased sense of smell and taste.  · A cough that may get worse at night.  · Fatigue.  · Fever.  · Thick drainage from your nose. The drainage is often green and  it may contain pus (purulent).  · Stuffy nose or congestion.  · Postnasal drip. This is when extra mucus collects in the throat or back of the nose.  · Swelling and warmth over the affected sinuses.  · Sore throat.  · Sensitivity to light.    How is this diagnosed?  This condition is diagnosed based on symptoms, a medical history, and a physical exam. To find out if your condition is acute or chronic, your health care provider may:  · Look in your nose for signs of nasal polyps.  · Tap over the affected sinus to check for signs of infection.  · View the inside of your sinuses using an imaging device that has a light attached (endoscope).    If your health care provider suspects that you have chronic sinusitis, you may also:  · Be tested for allergies.  · Have a sample of mucus taken from your nose (nasal culture) and checked for bacteria.  · Have a mucus sample examined to see if your sinusitis is related to an allergy.    If your sinusitis does not respond to treatment and it lasts longer than 8 weeks, you may have an MRI or CT scan to check your sinuses. These scans also help to determine how severe your infection is.  In rare cases, a bone biopsy may be done to rule out more serious types of fungal sinus disease.  How is this treated?  Treatment for sinusitis depends on the cause and whether your condition is chronic or acute. If a virus is causing your sinusitis, your symptoms will go away on their own within 10 days. You may be given medicines to relieve your symptoms, including:  · Topical nasal decongestants. They shrink swollen nasal passages and let mucus drain from your sinuses.  · Antihistamines. These drugs block inflammation that is triggered by allergies. This can help to ease swelling in your nose and sinuses.  · Topical nasal corticosteroids. These are nasal sprays that ease inflammation and swelling in your nose and sinuses.  · Nasal saline washes. These rinses can help to get rid of thick mucus in  your nose.    If your condition is caused by bacteria, you will be given an antibiotic medicine. If your condition is caused by a fungus, you will be given an antifungal medicine.  Surgery may be needed to correct underlying conditions, such as narrow nasal passages. Surgery may also be needed to remove polyps.  Follow these instructions at home:  Medicines  · Take, use, or apply over-the-counter and prescription medicines only as told by your health care provider. These may include nasal sprays.  · If you were prescribed an antibiotic medicine, take it as told by your health care provider. Do not stop taking the antibiotic even if you start to feel better.  Hydrate and Humidify  · Drink enough water to keep your urine clear or pale yellow. Staying hydrated will help to thin your mucus.  · Use a cool mist humidifier to keep the humidity level in your home above 50%.  · Inhale steam for 10-15 minutes, 3-4 times a day or as told by your health care provider. You can do this in the bathroom while a hot shower is running.  · Limit your exposure to cool or dry air.  Rest  · Rest as much as possible.  · Sleep with your head raised (elevated).  · Make sure to get enough sleep each night.  General instructions  · Apply a warm, moist washcloth to your face 3-4 times a day or as told by your health care provider. This will help with discomfort.  · Wash your hands often with soap and water to reduce your exposure to viruses and other germs. If soap and water are not available, use hand .  · Do not smoke. Avoid being around people who are smoking (secondhand smoke).  · Keep all follow-up visits as told by your health care provider. This is important.  Contact a health care provider if:  · You have a fever.  · Your symptoms get worse.  · Your symptoms do not improve within 10 days.  Get help right away if:  · You have a severe headache.  · You have persistent vomiting.  · You have pain or swelling around your face or  eyes.  · You have vision problems.  · You develop confusion.  · Your neck is stiff.  · You have trouble breathing.  This information is not intended to replace advice given to you by your health care provider. Make sure you discuss any questions you have with your health care provider.  Document Released: 12/18/2006 Document Revised: 08/13/2017 Document Reviewed: 10/12/2016  [employer] Interactive Patient Education © 2018 [employer]  Urinary Tract Infection, Adult  A urinary tract infection (UTI) is an infection of any part of the urinary tract. The urinary tract includes the:  · Kidneys.  · Ureters.  · Bladder.  · Urethra.    These organs make, store, and get rid of pee (urine) in the body.  Follow these instructions at home:  · Take over-the-counter and prescription medicines only as told by your doctor.  · If you were prescribed an antibiotic medicine, take it as told by your doctor. Do not stop taking the antibiotic even if you start to feel better.  · Avoid the following drinks:  ? Alcohol.  ? Caffeine.  ? Tea.  ? Carbonated drinks.  · Drink enough fluid to keep your pee clear or pale yellow.  · Keep all follow-up visits as told by your doctor. This is important.  · Make sure to:  ? Empty your bladder often and completely. Do not to hold pee for long periods of time.  ? Empty your bladder before and after sex.  ? Wipe from front to back after a bowel movement if you are female. Use each tissue one time when you wipe.  Contact a doctor if:  · You have back pain.  · You have a fever.  · You feel sick to your stomach (nauseous).  · You throw up (vomit).  · Your symptoms do not get better after 3 days.  · Your symptoms go away and then come back.  Get help right away if:  · You have very bad back pain.  · You have very bad lower belly (abdominal) pain.  · You are throwing up and cannot keep down any medicines or water.  This information is not intended to replace advice given to you by your health care provider.  Make sure you discuss any questions you have with your health care provider.  Document Released: 06/05/2009 Document Revised: 05/25/2017 Document Reviewed: 11/07/2016  [employer]vier Interactive Patient Education © 2018 [employer]

## 2018-10-04 LAB
BACTERIA UR CULT: ABNORMAL
BACTERIA UR CULT: ABNORMAL
OTHER ANTIBIOTIC SUSC ISLT: ABNORMAL

## 2018-10-05 ENCOUNTER — TELEPHONE (OUTPATIENT)
Dept: RETAIL CLINIC | Facility: CLINIC | Age: 73
End: 2018-10-05

## 2018-10-05 NOTE — TELEPHONE ENCOUNTER
Urine culture back and positive. Correct antibiotic prescribed at visit. Urine culture results conveyed to patient. Patient verbalizes understanding and reports feeling better.

## 2018-10-08 ENCOUNTER — HOSPITAL ENCOUNTER (OUTPATIENT)
Dept: BONE DENSITY | Facility: HOSPITAL | Age: 73
Discharge: HOME | End: 2018-10-08
Admitting: PHYSICIAN ASSISTANT

## 2018-10-08 DIAGNOSIS — Z78.0 POSTMENOPAUSAL: ICD-10-CM

## 2018-10-08 PROCEDURE — 77080 DXA BONE DENSITY AXIAL: CPT

## 2018-10-12 ENCOUNTER — OFFICE VISIT (OUTPATIENT)
Dept: FAMILY MEDICINE CLINIC | Facility: CLINIC | Age: 73
End: 2018-10-12

## 2018-10-12 ENCOUNTER — TELEPHONE (OUTPATIENT)
Dept: GASTROENTEROLOGY | Facility: CLINIC | Age: 73
End: 2018-10-12

## 2018-10-12 VITALS
RESPIRATION RATE: 16 BRPM | DIASTOLIC BLOOD PRESSURE: 60 MMHG | HEART RATE: 69 BPM | SYSTOLIC BLOOD PRESSURE: 110 MMHG | WEIGHT: 145 LBS | BODY MASS INDEX: 25.69 KG/M2 | HEIGHT: 63 IN | OXYGEN SATURATION: 99 % | TEMPERATURE: 97.9 F

## 2018-10-12 DIAGNOSIS — Z86.79 HISTORY OF HEART DISEASE: ICD-10-CM

## 2018-10-12 DIAGNOSIS — E78.2 MIXED HYPERLIPIDEMIA: Primary | ICD-10-CM

## 2018-10-12 DIAGNOSIS — R73.01 IMPAIRED FASTING GLUCOSE: ICD-10-CM

## 2018-10-12 DIAGNOSIS — I10 ESSENTIAL HYPERTENSION: ICD-10-CM

## 2018-10-12 DIAGNOSIS — E89.0 POSTOPERATIVE HYPOTHYROIDISM: ICD-10-CM

## 2018-10-12 DIAGNOSIS — E89.0 S/P THYROIDECTOMY: ICD-10-CM

## 2018-10-12 PROCEDURE — 99214 OFFICE O/P EST MOD 30 MIN: CPT | Performed by: PHYSICIAN ASSISTANT

## 2018-10-12 NOTE — TELEPHONE ENCOUNTER
----- Message from Charla Reinoso sent at 10/12/2018  2:36 PM EDT -----  Regarding: REFILL ON IMURAN  SEE PENDING REFILL REQUEST. PT HAS JUST A FEW DAYS LEFT. ASK IF SHE CAN GET THIS CALLED IN TODAY?

## 2018-10-12 NOTE — TELEPHONE ENCOUNTER
Patient called, left message, advised Anderson has been e-scribed to her pharmacy in Sept. Advised to call back with questions.

## 2018-10-12 NOTE — PATIENT INSTRUCTIONS
Low glycemic index diet  Exercise 30 minutes most days of the week  Make sure you get results on any labs or tests we ordered today  We discussed medications and how to take them as prescribed  Sleep 6-8 hours each night if possible  If you have not signed up for [employer], please activate your code ASAP from your After Visit Summary today    LDL goal <100  LDL goal if heart disease <70  HDL goal >60  Triglyceride goal <150  BP goal =<130/80  Fasting glucose <100

## 2018-10-12 NOTE — PROGRESS NOTES
Subjective   Mami Srivastava is a 73 y.o. female.     History of Present Illness     Lab Results   Component Value Date    CHLPL 226 (H) 08/25/2018    TRIG 159 (H) 08/25/2018    HDL 66 08/25/2018     (H) 08/25/2018     Mami Srivastava 73 y.o. female who presents today for routine follow up check and medication refills.  she has a history of   Patient Active Problem List   Diagnosis   • S/P thyroidectomy   • Mixed hyperlipidemia   • Vitamin D deficiency   • Impaired fasting glucose   • Hypothyroidism   • Osteopenia of multiple sites   • Hypertensive left ventricular hypertrophy, without heart failure   • Chronic seasonal allergic rhinitis   • History of heart disease   • Hx of abnormal mammogram   • Nodule of left lung   • History of diverticulosis   • Hypertension   • Diverticulitis of large intestine without perforation or abscess without bleeding   • Hyperglycemia   • Current chronic use of systemic steroids   • Autoimmune hepatitis treated with steroids (CMS/HCC)   .  Since the last visit, she has overall felt tired.  She has Hypertenision and is well controlled on medication, Hyperlipidemia and working on this with diet and exercise and Hypothyroidism and is well controlled on Rx.  Labs are in desired treatment range.  she has been compliant with current medications have reviewed them.  The patient denies medication side effects.    Results for orders placed or performed in visit on 09/29/18   Urine Culture, Comprehen ([facility]) - Urine, Clean Catch   Result Value Ref Range    Urine Culture Final report (A)     Result 1 Klebsiella pneumoniae (A)     Susceptibility Testing Comment    POC Urinalysis Dipstick, Automated   Result Value Ref Range    Color Yellow Yellow, Straw, Dark Yellow, Bryanna    Clarity, UA Cloudy (A) Clear    Specific Gravity  1.015 1.005 - 1.030    pH, Urine 6.0 5.0 - 8.0    Leukocytes Large (3+) (A) Negative    Nitrite, UA Negative Negative    Protein, POC Negative Negative mg/dL     Glucose, UA Negative Negative, 1000 mg/dL (3+) mg/dL    Ketones, UA Negative Negative    Urobilinogen, UA Normal Normal    Bilirubin Negative Negative    Blood, UA Trace (A) Negative   she is staying tired and f/u DR Hein and ? 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    Review of Systems   Constitutional: Positive for fatigue. Negative for activity change, appetite change and unexpected weight change.   HENT: Negative for nosebleeds and trouble swallowing.    Eyes: Negative for pain and visual disturbance.   Respiratory: Negative for chest tightness, shortness of breath and wheezing.    Cardiovascular: Negative for chest pain and palpitations.   Gastrointestinal: Negative for abdominal pain and blood in stool.   Endocrine: Negative.    Genitourinary: Positive for enuresis. Negative for difficulty urinating and hematuria.   Musculoskeletal: Negative for joint swelling.   Skin: Negative for color change and rash.   Allergic/Immunologic: Negative.    Neurological: Negative for syncope and speech difficulty.   Hematological: Negative for adenopathy.   Psychiatric/Behavioral: Negative for agitation and confusion.   All other systems reviewed and are negative.      Objective   Physical Exam   Constitutional: She is oriented to person, place, and time. She appears well-developed and well-nourished. No distress.   HENT:   Head:  Normocephalic and atraumatic.   Eyes: Pupils are equal, round, and reactive to light. Conjunctivae and EOM are normal. Right eye exhibits no discharge. Left eye exhibits no discharge. No scleral icterus.   Neck: Normal range of motion. Neck supple. No tracheal deviation present. No thyromegaly present.   Cardiovascular: Normal rate, regular rhythm, normal heart sounds, intact distal pulses and normal pulses.  Exam reveals no gallop.    No murmur heard.  Pulmonary/Chest: Effort normal and breath sounds normal. No respiratory distress. She has no wheezes. She has no rales.   Musculoskeletal: Normal range of motion.   Neurological: She is alert and oriented to person, place, and time. She exhibits normal muscle tone. Coordination normal.   Skin: Skin is warm. No rash noted. No erythema. No pallor.   Psychiatric: She has a normal mood and affect. Her behavior is normal. Judgment and thought content normal.   Nursing note and vitals reviewed.      Assessment/Plan   Mami was seen today for illness.    Diagnoses and all orders for this visit:    Mixed hyperlipidemia  -     Comprehensive metabolic panel; Future  -     Lipid panel; Future  -     Hemoglobin A1c; Future    Impaired fasting glucose  -     Comprehensive metabolic panel; Future  -     Lipid panel; Future  -     Hemoglobin A1c; Future    Essential hypertension  -     Comprehensive metabolic panel; Future  -     Lipid panel; Future  -     Hemoglobin A1c; Future    History of heart disease    S/P thyroidectomy    Postoperative hypothyroidism

## 2018-10-16 RX ORDER — AZATHIOPRINE 50 MG/1
TABLET ORAL
Qty: 45 TABLET | Refills: 1 | OUTPATIENT
Start: 2018-10-16

## 2018-10-16 RX ORDER — ATORVASTATIN CALCIUM 40 MG/1
40 TABLET, FILM COATED ORAL DAILY
Qty: 90 TABLET | Refills: 3 | Status: SHIPPED | OUTPATIENT
Start: 2018-10-16 | End: 2018-10-24

## 2018-10-19 ENCOUNTER — TELEPHONE (OUTPATIENT)
Dept: GASTROENTEROLOGY | Facility: CLINIC | Age: 73
End: 2018-10-19

## 2018-10-19 DIAGNOSIS — R19.7 DIARRHEA, UNSPECIFIED TYPE: Primary | ICD-10-CM

## 2018-10-19 NOTE — TELEPHONE ENCOUNTER
Called pt back. Pt states she had diarrhea a couple times this AM from about 0200 to 0600. She had 5-6 very loose stools in that time frame, then it resolved. She feels fine now, just tired. During the time frame of diarrhea, pt did not have fever, chills, nausea, vomiting, abdomina pain, rectal bleeding or mucous in the stool. Advised pt to monitor for symptoms. Advised if she has another loose stool, she can try iodium OTC. Advised that if symptoms progress or worsen, call back to proceed to the ER. Pt verb understanding and asked if Dr Hein ever responded to her e-mail about getting an EGD. Advised yes, I e-mailed her back this AM. Advised of the note from Dr Hein. She states she is not taking anything for her symptoms and asked what MD would recommend to treat her dyspepsia. Advised will check with MD and call back. Pt verb understanding.

## 2018-10-19 NOTE — TELEPHONE ENCOUNTER
----- Message from Charla Reinoso sent at 10/19/2018 10:51 AM EDT -----  Regarding: DIARRHEA  Contact: [phone]  PT NEED ADVISE ON WHAT TO DO ABOUT HER DIARRHEA?

## 2018-10-22 RX ORDER — RANITIDINE 150 MG/1
150 TABLET ORAL 2 TIMES DAILY PRN
Qty: 60 TABLET | Refills: 2 | Status: SHIPPED | OUTPATIENT
Start: 2018-10-22 | End: 2018-11-07 | Stop reason: HOSPADM

## 2018-10-22 NOTE — TELEPHONE ENCOUNTER
Called pt and advised that Dr Hein does want to send her stool for some testing. Pt verb understanding.

## 2018-10-22 NOTE — TELEPHONE ENCOUNTER
Called pt and advised of the note from Dr Hein. Advised will call the medication into her pharmacy and she can take it twice daily as needed. Pt verb understanding and states she spoke with me Friday about a bout of diarrhea that had resolved. She states she did OK Friday evening and Saturday as well without any symptoms. Then early Sunday she woke up again with diarrhea. From 1AM-7AM she had 6 loose BMs. Today she has already had 1 large loose stool. She states she was wondering if she needed to have any testing done? When she had diverticulitis before, she did not have diarrhea, she was constipated. She states she did take Bactrim about 3 weeks ago for a sinus infection. Advised will update Dr Hein and call back with recs. Pt verb understanding.

## 2018-10-23 ENCOUNTER — TELEPHONE (OUTPATIENT)
Dept: GASTROENTEROLOGY | Facility: CLINIC | Age: 73
End: 2018-10-23

## 2018-10-23 NOTE — TELEPHONE ENCOUNTER
Called pt back. Pt state she dropped the specimen off this AM and wanted to know how long it will take to come back. Advised will check with the  to see how long it takes to come back. Also, she has missed three days of work and will need a note from Dr Hein. Asked if she needs FMLA or if she needs a simple note. Pt states for three missed days, she needs a note and for any more, she needs FMLA. Asked if she knows for sure she will be back to work tomorrow? Pt states she is not sure. 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 Advised that she should wait to see when she can go back and if she needs a note for going back tomorrow, I can fax one in and if she misses tomorrow, she can have FMLA papers faxed to the office. She is also starting to have some nagging pain in the same area she had pain before when she had her diverticulitis flare. She states it is not severe pain like when she had the tics flare, but it is enough for her to know it's there. Advised will update MD and call back if he has any recs. Pt verb understanding.

## 2018-10-23 NOTE — TELEPHONE ENCOUNTER
----- Message from Charla Reinoso sent at 10/23/2018  8:55 AM EDT -----  Regarding: PREVIOUS PROBLEMS  Contact: [phone]  PT ASK IF MARIBEL CAN CALL HER BACK? SEE PREVIOUS TASK.

## 2018-10-24 ENCOUNTER — HOSPITAL ENCOUNTER (EMERGENCY)
Facility: HOSPITAL | Age: 73
Discharge: HOME | End: 2018-10-24
Attending: EMERGENCY MEDICINE | Admitting: EMERGENCY MEDICINE

## 2018-10-24 ENCOUNTER — APPOINTMENT (OUTPATIENT)
Dept: CT IMAGING | Facility: HOSPITAL | Age: 73
End: 2018-10-24

## 2018-10-24 VITALS
BODY MASS INDEX: 26.75 KG/M2 | WEIGHT: 151 LBS | RESPIRATION RATE: 16 BRPM | OXYGEN SATURATION: 100 % | SYSTOLIC BLOOD PRESSURE: 148 MMHG | HEART RATE: 72 BPM | HEIGHT: 63 IN | TEMPERATURE: 98.2 F | DIASTOLIC BLOOD PRESSURE: 57 MMHG

## 2018-10-24 DIAGNOSIS — K57.92 ACUTE DIVERTICULITIS: Primary | ICD-10-CM

## 2018-10-24 LAB
ALBUMIN SERPL-MCNC: 4.2 G/DL (ref 3.5–5.2)
ALBUMIN/GLOB SERPL: 1.7 G/DL
ALP SERPL-CCNC: 55 U/L (ref 39–117)
ALT SERPL W P-5'-P-CCNC: 39 U/L (ref 1–33)
ANION GAP SERPL CALCULATED.3IONS-SCNC: 13.1 MMOL/L
AST SERPL-CCNC: 45 U/L (ref 1–32)
BASOPHILS # BLD AUTO: 0.01 10*3/MM3 (ref 0–0.2)
BASOPHILS NFR BLD AUTO: 0.1 % (ref 0–1.5)
BILIRUB SERPL-MCNC: 0.9 MG/DL (ref 0.1–1.2)
BUN BLD-MCNC: 9 MG/DL (ref 8–23)
BUN/CREAT SERPL: 9.8 (ref 7–25)
CALCIUM SPEC-SCNC: 9.9 MG/DL (ref 8.6–10.5)
CHLORIDE SERPL-SCNC: 99 MMOL/L (ref 98–107)
CO2 SERPL-SCNC: 25.9 MMOL/L (ref 22–29)
CREAT BLD-MCNC: 0.92 MG/DL (ref 0.57–1)
DEPRECATED RDW RBC AUTO: 48.9 FL (ref 37–54)
EOSINOPHIL # BLD AUTO: 0.21 10*3/MM3 (ref 0–0.7)
EOSINOPHIL NFR BLD AUTO: 2.5 % (ref 0.3–6.2)
ERYTHROCYTE [DISTWIDTH] IN BLOOD BY AUTOMATED COUNT: 14.7 % (ref 11.7–13)
GFR SERPL CREATININE-BSD FRML MDRD: 60 ML/MIN/1.73
GLOBULIN UR ELPH-MCNC: 2.5 GM/DL
GLUCOSE BLD-MCNC: 104 MG/DL (ref 65–99)
HCT VFR BLD AUTO: 37.6 % (ref 35.6–45.5)
HGB BLD-MCNC: 12.5 G/DL (ref 11.9–15.5)
IMM GRANULOCYTES # BLD: 0.02 10*3/MM3 (ref 0–0.03)
IMM GRANULOCYTES NFR BLD: 0.2 % (ref 0–0.5)
LIPASE SERPL-CCNC: 59 U/L (ref 13–60)
LYMPHOCYTES # BLD AUTO: 1.79 10*3/MM3 (ref 0.9–4.8)
LYMPHOCYTES NFR BLD AUTO: 21.6 % (ref 19.6–45.3)
MCH RBC QN AUTO: 30.3 PG (ref 26.9–32)
MCHC RBC AUTO-ENTMCNC: 33.2 G/DL (ref 32.4–36.3)
MCV RBC AUTO: 91 FL (ref 80.5–98.2)
MONOCYTES # BLD AUTO: 0.57 10*3/MM3 (ref 0.2–1.2)
MONOCYTES NFR BLD AUTO: 6.9 % (ref 5–12)
NEUTROPHILS # BLD AUTO: 5.7 10*3/MM3 (ref 1.9–8.1)
NEUTROPHILS NFR BLD AUTO: 68.7 % (ref 42.7–76)
PLATELET # BLD AUTO: 168 10*3/MM3 (ref 140–500)
PMV BLD AUTO: 9.8 FL (ref 6–12)
POTASSIUM BLD-SCNC: 3.3 MMOL/L (ref 3.5–5.2)
PROT SERPL-MCNC: 6.7 G/DL (ref 6–8.5)
RBC # BLD AUTO: 4.13 10*6/MM3 (ref 3.9–5.2)
SODIUM BLD-SCNC: 138 MMOL/L (ref 136–145)
WBC NRBC COR # BLD: 8.3 10*3/MM3 (ref 4.5–10.7)

## 2018-10-24 PROCEDURE — 74177 CT ABD & PELVIS W/CONTRAST: CPT

## 2018-10-24 PROCEDURE — 85025 COMPLETE CBC W/AUTO DIFF WBC: CPT | Performed by: EMERGENCY MEDICINE

## 2018-10-24 PROCEDURE — 99283 EMERGENCY DEPT VISIT LOW MDM: CPT

## 2018-10-24 PROCEDURE — 25010000002 IOPAMIDOL 61 % SOLUTION: Performed by: EMERGENCY MEDICINE

## 2018-10-24 PROCEDURE — 83690 ASSAY OF LIPASE: CPT | Performed by: EMERGENCY MEDICINE

## 2018-10-24 PROCEDURE — 80053 COMPREHEN METABOLIC PANEL: CPT | Performed by: EMERGENCY MEDICINE

## 2018-10-24 RX ORDER — SODIUM CHLORIDE 0.9 % (FLUSH) 0.9 %
10 SYRINGE (ML) INJECTION AS NEEDED
Status: DISCONTINUED | OUTPATIENT
Start: 2018-10-24 | End: 2018-10-24 | Stop reason: HOSPADM

## 2018-10-24 RX ORDER — METRONIDAZOLE 500 MG/1
500 TABLET ORAL 3 TIMES DAILY
Qty: 30 TABLET | Refills: 0 | Status: SHIPPED | OUTPATIENT
Start: 2018-10-24 | End: 2018-11-07 | Stop reason: HOSPADM

## 2018-10-24 RX ORDER — CIPROFLOXACIN 500 MG/1
500 TABLET, FILM COATED ORAL 2 TIMES DAILY
Qty: 20 TABLET | Refills: 0 | Status: SHIPPED | OUTPATIENT
Start: 2018-10-24 | End: 2018-11-07 | Stop reason: HOSPADM

## 2018-10-24 RX ADMIN — IOPAMIDOL 85 ML: 612 INJECTION, SOLUTION INTRAVENOUS at 08:31

## 2018-10-24 NOTE — ED NOTES
Straight catheter attempted x2. No urine return. Will bladder scan patient and attempt again shortly. Dr. Lee is aware.      Teri Bishop RN  10/24/18 0916

## 2018-10-24 NOTE — ED TRIAGE NOTES
Pt to ED with c/o lower abd pain and intermittent diarrhea since Friday.  Pt reports abd pain started this morning and she called her PCP Melvina who recommended her to come in.  Pt reports history of diverticulitis.

## 2018-10-24 NOTE — ED PROVIDER NOTES
EMERGENCY DEPARTMENT ENCOUNTER    CHIEF COMPLAINT  Chief Complaint: Diarrhea  History given by: Pt  History limited by: Nothing  Room Number:   PMD: Jacqui Booth PA-C      HPI:  Pt is a 73 y.o. female who presents complaining of diarrhea which began 5 days ago, got better, then worsened again 3 days ago. The pt states that it is watery and foul smelling. The pt confirms moderate lower abd pain (cramping, which the pt states she thinks is related to diarrhea), and denies fever, headache, CP, SOA, vomiting, dysuria, leg pain, or back pain. The pt has had similar pain in the past related to diverticulitis. The pt states that she has given Dr. Hein (GI) a stool sample, and that he recommended she come to the ER this morning.     Duration/Onset/Timin days/sudden/intermittent  Location: GI  Radiation: None  Quality: Watery, foul smelling  Intensity/Severity: Moderate  Associated Symptoms: Lower abd pain  Aggravating or Alleviating Factors: Unknown  Previous Episodes: The pt states that she has had similar symptoms in the past related to diverticulitis.      PAST MEDICAL HISTORY  Active Ambulatory Problems     Diagnosis Date Noted   • S/P thyroidectomy 10/27/2017   • Mixed hyperlipidemia 10/27/2017   • Vitamin D deficiency 10/27/2017   • Impaired fasting glucose 10/27/2017   • Hypothyroidism 10/27/2017   • Osteopenia of multiple sites 10/27/2017   • Hypertensive left ventricular hypertrophy, without heart failure 10/27/2017   • Chronic seasonal allergic rhinitis 10/27/2017   • History of heart disease 10/27/2017   • Hx of abnormal mammogram 10/27/2017   • Nodule of left lung 10/27/2017   • History of diverticulosis 10/27/2017   • Hypertension 2018   • Diverticulitis of large intestine without perforation or abscess without bleeding 2018   • Hyperglycemia 2018   • Current chronic use of systemic steroids 2018   • Autoimmune hepatitis treated with steroids (CMS/HCC) 2018      Resolved Ambulatory Problems     Diagnosis Date Noted   • KEVIN (acute kidney injury) (CMS/HCC) 2018     Past Medical History:   Diagnosis Date   • Autoimmune hepatitis (CMS/HCC)    • Cholelithiasis 2018   • Coronary artery disease    • Hemangioma of liver    • Hernia    • Hyperlipidemia    • Hypertension    • Hyperthyroidism    • Hypothyroidism    • Laceration of finger of right hand 05/15/2018   • Osteopenia after menopause    • Skin cancer    • Ulcerative colitis (CMS/HCC)        PAST SURGICAL HISTORY  Past Surgical History:   Procedure Laterality Date   • BUNIONECTOMY     • COLONOSCOPY  2014    Diverticulosis in the sigmoid colon, in the descending colon, in the transverse colon and in the ascending colon (Pathology: Collagenous colitis)   • COLONOSCOPY  2005    Hemorrhoids, pandiverticulosis, status post diverticulitis   • LIVER BIOPSY     • LIVER BIOPSY     • THYROIDECTOMY         FAMILY HISTORY  Family History   Problem Relation Age of Onset   • Heart disease Mother    • Hypertension Mother    • Lung cancer Sister    • Thyroid disease Sister    • Lupus Sister    • Thyroid disease Brother    • Alcohol abuse Brother    • Glaucoma Maternal Grandmother    • Stomach cancer Maternal Grandmother             • Cirrhosis Father             • Liver disease Father        SOCIAL HISTORY  Social History     Social History   • Marital status: Single     Spouse name: N/A   • Number of children: N/A   • Years of education: N/A     Occupational History   • Not on file.     Social History Main Topics   • Smoking status: Former Smoker     Packs/day: 1.50     Years: 12.00     Start date: 1963     Quit date: 1976   • Smokeless tobacco: Never Used      Comment: Chrinic bronchitis...smoke allergy   • Alcohol use Yes      Comment: Seldom   • Drug use: No   • Sexual activity: Not Currently     Other Topics Concern   • Not on file     Social History Narrative   • No  narrative on file       ALLERGIES  Augmentin [amoxicillin-pot clavulanate] and Keflex [cephalexin]    REVIEW OF SYSTEMS  Review of Systems   Constitutional: Negative.  Negative for fever.   HENT: Negative.  Negative for sore throat.    Eyes: Negative.    Respiratory: Negative.  Negative for cough.    Cardiovascular: Negative.  Negative for chest pain.   Gastrointestinal: Positive for abdominal pain and diarrhea.   Genitourinary: Negative.  Negative for dysuria.   Musculoskeletal: Negative.  Negative for back pain.   Skin: Negative.  Negative for rash.   Neurological: Negative.  Negative for headaches.   All other systems reviewed and are negative.      PHYSICAL EXAM  ED Triage Vitals [10/24/18 0707]   Temp Heart Rate Resp BP SpO2   97.6 °F (36.4 °C) 86 16 -- 98 %      Temp src Heart Rate Source Patient Position BP Location FiO2 (%)   Tympanic -- -- -- --       Physical Exam   Constitutional: No distress.   HENT:   Head: Normocephalic and atraumatic.   Mouth/Throat: Oropharynx is clear and moist.   Eyes: Conjunctivae are normal.   Cardiovascular: Normal rate and regular rhythm.    Pulmonary/Chest: Breath sounds normal. No respiratory distress.   Abdominal: There is tenderness (Mild) in the suprapubic area and left lower quadrant.   Musculoskeletal: She exhibits no edema or tenderness.   Neurological: She is alert.   Skin: No rash noted.   Nursing note and vitals reviewed.      LAB RESULTS  Lab Results (last 24 hours)     Procedure Component Value Units Date/Time    CBC & Differential [[id number]] Collected:  10/24/18 0727    Specimen:  Blood Updated:  10/24/18 0739    Narrative:       The following orders were created for panel order CBC & Differential.  Procedure                               Abnormality         Status                     ---------                               -----------         ------                     CBC Auto Differential[[id number]]        Abnormal            Final result                  Please view results for these tests on the individual orders.    Comprehensive Metabolic Panel [[id number]]  (Abnormal) Collected:  10/24/18 0727    Specimen:  Blood Updated:  10/24/18 0759     Glucose 104 (H) mg/dL      BUN 9 mg/dL      Creatinine 0.92 mg/dL      Sodium 138 mmol/L      Potassium 3.3 (L) mmol/L      Chloride 99 mmol/L      CO2 25.9 mmol/L      Calcium 9.9 mg/dL      Total Protein 6.7 g/dL      Albumin 4.20 g/dL      ALT (SGPT) 39 (H) U/L      AST (SGOT) 45 (H) U/L      Alkaline Phosphatase 55 U/L      Total Bilirubin 0.9 mg/dL      eGFR Non African Amer 60 (L) mL/min/1.73      Globulin 2.5 gm/dL      A/G Ratio 1.7 g/dL      BUN/Creatinine Ratio 9.8     Anion Gap 13.1 mmol/L     Narrative:       The MDRD GFR formula is only valid for adults with stable renal function between ages 18 and 70.    Lipase [[id number]]  (Normal) Collected:  10/24/18 0727    Specimen:  Blood Updated:  10/24/18 0759     Lipase 59 U/L     CBC Auto Differential [[id number]]  (Abnormal) Collected:  10/24/18 0727    Specimen:  Blood Updated:  10/24/18 0739     WBC 8.30 10*3/mm3      RBC 4.13 10*6/mm3      Hemoglobin 12.5 g/dL      Hematocrit 37.6 %      MCV 91.0 fL      MCH 30.3 pg      MCHC 33.2 g/dL      RDW 14.7 (H) %      RDW-SD 48.9 fl      MPV 9.8 fL      Platelets 168 10*3/mm3      Neutrophil % 68.7 %      Lymphocyte % 21.6 %      Monocyte % 6.9 %      Eosinophil % 2.5 %      Basophil % 0.1 %      Immature Grans % 0.2 %      Neutrophils, Absolute 5.70 10*3/mm3      Lymphocytes, Absolute 1.79 10*3/mm3      Monocytes, Absolute 0.57 10*3/mm3      Eosinophils, Absolute 0.21 10*3/mm3      Basophils, Absolute 0.01 10*3/mm3      Immature Grans, Absolute 0.02 10*3/mm3           I ordered the above labs and reviewed the results    RADIOLOGY  CT Abdomen Pelvis With Contrast   Final Result   1.  Findings of acute sigmoid diverticulitis in a similar distribution   to that seen on 8/5/2018. Please ensure the patient is currently    up-to-date with colon cancer screening recommendations and if they are   not, recommend follow-up colonoscopy following resolution of patient's   symptoms to exclude underlying neoplasm.   2.  Cholelithiasis with mild distention of the gallbladder. Findings can   be seen in early cholecystitis and correlation with patient history is   recommended. If clinical concern for biliary pathology, a right upper   quadrant sonogram is recommended.   3.  Subcentimeter cystic lesion within the pancreatic body which was not   present in 2005. Recommend follow-up abdominal MRI and MRCP with and   without contrast in 2 years to ensure stability and exclude underlying   cystic neoplasm.       This report was finalized on 10/24/2018 9:05 AM by Dr. Albert Kirby M.D.               I ordered the above noted radiological studies. Interpreted by radiologist. Reviewed by me in PACS.       PROCEDURES  Procedures      PROGRESS AND CONSULTS     0716 Ordered CMP, lipase, and CBC for further evaluation.     0759 Rechecked the pt and discussed her lab results, which were overall unremarkable. Discussed that due to her h/o diverticulitis, I would like to order a CT abd for further evaluation. The pt agrees with the plan and all questions were addressed.    0800 Ordered CT abd pelvis with contrast for further evaluation.     0912 Rechecked the patient and she is resting comfortably. Discussed pertinent lab and imaging results, including CT abd pelvis which shows diverticulitis. Discussed inpatient and outpatient treatment options, and the pt states she would be comfortable going home. Discussed the plan to discharge the pt. Patient agrees with plan and all questions were addressed.       MEDICAL DECISION MAKING  Results were reviewed/discussed with the patient and they were also made aware of online access. Pt also made aware that some labs, such as cultures, will not be resulted during ER visit and follow up with PMD is necessary.      MDM  Number of Diagnoses or Management Options     Amount and/or Complexity of Data Reviewed  Clinical lab tests: ordered and reviewed (Potassium: 3.3)  Tests in the radiology section of CPT®: ordered and reviewed (CT abd pelvis: Acute sigmoid diverticulitis)  Decide to obtain previous medical records or to obtain history from someone other than the patient: yes  Review and summarize past medical records: yes (The pt was hospitalized in August for diverticulitis.)  Independent visualization of images, tracings, or specimens: yes    Patient Progress  Patient progress: stable         DIAGNOSIS  Final diagnoses:   Acute diverticulitis       DISPOSITION  Disposition: Discharged.    Patient discharged in stable condition.    Reviewed implications of results, diagnosis, meds, responsibility to follow up, warning signs and symptoms of possible worsening, potential complications and reasons to return to ER, including new or worsening symptoms.    Patient/Family voiced understanding of above instructions.    Discussed plan for discharge, as there is no emergent indication for admission.  Pt/family is agreeable and understands need for follow up and repeat testing.  Pt is aware that discharge does not mean that nothing is wrong but it indicates no emergency is present and they must continue care with follow-up as given below or physician of their choice.     FOLLOW-UP  Jacqui Booth PA-C  [id number] [facility]  [facility] [id number] [phone]      Call for Appointment    Shiva Hein MD  [address]  [place] [address]  [phone]      Call for Appointment         Medication List      New Prescriptions    ciprofloxacin 500 MG tablet  Commonly known as:  CIPRO  Take 1 tablet by mouth 2 (Two) Times a Day.     metroNIDAZOLE 500 MG tablet  Commonly known as:  FLAGYL  Take 1 tablet by mouth 3 (Three) Times a Day.        Stop    alendronate 70 MG tablet  Commonly known as:  FOSAMAX      atorvastatin 40 MG tablet  Commonly known as:  LIPITOR     predniSONE 20 MG tablet  Commonly known as:  DELTASONE     valACYclovir 1000 MG tablet  Commonly known as:  VALTREX            Latest Documented Vital Signs:  As of 9:16 AM  BP- 142/59 HR- 62 Temp- 97.6 °F (36.4 °C) (Tympanic) O2 sat- 98%    --  Documentation assistance provided by viet Sanders for Dr. Lee.  Information recorded by the scribe was done at my direction and has been verified and validated by me.     Kaia Sanders  10/24/18 0830       Kaia Sanders  10/24/18 0916       Cruz Lee MD  10/24/18 0928

## 2018-10-24 NOTE — ED NOTES
"Pt arrives with c/o diarrhea and lower abdominal pain that feelings like \"burning\" x6 days. Pt has a history of diverticulitis in August and a history of autoimmune hepatitis. Pt appears in NAD.       Teri Bishop RN  10/24/18 0721       Teri Bishop RN  10/24/18 0721    "

## 2018-10-25 ENCOUNTER — HOSPITAL ENCOUNTER (INPATIENT)
Facility: HOSPITAL | Age: 73
LOS: 12 days | Discharge: HOME HEALTH | End: 2018-11-07
Attending: EMERGENCY MEDICINE | Admitting: INTERNAL MEDICINE

## 2018-10-25 DIAGNOSIS — K58.0 IRRITABLE BOWEL SYNDROME WITH DIARRHEA: ICD-10-CM

## 2018-10-25 DIAGNOSIS — K59.1 FUNCTIONAL DIARRHEA: ICD-10-CM

## 2018-10-25 DIAGNOSIS — K57.92 ACUTE DIVERTICULITIS: ICD-10-CM

## 2018-10-25 DIAGNOSIS — R19.7 DIARRHEA, UNSPECIFIED TYPE: Primary | ICD-10-CM

## 2018-10-25 LAB
ADV 40+41 DNA STL QL NAA+NON-PROBE: NOT DETECTED
ASTRO TYP 1-8 RNA STL QL NAA+NON-PROBE: NOT DETECTED
BASOPHILS # BLD AUTO: 0.01 10*3/MM3 (ref 0–0.2)
BASOPHILS NFR BLD AUTO: 0.1 % (ref 0–1.5)
C CAYETANENSIS DNA STL QL NAA+NON-PROBE: NOT DETECTED
C COLI+JEJ+UPSA DNA STL QL NAA+NON-PROBE: NOT DETECTED
C DIF TOX TCDA+TCDB STL QL NAA+NON-PROBE: NOT DETECTED
CRYPTOSP DNA STL QL NAA+NON-PROBE: NOT DETECTED
DEPRECATED RDW RBC AUTO: 47.2 FL (ref 37–54)
E COLI O157 DNA STL QL NAA+NON-PROBE: NORMAL
E HISTOLYT DNA STL QL NAA+NON-PROBE: NOT DETECTED
EAEC PAA PLAS AGGR+AATA ST NAA+NON-PRB: NOT DETECTED
EC STX1+STX2 GENES STL QL NAA+NON-PROBE: NOT DETECTED
EOSINOPHIL # BLD AUTO: 0.46 10*3/MM3 (ref 0–0.7)
EOSINOPHIL NFR BLD AUTO: 5.5 % (ref 0.3–6.2)
EPEC EAE GENE STL QL NAA+NON-PROBE: NOT DETECTED
ERYTHROCYTE [DISTWIDTH] IN BLOOD BY AUTOMATED COUNT: 14.5 % (ref 11.7–13)
ETEC LTA+ST1A+ST1B TOX ST NAA+NON-PROBE: NOT DETECTED
G LAMBLIA DNA STL QL NAA+NON-PROBE: NOT DETECTED
HCT VFR BLD AUTO: 36.4 % (ref 35.6–45.5)
HGB BLD-MCNC: 12.5 G/DL (ref 11.9–15.5)
IMM GRANULOCYTES # BLD: 0.02 10*3/MM3 (ref 0–0.03)
IMM GRANULOCYTES NFR BLD: 0.2 % (ref 0–0.5)
LYMPHOCYTES # BLD AUTO: 1.03 10*3/MM3 (ref 0.9–4.8)
LYMPHOCYTES NFR BLD AUTO: 12.3 % (ref 19.6–45.3)
MCH RBC QN AUTO: 30.5 PG (ref 26.9–32)
MCHC RBC AUTO-ENTMCNC: 34.3 G/DL (ref 32.4–36.3)
MCV RBC AUTO: 88.8 FL (ref 80.5–98.2)
MONOCYTES # BLD AUTO: 0.63 10*3/MM3 (ref 0.2–1.2)
MONOCYTES NFR BLD AUTO: 7.5 % (ref 5–12)
NEUTROPHILS # BLD AUTO: 6.25 10*3/MM3 (ref 1.9–8.1)
NEUTROPHILS NFR BLD AUTO: 74.6 % (ref 42.7–76)
NOROVIRUS GI+II RNA STL QL NAA+NON-PROBE: NOT DETECTED
P SHIGELLOIDES DNA STL QL NAA+NON-PROBE: NOT DETECTED
PLATELET # BLD AUTO: 171 10*3/MM3 (ref 140–500)
PMV BLD AUTO: 9.8 FL (ref 6–12)
RBC # BLD AUTO: 4.1 10*6/MM3 (ref 3.9–5.2)
RVA RNA STL QL NAA+NON-PROBE: NOT DETECTED
S ENT+BONG DNA STL QL NAA+NON-PROBE: NOT DETECTED
SAPO I+II+IV+V RNA STL QL NAA+NON-PROBE: NOT DETECTED
SHIGELLA SP+EIEC IPAH ST NAA+NON-PROBE: NOT DETECTED
V CHOL+PARA+VUL DNA STL QL NAA+NON-PROBE: NOT DETECTED
V CHOLERAE DNA STL QL NAA+NON-PROBE: NOT DETECTED
WBC NRBC COR # BLD: 8.38 10*3/MM3 (ref 4.5–10.7)
Y ENTEROCOL DNA STL QL NAA+NON-PROBE: NOT DETECTED

## 2018-10-25 PROCEDURE — 99284 EMERGENCY DEPT VISIT MOD MDM: CPT

## 2018-10-25 PROCEDURE — 80053 COMPREHEN METABOLIC PANEL: CPT | Performed by: EMERGENCY MEDICINE

## 2018-10-25 PROCEDURE — 83690 ASSAY OF LIPASE: CPT | Performed by: EMERGENCY MEDICINE

## 2018-10-25 PROCEDURE — 85025 COMPLETE CBC W/AUTO DIFF WBC: CPT | Performed by: EMERGENCY MEDICINE

## 2018-10-25 RX ORDER — SODIUM CHLORIDE 0.9 % (FLUSH) 0.9 %
10 SYRINGE (ML) INJECTION AS NEEDED
Status: DISCONTINUED | OUTPATIENT
Start: 2018-10-25 | End: 2018-11-02

## 2018-10-25 RX ORDER — ONDANSETRON 2 MG/ML
4 INJECTION INTRAMUSCULAR; INTRAVENOUS ONCE
Status: COMPLETED | OUTPATIENT
Start: 2018-10-25 | End: 2018-10-26

## 2018-10-25 RX ADMIN — SODIUM CHLORIDE 1000 ML: 9 INJECTION, SOLUTION INTRAVENOUS at 23:59

## 2018-10-26 ENCOUNTER — APPOINTMENT (OUTPATIENT)
Dept: ULTRASOUND IMAGING | Facility: HOSPITAL | Age: 73
End: 2018-10-26

## 2018-10-26 PROBLEM — R19.7 DIARRHEA: Status: ACTIVE | Noted: 2018-10-26

## 2018-10-26 PROBLEM — K57.92 DIVERTICULITIS: Status: ACTIVE | Noted: 2018-10-26

## 2018-10-26 PROBLEM — K80.20 CHOLELITHIASIS: Status: ACTIVE | Noted: 2018-10-26

## 2018-10-26 LAB
ADV 40+41 DNA STL QL NAA+NON-PROBE: NOT DETECTED
ALBUMIN SERPL-MCNC: 4.2 G/DL (ref 3.5–5.2)
ALBUMIN/GLOB SERPL: 1.6 G/DL
ALP SERPL-CCNC: 58 U/L (ref 39–117)
ALT SERPL W P-5'-P-CCNC: 28 U/L (ref 1–33)
ANION GAP SERPL CALCULATED.3IONS-SCNC: 13.2 MMOL/L
AST SERPL-CCNC: 34 U/L (ref 1–32)
ASTRO TYP 1-8 RNA STL QL NAA+NON-PROBE: NOT DETECTED
BACTERIA UR QL AUTO: NORMAL /HPF
BILIRUB SERPL-MCNC: 1.5 MG/DL (ref 0.1–1.2)
BILIRUB UR QL STRIP: NEGATIVE
BUN BLD-MCNC: 10 MG/DL (ref 8–23)
BUN/CREAT SERPL: 10.1 (ref 7–25)
C CAYETANENSIS DNA STL QL NAA+NON-PROBE: NOT DETECTED
CALCIUM SPEC-SCNC: 9.2 MG/DL (ref 8.6–10.5)
CAMPY SP DNA.DIARRHEA STL QL NAA+PROBE: NOT DETECTED
CHLORIDE SERPL-SCNC: 96 MMOL/L (ref 98–107)
CLARITY UR: CLEAR
CO2 SERPL-SCNC: 26.8 MMOL/L (ref 22–29)
COLOR UR: YELLOW
CREAT BLD-MCNC: 0.99 MG/DL (ref 0.57–1)
CRYPTOSP STL CULT: NOT DETECTED
E COLI DNA SPEC QL NAA+PROBE: NOT DETECTED
E HISTOLYT AG STL-ACNC: NOT DETECTED
EAEC PAA PLAS AGGR+AATA ST NAA+NON-PRB: NOT DETECTED
EC STX1 + STX2 GENES STL NAA+PROBE: NOT DETECTED
EPEC EAE GENE STL QL NAA+NON-PROBE: DETECTED
ETEC LTA+ST1A+ST1B TOX ST NAA+NON-PROBE: NOT DETECTED
G LAMBLIA DNA SPEC QL NAA+PROBE: NOT DETECTED
GFR SERPL CREATININE-BSD FRML MDRD: 55 ML/MIN/1.73
GLOBULIN UR ELPH-MCNC: 2.7 GM/DL
GLUCOSE BLD-MCNC: 153 MG/DL (ref 65–99)
GLUCOSE UR STRIP-MCNC: NEGATIVE MG/DL
HGB UR QL STRIP.AUTO: NEGATIVE
HYALINE CASTS UR QL AUTO: NORMAL /LPF
KETONES UR QL STRIP: ABNORMAL
LEUKOCYTE ESTERASE UR QL STRIP.AUTO: ABNORMAL
LIPASE SERPL-CCNC: 45 U/L (ref 13–60)
NITRITE UR QL STRIP: NEGATIVE
NOROVIRUS GI+II RNA STL QL NAA+NON-PROBE: NOT DETECTED
P SHIGELLOIDES DNA STL QL NAA+NON-PROBE: NOT DETECTED
PH UR STRIP.AUTO: <=5 [PH] (ref 5–8)
POTASSIUM BLD-SCNC: 3.5 MMOL/L (ref 3.5–5.2)
PROT SERPL-MCNC: 6.9 G/DL (ref 6–8.5)
PROT UR QL STRIP: NEGATIVE
RBC # UR: NORMAL /HPF
REF LAB TEST METHOD: NORMAL
RV RNA STL NAA+PROBE: NOT DETECTED
SALMONELLA DNA SPEC QL NAA+PROBE: NOT DETECTED
SAPO I+II+IV+V RNA STL QL NAA+NON-PROBE: NOT DETECTED
SHIGELLA SP+EIEC IPAH STL QL NAA+PROBE: NOT DETECTED
SODIUM BLD-SCNC: 136 MMOL/L (ref 136–145)
SP GR UR STRIP: 1.02 (ref 1–1.03)
SQUAMOUS #/AREA URNS HPF: NORMAL /HPF
UROBILINOGEN UR QL STRIP: ABNORMAL
V CHOLERAE DNA SPEC QL NAA+PROBE: NOT DETECTED
VIBRIO DNA SPEC NAA+PROBE: NOT DETECTED
WBC UR QL AUTO: NORMAL /HPF
YERSINIA STL CULT: NOT DETECTED

## 2018-10-26 PROCEDURE — 25010000002 ONDANSETRON PER 1 MG: Performed by: EMERGENCY MEDICINE

## 2018-10-26 PROCEDURE — 25810000003 SODIUM CHLORIDE 0.9 % WITH KCL 20 MEQ 20-0.9 MEQ/L-% SOLUTION: Performed by: NURSE PRACTITIONER

## 2018-10-26 PROCEDURE — 99254 IP/OBS CNSLTJ NEW/EST MOD 60: CPT | Performed by: INTERNAL MEDICINE

## 2018-10-26 PROCEDURE — 63710000001 AZATHIOPRINE PER 50 MG: Performed by: NURSE PRACTITIONER

## 2018-10-26 PROCEDURE — 25010000002 LEVOFLOXACIN PER 250 MG: Performed by: NURSE PRACTITIONER

## 2018-10-26 PROCEDURE — 76705 ECHO EXAM OF ABDOMEN: CPT

## 2018-10-26 PROCEDURE — 81001 URINALYSIS AUTO W/SCOPE: CPT | Performed by: EMERGENCY MEDICINE

## 2018-10-26 PROCEDURE — 87507 IADNA-DNA/RNA PROBE TQ 12-25: CPT | Performed by: NURSE PRACTITIONER

## 2018-10-26 RX ORDER — ONDANSETRON 4 MG/1
4 TABLET, FILM COATED ORAL EVERY 6 HOURS PRN
Status: DISCONTINUED | OUTPATIENT
Start: 2018-10-26 | End: 2018-11-07 | Stop reason: HOSPADM

## 2018-10-26 RX ORDER — ONDANSETRON 2 MG/ML
4 INJECTION INTRAMUSCULAR; INTRAVENOUS EVERY 6 HOURS PRN
Status: DISCONTINUED | OUTPATIENT
Start: 2018-10-26 | End: 2018-11-07 | Stop reason: HOSPADM

## 2018-10-26 RX ORDER — SODIUM CHLORIDE AND POTASSIUM CHLORIDE 150; 900 MG/100ML; MG/100ML
100 INJECTION, SOLUTION INTRAVENOUS CONTINUOUS
Status: DISCONTINUED | OUTPATIENT
Start: 2018-10-26 | End: 2018-10-31

## 2018-10-26 RX ORDER — SODIUM CHLORIDE 0.9 % (FLUSH) 0.9 %
3 SYRINGE (ML) INJECTION EVERY 12 HOURS SCHEDULED
Status: DISCONTINUED | OUTPATIENT
Start: 2018-10-26 | End: 2018-11-07 | Stop reason: HOSPADM

## 2018-10-26 RX ORDER — FLUTICASONE PROPIONATE 50 MCG
1 SPRAY, SUSPENSION (ML) NASAL DAILY PRN
Status: DISCONTINUED | OUTPATIENT
Start: 2018-10-26 | End: 2018-11-07 | Stop reason: HOSPADM

## 2018-10-26 RX ORDER — CHOLECALCIFEROL (VITAMIN D3) 125 MCG
5 CAPSULE ORAL NIGHTLY PRN
Status: DISCONTINUED | OUTPATIENT
Start: 2018-10-26 | End: 2018-11-07 | Stop reason: HOSPADM

## 2018-10-26 RX ORDER — LEVOTHYROXINE SODIUM 0.07 MG/1
75 TABLET ORAL
Status: DISCONTINUED | OUTPATIENT
Start: 2018-10-26 | End: 2018-11-07 | Stop reason: HOSPADM

## 2018-10-26 RX ORDER — AZATHIOPRINE 50 MG/1
75 TABLET ORAL DAILY
Status: DISCONTINUED | OUTPATIENT
Start: 2018-10-26 | End: 2018-11-07 | Stop reason: HOSPADM

## 2018-10-26 RX ORDER — DIPHENOXYLATE HYDROCHLORIDE AND ATROPINE SULFATE 2.5; .025 MG/1; MG/1
1 TABLET ORAL DAILY
Status: DISCONTINUED | OUTPATIENT
Start: 2018-10-26 | End: 2018-11-07 | Stop reason: HOSPADM

## 2018-10-26 RX ORDER — LEVOFLOXACIN 5 MG/ML
500 INJECTION, SOLUTION INTRAVENOUS EVERY 24 HOURS
Status: DISCONTINUED | OUTPATIENT
Start: 2018-10-26 | End: 2018-10-28

## 2018-10-26 RX ORDER — ONDANSETRON 4 MG/1
4 TABLET, ORALLY DISINTEGRATING ORAL EVERY 6 HOURS PRN
Status: DISCONTINUED | OUTPATIENT
Start: 2018-10-26 | End: 2018-11-07 | Stop reason: HOSPADM

## 2018-10-26 RX ORDER — SODIUM CHLORIDE 0.9 % (FLUSH) 0.9 %
1-10 SYRINGE (ML) INJECTION AS NEEDED
Status: DISCONTINUED | OUTPATIENT
Start: 2018-10-26 | End: 2018-11-07 | Stop reason: HOSPADM

## 2018-10-26 RX ORDER — CETIRIZINE HYDROCHLORIDE 10 MG/1
10 TABLET ORAL NIGHTLY
Status: DISCONTINUED | OUTPATIENT
Start: 2018-10-26 | End: 2018-11-07 | Stop reason: HOSPADM

## 2018-10-26 RX ORDER — ACETAMINOPHEN 325 MG/1
650 TABLET ORAL EVERY 4 HOURS PRN
Status: DISCONTINUED | OUTPATIENT
Start: 2018-10-26 | End: 2018-11-07 | Stop reason: HOSPADM

## 2018-10-26 RX ORDER — ASPIRIN 81 MG/1
81 TABLET ORAL DAILY
Status: DISCONTINUED | OUTPATIENT
Start: 2018-10-26 | End: 2018-11-07 | Stop reason: HOSPADM

## 2018-10-26 RX ADMIN — METRONIDAZOLE 500 MG: 500 INJECTION, SOLUTION INTRAVENOUS at 08:16

## 2018-10-26 RX ADMIN — METRONIDAZOLE 500 MG: 500 INJECTION, SOLUTION INTRAVENOUS at 15:42

## 2018-10-26 RX ADMIN — Medication 3 ML: at 20:49

## 2018-10-26 RX ADMIN — ASPIRIN 81 MG: 81 TABLET, DELAYED RELEASE ORAL at 08:14

## 2018-10-26 RX ADMIN — AZATHIOPRINE 75 MG: 50 TABLET ORAL at 08:12

## 2018-10-26 RX ADMIN — LEVOFLOXACIN 500 MG: 5 INJECTION, SOLUTION INTRAVENOUS at 08:16

## 2018-10-26 RX ADMIN — Medication 3 ML: at 08:11

## 2018-10-26 RX ADMIN — LEVOTHYROXINE SODIUM 75 MCG: 75 TABLET ORAL at 06:35

## 2018-10-26 RX ADMIN — POTASSIUM CHLORIDE AND SODIUM CHLORIDE 100 ML/HR: 900; 150 INJECTION, SOLUTION INTRAVENOUS at 08:16

## 2018-10-26 RX ADMIN — LISINOPRIL: 10 TABLET ORAL at 08:14

## 2018-10-26 RX ADMIN — Medication 1 TABLET: at 08:12

## 2018-10-26 RX ADMIN — POTASSIUM CHLORIDE AND SODIUM CHLORIDE 100 ML/HR: 900; 150 INJECTION, SOLUTION INTRAVENOUS at 20:49

## 2018-10-26 RX ADMIN — ONDANSETRON 4 MG: 2 INJECTION INTRAMUSCULAR; INTRAVENOUS at 00:03

## 2018-10-26 RX ADMIN — CETIRIZINE HYDROCHLORIDE 10 MG: 10 TABLET, FILM COATED ORAL at 20:48

## 2018-10-27 PROCEDURE — 99232 SBSQ HOSP IP/OBS MODERATE 35: CPT | Performed by: INTERNAL MEDICINE

## 2018-10-27 PROCEDURE — 25010000002 LEVOFLOXACIN PER 250 MG: Performed by: NURSE PRACTITIONER

## 2018-10-27 PROCEDURE — 63710000001 AZATHIOPRINE PER 50 MG: Performed by: NURSE PRACTITIONER

## 2018-10-27 PROCEDURE — 25810000003 SODIUM CHLORIDE 0.9 % WITH KCL 20 MEQ 20-0.9 MEQ/L-% SOLUTION: Performed by: NURSE PRACTITIONER

## 2018-10-27 RX ADMIN — ASPIRIN 81 MG: 81 TABLET, DELAYED RELEASE ORAL at 07:27

## 2018-10-27 RX ADMIN — LEVOTHYROXINE SODIUM 75 MCG: 75 TABLET ORAL at 06:41

## 2018-10-27 RX ADMIN — CETIRIZINE HYDROCHLORIDE 10 MG: 10 TABLET, FILM COATED ORAL at 21:24

## 2018-10-27 RX ADMIN — LISINOPRIL: 10 TABLET ORAL at 07:28

## 2018-10-27 RX ADMIN — METRONIDAZOLE 500 MG: 500 INJECTION, SOLUTION INTRAVENOUS at 07:22

## 2018-10-27 RX ADMIN — METRONIDAZOLE 500 MG: 500 INJECTION, SOLUTION INTRAVENOUS at 15:21

## 2018-10-27 RX ADMIN — Medication 3 ML: at 21:25

## 2018-10-27 RX ADMIN — Medication 5 MG: at 21:25

## 2018-10-27 RX ADMIN — AZATHIOPRINE 75 MG: 50 TABLET ORAL at 07:28

## 2018-10-27 RX ADMIN — Medication 1 TABLET: at 07:27

## 2018-10-27 RX ADMIN — METRONIDAZOLE 500 MG: 500 INJECTION, SOLUTION INTRAVENOUS at 00:17

## 2018-10-27 RX ADMIN — POTASSIUM CHLORIDE AND SODIUM CHLORIDE 100 ML/HR: 900; 150 INJECTION, SOLUTION INTRAVENOUS at 07:05

## 2018-10-27 RX ADMIN — Medication 5 MG: at 00:17

## 2018-10-27 RX ADMIN — LEVOFLOXACIN 500 MG: 5 INJECTION, SOLUTION INTRAVENOUS at 07:22

## 2018-10-28 LAB
ALBUMIN SERPL-MCNC: 2.9 G/DL (ref 3.5–5.2)
ALBUMIN/GLOB SERPL: 1.3 G/DL
ALP SERPL-CCNC: 35 U/L (ref 39–117)
ALT SERPL W P-5'-P-CCNC: 16 U/L (ref 1–33)
ANION GAP SERPL CALCULATED.3IONS-SCNC: 9.9 MMOL/L
AST SERPL-CCNC: 22 U/L (ref 1–32)
BASOPHILS # BLD AUTO: 0.01 10*3/MM3 (ref 0–0.2)
BASOPHILS NFR BLD AUTO: 0.2 % (ref 0–1.5)
BILIRUB SERPL-MCNC: 0.5 MG/DL (ref 0.1–1.2)
BUN BLD-MCNC: 3 MG/DL (ref 8–23)
BUN/CREAT SERPL: 3.6 (ref 7–25)
CALCIUM SPEC-SCNC: 7 MG/DL (ref 8.6–10.5)
CHLORIDE SERPL-SCNC: 107 MMOL/L (ref 98–107)
CO2 SERPL-SCNC: 23.1 MMOL/L (ref 22–29)
CREAT BLD-MCNC: 0.83 MG/DL (ref 0.57–1)
DEPRECATED RDW RBC AUTO: 48.2 FL (ref 37–54)
EOSINOPHIL # BLD AUTO: 0.26 10*3/MM3 (ref 0–0.7)
EOSINOPHIL NFR BLD AUTO: 5.7 % (ref 0.3–6.2)
ERYTHROCYTE [DISTWIDTH] IN BLOOD BY AUTOMATED COUNT: 14.6 % (ref 11.7–13)
GFR SERPL CREATININE-BSD FRML MDRD: 67 ML/MIN/1.73
GLOBULIN UR ELPH-MCNC: 2.2 GM/DL
GLUCOSE BLD-MCNC: 92 MG/DL (ref 65–99)
HCT VFR BLD AUTO: 29.3 % (ref 35.6–45.5)
HGB BLD-MCNC: 10 G/DL (ref 11.9–15.5)
IMM GRANULOCYTES # BLD: 0.01 10*3/MM3 (ref 0–0.03)
IMM GRANULOCYTES NFR BLD: 0.2 % (ref 0–0.5)
LYMPHOCYTES # BLD AUTO: 0.92 10*3/MM3 (ref 0.9–4.8)
LYMPHOCYTES NFR BLD AUTO: 20.1 % (ref 19.6–45.3)
MCH RBC QN AUTO: 30.6 PG (ref 26.9–32)
MCHC RBC AUTO-ENTMCNC: 34.1 G/DL (ref 32.4–36.3)
MCV RBC AUTO: 89.6 FL (ref 80.5–98.2)
MONOCYTES # BLD AUTO: 0.4 10*3/MM3 (ref 0.2–1.2)
MONOCYTES NFR BLD AUTO: 8.7 % (ref 5–12)
NEUTROPHILS # BLD AUTO: 2.99 10*3/MM3 (ref 1.9–8.1)
NEUTROPHILS NFR BLD AUTO: 65.3 % (ref 42.7–76)
PLATELET # BLD AUTO: 161 10*3/MM3 (ref 140–500)
PMV BLD AUTO: 9.4 FL (ref 6–12)
POTASSIUM BLD-SCNC: 3 MMOL/L (ref 3.5–5.2)
PROT SERPL-MCNC: 5.1 G/DL (ref 6–8.5)
RBC # BLD AUTO: 3.27 10*6/MM3 (ref 3.9–5.2)
SODIUM BLD-SCNC: 140 MMOL/L (ref 136–145)
WBC NRBC COR # BLD: 4.58 10*3/MM3 (ref 4.5–10.7)

## 2018-10-28 PROCEDURE — 87046 STOOL CULTR AEROBIC BACT EA: CPT | Performed by: HOSPITALIST

## 2018-10-28 PROCEDURE — 63710000001 AZATHIOPRINE PER 50 MG: Performed by: NURSE PRACTITIONER

## 2018-10-28 PROCEDURE — 80053 COMPREHEN METABOLIC PANEL: CPT | Performed by: HOSPITALIST

## 2018-10-28 PROCEDURE — 87045 FECES CULTURE AEROBIC BACT: CPT | Performed by: HOSPITALIST

## 2018-10-28 PROCEDURE — 99232 SBSQ HOSP IP/OBS MODERATE 35: CPT | Performed by: INTERNAL MEDICINE

## 2018-10-28 PROCEDURE — 85025 COMPLETE CBC W/AUTO DIFF WBC: CPT | Performed by: HOSPITALIST

## 2018-10-28 PROCEDURE — 25010000002 LEVOFLOXACIN PER 250 MG: Performed by: NURSE PRACTITIONER

## 2018-10-28 RX ORDER — POTASSIUM CHLORIDE 750 MG/1
40 CAPSULE, EXTENDED RELEASE ORAL AS NEEDED
Status: DISCONTINUED | OUTPATIENT
Start: 2018-10-28 | End: 2018-11-07 | Stop reason: HOSPADM

## 2018-10-28 RX ORDER — LEVOFLOXACIN 500 MG/1
500 TABLET, FILM COATED ORAL EVERY 24 HOURS
Status: DISCONTINUED | OUTPATIENT
Start: 2018-10-29 | End: 2018-10-29

## 2018-10-28 RX ORDER — POTASSIUM CHLORIDE 1.5 G/1.77G
40 POWDER, FOR SOLUTION ORAL AS NEEDED
Status: DISCONTINUED | OUTPATIENT
Start: 2018-10-28 | End: 2018-11-07 | Stop reason: HOSPADM

## 2018-10-28 RX ORDER — METRONIDAZOLE 500 MG/1
500 TABLET ORAL EVERY 8 HOURS SCHEDULED
Status: DISCONTINUED | OUTPATIENT
Start: 2018-10-28 | End: 2018-10-29

## 2018-10-28 RX ADMIN — POTASSIUM CHLORIDE 40 MEQ: 750 CAPSULE, EXTENDED RELEASE ORAL at 15:05

## 2018-10-28 RX ADMIN — POTASSIUM CHLORIDE 40 MEQ: 750 CAPSULE, EXTENDED RELEASE ORAL at 21:24

## 2018-10-28 RX ADMIN — Medication 1 TABLET: at 08:04

## 2018-10-28 RX ADMIN — CETIRIZINE HYDROCHLORIDE 10 MG: 10 TABLET, FILM COATED ORAL at 21:24

## 2018-10-28 RX ADMIN — LEVOFLOXACIN 500 MG: 5 INJECTION, SOLUTION INTRAVENOUS at 08:04

## 2018-10-28 RX ADMIN — LISINOPRIL: 10 TABLET ORAL at 08:04

## 2018-10-28 RX ADMIN — AZATHIOPRINE 75 MG: 50 TABLET ORAL at 08:04

## 2018-10-28 RX ADMIN — LEVOTHYROXINE SODIUM 75 MCG: 75 TABLET ORAL at 08:03

## 2018-10-28 RX ADMIN — METRONIDAZOLE 500 MG: 500 TABLET, FILM COATED ORAL at 21:24

## 2018-10-28 RX ADMIN — METRONIDAZOLE 500 MG: 500 INJECTION, SOLUTION INTRAVENOUS at 00:15

## 2018-10-28 RX ADMIN — Medication 3 ML: at 21:25

## 2018-10-28 RX ADMIN — METRONIDAZOLE 500 MG: 500 TABLET, FILM COATED ORAL at 15:09

## 2018-10-28 RX ADMIN — METRONIDAZOLE 500 MG: 500 INJECTION, SOLUTION INTRAVENOUS at 08:04

## 2018-10-28 RX ADMIN — ASPIRIN 81 MG: 81 TABLET, DELAYED RELEASE ORAL at 08:04

## 2018-10-29 ENCOUNTER — APPOINTMENT (OUTPATIENT)
Dept: MRI IMAGING | Facility: HOSPITAL | Age: 73
End: 2018-10-29

## 2018-10-29 LAB
C DIFF TOX GENS STL QL NAA+PROBE: NEGATIVE
CORTIS SERPL-MCNC: 7.38 MCG/DL
MAGNESIUM SERPL-MCNC: 1.5 MG/DL (ref 1.6–2.4)
POTASSIUM BLD-SCNC: 3.3 MMOL/L (ref 3.5–5.2)
POTASSIUM BLD-SCNC: 3.8 MMOL/L (ref 3.5–5.2)
POTASSIUM BLD-SCNC: 3.8 MMOL/L (ref 3.5–5.2)

## 2018-10-29 PROCEDURE — 84132 ASSAY OF SERUM POTASSIUM: CPT | Performed by: HOSPITALIST

## 2018-10-29 PROCEDURE — 83735 ASSAY OF MAGNESIUM: CPT | Performed by: HOSPITALIST

## 2018-10-29 PROCEDURE — A9577 INJ MULTIHANCE: HCPCS | Performed by: HOSPITALIST

## 2018-10-29 PROCEDURE — 99255 IP/OBS CONSLTJ NEW/EST HI 80: CPT | Performed by: INTERNAL MEDICINE

## 2018-10-29 PROCEDURE — 25010000002 ENOXAPARIN PER 10 MG: Performed by: HOSPITALIST

## 2018-10-29 PROCEDURE — 99232 SBSQ HOSP IP/OBS MODERATE 35: CPT | Performed by: INTERNAL MEDICINE

## 2018-10-29 PROCEDURE — 74183 MRI ABD W/O CNTR FLWD CNTR: CPT

## 2018-10-29 PROCEDURE — 63710000001 AZATHIOPRINE PER 50 MG: Performed by: NURSE PRACTITIONER

## 2018-10-29 PROCEDURE — 87493 C DIFF AMPLIFIED PROBE: CPT | Performed by: HOSPITALIST

## 2018-10-29 PROCEDURE — 25010000002 MAGNESIUM SULFATE IN D5W 1G/100ML (PREMIX) 1-5 GM/100ML-% SOLUTION: Performed by: HOSPITALIST

## 2018-10-29 PROCEDURE — 25810000003 SODIUM CHLORIDE 0.9 % WITH KCL 20 MEQ 20-0.9 MEQ/L-% SOLUTION: Performed by: NURSE PRACTITIONER

## 2018-10-29 PROCEDURE — 0 GADOBENATE DIMEGLUMINE 529 MG/ML SOLUTION: Performed by: HOSPITALIST

## 2018-10-29 PROCEDURE — 82533 TOTAL CORTISOL: CPT | Performed by: HOSPITALIST

## 2018-10-29 RX ORDER — AZITHROMYCIN 250 MG/1
500 TABLET, FILM COATED ORAL
Status: COMPLETED | OUTPATIENT
Start: 2018-10-29 | End: 2018-10-31

## 2018-10-29 RX ORDER — MAGNESIUM SULFATE 1 G/100ML
2 INJECTION INTRAVENOUS ONCE
Status: COMPLETED | OUTPATIENT
Start: 2018-10-29 | End: 2018-10-29

## 2018-10-29 RX ADMIN — METRONIDAZOLE 500 MG: 500 TABLET, FILM COATED ORAL at 07:10

## 2018-10-29 RX ADMIN — ASPIRIN 81 MG: 81 TABLET, DELAYED RELEASE ORAL at 09:24

## 2018-10-29 RX ADMIN — LEVOTHYROXINE SODIUM 75 MCG: 75 TABLET ORAL at 06:19

## 2018-10-29 RX ADMIN — AZITHROMYCIN 500 MG: 250 TABLET, FILM COATED ORAL at 11:15

## 2018-10-29 RX ADMIN — Medication 3 ML: at 21:23

## 2018-10-29 RX ADMIN — POTASSIUM CHLORIDE 40 MEQ: 750 CAPSULE, EXTENDED RELEASE ORAL at 04:14

## 2018-10-29 RX ADMIN — ENOXAPARIN SODIUM 40 MG: 40 INJECTION SUBCUTANEOUS at 14:32

## 2018-10-29 RX ADMIN — POTASSIUM CHLORIDE AND SODIUM CHLORIDE 100 ML/HR: 900; 150 INJECTION, SOLUTION INTRAVENOUS at 03:13

## 2018-10-29 RX ADMIN — Medication 3 ML: at 09:29

## 2018-10-29 RX ADMIN — POTASSIUM CHLORIDE 40 MEQ: 750 CAPSULE, EXTENDED RELEASE ORAL at 09:23

## 2018-10-29 RX ADMIN — AZATHIOPRINE 75 MG: 50 TABLET ORAL at 09:24

## 2018-10-29 RX ADMIN — Medication 1 TABLET: at 14:32

## 2018-10-29 RX ADMIN — Medication 5 MG: at 23:03

## 2018-10-29 RX ADMIN — GADOBENATE DIMEGLUMINE 12 ML: 529 INJECTION, SOLUTION INTRAVENOUS at 19:36

## 2018-10-29 RX ADMIN — CETIRIZINE HYDROCHLORIDE 10 MG: 10 TABLET, FILM COATED ORAL at 21:22

## 2018-10-29 RX ADMIN — MAGNESIUM SULFATE HEPTAHYDRATE 2 G: 1 INJECTION, SOLUTION INTRAVENOUS at 14:32

## 2018-10-30 LAB
BASOPHILS # BLD AUTO: 0 10*3/MM3 (ref 0–0.2)
BASOPHILS NFR BLD AUTO: 0 % (ref 0–1.5)
DEPRECATED RDW RBC AUTO: 48.7 FL (ref 37–54)
EOSINOPHIL # BLD AUTO: 0.21 10*3/MM3 (ref 0–0.7)
EOSINOPHIL NFR BLD AUTO: 5.2 % (ref 0.3–6.2)
ERYTHROCYTE [DISTWIDTH] IN BLOOD BY AUTOMATED COUNT: 14.6 % (ref 11.7–13)
HCT VFR BLD AUTO: 35.4 % (ref 35.6–45.5)
HGB BLD-MCNC: 11.2 G/DL (ref 11.9–15.5)
IMM GRANULOCYTES # BLD: 0 10*3/MM3 (ref 0–0.03)
IMM GRANULOCYTES NFR BLD: 0 % (ref 0–0.5)
LYMPHOCYTES # BLD AUTO: 0.94 10*3/MM3 (ref 0.9–4.8)
LYMPHOCYTES NFR BLD AUTO: 23.4 % (ref 19.6–45.3)
MCH RBC QN AUTO: 28.7 PG (ref 26.9–32)
MCHC RBC AUTO-ENTMCNC: 31.6 G/DL (ref 32.4–36.3)
MCV RBC AUTO: 90.8 FL (ref 80.5–98.2)
MONOCYTES # BLD AUTO: 0.43 10*3/MM3 (ref 0.2–1.2)
MONOCYTES NFR BLD AUTO: 10.7 % (ref 5–12)
NEUTROPHILS # BLD AUTO: 2.43 10*3/MM3 (ref 1.9–8.1)
NEUTROPHILS NFR BLD AUTO: 60.7 % (ref 42.7–76)
PLATELET # BLD AUTO: 188 10*3/MM3 (ref 140–500)
PMV BLD AUTO: 9.7 FL (ref 6–12)
RBC # BLD AUTO: 3.9 10*6/MM3 (ref 3.9–5.2)
WBC NRBC COR # BLD: 4.01 10*3/MM3 (ref 4.5–10.7)

## 2018-10-30 PROCEDURE — 25810000003 SODIUM CHLORIDE 0.9 % WITH KCL 20 MEQ 20-0.9 MEQ/L-% SOLUTION: Performed by: NURSE PRACTITIONER

## 2018-10-30 PROCEDURE — 63710000001 AZATHIOPRINE PER 50 MG: Performed by: NURSE PRACTITIONER

## 2018-10-30 PROCEDURE — 99232 SBSQ HOSP IP/OBS MODERATE 35: CPT | Performed by: INTERNAL MEDICINE

## 2018-10-30 PROCEDURE — 85025 COMPLETE CBC W/AUTO DIFF WBC: CPT | Performed by: INTERNAL MEDICINE

## 2018-10-30 RX ORDER — LOPERAMIDE HYDROCHLORIDE 2 MG/1
2 CAPSULE ORAL 2 TIMES DAILY
Status: DISCONTINUED | OUTPATIENT
Start: 2018-10-30 | End: 2018-10-31

## 2018-10-30 RX ORDER — LOPERAMIDE HYDROCHLORIDE 2 MG/1
2 CAPSULE ORAL 4 TIMES DAILY PRN
Status: DISCONTINUED | OUTPATIENT
Start: 2018-10-30 | End: 2018-11-07 | Stop reason: HOSPADM

## 2018-10-30 RX ORDER — HYDROCODONE BITARTRATE AND ACETAMINOPHEN 5; 325 MG/1; MG/1
1 TABLET ORAL EVERY 6 HOURS PRN
Status: DISCONTINUED | OUTPATIENT
Start: 2018-10-30 | End: 2018-11-07 | Stop reason: HOSPADM

## 2018-10-30 RX ADMIN — FLUTICASONE PROPIONATE 1 SPRAY: 50 SPRAY, METERED NASAL at 16:26

## 2018-10-30 RX ADMIN — HYDROCODONE BITARTRATE AND ACETAMINOPHEN 1 TABLET: 5; 325 TABLET ORAL at 18:56

## 2018-10-30 RX ADMIN — POTASSIUM CHLORIDE AND SODIUM CHLORIDE 100 ML/HR: 900; 150 INJECTION, SOLUTION INTRAVENOUS at 08:31

## 2018-10-30 RX ADMIN — Medication 3 ML: at 21:16

## 2018-10-30 RX ADMIN — ASPIRIN 81 MG: 81 TABLET, DELAYED RELEASE ORAL at 08:31

## 2018-10-30 RX ADMIN — LOPERAMIDE HYDROCHLORIDE 2 MG: 2 CAPSULE ORAL at 16:25

## 2018-10-30 RX ADMIN — LEVOTHYROXINE SODIUM 75 MCG: 75 TABLET ORAL at 06:23

## 2018-10-30 RX ADMIN — LOPERAMIDE HYDROCHLORIDE 2 MG: 2 CAPSULE ORAL at 21:16

## 2018-10-30 RX ADMIN — Medication 1 TABLET: at 11:54

## 2018-10-30 RX ADMIN — AZITHROMYCIN 500 MG: 250 TABLET, FILM COATED ORAL at 08:31

## 2018-10-30 RX ADMIN — AZATHIOPRINE 75 MG: 50 TABLET ORAL at 08:31

## 2018-10-30 RX ADMIN — CETIRIZINE HYDROCHLORIDE 10 MG: 10 TABLET, FILM COATED ORAL at 21:15

## 2018-10-31 LAB
ANION GAP SERPL CALCULATED.3IONS-SCNC: 7.8 MMOL/L
BASOPHILS # BLD AUTO: 0.02 10*3/MM3 (ref 0–0.2)
BASOPHILS NFR BLD AUTO: 0.5 % (ref 0–1.5)
BUN BLD-MCNC: <2 MG/DL (ref 8–23)
BUN/CREAT SERPL: ABNORMAL (ref 7–25)
CALCIUM SPEC-SCNC: 7.8 MG/DL (ref 8.6–10.5)
CHLORIDE SERPL-SCNC: 109 MMOL/L (ref 98–107)
CO2 SERPL-SCNC: 23.2 MMOL/L (ref 22–29)
CREAT BLD-MCNC: 0.7 MG/DL (ref 0.57–1)
DEPRECATED RDW RBC AUTO: 49.7 FL (ref 37–54)
EOSINOPHIL # BLD AUTO: 0.28 10*3/MM3 (ref 0–0.7)
EOSINOPHIL NFR BLD AUTO: 6.5 % (ref 0.3–6.2)
ERYTHROCYTE [DISTWIDTH] IN BLOOD BY AUTOMATED COUNT: 14.8 % (ref 11.7–13)
GFR SERPL CREATININE-BSD FRML MDRD: 82 ML/MIN/1.73
GLUCOSE BLD-MCNC: 91 MG/DL (ref 65–99)
HCT VFR BLD AUTO: 34.5 % (ref 35.6–45.5)
HGB BLD-MCNC: 10.8 G/DL (ref 11.9–15.5)
IMM GRANULOCYTES # BLD: 0 10*3/MM3 (ref 0–0.03)
IMM GRANULOCYTES NFR BLD: 0 % (ref 0–0.5)
LYMPHOCYTES # BLD AUTO: 1.13 10*3/MM3 (ref 0.9–4.8)
LYMPHOCYTES NFR BLD AUTO: 26.3 % (ref 19.6–45.3)
MCH RBC QN AUTO: 28.6 PG (ref 26.9–32)
MCHC RBC AUTO-ENTMCNC: 31.3 G/DL (ref 32.4–36.3)
MCV RBC AUTO: 91.3 FL (ref 80.5–98.2)
MONOCYTES # BLD AUTO: 0.45 10*3/MM3 (ref 0.2–1.2)
MONOCYTES NFR BLD AUTO: 10.5 % (ref 5–12)
NEUTROPHILS # BLD AUTO: 2.41 10*3/MM3 (ref 1.9–8.1)
NEUTROPHILS NFR BLD AUTO: 56.2 % (ref 42.7–76)
PLATELET # BLD AUTO: 203 10*3/MM3 (ref 140–500)
PMV BLD AUTO: 9.6 FL (ref 6–12)
POTASSIUM BLD-SCNC: 4 MMOL/L (ref 3.5–5.2)
RBC # BLD AUTO: 3.78 10*6/MM3 (ref 3.9–5.2)
SODIUM BLD-SCNC: 140 MMOL/L (ref 136–145)
WBC NRBC COR # BLD: 4.29 10*3/MM3 (ref 4.5–10.7)

## 2018-10-31 PROCEDURE — 85025 COMPLETE CBC W/AUTO DIFF WBC: CPT | Performed by: HOSPITALIST

## 2018-10-31 PROCEDURE — 80048 BASIC METABOLIC PNL TOTAL CA: CPT | Performed by: HOSPITALIST

## 2018-10-31 PROCEDURE — 99232 SBSQ HOSP IP/OBS MODERATE 35: CPT | Performed by: INTERNAL MEDICINE

## 2018-10-31 PROCEDURE — 63710000001 AZATHIOPRINE PER 50 MG: Performed by: NURSE PRACTITIONER

## 2018-10-31 PROCEDURE — 25810000003 SODIUM CHLORIDE 0.9 % WITH KCL 20 MEQ 20-0.9 MEQ/L-% SOLUTION: Performed by: NURSE PRACTITIONER

## 2018-10-31 RX ORDER — LOPERAMIDE HYDROCHLORIDE 2 MG/1
2 CAPSULE ORAL 3 TIMES DAILY
Status: DISCONTINUED | OUTPATIENT
Start: 2018-10-31 | End: 2018-11-07 | Stop reason: HOSPADM

## 2018-10-31 RX ORDER — LISINOPRIL 10 MG/1
10 TABLET ORAL
Status: DISCONTINUED | OUTPATIENT
Start: 2018-10-31 | End: 2018-11-02

## 2018-10-31 RX ADMIN — Medication 3 ML: at 10:23

## 2018-10-31 RX ADMIN — LOPERAMIDE HYDROCHLORIDE 2 MG: 2 CAPSULE ORAL at 10:22

## 2018-10-31 RX ADMIN — LOPERAMIDE HYDROCHLORIDE 2 MG: 2 CAPSULE ORAL at 22:07

## 2018-10-31 RX ADMIN — POTASSIUM CHLORIDE AND SODIUM CHLORIDE 100 ML/HR: 900; 150 INJECTION, SOLUTION INTRAVENOUS at 15:12

## 2018-10-31 RX ADMIN — POTASSIUM CHLORIDE AND SODIUM CHLORIDE 100 ML/HR: 900; 150 INJECTION, SOLUTION INTRAVENOUS at 04:38

## 2018-10-31 RX ADMIN — Medication 3 ML: at 22:08

## 2018-10-31 RX ADMIN — ASPIRIN 81 MG: 81 TABLET, DELAYED RELEASE ORAL at 10:22

## 2018-10-31 RX ADMIN — CETIRIZINE HYDROCHLORIDE 10 MG: 10 TABLET, FILM COATED ORAL at 22:07

## 2018-10-31 RX ADMIN — LEVOTHYROXINE SODIUM 75 MCG: 75 TABLET ORAL at 06:57

## 2018-10-31 RX ADMIN — Medication 1 TABLET: at 15:13

## 2018-10-31 RX ADMIN — AZATHIOPRINE 75 MG: 50 TABLET ORAL at 10:22

## 2018-10-31 RX ADMIN — AZITHROMYCIN 500 MG: 250 TABLET, FILM COATED ORAL at 10:22

## 2018-11-01 ENCOUNTER — RESULTS ENCOUNTER (OUTPATIENT)
Dept: FAMILY MEDICINE CLINIC | Facility: CLINIC | Age: 73
End: 2018-11-01

## 2018-11-01 ENCOUNTER — APPOINTMENT (OUTPATIENT)
Dept: CARDIOLOGY | Facility: HOSPITAL | Age: 73
End: 2018-11-01
Attending: HOSPITALIST

## 2018-11-01 DIAGNOSIS — R73.01 IMPAIRED FASTING GLUCOSE: ICD-10-CM

## 2018-11-01 DIAGNOSIS — E78.2 MIXED HYPERLIPIDEMIA: ICD-10-CM

## 2018-11-01 DIAGNOSIS — I10 ESSENTIAL HYPERTENSION: ICD-10-CM

## 2018-11-01 LAB
BH CV LOWER VASCULAR LEFT COMMON FEMORAL AUGMENT: NORMAL
BH CV LOWER VASCULAR LEFT COMMON FEMORAL COMPETENT: NORMAL
BH CV LOWER VASCULAR LEFT COMMON FEMORAL COMPRESS: NORMAL
BH CV LOWER VASCULAR LEFT COMMON FEMORAL PHASIC: NORMAL
BH CV LOWER VASCULAR LEFT COMMON FEMORAL SPONT: NORMAL
BH CV LOWER VASCULAR LEFT DISTAL FEMORAL COMPRESS: NORMAL
BH CV LOWER VASCULAR LEFT GASTRONEMIUS COMPRESS: NORMAL
BH CV LOWER VASCULAR LEFT GREATER SAPH AK COMPRESS: NORMAL
BH CV LOWER VASCULAR LEFT GREATER SAPH BK COMPRESS: NORMAL
BH CV LOWER VASCULAR LEFT LESSER SAPH COMPRESS: NORMAL
BH CV LOWER VASCULAR LEFT MID FEMORAL AUGMENT: NORMAL
BH CV LOWER VASCULAR LEFT MID FEMORAL COMPETENT: NORMAL
BH CV LOWER VASCULAR LEFT MID FEMORAL COMPRESS: NORMAL
BH CV LOWER VASCULAR LEFT MID FEMORAL PHASIC: NORMAL
BH CV LOWER VASCULAR LEFT MID FEMORAL SPONT: NORMAL
BH CV LOWER VASCULAR LEFT PERONEAL COMPRESS: NORMAL
BH CV LOWER VASCULAR LEFT POPLITEAL AUGMENT: NORMAL
BH CV LOWER VASCULAR LEFT POPLITEAL COMPETENT: NORMAL
BH CV LOWER VASCULAR LEFT POPLITEAL COMPRESS: NORMAL
BH CV LOWER VASCULAR LEFT POPLITEAL PHASIC: NORMAL
BH CV LOWER VASCULAR LEFT POPLITEAL SPONT: NORMAL
BH CV LOWER VASCULAR LEFT POSTERIOR TIBIAL COMPRESS: NORMAL
BH CV LOWER VASCULAR LEFT PROXIMAL FEMORAL COMPRESS: NORMAL
BH CV LOWER VASCULAR LEFT SAPHENOFEMORAL JUNCTION AUGMENT: NORMAL
BH CV LOWER VASCULAR LEFT SAPHENOFEMORAL JUNCTION COMPETENT: NORMAL
BH CV LOWER VASCULAR LEFT SAPHENOFEMORAL JUNCTION COMPRESS: NORMAL
BH CV LOWER VASCULAR LEFT SAPHENOFEMORAL JUNCTION PHASIC: NORMAL
BH CV LOWER VASCULAR LEFT SAPHENOFEMORAL JUNCTION SPONT: NORMAL
BH CV LOWER VASCULAR RIGHT COMMON FEMORAL AUGMENT: NORMAL
BH CV LOWER VASCULAR RIGHT COMMON FEMORAL COMPETENT: NORMAL
BH CV LOWER VASCULAR RIGHT COMMON FEMORAL COMPRESS: NORMAL
BH CV LOWER VASCULAR RIGHT COMMON FEMORAL PHASIC: NORMAL
BH CV LOWER VASCULAR RIGHT COMMON FEMORAL SPONT: NORMAL
BH CV LOWER VASCULAR RIGHT DISTAL FEMORAL COMPRESS: NORMAL
BH CV LOWER VASCULAR RIGHT GASTRONEMIUS COMPRESS: NORMAL
BH CV LOWER VASCULAR RIGHT GREATER SAPH AK COMPRESS: NORMAL
BH CV LOWER VASCULAR RIGHT GREATER SAPH BK COMPRESS: NORMAL
BH CV LOWER VASCULAR RIGHT LESSER SAPH COMPRESS: NORMAL
BH CV LOWER VASCULAR RIGHT MID FEMORAL AUGMENT: NORMAL
BH CV LOWER VASCULAR RIGHT MID FEMORAL COMPETENT: NORMAL
BH CV LOWER VASCULAR RIGHT MID FEMORAL COMPRESS: NORMAL
BH CV LOWER VASCULAR RIGHT MID FEMORAL PHASIC: NORMAL
BH CV LOWER VASCULAR RIGHT MID FEMORAL SPONT: NORMAL
BH CV LOWER VASCULAR RIGHT PERONEAL COMPRESS: NORMAL
BH CV LOWER VASCULAR RIGHT POPLITEAL AUGMENT: NORMAL
BH CV LOWER VASCULAR RIGHT POPLITEAL COMPETENT: NORMAL
BH CV LOWER VASCULAR RIGHT POPLITEAL COMPRESS: NORMAL
BH CV LOWER VASCULAR RIGHT POPLITEAL PHASIC: NORMAL
BH CV LOWER VASCULAR RIGHT POPLITEAL SPONT: NORMAL
BH CV LOWER VASCULAR RIGHT POSTERIOR TIBIAL COMPRESS: NORMAL
BH CV LOWER VASCULAR RIGHT PROXIMAL FEMORAL COMPRESS: NORMAL
BH CV LOWER VASCULAR RIGHT SAPHENOFEMORAL JUNCTION AUGMENT: NORMAL
BH CV LOWER VASCULAR RIGHT SAPHENOFEMORAL JUNCTION COMPETENT: NORMAL
BH CV LOWER VASCULAR RIGHT SAPHENOFEMORAL JUNCTION COMPRESS: NORMAL
BH CV LOWER VASCULAR RIGHT SAPHENOFEMORAL JUNCTION PHASIC: NORMAL
BH CV LOWER VASCULAR RIGHT SAPHENOFEMORAL JUNCTION SPONT: NORMAL
GLUCOSE BLDC GLUCOMTR-MCNC: 87 MG/DL (ref 70–130)
PROCALCITONIN SERPL-MCNC: 0.14 NG/ML (ref 0.1–0.25)

## 2018-11-01 PROCEDURE — 93970 EXTREMITY STUDY: CPT

## 2018-11-01 PROCEDURE — 25010000002 ENOXAPARIN PER 10 MG: Performed by: HOSPITALIST

## 2018-11-01 PROCEDURE — 63710000001 AZATHIOPRINE PER 50 MG: Performed by: NURSE PRACTITIONER

## 2018-11-01 PROCEDURE — 99232 SBSQ HOSP IP/OBS MODERATE 35: CPT | Performed by: INTERNAL MEDICINE

## 2018-11-01 PROCEDURE — 87040 BLOOD CULTURE FOR BACTERIA: CPT | Performed by: HOSPITALIST

## 2018-11-01 PROCEDURE — 82962 GLUCOSE BLOOD TEST: CPT

## 2018-11-01 PROCEDURE — 84145 PROCALCITONIN (PCT): CPT | Performed by: HOSPITALIST

## 2018-11-01 RX ORDER — MAGNESIUM CARB/ALUMINUM HYDROX 105-160MG
296 TABLET,CHEWABLE ORAL ONCE
Status: COMPLETED | OUTPATIENT
Start: 2018-11-01 | End: 2018-11-01

## 2018-11-01 RX ADMIN — Medication 3 ML: at 21:25

## 2018-11-01 RX ADMIN — ACETAMINOPHEN 650 MG: 325 TABLET, FILM COATED ORAL at 12:24

## 2018-11-01 RX ADMIN — LOPERAMIDE HYDROCHLORIDE 2 MG: 2 CAPSULE ORAL at 09:15

## 2018-11-01 RX ADMIN — Medication 1 TABLET: at 12:24

## 2018-11-01 RX ADMIN — Medication 3 ML: at 09:18

## 2018-11-01 RX ADMIN — LISINOPRIL 10 MG: 10 TABLET ORAL at 09:16

## 2018-11-01 RX ADMIN — ASPIRIN 81 MG: 81 TABLET, DELAYED RELEASE ORAL at 09:15

## 2018-11-01 RX ADMIN — ACETAMINOPHEN 650 MG: 325 TABLET, FILM COATED ORAL at 00:07

## 2018-11-01 RX ADMIN — CETIRIZINE HYDROCHLORIDE 10 MG: 10 TABLET, FILM COATED ORAL at 21:24

## 2018-11-01 RX ADMIN — LEVOTHYROXINE SODIUM 75 MCG: 75 TABLET ORAL at 06:53

## 2018-11-01 RX ADMIN — AZATHIOPRINE 75 MG: 50 TABLET ORAL at 09:15

## 2018-11-01 RX ADMIN — ENOXAPARIN SODIUM 40 MG: 40 INJECTION SUBCUTANEOUS at 14:16

## 2018-11-01 RX ADMIN — Medication 296 ML: at 14:16

## 2018-11-01 NOTE — PROGRESS NOTES
ID note  CC: f/u diarrhea  S: gas pain, some fevers overnight  O: Tm 100.7, nad, awake in bed, PIV sites look okay, abd s/nt/nd    A/p  1. EPEC  2. Autoimmune hepatitis on immunosuppression, complicating case  3. Collagenous colitis on colon biopsy in 2014    Agree with dopplers   Check Bcx if spikes temperature over 100.4 again  Hold abx  Don't think EPEC still active issue  Await GI eval--no better after treatment with two separate abx.

## 2018-11-01 NOTE — PROGRESS NOTES
[facility]     LOS: 6 days     Subjective:    Feeling ok, still having gas  bm 5-6 loose to liquid color has changed to brown    No cp, no soa    Sleeping some    Objective:    Vital Signs:  Temp:  [97.6 °F (36.4 °C)-100.7 °F (38.2 °C)] 99.9 °F (37.7 °C)  Heart Rate:  [62-92] 62  Resp:  [16-18] 16  BP: (132-171)/(62-80) 132/62    SpO2:  [94 %-99 %] 98 %  on   ;   Device (Oxygen Therapy): room air  Body mass index is 24.71 kg/m².    Physical Exam   Constitutional: She appears well-developed and well-nourished.   HENT:   Head: Normocephalic and atraumatic.   Cardiovascular: Normal rate and regular rhythm.    No murmur heard.  Pulmonary/Chest: Effort normal and breath sounds normal.   Abdominal: Soft. Bowel sounds are normal. She exhibits distension ( better). There is no tenderness.   Neurological: She is alert.   Skin: Skin is warm and dry.       Results Review:    Glucose   Date Value Ref Range Status   10/31/2018 91 65 - 99 mg/dL Final       Results from last 7 days  Lab Units 10/31/18  0541   WBC 10*3/mm3 4.29*   HEMOGLOBIN g/dL 10.8*   HEMATOCRIT % 34.5*   PLATELETS 10*3/mm3 203       Results from last 7 days  Lab Units 10/31/18  0541  10/28/18  0559   SODIUM mmol/L 140  --  140   POTASSIUM mmol/L 4.0  < > 3.0*   CHLORIDE mmol/L 109*  --  107   CO2 mmol/L 23.2  --  23.1   BUN mg/dL <2*  --  3*   CREATININE mg/dL 0.70  --  0.83   CALCIUM mg/dL 7.8*  --  7.0*   BILIRUBIN mg/dL  --   --  0.5   ALK PHOS U/L  --   --  35*   ALT (SGPT) U/L  --   --  16   AST (SGOT) U/L  --   --  22   GLUCOSE mg/dL 91  --  92   < > = values in this interval not displayed.        Results from last 7 days  Lab Units 10/29/18  0736   MAGNESIUM mg/dL 1.5*         Cultures:       I have reviewed daily medications and changes in CPOE    Scheduled meds    aspirin 81 mg Oral Daily   azaTHIOprine 75 mg Oral Daily   cetirizine 10 mg Oral Nightly   enoxaparin 40 mg Subcutaneous Q24H   levothyroxine 75 mcg Oral Q AM    lisinopril 10 mg Oral Q24H   loperamide 2 mg Oral TID   multivitamin 1 tablet Oral Daily   sodium chloride 3 mL Intravenous Q12H          PRN meds  •  acetaminophen  •  fluticasone  •  HYDROcodone-acetaminophen  •  loperamide  •  melatonin  •  ondansetron **OR** ondansetron ODT **OR** ondansetron  •  potassium chloride  •  potassium chloride  •  sodium chloride  •  [COMPLETED] Insert peripheral IV **AND** sodium chloride        Diarrhea    Mixed hyperlipidemia    Hypothyroidism    Hypertension    Autoimmune hepatitis treated with steroids (CMS/HCC)    Diverticulitis    Cholelithiasis        Assessment/Plan:    Diarrhea  -ecoli- EPAC  -change to zithromax  -GI IN + for EPEC  -schedule the immodium    Fevers-IS  -dopplers  -blood cultures      Mixed hyperlipidemia      Hypothyroidism      Hypertension      Autoimmune hepatitis treated with steroids (CMS/HCC)  -imuran      Diverticulitis-slightly better but with fevers      Cholelithiasis      hypokalemia- better, stop the bp medication     pancreatic cysts-no further workup    dvt prophylaxis-lovenox 40    Karl Cummings MD  11/01/18  6:38 AM

## 2018-11-01 NOTE — PROGRESS NOTES
[facility]  Inpatient Progress Note    Reason for Follow Up:  Diarrhea, autoimmune hepatitis    Subjective     Interval History:   Only one bowel movement this morning.  Fever overnight.  She had 5 bowel movements following eating yesterday.  Complaining of a lot of gas.    Current Facility-Administered Medications:   •  acetaminophen (TYLENOL) tablet 650 mg, 650 mg, Oral, Q4H PRN, Yessica Cummins E, APRN, 650 mg at 11/01/18 1224  •  aspirin EC tablet 81 mg, 81 mg, Oral, Daily, Yessica Cummins E, APRN, 81 mg at 11/01/18 0915  •  azaTHIOprine (IMURAN) tablet 75 mg, 75 mg, Oral, Daily, Yessica Cummins E, APRN, 75 mg at 11/01/18 0915  •  cetirizine (zyrTEC) tablet 10 mg, 10 mg, Oral, Nightly, Yessica Cummins, APRN, 10 mg at 10/31/18 2207  •  enoxaparin (LOVENOX) syringe 40 mg, 40 mg, Subcutaneous, Q24H, Karl Cummings MD, 40 mg at 10/29/18 1432  •  fluticasone (FLONASE) 50 MCG/ACT nasal spray 1 spray, 1 spray, Nasal, Daily PRN, Yessica Cummins APRN, 1 spray at 10/30/18 1626  •  HYDROcodone-acetaminophen (NORCO) 5-325 MG per tablet 1 tablet, 1 tablet, Oral, Q6H PRN, Karl Cummings MD, 1 tablet at 10/30/18 1856  •  levothyroxine (SYNTHROID, LEVOTHROID) tablet 75 mcg, 75 mcg, Oral, Q AM, Yessica Cummins, APRN, 75 mcg at 11/01/18 0653  •  lisinopril (PRINIVIL,ZESTRIL) tablet 10 mg, 10 mg, Oral, Q24H, Karl Cummings MD, 10 mg at 11/01/18 0916  •  loperamide (IMODIUM) capsule 2 mg, 2 mg, Oral, 4x Daily PRN, Sacha Lau MD, 2 mg at 10/30/18 1625  •  loperamide (IMODIUM) capsule 2 mg, 2 mg, Oral, TID, Karl Cummings MD, 2 mg at 11/01/18 0915  •  melatonin tablet 5 mg, 5 mg, Oral, Nightly PRN, Talha Serrano MD, 5 mg at 10/29/18 2303  •  multivitamin (THERAGRAN) tablet 1 tablet, 1 tablet, Oral, Daily, Yessica Cummins, APRN, 1 tablet at 11/01/18 1224  •  ondansetron (ZOFRAN) tablet 4 mg, 4 mg, Oral, Q6H PRN **OR** ondansetron ODT (ZOFRAN-ODT) disintegrating tablet 4 mg, 4 mg, Oral, Q6H  PRN **OR** ondansetron (ZOFRAN) injection 4 mg, 4 mg, Intravenous, Q6H PRN, Yessica Cummins, APRN  •  potassium chloride (KLOR-CON) packet 40 mEq, 40 mEq, Oral, PRN, Karl Cummings MD  •  potassium chloride (MICRO-K) CR capsule 40 mEq, 40 mEq, Oral, PRN, Karl Cummings MD, 40 mEq at 10/29/18 0923  •  sodium chloride 0.9 % flush 1-10 mL, 1-10 mL, Intravenous, PRN, Yessica Cummins, APRN  •  [COMPLETED] Insert peripheral IV, , , Once **AND** sodium chloride 0.9 % flush 10 mL, 10 mL, Intravenous, PRN, Kb Conway MD  •  sodium chloride 0.9 % flush 3 mL, 3 mL, Intravenous, Q12H, Yessica Cummins, APRN, 3 mL at 11/01/18 0918  Review of Systems:   All systems reviewed and negative except for: Constitution: fever, GI: gas, diarrhea  Objective     Vital Signs  Temp:  [97.8 °F (36.6 °C)-100.7 °F (38.2 °C)] 98.3 °F (36.8 °C)  Heart Rate:  [62-92] 85  Resp:  [16-18] 18  BP: (132-171)/(62-80) 135/73  Body mass index is 24.71 kg/m².    Intake/Output Summary (Last 24 hours) at 11/01/18 1230  Last data filed at 11/01/18 1136   Gross per 24 hour   Intake             1706 ml   Output             3850 ml   Net            -2144 ml     I/O this shift:  In: -   Out: 750 [Urine:650; Stool:100]     Physical Exam:   General: patient awake, alert and cooperative   Eyes: Normal lids and lashes, no scleral icterus   Neck: supple, normal ROM   Skin: warm and dry, not jaundiced   Cardiovascular: regular rhythm and rate, no murmurs auscultated   Pulm: clear to auscultation bilaterally, regular and unlabored   Abdomen: soft, nontender, nondistended; normal bowel sounds   Rectal: deferred   Extremities: no rash or edema   Psychiatric: Normal mood and behavior; memory intact     Results Review:     I reviewed the patient's new clinical results.      Results from last 7 days  Lab Units 10/31/18  0541 10/30/18  0643 10/28/18  0559   WBC 10*3/mm3 4.29* 4.01* 4.58   HEMOGLOBIN g/dL 10.8* 11.2* 10.0*   HEMATOCRIT % 34.5* 35.4* 29.3*    PLATELETS 10*3/mm3 203 188 161       Results from last 7 days  Lab Units 10/31/18  0541 10/29/18  1816 10/29/18  0736  10/28/18  0559 10/25/18  2350   SODIUM mmol/L 140  --   --   --  140 136   POTASSIUM mmol/L 4.0 3.8 3.8  < > 3.0* 3.5   CHLORIDE mmol/L 109*  --   --   --  107 96*   CO2 mmol/L 23.2  --   --   --  23.1 26.8   BUN mg/dL <2*  --   --   --  3* 10   CREATININE mg/dL 0.70  --   --   --  0.83 0.99   CALCIUM mg/dL 7.8*  --   --   --  7.0* 9.2   BILIRUBIN mg/dL  --   --   --   --  0.5 1.5*   ALK PHOS U/L  --   --   --   --  35* 58   ALT (SGPT) U/L  --   --   --   --  16 28   AST (SGOT) U/L  --   --   --   --  22 34*   GLUCOSE mg/dL 91  --   --   --  92 153*   < > = values in this interval not displayed.        Lab Results  Lab Value Date/Time   LIPASE 45 10/25/2018 2350   LIPASE 59 10/24/2018 0727   LIPASE 42 08/05/2018 1551   LIPASE 55 05/24/2018 0245       Radiology:  MRI Abdomen With & Without Contrast   Final Result   Two small adjacent benign appearing cysts within the body of   the pancreas as described. No additional imaging follow-up of these   lesions is deemed necessary.       This report was finalized on 10/30/2018 8:22 AM by Dr. Cruz West M.D.           Gallbladder   Final Result   1. Cholelithiasis.    2. 10 mm pancreatic cyst.       This report was finalized on 10/27/2018 5:53 AM by Talha Souza M.D.              Assessment/Plan     Patient Active Problem List   Diagnosis   • S/P thyroidectomy   • Mixed hyperlipidemia   • Vitamin D deficiency   • Impaired fasting glucose   • Hypothyroidism   • Osteopenia of multiple sites   • Hypertensive left ventricular hypertrophy, without heart failure   • Chronic seasonal allergic rhinitis   • History of heart disease   • Hx of abnormal mammogram   • Nodule of left lung   • History of diverticulosis   • Hypertension   • Diverticulitis of large intestine without perforation or abscess without bleeding   • Hyperglycemia   • Current chronic use  of systemic steroids   • Autoimmune hepatitis treated with steroids (CMS/HCC)   • Diarrhea   • Diverticulitis   • Cholelithiasis       Impression  1.  Sigmoid diverticulitis-also had flare in June; sigmoid inflammation  2.  Diarrhea-persists despite treatment of EPEC, ? Due to collagenous colitis. However, now febrile  3.  History of autoimmune hepatitis on Imuran  4.  History of collagenous colitis: on prior colonoscopy 2014  5.  Pancreatic cyst-seen on CT and ultrasound. MRCP w/ 2 small adjacent benign appearing cysts within the body ofthe pancreas . No additional imaging follow-up      Plan  She has completed antibiotics.  On my review of her chart, she has had diarrhea since October 19 without cessation.  This may be collagenous colitis but she also has inflammation on her CT.  This is been attributed to diverticulitis.  I think proceeding with a flexible sigmoidoscopy in the morning to assess the sigmoid area is the next step.  She does understand that she will need to have a full colonoscopy with Dr. Hein in a few months following discharge from the hospital.    Continue supportive care for diarrhea    Continue imuran    Continue diet as tolerated    Nothing by mouth at midnight, will give tap water enemas in the morning    I discussed the patients findings and my recommendations with patient and nursing staff.    Tiffany Avila MD

## 2018-11-01 NOTE — PLAN OF CARE
Problem: Patient Care Overview  Goal: Plan of Care Review  Outcome: Ongoing (interventions implemented as appropriate)   10/31/18 1831 10/31/18 2000 11/01/18 0407   Coping/Psychosocial   Plan of Care Reviewed With --  patient --    Plan of Care Review   Progress improving --  --    OTHER   Outcome Summary --  --  Steady gait. Temp max 100.7. Tylenol given. Continue diarrhea. On melinda Immodium. No c/o pain.      Goal: Individualization and Mutuality  Outcome: Ongoing (interventions implemented as appropriate)    Goal: Discharge Needs Assessment  Outcome: Ongoing (interventions implemented as appropriate)    Goal: Interprofessional Rounds/Family Conf  Outcome: Ongoing (interventions implemented as appropriate)      Problem: Bowel Disease, Inflammatory (Adult)  Goal: Signs and Symptoms of Listed Potential Problems Will be Absent, Minimized or Managed (Bowel Disease, Inflammatory)  Outcome: Ongoing (interventions implemented as appropriate)

## 2018-11-02 ENCOUNTER — ANESTHESIA EVENT (OUTPATIENT)
Dept: GASTROENTEROLOGY | Facility: HOSPITAL | Age: 73
End: 2018-11-02

## 2018-11-02 ENCOUNTER — ANESTHESIA (OUTPATIENT)
Dept: GASTROENTEROLOGY | Facility: HOSPITAL | Age: 73
End: 2018-11-02

## 2018-11-02 LAB
GLUCOSE BLDC GLUCOMTR-MCNC: 78 MG/DL (ref 70–130)
GLUCOSE BLDC GLUCOMTR-MCNC: 82 MG/DL (ref 70–130)

## 2018-11-02 PROCEDURE — 63710000001 AZATHIOPRINE PER 50 MG: Performed by: NURSE PRACTITIONER

## 2018-11-02 PROCEDURE — 25010000002 PROPOFOL 10 MG/ML EMULSION: Performed by: ANESTHESIOLOGY

## 2018-11-02 PROCEDURE — 0DBH8ZX EXCISION OF CECUM, VIA NATURAL OR ARTIFICIAL OPENING ENDOSCOPIC, DIAGNOSTIC: ICD-10-PCS | Performed by: INTERNAL MEDICINE

## 2018-11-02 PROCEDURE — 88305 TISSUE EXAM BY PATHOLOGIST: CPT | Performed by: INTERNAL MEDICINE

## 2018-11-02 PROCEDURE — 0DBE8ZX EXCISION OF LARGE INTESTINE, VIA NATURAL OR ARTIFICIAL OPENING ENDOSCOPIC, DIAGNOSTIC: ICD-10-PCS | Performed by: INTERNAL MEDICINE

## 2018-11-02 PROCEDURE — 0DBP8ZX EXCISION OF RECTUM, VIA NATURAL OR ARTIFICIAL OPENING ENDOSCOPIC, DIAGNOSTIC: ICD-10-PCS | Performed by: INTERNAL MEDICINE

## 2018-11-02 PROCEDURE — 88342 IMHCHEM/IMCYTCHM 1ST ANTB: CPT | Performed by: INTERNAL MEDICINE

## 2018-11-02 PROCEDURE — 45380 COLONOSCOPY AND BIOPSY: CPT | Performed by: INTERNAL MEDICINE

## 2018-11-02 PROCEDURE — 82962 GLUCOSE BLOOD TEST: CPT

## 2018-11-02 PROCEDURE — 99232 SBSQ HOSP IP/OBS MODERATE 35: CPT | Performed by: INTERNAL MEDICINE

## 2018-11-02 PROCEDURE — 87252 VIRUS INOCULATION TISSUE: CPT | Performed by: INTERNAL MEDICINE

## 2018-11-02 RX ORDER — SODIUM CHLORIDE, SODIUM LACTATE, POTASSIUM CHLORIDE, CALCIUM CHLORIDE 600; 310; 30; 20 MG/100ML; MG/100ML; MG/100ML; MG/100ML
INJECTION, SOLUTION INTRAVENOUS CONTINUOUS PRN
Status: DISCONTINUED | OUTPATIENT
Start: 2018-11-02 | End: 2018-11-02 | Stop reason: SURG

## 2018-11-02 RX ORDER — PROPOFOL 10 MG/ML
VIAL (ML) INTRAVENOUS AS NEEDED
Status: DISCONTINUED | OUTPATIENT
Start: 2018-11-02 | End: 2018-11-02 | Stop reason: SURG

## 2018-11-02 RX ORDER — LISINOPRIL 10 MG/1
10 TABLET ORAL
Status: DISCONTINUED | OUTPATIENT
Start: 2018-11-03 | End: 2018-11-07 | Stop reason: HOSPADM

## 2018-11-02 RX ORDER — PROPOFOL 10 MG/ML
VIAL (ML) INTRAVENOUS CONTINUOUS PRN
Status: DISCONTINUED | OUTPATIENT
Start: 2018-11-02 | End: 2018-11-02 | Stop reason: SURG

## 2018-11-02 RX ORDER — DIAPER,BRIEF,INFANT-TODD,DISP
EACH MISCELLANEOUS EVERY 12 HOURS SCHEDULED
Status: DISCONTINUED | OUTPATIENT
Start: 2018-11-02 | End: 2018-11-07 | Stop reason: HOSPADM

## 2018-11-02 RX ORDER — SODIUM CHLORIDE 9 MG/ML
1000 INJECTION, SOLUTION INTRAVENOUS CONTINUOUS
Status: ACTIVE | OUTPATIENT
Start: 2018-11-02 | End: 2018-11-04

## 2018-11-02 RX ORDER — SODIUM CHLORIDE 0.9 % (FLUSH) 0.9 %
3 SYRINGE (ML) INJECTION AS NEEDED
Status: DISCONTINUED | OUTPATIENT
Start: 2018-11-02 | End: 2018-11-02 | Stop reason: HOSPADM

## 2018-11-02 RX ORDER — LIDOCAINE HYDROCHLORIDE 20 MG/ML
INJECTION, SOLUTION INFILTRATION; PERINEURAL AS NEEDED
Status: DISCONTINUED | OUTPATIENT
Start: 2018-11-02 | End: 2018-11-02 | Stop reason: SURG

## 2018-11-02 RX ORDER — LIDOCAINE HYDROCHLORIDE 10 MG/ML
0.5 INJECTION, SOLUTION INFILTRATION; PERINEURAL ONCE AS NEEDED
Status: DISCONTINUED | OUTPATIENT
Start: 2018-11-02 | End: 2018-11-02 | Stop reason: HOSPADM

## 2018-11-02 RX ADMIN — AZATHIOPRINE 75 MG: 50 TABLET ORAL at 08:51

## 2018-11-02 RX ADMIN — PROPOFOL 200 MCG/KG/MIN: 10 INJECTION, EMULSION INTRAVENOUS at 12:22

## 2018-11-02 RX ADMIN — CETIRIZINE HYDROCHLORIDE 10 MG: 10 TABLET, FILM COATED ORAL at 20:17

## 2018-11-02 RX ADMIN — LEVOTHYROXINE SODIUM 75 MCG: 75 TABLET ORAL at 06:58

## 2018-11-02 RX ADMIN — LISINOPRIL 10 MG: 10 TABLET ORAL at 08:51

## 2018-11-02 RX ADMIN — PROPOFOL 125 MG: 10 INJECTION, EMULSION INTRAVENOUS at 12:22

## 2018-11-02 RX ADMIN — HYDROCORTISONE: 1 CREAM TOPICAL at 20:17

## 2018-11-02 RX ADMIN — SODIUM CHLORIDE 1000 ML: 9 INJECTION, SOLUTION INTRAVENOUS at 11:30

## 2018-11-02 RX ADMIN — Medication 3 ML: at 08:52

## 2018-11-02 RX ADMIN — Medication 3 ML: at 20:17

## 2018-11-02 RX ADMIN — LOPERAMIDE HYDROCHLORIDE 2 MG: 2 CAPSULE ORAL at 20:16

## 2018-11-02 RX ADMIN — SODIUM CHLORIDE, POTASSIUM CHLORIDE, SODIUM LACTATE AND CALCIUM CHLORIDE: 600; 310; 30; 20 INJECTION, SOLUTION INTRAVENOUS at 12:18

## 2018-11-02 RX ADMIN — LIDOCAINE HYDROCHLORIDE 50 MG: 20 INJECTION, SOLUTION INFILTRATION; PERINEURAL at 12:21

## 2018-11-02 NOTE — PLAN OF CARE
Problem: Patient Care Overview  Goal: Plan of Care Review  Outcome: Ongoing (interventions implemented as appropriate)   11/02/18 0144   OTHER   Outcome Summary Steady gait. NPO since midnight for Sigmoidoscopy 11/2. Continued diarrhea. C/O headache. No fevers tonight.      Goal: Individualization and Mutuality  Outcome: Ongoing (interventions implemented as appropriate)    Goal: Discharge Needs Assessment  Outcome: Ongoing (interventions implemented as appropriate)    Goal: Interprofessional Rounds/Family Conf  Outcome: Ongoing (interventions implemented as appropriate)      Problem: Bowel Disease, Inflammatory (Adult)  Goal: Signs and Symptoms of Listed Potential Problems Will be Absent, Minimized or Managed (Bowel Disease, Inflammatory)  Outcome: Ongoing (interventions implemented as appropriate)

## 2018-11-02 NOTE — ANESTHESIA PREPROCEDURE EVALUATION
Anesthesia Evaluation     Patient summary reviewed and Nursing notes reviewed   NPO Solid Status: > 8 hours  NPO Liquid Status: > 8 hours           Airway   Mallampati: II  Dental      Pulmonary - normal exam    breath sounds clear to auscultation  Cardiovascular - normal exam    (+) hypertension, CAD,       Neuro/Psych  GI/Hepatic/Renal/Endo    (+)   liver disease, hypothyroidism,     Musculoskeletal     Abdominal    Substance History      OB/GYN          Other      history of cancer                    Anesthesia Plan    ASA 3     MAC   total IV anesthesia  intravenous induction   Anesthetic plan, all risks, benefits, and alternatives have been provided, discussed and informed consent has been obtained with: patient.

## 2018-11-02 NOTE — PROGRESS NOTES
INFECTIOUS DISEASES PROGRESS NOTE    CC: f/u diarrhea    S:   Still with diarrhea  Has heat rash  No f/c/ns    O:  Physical Exam:  Temp:  [96.5 °F (35.8 °C)-98.3 °F (36.8 °C)] 97.7 °F (36.5 °C)  Heart Rate:  [60-66] 66  Resp:  [16] 16  BP: (121-144)/(59-67) 144/67  Physical Exam   Constitutional: She appears well-developed. No distress.   Pulmonary/Chest: Effort normal.   Abdominal: Soft. She exhibits no distension. There is no tenderness.   Neurological: She is alert.   Skin: Skin is warm and dry. There is erythema (back c/w miliaria).   Psychiatric: She has a normal mood and affect. Her behavior is normal.        Diagnostics:    Dopplers neg  Bcx ngtd     Assessment/Plan   1. EPEC  2. Autoimmune hepatitis on immunosuppression, complicating case  3. Collagenous colitis on colon biopsy in 2014    Felx sig today  Cont to hold abx  Will follow those results peripherally      Tavon Calixto MD  10:56 AM  11/02/18

## 2018-11-02 NOTE — PROGRESS NOTES
[facility]     LOS: 7 days     Subjective:  Had colonoscopy  Has some rash in the back    No cp, no soa    Objective:    Vital Signs:  Temp:  [96.5 °F (35.8 °C)-98 °F (36.7 °C)] 97.7 °F (36.5 °C)  Heart Rate:  [60-69] 69  Resp:  [14-18] 18  BP: ()/(33-69) 123/59    SpO2:  [97 %-99 %] 97 %  on   ;   Device (Oxygen Therapy): room air  Body mass index is 25.24 kg/m².    Physical Exam   Constitutional: She appears well-developed and well-nourished.   HENT:   Head: Normocephalic and atraumatic.   Cardiovascular: Normal rate and regular rhythm.    No murmur heard.  Pulmonary/Chest: Effort normal and breath sounds normal.   Abdominal: Soft. Bowel sounds are normal. She exhibits distension ( better). There is no tenderness.   Neurological: She is alert.   Skin: Skin is warm and dry.       Results Review:    Glucose   Date Value Ref Range Status   10/31/2018 91 65 - 99 mg/dL Final       Results from last 7 days  Lab Units 10/31/18  0541   WBC 10*3/mm3 4.29*   HEMOGLOBIN g/dL 10.8*   HEMATOCRIT % 34.5*   PLATELETS 10*3/mm3 203       Results from last 7 days  Lab Units 10/31/18  0541  10/28/18  0559   SODIUM mmol/L 140  --  140   POTASSIUM mmol/L 4.0  < > 3.0*   CHLORIDE mmol/L 109*  --  107   CO2 mmol/L 23.2  --  23.1   BUN mg/dL <2*  --  3*   CREATININE mg/dL 0.70  --  0.83   CALCIUM mg/dL 7.8*  --  7.0*   BILIRUBIN mg/dL  --   --  0.5   ALK PHOS U/L  --   --  35*   ALT (SGPT) U/L  --   --  16   AST (SGOT) U/L  --   --  22   GLUCOSE mg/dL 91  --  92   < > = values in this interval not displayed.        Results from last 7 days  Lab Units 10/29/18  0736   MAGNESIUM mg/dL 1.5*         Cultures:       I have reviewed daily medications and changes in CPOE    Scheduled meds    aspirin 81 mg Oral Daily   azaTHIOprine 75 mg Oral Daily   cetirizine 10 mg Oral Nightly   enoxaparin 40 mg Subcutaneous Q24H   hydrocortisone  Topical Q12H   levothyroxine 75 mcg Oral Q AM   [START ON 11/3/2018]  lisinopril 10 mg Oral Q24H   loperamide 2 mg Oral TID   multivitamin 1 tablet Oral Daily   sodium chloride 3 mL Intravenous Q12H         sodium chloride 1,000 mL Last Rate: Stopped (11/02/18 1331)     PRN meds  •  acetaminophen  •  fluticasone  •  HYDROcodone-acetaminophen  •  loperamide  •  melatonin  •  ondansetron **OR** ondansetron ODT **OR** ondansetron  •  potassium chloride  •  potassium chloride  •  sodium chloride        Diarrhea    Mixed hyperlipidemia    Hypothyroidism    Hypertension    Autoimmune hepatitis treated with steroids (CMS/HCC)    Diverticulitis    Cholelithiasis        Assessment/Plan:    Diarrhea  -ecoli- EPAC  -change to zithromax  -GI [place] + for EPEC  -schedule the imodium  - Colonoscopy performed today results pending    Fevers-IS  -dopplers  -blood cultures, neg      Mixed hyperlipidemia      Hypothyroidism      Hypertension      Autoimmune hepatitis treated with steroids (CMS/HCC)  -imuran      Diverticulitis-slightly better but with fevers      Cholelithiasis      hypokalemia- resolved     pancreatic cysts-no further workup    dvt prophylaxis-lovenox 40    rFida Barrientos MD  11/02/18  5:08 PM

## 2018-11-02 NOTE — PLAN OF CARE
Problem: Patient Care Overview  Goal: Plan of Care Review  Outcome: Ongoing (interventions implemented as appropriate)   11/02/18 7024   Coping/Psychosocial   Plan of Care Reviewed With patient   Plan of Care Review   Progress improving   OTHER   Outcome Summary Sigmoidoscopy completed. No c/o pain. Pt resting since procedure. VSS. Up ad chacho.      Goal: Individualization and Mutuality  Outcome: Ongoing (interventions implemented as appropriate)    Goal: Discharge Needs Assessment  Outcome: Ongoing (interventions implemented as appropriate)    Goal: Interprofessional Rounds/Family Conf  Outcome: Ongoing (interventions implemented as appropriate)      Problem: Bowel Disease, Inflammatory (Adult)  Goal: Signs and Symptoms of Listed Potential Problems Will be Absent, Minimized or Managed (Bowel Disease, Inflammatory)  Outcome: Ongoing (interventions implemented as appropriate)

## 2018-11-02 NOTE — PROGRESS NOTES
Continued Stay Note  [facility]     Patient Name: Mami Srivastava  MRN: [id number]  Today's Date: 11/2/2018    Admit Date: 10/25/2018          Discharge Plan     Row Name 11/02/18 1529       Plan    Plan Plans dc home with assist of friend.    Patient/Family in Agreement with Plan yes    Plan Comments Discharge plans unchanged. Plans dc home. List of personal caregivers given. No additional needs noted. Friend to transport per private auto. Continue to follow.               Discharge Codes    No documentation.           Natalie Morton RN

## 2018-11-02 NOTE — ANESTHESIA POSTPROCEDURE EVALUATION
"Patient: Mami Srivastava    Procedure Summary     Date:  11/02/18 Room / Location:  [facility] ENDOSCOPY 4 /  [facility]    Anesthesia Start:  1218 Anesthesia Stop:  1252    Procedure:  colonoscopy to cecum and terminal ileum (N/A ) Diagnosis:       Diarrhea, unspecified type      (Diarrhea, unspecified type [R19.7])    Surgeon:  Freeman Donaldson MD Provider:  Nikunj Ann MD    Anesthesia Type:  MAC ASA Status:  3          Anesthesia Type: MAC  Last vitals  BP   (!) 88/33 (11/02/18 1250)   Temp   36.7 °C (98 °F) (11/02/18 1124)   Pulse   61 (11/02/18 1250)   Resp   14 (11/02/18 1250)     SpO2   98 % (11/02/18 1250)     Post Anesthesia Care and Evaluation    Patient location during evaluation: PHASE II  Patient participation: complete - patient participated  Level of consciousness: sleepy but conscious  Pain management: adequate  Airway patency: patent  Anesthetic complications: No anesthetic complications    Cardiovascular status: acceptable  Respiratory status: acceptable  Hydration status: acceptable    Comments: BP (!) 88/33 (BP Location: Right arm, Patient Position: Lying)   Pulse 61   Temp 36.7 °C (98 °F) (Oral)   Resp 14   Ht 157.5 cm (62\")   Wt 62.6 kg (138 lb)   LMP  (LMP Unknown)   SpO2 98%   BMI 25.24 kg/m²               "

## 2018-11-03 LAB
CAMPYLOBACTER STL CULT: NORMAL
E COLI SXT STL QL IA: NEGATIVE
Lab: NORMAL
Lab: NORMAL
SALM + SHIG STL CULT: NORMAL

## 2018-11-03 PROCEDURE — 99232 SBSQ HOSP IP/OBS MODERATE 35: CPT | Performed by: INTERNAL MEDICINE

## 2018-11-03 PROCEDURE — 25010000002 ENOXAPARIN PER 10 MG: Performed by: HOSPITALIST

## 2018-11-03 PROCEDURE — 63710000001 AZATHIOPRINE PER 50 MG: Performed by: NURSE PRACTITIONER

## 2018-11-03 RX ADMIN — AZATHIOPRINE 75 MG: 50 TABLET ORAL at 09:58

## 2018-11-03 RX ADMIN — Medication 3 ML: at 10:02

## 2018-11-03 RX ADMIN — LOPERAMIDE HYDROCHLORIDE 2 MG: 2 CAPSULE ORAL at 21:41

## 2018-11-03 RX ADMIN — LEVOTHYROXINE SODIUM 75 MCG: 75 TABLET ORAL at 07:43

## 2018-11-03 RX ADMIN — ENOXAPARIN SODIUM 40 MG: 40 INJECTION SUBCUTANEOUS at 14:03

## 2018-11-03 RX ADMIN — HYDROCORTISONE: 1 CREAM TOPICAL at 10:02

## 2018-11-03 RX ADMIN — HYDROCORTISONE: 1 CREAM TOPICAL at 21:41

## 2018-11-03 RX ADMIN — LOPERAMIDE HYDROCHLORIDE 2 MG: 2 CAPSULE ORAL at 17:00

## 2018-11-03 RX ADMIN — LISINOPRIL 10 MG: 10 TABLET ORAL at 10:02

## 2018-11-03 RX ADMIN — Medication 5 MG: at 22:44

## 2018-11-03 RX ADMIN — CETIRIZINE HYDROCHLORIDE 10 MG: 10 TABLET, FILM COATED ORAL at 21:41

## 2018-11-03 RX ADMIN — LOPERAMIDE HYDROCHLORIDE 2 MG: 2 CAPSULE ORAL at 10:02

## 2018-11-03 RX ADMIN — Medication 3 ML: at 21:42

## 2018-11-03 RX ADMIN — Medication 1 TABLET: at 12:47

## 2018-11-03 NOTE — PROGRESS NOTES
[facility]  Inpatient Progress Note    Reason for Follow Up:  Diarrhea, pancreatic cyst    Subjective     Interval History:   Thinks she may have turned corner.  No diarrhea o/n.  Actually has some appetite this am.  Colonoscopy yesterday, normal macroscopically.      Current Facility-Administered Medications:   •  acetaminophen (TYLENOL) tablet 650 mg, 650 mg, Oral, Q4H PRN, Yessica Cummins, APRN, 650 mg at 11/01/18 1224  •  aspirin EC tablet 81 mg, 81 mg, Oral, Daily, Yessica Cummins, APRN, 81 mg at 11/01/18 0915  •  azaTHIOprine (IMURAN) tablet 75 mg, 75 mg, Oral, Daily, Yessica Cummins E, APRN, 75 mg at 11/03/18 0958  •  cetirizine (zyrTEC) tablet 10 mg, 10 mg, Oral, Nightly, Yessica Cummins, APRN, 10 mg at 11/02/18 2017  •  enoxaparin (LOVENOX) syringe 40 mg, 40 mg, Subcutaneous, Q24H, Karl Cummings MD, 40 mg at 11/01/18 1416  •  fluticasone (FLONASE) 50 MCG/ACT nasal spray 1 spray, 1 spray, Nasal, Daily PRN, Yessica Cummins APRN, 1 spray at 10/30/18 1626  •  HYDROcodone-acetaminophen (NORCO) 5-325 MG per tablet 1 tablet, 1 tablet, Oral, Q6H PRN, Karl Cummings MD, 1 tablet at 10/30/18 1856  •  hydrocortisone 1 % cream, , Topical, Q12H, Frida Barrientos MD  •  levothyroxine (SYNTHROID, LEVOTHROID) tablet 75 mcg, 75 mcg, Oral, Q AM, Yessica Cummins, APRN, 75 mcg at 11/03/18 0743  •  lisinopril (PRINIVIL,ZESTRIL) tablet 10 mg, 10 mg, Oral, Q24H, Frida Barrientos MD, 10 mg at 11/03/18 1002  •  loperamide (IMODIUM) capsule 2 mg, 2 mg, Oral, 4x Daily PRN, Sacha Lau MD, 2 mg at 10/30/18 1625  •  loperamide (IMODIUM) capsule 2 mg, 2 mg, Oral, TID, Karl Cummings MD, 2 mg at 11/03/18 1002  •  melatonin tablet 5 mg, 5 mg, Oral, Nightly PRN, Talha Serrano MD, 5 mg at 10/29/18 2303  •  multivitamin (THERAGRAN) tablet 1 tablet, 1 tablet, Oral, Daily, Yessica Cummins, APRN, 1 tablet at 11/01/18 1224  •  ondansetron (ZOFRAN) tablet 4 mg, 4 mg, Oral, Q6H PRN **OR**  ondansetron ODT (ZOFRAN-ODT) disintegrating tablet 4 mg, 4 mg, Oral, Q6H PRN **OR** ondansetron (ZOFRAN) injection 4 mg, 4 mg, Intravenous, Q6H PRN, Yessica Cummins, APRN  •  potassium chloride (KLOR-CON) packet 40 mEq, 40 mEq, Oral, PRN, Karl Cummings MD  •  potassium chloride (MICRO-K) CR capsule 40 mEq, 40 mEq, Oral, PRN, Karl Cummings MD, 40 mEq at 10/29/18 0923  •  sodium chloride 0.9 % flush 1-10 mL, 1-10 mL, Intravenous, PRN, Yessica Cummins, APRN  •  sodium chloride 0.9 % flush 3 mL, 3 mL, Intravenous, Q12H, Yessica Cummins APRN, 3 mL at 11/03/18 1002  •  sodium chloride 0.9 % infusion 1,000 mL, 1,000 mL, Intravenous, Continuous, Freeman Donaldson MD, Stopped at 11/02/18 1331  Review of Systems:    All systems were reviewed and negative except for:  Constitution:  positive for anorexia  Gastrointestinal: postitive for  diarrhea    Objective     Vital Signs  Temp:  [97.2 °F (36.2 °C)-98 °F (36.7 °C)] 97.4 °F (36.3 °C)  Heart Rate:  [61-80] 70  Resp:  [14-18] 18  BP: ()/(33-85) 136/69  Body mass index is 25.24 kg/m².    Intake/Output Summary (Last 24 hours) at 11/03/18 1044  Last data filed at 11/03/18 0420   Gross per 24 hour   Intake              890 ml   Output              300 ml   Net              590 ml     No intake/output data recorded.     Physical Exam:   General: patient awake, alert and cooperative   Eyes: Normal lids and lashes, no scleral icterus   Neck: supple, normal ROM   Skin: warm and dry, not jaundiced   Abdomen: soft, nontender, nondistended; normal bowel sounds   Extremities: no rash or edema   Psychiatric: Normal mood and behavior; memory intact     Results Review:     I reviewed the patient's new clinical results.      Results from last 7 days  Lab Units 10/31/18  0586 10/30/18  0643 10/28/18  0559   WBC 10*3/mm3 4.29* 4.01* 4.58   HEMOGLOBIN g/dL 10.8* 11.2* 10.0*   HEMATOCRIT % 34.5* 35.4* 29.3*   PLATELETS 10*3/mm3 203 188 161       Results from last 7 days  Lab Units  10/31/18  0541 10/29/18  1816 10/29/18  0736  10/28/18  0559   SODIUM mmol/L 140  --   --   --  140   POTASSIUM mmol/L 4.0 3.8 3.8  < > 3.0*   CHLORIDE mmol/L 109*  --   --   --  107   CO2 mmol/L 23.2  --   --   --  23.1   BUN mg/dL <2*  --   --   --  3*   CREATININE mg/dL 0.70  --   --   --  0.83   CALCIUM mg/dL 7.8*  --   --   --  7.0*   BILIRUBIN mg/dL  --   --   --   --  0.5   ALK PHOS U/L  --   --   --   --  35*   ALT (SGPT) U/L  --   --   --   --  16   AST (SGOT) U/L  --   --   --   --  22   GLUCOSE mg/dL 91  --   --   --  92   < > = values in this interval not displayed.        Lab Results  Lab Value Date/Time   LIPASE 45 10/25/2018 2350   LIPASE 59 10/24/2018 0727   LIPASE 42 08/05/2018 1551   LIPASE 55 05/24/2018 0245       Radiology:  MRI Abdomen With & Without Contrast   Final Result   Two small adjacent benign appearing cysts within the body of   the pancreas as described. No additional imaging follow-up of these   lesions is deemed necessary.       This report was finalized on 10/30/2018 8:22 AM by Dr. Cruz West M.D.          US Gallbladder   Final Result   1. Cholelithiasis.    2. 10 mm pancreatic cyst.       This report was finalized on 10/27/2018 5:53 AM by Talha Souza M.D.              Assessment/Plan     Patient Active Problem List   Diagnosis   • S/P thyroidectomy   • Mixed hyperlipidemia   • Vitamin D deficiency   • Impaired fasting glucose   • Hypothyroidism   • Osteopenia of multiple sites   • Hypertensive left ventricular hypertrophy, without heart failure   • Chronic seasonal allergic rhinitis   • History of heart disease   • Hx of abnormal mammogram   • Nodule of left lung   • History of diverticulosis   • Hypertension   • Diverticulitis of large intestine without perforation or abscess without bleeding   • Hyperglycemia   • Current chronic use of systemic steroids   • Autoimmune hepatitis treated with steroids (CMS/HCC)   • Diarrhea   • Diverticulitis   • Cholelithiasis          Assessment/Plan   Assessment:   1.  Sigmoid diverticulitis-recurrent  2.  Diarrhea-stool studies consistent with EP EC  3.  History of autoimmune hepatitis on Imuran  4.  History of collagenous colitis on prior colonoscopy 2014  5.  Cholelithiasis  6.  Pancreatic cyst-seen on CT and ultrasound. MRCP w/ 2 small adjacent benign appearing cysts within the body of  the pancreas . No additional imaging follow-up         Plan:   - Await results of biopsies from colonoscopy  - Continue current medical management  - No further f/u necessary for benign appearing pancreatic cysts    I discussed the patients findings and my recommendations with patient.         Sacha Lau M.D.  [facility]  [address] [place] [address]  Office: [phone]

## 2018-11-03 NOTE — PROGRESS NOTES
[facility]     LOS: 8 days     Subjective:  Had colonoscopy  No diarrhea from last night      Objective:    Vital Signs:  Temp:  [97.2 °F (36.2 °C)-97.7 °F (36.5 °C)] 97.4 °F (36.3 °C)  Heart Rate:  [61-80] 70  Resp:  [14-18] 18  BP: ()/(33-85) 136/69    SpO2:  [97 %-98 %] 97 %  on   ;   Device (Oxygen Therapy): room air  Body mass index is 25.24 kg/m².    Physical Exam   Constitutional: She appears well-developed and well-nourished.   HENT:   Head: Normocephalic and atraumatic.   Cardiovascular: Normal rate and regular rhythm.    No murmur heard.  Pulmonary/Chest: Effort normal and breath sounds normal.   Abdominal: Soft. Bowel sounds are normal. She exhibits distension ( better). There is no tenderness.   Neurological: She is alert.   Skin: Skin is warm and dry.       Results Review:    No results found for: GLUCOSE    Results from last 7 days  Lab Units 10/31/18  0541   WBC 10*3/mm3 4.29*   HEMOGLOBIN g/dL 10.8*   HEMATOCRIT % 34.5*   PLATELETS 10*3/mm3 203       Results from last 7 days  Lab Units 10/31/18  0541  10/28/18  0559   SODIUM mmol/L 140  --  140   POTASSIUM mmol/L 4.0  < > 3.0*   CHLORIDE mmol/L 109*  --  107   CO2 mmol/L 23.2  --  23.1   BUN mg/dL <2*  --  3*   CREATININE mg/dL 0.70  --  0.83   CALCIUM mg/dL 7.8*  --  7.0*   BILIRUBIN mg/dL  --   --  0.5   ALK PHOS U/L  --   --  35*   ALT (SGPT) U/L  --   --  16   AST (SGOT) U/L  --   --  22   GLUCOSE mg/dL 91  --  92   < > = values in this interval not displayed.        Results from last 7 days  Lab Units 10/29/18  0736   MAGNESIUM mg/dL 1.5*         Cultures:       I have reviewed daily medications and changes in CPOE    Scheduled meds    aspirin 81 mg Oral Daily   azaTHIOprine 75 mg Oral Daily   cetirizine 10 mg Oral Nightly   enoxaparin 40 mg Subcutaneous Q24H   hydrocortisone  Topical Q12H   levothyroxine 75 mcg Oral Q AM   lisinopril 10 mg Oral Q24H   loperamide 2 mg Oral TID   multivitamin 1 tablet Oral  Daily   sodium chloride 3 mL Intravenous Q12H         sodium chloride 1,000 mL Last Rate: Stopped (11/02/18 1331)     PRN meds  •  acetaminophen  •  fluticasone  •  HYDROcodone-acetaminophen  •  loperamide  •  melatonin  •  ondansetron **OR** ondansetron ODT **OR** ondansetron  •  potassium chloride  •  potassium chloride  •  sodium chloride        Diarrhea    Mixed hyperlipidemia    Hypothyroidism    Hypertension    Autoimmune hepatitis treated with steroids (CMS/HCC)    Diverticulitis    Cholelithiasis        Assessment/Plan:    Diarrhea  -ecoli- EPAC  -GI [place] + for enteropathic Escherichia coli ( no antibiotics per ID)  -schedule the imodium  - Colonoscopy performed today results pending    Fevers-IS  -dopplers  -blood cultures, neg      Mixed hyperlipidemia      Hypothyroidism      Hypertension      Autoimmune hepatitis treated with steroids (CMS/HCC)  -imuran      Diverticulitis-slightly better but with fevers      Cholelithiasis      hypokalemia- resolved     pancreatic cysts-no further workup    dvt prophylaxis-lovenox 40    Frida Barrientos MD  11/03/18  11:38 AM

## 2018-11-03 NOTE — PLAN OF CARE
Problem: Patient Care Overview  Goal: Plan of Care Review  Outcome: Ongoing (interventions implemented as appropriate)   11/03/18 0436   Coping/Psychosocial   Plan of Care Reviewed With patient   Plan of Care Review   Progress improving   OTHER   Outcome Summary colonoscopy yesterday. Bx taken. Denies pain tonight or nausea. Immodium given as scheduled. IV Saline locked.       Problem: Bowel Disease, Inflammatory (Adult)  Goal: Signs and Symptoms of Listed Potential Problems Will be Absent, Minimized or Managed (Bowel Disease, Inflammatory)  Outcome: Ongoing (interventions implemented as appropriate)

## 2018-11-03 NOTE — PLAN OF CARE
Problem: Patient Care Overview  Goal: Plan of Care Review  Outcome: Ongoing (interventions implemented as appropriate)   11/03/18 8933   Coping/Psychosocial   Plan of Care Reviewed With patient   Plan of Care Review   Progress no change   OTHER   Outcome Summary Pt had multiple watery diarrhea episodes mid-afternoon. Immodium continued. Decreased appetite. VSS. Will continue to monitor.     Goal: Individualization and Mutuality  Outcome: Ongoing (interventions implemented as appropriate)    Goal: Discharge Needs Assessment  Outcome: Ongoing (interventions implemented as appropriate)    Goal: Interprofessional Rounds/Family Conf  Outcome: Ongoing (interventions implemented as appropriate)      Problem: Bowel Disease, Inflammatory (Adult)  Goal: Signs and Symptoms of Listed Potential Problems Will be Absent, Minimized or Managed (Bowel Disease, Inflammatory)  Outcome: Ongoing (interventions implemented as appropriate)

## 2018-11-04 LAB
ANION GAP SERPL CALCULATED.3IONS-SCNC: 8.7 MMOL/L
BASOPHILS # BLD AUTO: 0.02 10*3/MM3 (ref 0–0.2)
BASOPHILS NFR BLD AUTO: 0.5 % (ref 0–1.5)
BUN BLD-MCNC: 7 MG/DL (ref 8–23)
BUN/CREAT SERPL: 9.2 (ref 7–25)
CALCIUM SPEC-SCNC: 8.9 MG/DL (ref 8.6–10.5)
CHLORIDE SERPL-SCNC: 104 MMOL/L (ref 98–107)
CO2 SERPL-SCNC: 27.3 MMOL/L (ref 22–29)
CREAT BLD-MCNC: 0.76 MG/DL (ref 0.57–1)
DEPRECATED RDW RBC AUTO: 47.7 FL (ref 37–54)
EOSINOPHIL # BLD AUTO: 0.19 10*3/MM3 (ref 0–0.7)
EOSINOPHIL NFR BLD AUTO: 4.3 % (ref 0.3–6.2)
ERYTHROCYTE [DISTWIDTH] IN BLOOD BY AUTOMATED COUNT: 14.8 % (ref 11.7–13)
GFR SERPL CREATININE-BSD FRML MDRD: 75 ML/MIN/1.73
GLUCOSE BLD-MCNC: 84 MG/DL (ref 65–99)
HCT VFR BLD AUTO: 33.2 % (ref 35.6–45.5)
HGB BLD-MCNC: 11.2 G/DL (ref 11.9–15.5)
IMM GRANULOCYTES # BLD: 0.01 10*3/MM3 (ref 0–0.03)
IMM GRANULOCYTES NFR BLD: 0.2 % (ref 0–0.5)
LYMPHOCYTES # BLD AUTO: 1.27 10*3/MM3 (ref 0.9–4.8)
LYMPHOCYTES NFR BLD AUTO: 29.1 % (ref 19.6–45.3)
MCH RBC QN AUTO: 29.8 PG (ref 26.9–32)
MCHC RBC AUTO-ENTMCNC: 33.7 G/DL (ref 32.4–36.3)
MCV RBC AUTO: 88.3 FL (ref 80.5–98.2)
MONOCYTES # BLD AUTO: 0.38 10*3/MM3 (ref 0.2–1.2)
MONOCYTES NFR BLD AUTO: 8.7 % (ref 5–12)
NEUTROPHILS # BLD AUTO: 2.51 10*3/MM3 (ref 1.9–8.1)
NEUTROPHILS NFR BLD AUTO: 57.4 % (ref 42.7–76)
PLATELET # BLD AUTO: 242 10*3/MM3 (ref 140–500)
PMV BLD AUTO: 9.1 FL (ref 6–12)
POTASSIUM BLD-SCNC: 3.5 MMOL/L (ref 3.5–5.2)
RBC # BLD AUTO: 3.76 10*6/MM3 (ref 3.9–5.2)
SODIUM BLD-SCNC: 140 MMOL/L (ref 136–145)
WBC NRBC COR # BLD: 4.37 10*3/MM3 (ref 4.5–10.7)

## 2018-11-04 PROCEDURE — 85025 COMPLETE CBC W/AUTO DIFF WBC: CPT | Performed by: INTERNAL MEDICINE

## 2018-11-04 PROCEDURE — 25010000002 ENOXAPARIN PER 10 MG: Performed by: HOSPITALIST

## 2018-11-04 PROCEDURE — 99232 SBSQ HOSP IP/OBS MODERATE 35: CPT | Performed by: INTERNAL MEDICINE

## 2018-11-04 PROCEDURE — 63710000001 AZATHIOPRINE PER 50 MG: Performed by: NURSE PRACTITIONER

## 2018-11-04 PROCEDURE — 80048 BASIC METABOLIC PNL TOTAL CA: CPT | Performed by: INTERNAL MEDICINE

## 2018-11-04 RX ADMIN — LEVOTHYROXINE SODIUM 75 MCG: 75 TABLET ORAL at 05:25

## 2018-11-04 RX ADMIN — POTASSIUM CHLORIDE 40 MEQ: 750 CAPSULE, EXTENDED RELEASE ORAL at 16:28

## 2018-11-04 RX ADMIN — Medication 5 MG: at 21:51

## 2018-11-04 RX ADMIN — LOPERAMIDE HYDROCHLORIDE 2 MG: 2 CAPSULE ORAL at 08:20

## 2018-11-04 RX ADMIN — ASPIRIN 81 MG: 81 TABLET, DELAYED RELEASE ORAL at 08:19

## 2018-11-04 RX ADMIN — POTASSIUM CHLORIDE 40 MEQ: 750 CAPSULE, EXTENDED RELEASE ORAL at 21:54

## 2018-11-04 RX ADMIN — AZATHIOPRINE 75 MG: 50 TABLET ORAL at 08:19

## 2018-11-04 RX ADMIN — Medication 3 ML: at 08:22

## 2018-11-04 RX ADMIN — LOPERAMIDE HYDROCHLORIDE 2 MG: 2 CAPSULE ORAL at 21:51

## 2018-11-04 RX ADMIN — HYDROCORTISONE: 1 CREAM TOPICAL at 08:20

## 2018-11-04 RX ADMIN — HYDROCORTISONE 1 APPLICATION: 1 CREAM TOPICAL at 21:52

## 2018-11-04 RX ADMIN — Medication 1 TABLET: at 13:10

## 2018-11-04 RX ADMIN — Medication 3 ML: at 21:52

## 2018-11-04 RX ADMIN — LISINOPRIL 10 MG: 10 TABLET ORAL at 08:20

## 2018-11-04 RX ADMIN — CETIRIZINE HYDROCHLORIDE 10 MG: 10 TABLET, FILM COATED ORAL at 21:51

## 2018-11-04 RX ADMIN — ENOXAPARIN SODIUM 40 MG: 40 INJECTION SUBCUTANEOUS at 13:11

## 2018-11-04 NOTE — PROGRESS NOTES
[facility]  Inpatient Progress Note    Reason for Follow Up:  Diarrhea, pancreatic cyst    Subjective     Interval History:   Had some diarrhea yesterday afternoon/evening, none yet today.  Frustrated.      Current Facility-Administered Medications:   •  acetaminophen (TYLENOL) tablet 650 mg, 650 mg, Oral, Q4H PRN, Yessica Cummins E, APRN, 650 mg at 11/01/18 1224  •  aspirin EC tablet 81 mg, 81 mg, Oral, Daily, Yessica Cummins E, APRN, 81 mg at 11/04/18 0819  •  azaTHIOprine (IMURAN) tablet 75 mg, 75 mg, Oral, Daily, ScotlandYessica brooks E, APRN, 75 mg at 11/04/18 0819  •  cetirizine (zyrTEC) tablet 10 mg, 10 mg, Oral, Nightly, Yessica Cummins, APRN, 10 mg at 11/03/18 2141  •  enoxaparin (LOVENOX) syringe 40 mg, 40 mg, Subcutaneous, Q24H, Karl Cummings MD, 40 mg at 11/04/18 1311  •  fluticasone (FLONASE) 50 MCG/ACT nasal spray 1 spray, 1 spray, Nasal, Daily PRN, Yessica Cummins, APRN, 1 spray at 10/30/18 1626  •  HYDROcodone-acetaminophen (NORCO) 5-325 MG per tablet 1 tablet, 1 tablet, Oral, Q6H PRN, Karl Cummings MD, 1 tablet at 10/30/18 1856  •  hydrocortisone 1 % cream, , Topical, Q12H, Frida Barrientos MD  •  levothyroxine (SYNTHROID, LEVOTHROID) tablet 75 mcg, 75 mcg, Oral, Q AM, Yessica Cummins, APRN, 75 mcg at 11/04/18 0525  •  lisinopril (PRINIVIL,ZESTRIL) tablet 10 mg, 10 mg, Oral, Q24H, Frida Barrientos MD, 10 mg at 11/04/18 0820  •  loperamide (IMODIUM) capsule 2 mg, 2 mg, Oral, 4x Daily PRN, Sacha Lau MD, 2 mg at 10/30/18 1625  •  loperamide (IMODIUM) capsule 2 mg, 2 mg, Oral, TID, Karl Cummings MD, 2 mg at 11/04/18 0820  •  melatonin tablet 5 mg, 5 mg, Oral, Nightly PRN, Talha Serrano MD, 5 mg at 11/03/18 7154  •  multivitamin (THERAGRAN) tablet 1 tablet, 1 tablet, Oral, Daily, Yessica Cummins, APRN, 1 tablet at 11/04/18 1310  •  ondansetron (ZOFRAN) tablet 4 mg, 4 mg, Oral, Q6H PRN **OR** ondansetron ODT (ZOFRAN-ODT) disintegrating tablet 4 mg, 4 mg,  Oral, Q6H PRN **OR** ondansetron (ZOFRAN) injection 4 mg, 4 mg, Intravenous, Q6H PRN, Yessica Cummins, APRN  •  potassium chloride (KLOR-CON) packet 40 mEq, 40 mEq, Oral, PRN, Karl Cummings MD  •  potassium chloride (MICRO-K) CR capsule 40 mEq, 40 mEq, Oral, PRN, Karl Cummings MD, 40 mEq at 10/29/18 0923  •  sodium chloride 0.9 % flush 1-10 mL, 1-10 mL, Intravenous, PRN, Yessica Cummins, APRN  •  sodium chloride 0.9 % flush 3 mL, 3 mL, Intravenous, Q12H, Yessica Cummins, APRN, 3 mL at 11/04/18 0822  Review of Systems:    All systems were reviewed and negative except for:  Constitution:  positive for anorexia  Gastrointestinal: postitive for  diarrhea    Objective     Vital Signs  Temp:  [96.7 °F (35.9 °C)-97.6 °F (36.4 °C)] 96.7 °F (35.9 °C)  Heart Rate:  [62-79] 75  Resp:  [16-18] 17  BP: (119-147)/(65-76) 140/76  Body mass index is 25.24 kg/m².  No intake or output data in the 24 hours ending 11/04/18 1554  No intake/output data recorded.     Physical Exam:   General: patient awake, alert and cooperative   Eyes: Normal lids and lashes, no scleral icterus   Neck: supple, normal ROM   Skin: warm and dry, not jaundiced   Abdomen: soft, nontender, nondistended; normal bowel sounds   Extremities: no rash or edema   Psychiatric: Normal mood and behavior; memory intact     Results Review:     I reviewed the patient's new clinical results.      Results from last 7 days  Lab Units 11/04/18  0532 10/31/18  0541 10/30/18  0643   WBC 10*3/mm3 4.37* 4.29* 4.01*   HEMOGLOBIN g/dL 11.2* 10.8* 11.2*   HEMATOCRIT % 33.2* 34.5* 35.4*   PLATELETS 10*3/mm3 242 203 188       Results from last 7 days  Lab Units 11/04/18  0532 10/31/18  0541 10/29/18  1816   SODIUM mmol/L 140 140  --    POTASSIUM mmol/L 3.5 4.0 3.8   CHLORIDE mmol/L 104 109*  --    CO2 mmol/L 27.3 23.2  --    BUN mg/dL 7* <2*  --    CREATININE mg/dL 0.76 0.70  --    CALCIUM mg/dL 8.9 7.8*  --    GLUCOSE mg/dL 84 91  --            Lab Results  Lab Value Date/Time    LIPASE 45 10/25/2018 2350   LIPASE 59 10/24/2018 0727   LIPASE 42 08/05/2018 1551   LIPASE 55 05/24/2018 0245       Radiology:  MRI Abdomen With & Without Contrast   Final Result   Two small adjacent benign appearing cysts within the body of   the pancreas as described. No additional imaging follow-up of these   lesions is deemed necessary.       This report was finalized on 10/30/2018 8:22 AM by Dr. Cruz West M.D.          US Gallbladder   Final Result   1. Cholelithiasis.    2. 10 mm pancreatic cyst.       This report was finalized on 10/27/2018 5:53 AM by Talha Souza M.D.              Assessment/Plan     Patient Active Problem List   Diagnosis   • S/P thyroidectomy   • Mixed hyperlipidemia   • Vitamin D deficiency   • Impaired fasting glucose   • Hypothyroidism   • Osteopenia of multiple sites   • Hypertensive left ventricular hypertrophy, without heart failure   • Chronic seasonal allergic rhinitis   • History of heart disease   • Hx of abnormal mammogram   • Nodule of left lung   • History of diverticulosis   • Hypertension   • Diverticulitis of large intestine without perforation or abscess without bleeding   • Hyperglycemia   • Current chronic use of systemic steroids   • Autoimmune hepatitis treated with steroids (CMS/HCC)   • Diarrhea   • Diverticulitis   • Cholelithiasis         Assessment/Plan   Assessment:   1.  Sigmoid diverticulitis-recurrent  2.  Diarrhea-stool studies consistent with EP EC  3.  History of autoimmune hepatitis on Imuran  4.  History of collagenous colitis on prior colonoscopy 2014  5.  Cholelithiasis  6.  Pancreatic cyst-seen on CT and ultrasound. MRCP w/ 2 small adjacent benign appearing cysts within the body of  the pancreas . No additional imaging follow-up     Plan:   - Await results of biopsies from colonoscopy, may need addition of budesonide if suggestive of microscopic colitis  - Continue current medical management  - No further f/u necessary for benign appearing  pancreatic cysts    I discussed the patients findings and my recommendations with patient.         Sacha Lau M.D.  [facility]  [address] [place] [address]  Office: [phone]

## 2018-11-04 NOTE — PLAN OF CARE
Problem: Patient Care Overview  Goal: Plan of Care Review  Outcome: Ongoing (interventions implemented as appropriate)   11/04/18 0555   Coping/Psychosocial   Plan of Care Reviewed With patient   Plan of Care Review   Progress no change   OTHER   Outcome Summary No request for pain meds tonight. Immodium scheduled given. Cont with loose stools tonight. Appitite decreased. Denies nausea       Problem: Bowel Disease, Inflammatory (Adult)  Goal: Signs and Symptoms of Listed Potential Problems Will be Absent, Minimized or Managed (Bowel Disease, Inflammatory)  Outcome: Ongoing (interventions implemented as appropriate)

## 2018-11-04 NOTE — PLAN OF CARE
Problem: Patient Care Overview  Goal: Plan of Care Review  Outcome: Ongoing (interventions implemented as appropriate)   11/04/18 1543   Coping/Psychosocial   Plan of Care Reviewed With patient   Plan of Care Review   Progress improving   OTHER   Outcome Summary No diarrhea today at this point in the day. No c/o pain or nausea. Will continue to monitor.     Goal: Individualization and Mutuality  Outcome: Ongoing (interventions implemented as appropriate)    Goal: Discharge Needs Assessment  Outcome: Ongoing (interventions implemented as appropriate)    Goal: Interprofessional Rounds/Family Conf  Outcome: Ongoing (interventions implemented as appropriate)      Problem: Bowel Disease, Inflammatory (Adult)  Goal: Signs and Symptoms of Listed Potential Problems Will be Absent, Minimized or Managed (Bowel Disease, Inflammatory)  Outcome: Ongoing (interventions implemented as appropriate)

## 2018-11-05 LAB — POTASSIUM BLD-SCNC: 4.1 MMOL/L (ref 3.5–5.2)

## 2018-11-05 PROCEDURE — 63710000001 AZATHIOPRINE PER 50 MG: Performed by: NURSE PRACTITIONER

## 2018-11-05 PROCEDURE — 99232 SBSQ HOSP IP/OBS MODERATE 35: CPT | Performed by: INTERNAL MEDICINE

## 2018-11-05 PROCEDURE — 84132 ASSAY OF SERUM POTASSIUM: CPT | Performed by: HOSPITALIST

## 2018-11-05 PROCEDURE — 25010000002 ENOXAPARIN PER 10 MG: Performed by: HOSPITALIST

## 2018-11-05 RX ADMIN — Medication 5 MG: at 20:34

## 2018-11-05 RX ADMIN — LOPERAMIDE HYDROCHLORIDE 2 MG: 2 CAPSULE ORAL at 15:38

## 2018-11-05 RX ADMIN — LOPERAMIDE HYDROCHLORIDE 2 MG: 2 CAPSULE ORAL at 20:34

## 2018-11-05 RX ADMIN — Medication 1 TABLET: at 11:02

## 2018-11-05 RX ADMIN — AZATHIOPRINE 75 MG: 50 TABLET ORAL at 08:45

## 2018-11-05 RX ADMIN — LOPERAMIDE HYDROCHLORIDE 2 MG: 2 CAPSULE ORAL at 06:28

## 2018-11-05 RX ADMIN — ENOXAPARIN SODIUM 40 MG: 40 INJECTION SUBCUTANEOUS at 15:38

## 2018-11-05 RX ADMIN — Medication 3 ML: at 10:12

## 2018-11-05 RX ADMIN — LEVOTHYROXINE SODIUM 75 MCG: 75 TABLET ORAL at 06:28

## 2018-11-05 RX ADMIN — Medication 3 ML: at 20:35

## 2018-11-05 RX ADMIN — CETIRIZINE HYDROCHLORIDE 10 MG: 10 TABLET, FILM COATED ORAL at 20:34

## 2018-11-05 RX ADMIN — ASPIRIN 81 MG: 81 TABLET, DELAYED RELEASE ORAL at 08:44

## 2018-11-05 RX ADMIN — LISINOPRIL 10 MG: 10 TABLET ORAL at 08:44

## 2018-11-05 RX ADMIN — HYDROCORTISONE: 1 CREAM TOPICAL at 20:43

## 2018-11-05 RX ADMIN — HYDROCORTISONE: 1 CREAM TOPICAL at 08:44

## 2018-11-05 RX ADMIN — LOPERAMIDE HYDROCHLORIDE 2 MG: 2 CAPSULE ORAL at 08:44

## 2018-11-05 NOTE — PLAN OF CARE
Problem: Patient Care Overview  Goal: Plan of Care Review  Outcome: Ongoing (interventions implemented as appropriate)   11/05/18 1720   Coping/Psychosocial   Plan of Care Reviewed With patient   Plan of Care Review   Progress improving   OTHER   Outcome Summary Pt. has had a decent day. Complaining of buttock pain due to excessive diarrhea - given a sitz bath today to comfort this. Given imodium as scheduled. Potassium adequate today - no protocol measures given. No nausea / vomiting.      Goal: Individualization and Mutuality  Outcome: Ongoing (interventions implemented as appropriate)    Goal: Discharge Needs Assessment  Outcome: Ongoing (interventions implemented as appropriate)    Goal: Interprofessional Rounds/Family Conf  Outcome: Ongoing (interventions implemented as appropriate)      Problem: Bowel Disease, Inflammatory (Adult)  Goal: Signs and Symptoms of Listed Potential Problems Will be Absent, Minimized or Managed (Bowel Disease, Inflammatory)  Outcome: Ongoing (interventions implemented as appropriate)

## 2018-11-05 NOTE — PROGRESS NOTES
Adult Nutrition  Assessment/PES    Patient Name:  Mami Srivastava  YOB: 1945  MRN: [id number]  Admit Date:  10/25/2018    Assessment Date:  11/5/2018    Nutrition assessment triggered by length of stay. Patient reported poor appetite due to diarrhea.  Provided nutrition therapy. Encouraged adequate fluid intake. She agreed to try boost breeze and powerade BID.  RD to monitor/follow          Reason for Assessment     Row Name 11/05/18 130          Reason for Assessment    Reason For Assessment per organizational policy   length of stay     Diagnosis   Primary Problem:  Diarrhea      Identified At Risk by Screening Criteria reduced oral intake over the last month               Anthropometrics     Row Name 11/05/18 1301          Body Mass Index (BMI)    BMI Assessment BMI 25-29.9: overweight             Labs/Tests/Procedures/Meds     Row Name 11/05/18 1301          Labs/Procedures/Meds    Lab Results Reviewed reviewed, pertinent        Diagnostic Tests/Procedures    Diagnostic Test/Procedure Reviewed reviewed, pertinent        Medications    Pertinent Medications Reviewed reviewed, pertinent     Pertinent Medications Comments multivitamin, NaCl             Physical Findings     Row Name 11/05/18 1301          Physical Findings    Overall Physical Appearance --   B=22             Estimated/Assessed Needs     Row Name 11/05/18 1301          Calculation Measurements    Weight Used For Calculations 62.6 kg (138 lb 0.1 oz)        Estimated/Assessed Needs    Additional Documentation KCAL/KG (Group);Protein Requirements (Group);Fluid Requirements (Group)        KCAL/KG                                                                kcal/kg (Specify) --   1439-5094        Gary-St. Jeor Equation    RMR (Gary-St. Jeor Equation) 1084.25        Protein Requirements    Est Protein Requirement Amount (gms/kg) 1.2 gm protein     Estimated Protein Requirements (gms/day) 75.12        Fluid Requirements     Estimated Fluid Requirements (mL/day) 1600     RDA Method (mL) 1600     Mariluz-Segar Method (over 20 kg) 2752             Nutrition Prescription Ordered     Row Name 11/05/18 1302          Nutrition Prescription PO    Common Modifiers Low Sodium     Low Sodium Details 2,000 mg Sodium             Evaluation of Received Nutrient/Fluid Intake     Row Name 11/05/18 1302 11/05/18 1301       Calculation Measurements    Weight Used For Calculations  -- 62.6 kg (138 lb 0.1 oz)       PO Evaluation    Number of Meals 3  --    % PO Intake 30  --            Evaluation of Prescribed Nutrient/Fluid Intake     Row Name 11/05/18 1301          Calculation Measurements    Weight Used For Calculations 62.6 kg (138 lb 0.1 oz)           Problem/Interventions:        Problem 1     Row Name 11/05/18 1302          Nutrition Diagnoses Problem 1    Problem 1 Inadequate Nutrient Intake     Etiology (related to) MNT for Treatment/Condition     Signs/Symptoms (evidenced by) Report of Mnimal PO Intake                     Intervention Goal     Row Name 11/05/18 1302          Intervention Goal    General Maintain nutrition;Meet nutritional needs for age/condition     PO Tolerate PO;Increase intake     Weight Maintain weight             Nutrition Intervention     Row Name 11/05/18 1302          Nutrition Intervention    RD/Tech Action Interview for preference;Follow Tx progress;Care plan reviewd;Encourage intake;Recommend/ordered;Supplement provided     Recommended/Ordered Supplement             Nutrition Prescription     Row Name 11/05/18 1302          Nutrition Prescription PO    PO Prescription Begin/change supplement     Supplement Boost Breeze     Supplement Frequency 2 times a day     New PO Prescription Ordered? Yes             Education/Evaluation     Row Name 11/05/18 1303          Education    Education Will Instruct as appropriate        Monitor/Evaluation    Monitor Per protocol     Education Follow-up Reinforce PRN          Electronically signed by:  Delia Oreilly RD  11/05/18 1:04 PM

## 2018-11-05 NOTE — PROGRESS NOTES
[facility]  Inpatient Progress Note    Reason for Follow Up:  Diarrhea, pancreatic cyst    Subjective     Interval History:    Diarrhea was better yesterday but this morning she had 5 loose stools with urgency and incontinence. Poor appetite.     Current Facility-Administered Medications:   •  acetaminophen (TYLENOL) tablet 650 mg, 650 mg, Oral, Q4H PRN, Yessica Cummins E, APRN, 650 mg at 11/01/18 1224  •  aspirin EC tablet 81 mg, 81 mg, Oral, Daily, Yessica Cummins E, APRN, 81 mg at 11/05/18 0844  •  azaTHIOprine (IMURAN) tablet 75 mg, 75 mg, Oral, Daily, IrionYessica brooks E, APRN, 75 mg at 11/05/18 0845  •  cetirizine (zyrTEC) tablet 10 mg, 10 mg, Oral, Nightly, Yessica Cummins E, APRN, 10 mg at 11/04/18 2151  •  enoxaparin (LOVENOX) syringe 40 mg, 40 mg, Subcutaneous, Q24H, Karl Cummings MD, 40 mg at 11/04/18 1311  •  fluticasone (FLONASE) 50 MCG/ACT nasal spray 1 spray, 1 spray, Nasal, Daily PRN, Yessica Cummins, APRN, 1 spray at 10/30/18 1626  •  HYDROcodone-acetaminophen (NORCO) 5-325 MG per tablet 1 tablet, 1 tablet, Oral, Q6H PRN, Karl Cummings MD, 1 tablet at 10/30/18 1856  •  hydrocortisone 1 % cream, , Topical, Q12H, Frida Barrientos MD  •  levothyroxine (SYNTHROID, LEVOTHROID) tablet 75 mcg, 75 mcg, Oral, Q AM, Yessica Cummins, APRN, 75 mcg at 11/05/18 0628  •  lisinopril (PRINIVIL,ZESTRIL) tablet 10 mg, 10 mg, Oral, Q24H, Frida Barrientos MD, 10 mg at 11/05/18 0844  •  loperamide (IMODIUM) capsule 2 mg, 2 mg, Oral, 4x Daily PRN, Sacha Lau MD, 2 mg at 11/05/18 0628  •  loperamide (IMODIUM) capsule 2 mg, 2 mg, Oral, TID, Karl Cummings MD, 2 mg at 11/05/18 0844  •  melatonin tablet 5 mg, 5 mg, Oral, Nightly PRN, Talha Serrano MD, 5 mg at 11/04/18 2151  •  multivitamin (THERAGRAN) tablet 1 tablet, 1 tablet, Oral, Daily, Yessica Cummins, APRN, 1 tablet at 11/05/18 1102  •  ondansetron (ZOFRAN) tablet 4 mg, 4 mg, Oral, Q6H PRN **OR** ondansetron ODT  (ZOFRAN-ODT) disintegrating tablet 4 mg, 4 mg, Oral, Q6H PRN **OR** ondansetron (ZOFRAN) injection 4 mg, 4 mg, Intravenous, Q6H PRN, Yessica Cummins, APRN  •  potassium chloride (KLOR-CON) packet 40 mEq, 40 mEq, Oral, PRN, Karl Cummings MD  •  potassium chloride (MICRO-K) CR capsule 40 mEq, 40 mEq, Oral, PRN, Karl Cummings MD, 40 mEq at 11/04/18 2154  •  sodium chloride 0.9 % flush 1-10 mL, 1-10 mL, Intravenous, PRN, Yessica Cummins, APRN  •  sodium chloride 0.9 % flush 3 mL, 3 mL, Intravenous, Q12H, Yessica Cummins, APRN, 3 mL at 11/05/18 1012  Review of Systems:    All systems were reviewed and negative except for:  Constitution:  positive for anorexia  Gastrointestinal: postitive for  diarrhea    Objective     Vital Signs  Temp:  [97 °F (36.1 °C)-97.8 °F (36.6 °C)] 97.1 °F (36.2 °C)  Heart Rate:  [68-85] 68  Resp:  [16-17] 16  BP: (115-143)/(64-76) 118/64  Body mass index is 25.24 kg/m².    Intake/Output Summary (Last 24 hours) at 11/05/18 1300  Last data filed at 11/05/18 1129   Gross per 24 hour   Intake              120 ml   Output                0 ml   Net              120 ml     I/O this shift:  In: 120 [P.O.:120]  Out: -      Physical Exam:   General: patient awake, alert and cooperative   Eyes: Normal lids and lashes, no scleral icterus   Neck: supple, normal ROM   Skin: warm and dry, not jaundiced   Abdomen: soft, nontender, nondistended; normal bowel sounds   Extremities: no rash or edema   Psychiatric: Normal mood and behavior; memory intact     Results Review:     I reviewed the patient's new clinical results.      Results from last 7 days  Lab Units 11/04/18  0532 10/31/18  0541 10/30/18  0643   WBC 10*3/mm3 4.37* 4.29* 4.01*   HEMOGLOBIN g/dL 11.2* 10.8* 11.2*   HEMATOCRIT % 33.2* 34.5* 35.4*   PLATELETS 10*3/mm3 242 203 188       Results from last 7 days  Lab Units 11/05/18  0649 11/04/18  0532 10/31/18  0541   SODIUM mmol/L  --  140 140   POTASSIUM mmol/L 4.1 3.5 4.0   CHLORIDE mmol/L  --   104 109*   CO2 mmol/L  --  27.3 23.2   BUN mg/dL  --  7* <2*   CREATININE mg/dL  --  0.76 0.70   CALCIUM mg/dL  --  8.9 7.8*   GLUCOSE mg/dL  --  84 91           Lab Results  Lab Value Date/Time   LIPASE 45 10/25/2018 2350   LIPASE 59 10/24/2018 0727   LIPASE 42 08/05/2018 1551   LIPASE 55 05/24/2018 0245       Radiology:  MRI Abdomen With & Without Contrast   Final Result   Two small adjacent benign appearing cysts within the body of   the pancreas as described. No additional imaging follow-up of these   lesions is deemed necessary.       This report was finalized on 10/30/2018 8:22 AM by Dr. Cruz West M.D.          US Gallbladder   Final Result   1. Cholelithiasis.    2. 10 mm pancreatic cyst.       This report was finalized on 10/27/2018 5:53 AM by Talha Souza M.D.              Assessment/Plan     Patient Active Problem List   Diagnosis   • S/P thyroidectomy   • Mixed hyperlipidemia   • Vitamin D deficiency   • Impaired fasting glucose   • Hypothyroidism   • Osteopenia of multiple sites   • Hypertensive left ventricular hypertrophy, without heart failure   • Chronic seasonal allergic rhinitis   • History of heart disease   • Hx of abnormal mammogram   • Nodule of left lung   • History of diverticulosis   • Hypertension   • Diverticulitis of large intestine without perforation or abscess without bleeding   • Hyperglycemia   • Current chronic use of systemic steroids   • Autoimmune hepatitis treated with steroids (CMS/HCC)   • Diarrhea   • Diverticulitis   • Cholelithiasis                Mariana Conway RYAN          We are following for diarrhea, now 11 pounds weight loss    Constitutional: She is oriented to person, place, and time. She appears well-developed and well-nourished.   HENT: anicteric, no thyromegaly  Head: Normocephalic and atraumatic.   Eyes: Conjunctivae and EOM are normal.   Neck: Normal range of motion. No tracheal deviation present. Cardiovascular: Normal rate and regular rhythm.     Pulmonary/Chest: Effort normal and breath sounds normal. No respiratory distress.   Abdominal: Soft. Bowel sounds are normal. She exhibits no distension and no mass. There is no tenderness. There is no rebound and no guarding.   Musculoskeletal: Normal range of motion.   Neurological: She is alert and oriented to person, place, and time.   Skin: Skin is warm and dry.   Psychiatric: She has a normal mood and affect. Judgment normal.   Nursing note and vitals reviewed.      Clinical Information    Please stain for CMV and herpes in addition    Final Diagnosis   1. Colon, Cecum, Biopsy:                A. No hyperplastic or tubulovillous change.               B. No significant inflammation.               C. No crypt distortion or basement membrane thickening.               D. No viral inclusions or other organisms on routinely stained sections (see comment).               E.  Negative for herpes by immunohistochemical stain.        2. Colon, 60 cm, Biopsy:                A. No hyperplastic or tubulovillous change.               B. No significant inflammation.               C. No crypt distortion or basement membrane thickening.               D. No viral inclusions or other organisms on routinely stained sections (see comment).               E.  Negative for herpes by immunohistochemical stain.     3. Colon, Rectum, Biopsy:               A. No hyperplastic or tubulovillous change.               B. No significant inflammation.               C. No crypt distortion or basement membrane thickening.               D. No viral inclusions or other organisms on routinely stained sections (see comment).               E.  Negative for herpes by immunohistochemical stain.     Comment: CMV stains will be ordered on specimens 1, 2, and 3 as requested.  Those results will follow in an addendum to this case.     [facility]             Assessment/Plan      Assessment:   1.  Sigmoid diverticulitis-recurrent  2.  Diarrhea-stool studies  consistent with EP EC  3.  History of autoimmune hepatitis on Imuran  4.  History of collagenous colitis on prior colonoscopy 2014  5.  Cholelithiasis  6.  Pancreatic cyst-seen on CT and ultrasound. MRCP w/ 2 small adjacent benign appearing cysts within the body of  the pancreas . No additional imaging follow-up           Plan:   - Await viral stains of biopsies from colonoscopy as microscopic colitis does not appear to be a diagnosis by biopsy  - Continue current medical management  - no follow up on pancreatic cyst needed

## 2018-11-05 NOTE — PLAN OF CARE
Problem: Patient Care Overview  Goal: Plan of Care Review  Outcome: Ongoing (interventions implemented as appropriate)   11/05/18 0432   Coping/Psychosocial   Plan of Care Reviewed With patient   Plan of Care Review   Progress no change   OTHER   Outcome Summary VSS. Ad chacho. K protocol. No BM until she received first 40 mEq dose. Imodium given as scheduled. No c/o pain or nausea. Cont to monitor.       Problem: Bowel Disease, Inflammatory (Adult)  Goal: Signs and Symptoms of Listed Potential Problems Will be Absent, Minimized or Managed (Bowel Disease, Inflammatory)  Outcome: Ongoing (interventions implemented as appropriate)

## 2018-11-05 NOTE — PROGRESS NOTES
[facility]     LOS: 10 days     Subjective:  Had colonoscopy  Still having watery stools again    Objective:    Vital Signs:  Temp:  [97 °F (36.1 °C)-97.8 °F (36.6 °C)] 97.8 °F (36.6 °C)  Heart Rate:  [66-85] 66  Resp:  [16-17] 16  BP: (115-143)/(60-76) 117/60    SpO2:  [96 %-98 %] 96 %  on   ;   Device (Oxygen Therapy): room air  Body mass index is 25.24 kg/m².    Physical Exam   Constitutional: She appears well-developed and well-nourished.   HENT:   Head: Normocephalic and atraumatic.   Cardiovascular: Normal rate and regular rhythm.    No murmur heard.  Pulmonary/Chest: Effort normal and breath sounds normal.   Abdominal: Soft. Bowel sounds are normal. There is no tenderness.   Neurological: She is alert.   Skin: Skin is warm and dry.       Results Review:    Glucose   Date Value Ref Range Status   11/04/2018 84 65 - 99 mg/dL Final       Results from last 7 days  Lab Units 11/04/18  0532   WBC 10*3/mm3 4.37*   HEMOGLOBIN g/dL 11.2*   HEMATOCRIT % 33.2*   PLATELETS 10*3/mm3 242       Results from last 7 days  Lab Units 11/05/18  0649 11/04/18  0532   SODIUM mmol/L  --  140   POTASSIUM mmol/L 4.1 3.5   CHLORIDE mmol/L  --  104   CO2 mmol/L  --  27.3   BUN mg/dL  --  7*   CREATININE mg/dL  --  0.76   CALCIUM mg/dL  --  8.9   GLUCOSE mg/dL  --  84                 Cultures:       I have reviewed daily medications and changes in CPOE    Scheduled meds    aspirin 81 mg Oral Daily   azaTHIOprine 75 mg Oral Daily   cetirizine 10 mg Oral Nightly   enoxaparin 40 mg Subcutaneous Q24H   hydrocortisone  Topical Q12H   levothyroxine 75 mcg Oral Q AM   lisinopril 10 mg Oral Q24H   loperamide 2 mg Oral TID   multivitamin 1 tablet Oral Daily   sodium chloride 3 mL Intravenous Q12H          PRN meds  •  acetaminophen  •  fluticasone  •  HYDROcodone-acetaminophen  •  loperamide  •  melatonin  •  ondansetron **OR** ondansetron ODT **OR** ondansetron  •  potassium chloride  •  potassium chloride  •   sodium chloride        Diarrhea    Mixed hyperlipidemia    Hypothyroidism    Hypertension    Autoimmune hepatitis treated with steroids (CMS/HCC)    Diverticulitis    Cholelithiasis        Assessment/Plan:    Diarrhea  -ecoli- EPAC  -GI SD + for enteropathic Escherichia coli ( no antibiotics per ID)  -schedule the imodium  - Colonoscopy performed today results pending  Clinical Information     Please stain for CMV and herpes in addition    Final Diagnosis   1. Colon, Cecum, Biopsy:                A. No hyperplastic or tubulovillous change.               B. No significant inflammation.               C. No crypt distortion or basement membrane thickening.               D. No viral inclusions or other organisms on routinely stained sections (see comment).               E.  Negative for herpes by immunohistochemical stain.        2. Colon, 60 cm, Biopsy:                A. No hyperplastic or tubulovillous change.               B. No significant inflammation.               C. No crypt distortion or basement membrane thickening.               D. No viral inclusions or other organisms on routinely stained sections (see comment).               E.  Negative for herpes by immunohistochemical stain.     3. Colon, Rectum, Biopsy:               A. No hyperplastic or tubulovillous change.               B. No significant inflammation.               C. No crypt distortion or basement membrane thickening.               D. No viral inclusions or other organisms on routinely stained sections (see comment).               E.  Negative for herpes by immunohistochemical stain.     Comment: CMV stains will be ordered on specimens 1, 2, and 3 as requested.  Those results will follow in an addendum to this case.         Mixed hyperlipidemia      Hypothyroidism      Hypertension      Autoimmune hepatitis treated with steroids (CMS/HCC)  -imuran      Diverticulitis-slightly better but with fevers      Cholelithiasis      hypokalemia-  resolved     pancreatic cysts-no further workup    dvt prophylaxis-lovenox 40    Frida Barrientos MD  11/05/18  4:02 PM

## 2018-11-06 PROBLEM — K58.0 IRRITABLE BOWEL SYNDROME WITH DIARRHEA: Status: ACTIVE | Noted: 2018-11-06

## 2018-11-06 LAB
BACTERIA SPEC AEROBE CULT: NORMAL
BACTERIA SPEC AEROBE CULT: NORMAL

## 2018-11-06 PROCEDURE — 63710000001 AZATHIOPRINE PER 50 MG: Performed by: NURSE PRACTITIONER

## 2018-11-06 PROCEDURE — 25010000002 ENOXAPARIN PER 10 MG: Performed by: HOSPITALIST

## 2018-11-06 RX ORDER — ALUMINA, MAGNESIA, AND SIMETHICONE 2400; 2400; 240 MG/30ML; MG/30ML; MG/30ML
15 SUSPENSION ORAL EVERY 6 HOURS PRN
Status: DISCONTINUED | OUTPATIENT
Start: 2018-11-06 | End: 2018-11-07 | Stop reason: HOSPADM

## 2018-11-06 RX ADMIN — LOPERAMIDE HYDROCHLORIDE 2 MG: 2 CAPSULE ORAL at 08:35

## 2018-11-06 RX ADMIN — RIFAXIMIN 550 MG: 550 TABLET ORAL at 20:37

## 2018-11-06 RX ADMIN — LOPERAMIDE HYDROCHLORIDE 2 MG: 2 CAPSULE ORAL at 02:28

## 2018-11-06 RX ADMIN — Medication 1 TABLET: at 12:38

## 2018-11-06 RX ADMIN — ENOXAPARIN SODIUM 40 MG: 40 INJECTION SUBCUTANEOUS at 15:26

## 2018-11-06 RX ADMIN — HYDROCORTISONE: 1 CREAM TOPICAL at 08:35

## 2018-11-06 RX ADMIN — Medication 3 ML: at 09:28

## 2018-11-06 RX ADMIN — LOPERAMIDE HYDROCHLORIDE 2 MG: 2 CAPSULE ORAL at 12:38

## 2018-11-06 RX ADMIN — LOPERAMIDE HYDROCHLORIDE 2 MG: 2 CAPSULE ORAL at 20:37

## 2018-11-06 RX ADMIN — ASPIRIN 81 MG: 81 TABLET, DELAYED RELEASE ORAL at 08:35

## 2018-11-06 RX ADMIN — HYDROCORTISONE: 1 CREAM TOPICAL at 20:42

## 2018-11-06 RX ADMIN — AZATHIOPRINE 75 MG: 50 TABLET ORAL at 08:34

## 2018-11-06 RX ADMIN — Medication 3 ML: at 20:38

## 2018-11-06 RX ADMIN — LEVOTHYROXINE SODIUM 75 MCG: 75 TABLET ORAL at 06:18

## 2018-11-06 RX ADMIN — LOPERAMIDE HYDROCHLORIDE 2 MG: 2 CAPSULE ORAL at 16:13

## 2018-11-06 RX ADMIN — CETIRIZINE HYDROCHLORIDE 10 MG: 10 TABLET, FILM COATED ORAL at 20:37

## 2018-11-06 RX ADMIN — LISINOPRIL 10 MG: 10 TABLET ORAL at 08:35

## 2018-11-06 NOTE — PROGRESS NOTES
[facility]  Inpatient Progress Note    Reason for Follow Up:  Diarrhea, pancreatic cyst    Subjective     Interval History:    Diarrhea persists. excoriation from excessive stools. Poor intake     Current Facility-Administered Medications:   •  acetaminophen (TYLENOL) tablet 650 mg, 650 mg, Oral, Q4H PRN, Yessica Cummins E, APRN, 650 mg at 11/01/18 1224  •  aspirin EC tablet 81 mg, 81 mg, Oral, Daily, Yessica Cummins E, APRN, 81 mg at 11/06/18 0835  •  azaTHIOprine (IMURAN) tablet 75 mg, 75 mg, Oral, Daily, PortageYessica brooks E, APRN, 75 mg at 11/06/18 0834  •  cetirizine (zyrTEC) tablet 10 mg, 10 mg, Oral, Nightly, Yessica Cummins, APRN, 10 mg at 11/05/18 2034  •  enoxaparin (LOVENOX) syringe 40 mg, 40 mg, Subcutaneous, Q24H, Karl Cummings MD, 40 mg at 11/05/18 1538  •  fluticasone (FLONASE) 50 MCG/ACT nasal spray 1 spray, 1 spray, Nasal, Daily PRN, Yessica Cummins, APRN, 1 spray at 10/30/18 1626  •  HYDROcodone-acetaminophen (NORCO) 5-325 MG per tablet 1 tablet, 1 tablet, Oral, Q6H PRN, Karl Cummings MD, 1 tablet at 10/30/18 1856  •  hydrocortisone 1 % cream, , Topical, Q12H, Frida Barrientos MD  •  levothyroxine (SYNTHROID, LEVOTHROID) tablet 75 mcg, 75 mcg, Oral, Q AM, Yessica Cummins, APRN, 75 mcg at 11/06/18 0618  •  lisinopril (PRINIVIL,ZESTRIL) tablet 10 mg, 10 mg, Oral, Q24H, Frida Barrientos MD, 10 mg at 11/06/18 0835  •  loperamide (IMODIUM) capsule 2 mg, 2 mg, Oral, 4x Daily PRN, Sacha Lau MD, 2 mg at 11/06/18 0228  •  loperamide (IMODIUM) capsule 2 mg, 2 mg, Oral, TID, Karl Cummings MD, 2 mg at 11/06/18 0835  •  melatonin tablet 5 mg, 5 mg, Oral, Nightly PRN, Talha Serrano MD, 5 mg at 11/05/18 2034  •  multivitamin (THERAGRAN) tablet 1 tablet, 1 tablet, Oral, Daily, Yessica Cummins, APRN, 1 tablet at 11/05/18 1102  •  ondansetron (ZOFRAN) tablet 4 mg, 4 mg, Oral, Q6H PRN **OR** ondansetron ODT (ZOFRAN-ODT) disintegrating tablet 4 mg, 4 mg, Oral, Q6H  PRN **OR** ondansetron (ZOFRAN) injection 4 mg, 4 mg, Intravenous, Q6H PRN, Yessica Cummins, APRN  •  potassium chloride (KLOR-CON) packet 40 mEq, 40 mEq, Oral, PRN, Karl Cummings MD  •  potassium chloride (MICRO-K) CR capsule 40 mEq, 40 mEq, Oral, PRN, Karl Cummings MD, 40 mEq at 11/04/18 2154  •  sodium chloride 0.9 % flush 1-10 mL, 1-10 mL, Intravenous, PRN, Yessica Cummins, APRN  •  sodium chloride 0.9 % flush 3 mL, 3 mL, Intravenous, Q12H, Yessica Cummins APRN, 3 mL at 11/06/18 0928  Review of Systems:    All systems were reviewed and negative except for:  Constitution:  positive for anorexia  Gastrointestinal: postitive for  diarrhea    Objective     Vital Signs  Temp:  [97.1 °F (36.2 °C)-98.3 °F (36.8 °C)] 97.1 °F (36.2 °C)  Heart Rate:  [66-84] 77  Resp:  [16] 16  BP: (117-127)/(60-69) 127/68  Body mass index is 25.24 kg/m².    Intake/Output Summary (Last 24 hours) at 11/06/18 1156  Last data filed at 11/06/18 0905   Gross per 24 hour   Intake              510 ml   Output                0 ml   Net              510 ml     I/O this shift:  In: 150 [P.O.:150]  Out: -      Physical Exam:   General: patient awake, alert and cooperative   Eyes: Normal lids and lashes, no scleral icterus   Neck: supple, normal ROM   Skin: warm and dry, not jaundiced   Abdomen: soft, nontender, nondistended; normal bowel sounds   Extremities: no rash or edema   Psychiatric: Normal mood and behavior; memory intact     Results Review:     I reviewed the patient's new clinical results.      Results from last 7 days  Lab Units 11/04/18  0532 10/31/18  0541   WBC 10*3/mm3 4.37* 4.29*   HEMOGLOBIN g/dL 11.2* 10.8*   HEMATOCRIT % 33.2* 34.5*   PLATELETS 10*3/mm3 242 203       Results from last 7 days  Lab Units 11/05/18  0649 11/04/18  0532 10/31/18  0541   SODIUM mmol/L  --  140 140   POTASSIUM mmol/L 4.1 3.5 4.0   CHLORIDE mmol/L  --  104 109*   CO2 mmol/L  --  27.3 23.2   BUN mg/dL  --  7* <2*   CREATININE mg/dL  --  0.76 0.70    CALCIUM mg/dL  --  8.9 7.8*   GLUCOSE mg/dL  --  84 91           Lab Results  Lab Value Date/Time   LIPASE 45 10/25/2018 2350   LIPASE 59 10/24/2018 0727   LIPASE 42 08/05/2018 1551   LIPASE 55 05/24/2018 0245       Radiology:  MRI Abdomen With & Without Contrast   Final Result   Two small adjacent benign appearing cysts within the body of   the pancreas as described. No additional imaging follow-up of these   lesions is deemed necessary.       This report was finalized on 10/30/2018 8:22 AM by Dr. Cruz West M.D.          US Gallbladder   Final Result   1. Cholelithiasis.    2. 10 mm pancreatic cyst.       This report was finalized on 10/27/2018 5:53 AM by Talha Souza M.D.              Assessment/Plan     Patient Active Problem List   Diagnosis   • S/P thyroidectomy   • Mixed hyperlipidemia   • Vitamin D deficiency   • Impaired fasting glucose   • Hypothyroidism   • Osteopenia of multiple sites   • Hypertensive left ventricular hypertrophy, without heart failure   • Chronic seasonal allergic rhinitis   • History of heart disease   • Hx of abnormal mammogram   • Nodule of left lung   • History of diverticulosis   • Hypertension   • Diverticulitis of large intestine without perforation or abscess without bleeding   • Hyperglycemia   • Current chronic use of systemic steroids   • Autoimmune hepatitis treated with steroids (CMS/HCC)   • Diarrhea   • Diverticulitis   • Cholelithiasis                Mariana Conway RYAN            We are following for diarrhea    She was off the floor today    Plan per nurse practitioner below     Assessment/Plan      Assessment:   1.  Sigmoid diverticulitis-recurrent  2.  Diarrhea-stool studies consistent with EP EC  3.  History of autoimmune hepatitis on Imuran  4.  History of collagenous colitis on prior colonoscopy 2014  5.  Cholelithiasis  6.  Pancreatic cyst-seen on CT and ultrasound. MRCP w/ 2 small adjacent benign appearing cysts within the body of  the pancreas . No  additional imaging follow-up           Plan:   - negative viral stains of biopsies from colonoscopy.   - Continue current medical management with antidiarrheals   - We can try Xifaxan for now for irritable bowel syndrome, she should have IB cause testing as an outpatient

## 2018-11-06 NOTE — PROGRESS NOTES
Continued Stay Note  [facility]     Patient Name: Mami Srivastava  MRN: [id number]  Today's Date: 11/6/2018    Admit Date: 10/25/2018          Discharge Plan     Row Name 11/06/18 1705       Plan    Plan Home with assist of family/friend when medically ready.    Patient/Family in Agreement with Plan yes    Plan Comments Spoke with patient at bedside. Discharge plans unchanged. Plans dc home. No needs identified. Continue to follow.              Discharge Codes    No documentation.           Natalie Morton RN

## 2018-11-06 NOTE — PLAN OF CARE
Problem: Patient Care Overview  Goal: Plan of Care Review  Outcome: Ongoing (interventions implemented as appropriate)   11/06/18 4011   Coping/Psychosocial   Plan of Care Reviewed With patient   Plan of Care Review   Progress no change   OTHER   Outcome Summary Continue to have loose bm. Feels raw in crotch area. Enjoys sitz bath. Will continue to monitor.     Goal: Individualization and Mutuality  Outcome: Ongoing (interventions implemented as appropriate)    Goal: Discharge Needs Assessment  Outcome: Ongoing (interventions implemented as appropriate)    Goal: Interprofessional Rounds/Family Conf  Outcome: Ongoing (interventions implemented as appropriate)

## 2018-11-06 NOTE — PROGRESS NOTES
[facility]     LOS: 11 days     Subjective:  Had colonoscopy  Still having watery stools again    Objective:    Vital Signs:  Temp:  [97.1 °F (36.2 °C)-98.3 °F (36.8 °C)] 97.1 °F (36.2 °C)  Heart Rate:  [77-84] 77  Resp:  [16] 16  BP: (121-127)/(68-69) 127/68    SpO2:  [97 %-100 %] 97 %  on   ;   Device (Oxygen Therapy): room air  Body mass index is 25.24 kg/m².    Physical Exam   Constitutional: She appears well-developed and well-nourished.   HENT:   Head: Normocephalic and atraumatic.   Cardiovascular: Normal rate and regular rhythm.    No murmur heard.  Pulmonary/Chest: Effort normal and breath sounds normal.   Abdominal: Soft. Bowel sounds are normal. There is no tenderness.   Neurological: She is alert.   Skin: Skin is warm and dry.       Results Review:    Glucose   Date Value Ref Range Status   11/04/2018 84 65 - 99 mg/dL Final       Results from last 7 days  Lab Units 11/04/18  0532   WBC 10*3/mm3 4.37*   HEMOGLOBIN g/dL 11.2*   HEMATOCRIT % 33.2*   PLATELETS 10*3/mm3 242       Results from last 7 days  Lab Units 11/05/18  0649 11/04/18  0532   SODIUM mmol/L  --  140   POTASSIUM mmol/L 4.1 3.5   CHLORIDE mmol/L  --  104   CO2 mmol/L  --  27.3   BUN mg/dL  --  7*   CREATININE mg/dL  --  0.76   CALCIUM mg/dL  --  8.9   GLUCOSE mg/dL  --  84                 Cultures:       I have reviewed daily medications and changes in CPOE    Scheduled meds    aspirin 81 mg Oral Daily   azaTHIOprine 75 mg Oral Daily   cetirizine 10 mg Oral Nightly   enoxaparin 40 mg Subcutaneous Q24H   hydrocortisone  Topical Q12H   levothyroxine 75 mcg Oral Q AM   lisinopril 10 mg Oral Q24H   loperamide 2 mg Oral TID   multivitamin 1 tablet Oral Daily   rifAXIMin 550 mg Oral Q12H   sodium chloride 3 mL Intravenous Q12H          PRN meds  •  acetaminophen  •  fluticasone  •  HYDROcodone-acetaminophen  •  loperamide  •  melatonin  •  ondansetron **OR** ondansetron ODT **OR** ondansetron  •  potassium chloride  •   potassium chloride  •  sodium chloride        Irritable bowel syndrome with diarrhea    Mixed hyperlipidemia    Hypothyroidism    Hypertension    Autoimmune hepatitis treated with steroids (CMS/HCC)    Diarrhea    Diverticulitis    Cholelithiasis        Assessment/Plan:    Diarrhea  -ecoli- EPAC  -GI LA + for enteropathic Escherichia coli ( no antibiotics per ID)  -schedule the imodium  - Colonoscopy performed today results are so far negative and non-diagnostic  - Trail of Xifaxan for irritable bowel syndrome    Clinical Information     Please stain for CMV and herpes in addition    Final Diagnosis   1. Colon, Cecum, Biopsy:                A. No hyperplastic or tubulovillous change.               B. No significant inflammation.               C. No crypt distortion or basement membrane thickening.               D. No viral inclusions or other organisms on routinely stained sections (see comment).               E.  Negative for herpes by immunohistochemical stain.        2. Colon, 60 cm, Biopsy:                A. No hyperplastic or tubulovillous change.               B. No significant inflammation.               C. No crypt distortion or basement membrane thickening.               D. No viral inclusions or other organisms on routinely stained sections (see comment).               E.  Negative for herpes by immunohistochemical stain.     3. Colon, Rectum, Biopsy:               A. No hyperplastic or tubulovillous change.               B. No significant inflammation.               C. No crypt distortion or basement membrane thickening.               D. No viral inclusions or other organisms on routinely stained sections (see comment).               E.  Negative for herpes by immunohistochemical stain.     Comment: CMV stains will be ordered on specimens 1, 2, and 3 as requested.  Those results will follow in an addendum to this case.         Mixed hyperlipidemia      Hypothyroidism      Hypertension      Autoimmune  hepatitis treated with steroids (CMS/HCC)  -imuran      Diverticulitis-slightly better but with fevers      Cholelithiasis      hypokalemia- resolved     pancreatic cysts-no further workup    dvt prophylaxis-lovenox 40    Home on Wednesday with home health      Frida Barrientos MD  11/06/18  5:18 PM

## 2018-11-06 NOTE — PLAN OF CARE
Problem: Patient Care Overview  Goal: Plan of Care Review  Outcome: Ongoing (interventions implemented as appropriate)   11/06/18 6855   Coping/Psychosocial   Plan of Care Reviewed With patient   Plan of Care Review   Progress no change   OTHER   Outcome Summary VSS, ad chacho, cont to have green watery diarrhea. Lomotil scheduled and lomotil PRN x 1. Frustrated no resolution to symptoms.

## 2018-11-07 ENCOUNTER — TELEPHONE (OUTPATIENT)
Dept: FAMILY MEDICINE CLINIC | Facility: CLINIC | Age: 73
End: 2018-11-07

## 2018-11-07 VITALS
SYSTOLIC BLOOD PRESSURE: 131 MMHG | RESPIRATION RATE: 18 BRPM | OXYGEN SATURATION: 97 % | TEMPERATURE: 98 F | HEART RATE: 70 BPM | BODY MASS INDEX: 25.4 KG/M2 | HEIGHT: 62 IN | DIASTOLIC BLOOD PRESSURE: 67 MMHG | WEIGHT: 138 LBS

## 2018-11-07 LAB — POTASSIUM BLD-SCNC: 3.8 MMOL/L (ref 3.5–5.2)

## 2018-11-07 PROCEDURE — 84132 ASSAY OF SERUM POTASSIUM: CPT | Performed by: INTERNAL MEDICINE

## 2018-11-07 PROCEDURE — 99232 SBSQ HOSP IP/OBS MODERATE 35: CPT | Performed by: INTERNAL MEDICINE

## 2018-11-07 PROCEDURE — 63710000001 AZATHIOPRINE PER 50 MG: Performed by: NURSE PRACTITIONER

## 2018-11-07 RX ORDER — LISINOPRIL 10 MG/1
10 TABLET ORAL
Qty: 30 TABLET | Refills: 0 | Status: SHIPPED | OUTPATIENT
Start: 2018-11-08 | End: 2019-08-22

## 2018-11-07 RX ORDER — NYSTATIN 100000 [USP'U]/G
POWDER TOPICAL EVERY 12 HOURS SCHEDULED
Status: DISCONTINUED | OUTPATIENT
Start: 2018-11-07 | End: 2018-11-07 | Stop reason: HOSPADM

## 2018-11-07 RX ORDER — LOPERAMIDE HYDROCHLORIDE 2 MG/1
2 CAPSULE ORAL 4 TIMES DAILY PRN
Qty: 50 CAPSULE | Refills: 0 | Status: SHIPPED | OUTPATIENT
Start: 2018-11-07 | End: 2019-08-22

## 2018-11-07 RX ADMIN — RIFAXIMIN 550 MG: 550 TABLET ORAL at 09:07

## 2018-11-07 RX ADMIN — Medication 5 MG: at 00:20

## 2018-11-07 RX ADMIN — LOPERAMIDE HYDROCHLORIDE 2 MG: 2 CAPSULE ORAL at 00:20

## 2018-11-07 RX ADMIN — LOPERAMIDE HYDROCHLORIDE 2 MG: 2 CAPSULE ORAL at 09:07

## 2018-11-07 RX ADMIN — LISINOPRIL 10 MG: 10 TABLET ORAL at 09:07

## 2018-11-07 RX ADMIN — Medication 1 TABLET: at 12:14

## 2018-11-07 RX ADMIN — LEVOTHYROXINE SODIUM 75 MCG: 75 TABLET ORAL at 06:37

## 2018-11-07 RX ADMIN — NYSTATIN: 100000 POWDER TOPICAL at 12:14

## 2018-11-07 RX ADMIN — ALUMINUM HYDROXIDE, MAGNESIUM HYDROXIDE, AND DIMETHICONE 15 ML: 400; 400; 40 SUSPENSION ORAL at 00:20

## 2018-11-07 RX ADMIN — Medication 3 ML: at 09:09

## 2018-11-07 RX ADMIN — ASPIRIN 81 MG: 81 TABLET, DELAYED RELEASE ORAL at 09:07

## 2018-11-07 RX ADMIN — AZATHIOPRINE 75 MG: 50 TABLET ORAL at 09:07

## 2018-11-07 RX ADMIN — HYDROCORTISONE: 1 CREAM TOPICAL at 09:09

## 2018-11-07 NOTE — PROGRESS NOTES
[facility]  Inpatient Progress Note    Reason for Follow Up:  Diarrhea, pancreatic cyst    Subjective     Interval History:   Very anxious about potential DC home today.  She is tolerating liquids without vomiting.      Current Facility-Administered Medications:   •  acetaminophen (TYLENOL) tablet 650 mg, 650 mg, Oral, Q4H PRN, Yessica Cummins, APRN, 650 mg at 11/01/18 1224  •  aluminum-magnesium hydroxide-simethicone (MAALOX MAX) 400-400-40 MG/5ML suspension 15 mL, 15 mL, Oral, Q6H PRN, Kurtis Moseley MD, 15 mL at 11/07/18 0020  •  aspirin EC tablet 81 mg, 81 mg, Oral, Daily, Yessica Cummins APRN, 81 mg at 11/06/18 0835  •  azaTHIOprine (IMURAN) tablet 75 mg, 75 mg, Oral, Daily, Yessica Cummins, APRN, 75 mg at 11/06/18 0834  •  cetirizine (zyrTEC) tablet 10 mg, 10 mg, Oral, Nightly, Yessica Cummins APRN, 10 mg at 11/06/18 2037  •  enoxaparin (LOVENOX) syringe 40 mg, 40 mg, Subcutaneous, Q24H, Karl Cummings MD, 40 mg at 11/06/18 1526  •  fluticasone (FLONASE) 50 MCG/ACT nasal spray 1 spray, 1 spray, Nasal, Daily PRN, Yessica Cummins APRN, 1 spray at 10/30/18 1626  •  HYDROcodone-acetaminophen (NORCO) 5-325 MG per tablet 1 tablet, 1 tablet, Oral, Q6H PRN, Karl Cummings MD, 1 tablet at 10/30/18 1856  •  hydrocortisone 1 % cream, , Topical, Q12H, Frida Barrientos MD  •  levothyroxine (SYNTHROID, LEVOTHROID) tablet 75 mcg, 75 mcg, Oral, Q AM, Yessica Cummins, APRN, 75 mcg at 11/07/18 0637  •  lisinopril (PRINIVIL,ZESTRIL) tablet 10 mg, 10 mg, Oral, Q24H, Frida Barrientos MD, 10 mg at 11/06/18 0835  •  loperamide (IMODIUM) capsule 2 mg, 2 mg, Oral, 4x Daily PRN, Sacha Lau MD, 2 mg at 11/07/18 0020  •  loperamide (IMODIUM) capsule 2 mg, 2 mg, Oral, TID, Karl Cummings MD, 2 mg at 11/06/18 2037  •  melatonin tablet 5 mg, 5 mg, Oral, Nightly PRN, Talha Serrano MD, 5 mg at 11/07/18 0020  •  multivitamin (THERAGRAN) tablet 1 tablet, 1 tablet, Oral, Daily, Placido,  RYAN Pace, 1 tablet at 11/06/18 1238  •  nystatin (MYCOSTATIN) powder, , Topical, Q12H, Cruz Fairbanks MD  •  ondansetron (ZOFRAN) tablet 4 mg, 4 mg, Oral, Q6H PRN **OR** ondansetron ODT (ZOFRAN-ODT) disintegrating tablet 4 mg, 4 mg, Oral, Q6H PRN **OR** ondansetron (ZOFRAN) injection 4 mg, 4 mg, Intravenous, Q6H PRN, Yessica Cummins APRN  •  potassium chloride (KLOR-CON) packet 40 mEq, 40 mEq, Oral, PRN, Karl Cummings MD  •  potassium chloride (MICRO-K) CR capsule 40 mEq, 40 mEq, Oral, PRN, Karl Cummings MD, 40 mEq at 11/04/18 2154  •  rifaximin (XIFAXAN) tablet 550 mg, 550 mg, Oral, Q12H, Sacha Gates MD, 550 mg at 11/06/18 2037  •  sodium chloride 0.9 % flush 1-10 mL, 1-10 mL, Intravenous, PRN, Yessica Cummins APRN  •  sodium chloride 0.9 % flush 3 mL, 3 mL, Intravenous, Q12H, Yessica Cummins APRN, 3 mL at 11/06/18 2038  Review of Systems:    All systems were reviewed and negative except for:  Constitution:  positive for anorexia  Gastrointestinal: postitive for  diarrhea    Objective     Vital Signs  Temp:  [97 °F (36.1 °C)-98 °F (36.7 °C)] 97 °F (36.1 °C)  Heart Rate:  [68-77] 68  Resp:  [16] 16  BP: (114-149)/(68-77) 114/74  Body mass index is 25.24 kg/m².    Intake/Output Summary (Last 24 hours) at 11/07/18 0730  Last data filed at 11/07/18 0638   Gross per 24 hour   Intake              840 ml   Output                0 ml   Net              840 ml     No intake/output data recorded.     Physical Exam:   General: patient awake, alert and cooperative   Abdomen: soft, nontender, nondistended; normal bowel sounds   Extremities: no rash or edema   Psychiatric: Normal mood and behavior; memory intact     Results Review:     I reviewed the patient's new clinical results.      Results from last 7 days  Lab Units 11/04/18  0532   WBC 10*3/mm3 4.37*   HEMOGLOBIN g/dL 11.2*   HEMATOCRIT % 33.2*   PLATELETS 10*3/mm3 242       Results from last 7 days  Lab Units 11/07/18  0618 11/05/18  0649  11/04/18  0532   SODIUM mmol/L  --   --  140   POTASSIUM mmol/L 3.8 4.1 3.5   CHLORIDE mmol/L  --   --  104   CO2 mmol/L  --   --  27.3   BUN mg/dL  --   --  7*   CREATININE mg/dL  --   --  0.76   CALCIUM mg/dL  --   --  8.9   GLUCOSE mg/dL  --   --  84           Lab Results  Lab Value Date/Time   LIPASE 45 10/25/2018 2350   LIPASE 59 10/24/2018 0727   LIPASE 42 08/05/2018 1551   LIPASE 55 05/24/2018 0245       Radiology:  MRI Abdomen With & Without Contrast   Final Result   Two small adjacent benign appearing cysts within the body of   the pancreas as described. No additional imaging follow-up of these   lesions is deemed necessary.       This report was finalized on 10/30/2018 8:22 AM by Dr. Cruz West M.D.          US Gallbladder   Final Result   1. Cholelithiasis.    2. 10 mm pancreatic cyst.       This report was finalized on 10/27/2018 5:53 AM by Talha Souza M.D.              Assessment/Plan     Patient Active Problem List   Diagnosis   • S/P thyroidectomy   • Mixed hyperlipidemia   • Vitamin D deficiency   • Impaired fasting glucose   • Hypothyroidism   • Osteopenia of multiple sites   • Hypertensive left ventricular hypertrophy, without heart failure   • Chronic seasonal allergic rhinitis   • History of heart disease   • Hx of abnormal mammogram   • Nodule of left lung   • History of diverticulosis   • Hypertension   • Diverticulitis of large intestine without perforation or abscess without bleeding   • Hyperglycemia   • Current chronic use of systemic steroids   • Autoimmune hepatitis treated with steroids (CMS/HCC)   • Diarrhea   • Diverticulitis   • Cholelithiasis   • Irritable bowel syndrome with diarrhea          Assessment/Plan      Assessment:   1.  Sigmoid diverticulitis-recurrent  2.  Diarrhea-stool studies consistent with EP EC  3.  History of autoimmune hepatitis on Imuran  4.  History of collagenous colitis on prior colonoscopy 2014  5.  Cholelithiasis  6.  Pancreatic cyst-seen on CT and  ultrasound. MRCP w/ 2 small adjacent benign appearing cysts within the body of  the pancreas . No additional imaging follow-up         Plan:   - negative viral stains of biopsies from colonoscopy.   - Continue current medical management with antidiarrheals   - DC home on Xifaxin 550mg TID for two weeks.  Will arrange for close outpt f/u with Dr Hein to continue with outpt workup of her diarrhea.            Sacha Lau M.D.  [facility]  [address] [place] [address]  Office: [phone]

## 2018-11-07 NOTE — DISCHARGE PLACEMENT REQUEST
"Mami Srivastava (73 y.o. Female)     Date of Birth Social Security Number Address Home Phone MRN    1945  [address] [facility] [address] [phone] [id number]    [employer]beterian Marital Status          Mormonism        Admission Date Admission Type Admitting Provider Attending Provider Department, Room/Bed    10/25/18 Emergency Talha Serrano MD Ramaswamy, Rajanna B, MD [facility] [address] [place], P398/1    Discharge Date Discharge Disposition Discharge Destination         Home or Self Care              Attending Provider:  Frida Barrientos MD    Allergies:  Augmentin [Amoxicillin-pot Clavulanate], Keflex [Cephalexin]    Isolation:  None   Infection:  Other (10/29/18)   Code Status:  CPR    Ht:  157.5 cm (62\")   Wt:  62.6 kg (138 lb)    Admission Cmt:  None   Principal Problem:  Irritable bowel syndrome with diarrhea [K58.0]                 Active Insurance as of 10/25/2018     Primary Coverage     Payor Plan Insurance Group Employer/Plan Group    [employer] [facility] [employer] EMPLOYEE [id number]     Payor Plan Address Payor Plan Phone Number Effective From Effective To    [address] [phone] 1/1/2018     [facility] [id number]       Subscriber Name Subscriber Birth Date Member ID       MAMI SRIVASTAVA 1945 [id number]           Secondary Coverage     Payor Plan Insurance Group Employer/Plan Group    HUMANA MEDICARE REPLACEMENT HUMANA MEDICARE REPL [id number]     Payor Plan Address Payor Plan Phone Number Effective From Effective To    [address] [address] [phone] 1/1/2018     [facility] [id number]       Subscriber Name Subscriber Birth Date Member ID       MAMI SRIVASTAVA 1945 [id number]                 Emergency Contacts      (Rel.) Home Phone Work Phone Mobile Phone    ItascaMaria E (Relative) [phone] -- [phone]    NathanTemitope (Friend) [phone] -- [phone]            Emergency Contact Information     Name " Relation Home Work Mobile    Maria E James Relative [phone]    Temitope Evans Friend [phone]          Insurance Information                [facility] EMPLOYEE Phone: [phone]    Subscriber: Mami Srivastava Subscriber#: [id number]    Group#: [id number] Precert#:         HUMANA MEDICARE REPLACEMENT/HUMANA MEDICARE REPL Phone: [phone]    Subscriber: Mami Srivastava Subscriber#: [id number]    Group#: [id number] Precert#:

## 2018-11-07 NOTE — DISCHARGE SUMMARY
Name: Mami Srivastava  Age: 73 y.o.  Sex: female  :  1945  MRN: [id number]         Primary Care Physician: Jacqui Booth PA-C      Date of Admission:  10/25/2018  Date of Discharge:  2018      CHIEF COMPLAINT     Nausea; Diarrhea; and Weakness - Generalized      DISCHARGE DIAGNOSIS  Active Hospital Problems    Diagnosis Date Noted   • **Irritable bowel syndrome with diarrhea [K58.0] 2018   • Diarrhea [R19.7] 10/26/2018   • Diverticulitis [K57.92] 10/26/2018   • Cholelithiasis [K80.20] 10/26/2018   • Autoimmune hepatitis treated with steroids (CMS/HCC) [K75.4] 2018   • Hypertension [I10] 2018   • Hypothyroidism [E03.9] 10/27/2017   • Mixed hyperlipidemia [E78.2] 10/27/2017      Resolved Hospital Problems    Diagnosis Date Noted Date Resolved   No resolved problems to display.       SECONDARY DIAGNOSES  Past Medical History:   Diagnosis Date   • Autoimmune hepatitis (CMS/HCC)    • Cholelithiasis 2018    Ultrasound   • Coronary artery disease     Dr Luis Rick   • Hemangioma of liver    • Hernia     Hiatal hernia-Prabhjot test   • Hyperlipidemia     Dr Luis Rick-atorvastatin   • Hypertension    • Hyperthyroidism    • Hypothyroidism    • Laceration of finger of right hand 05/15/2018    lac with knife and had 3 stitches   • Osteopenia after menopause    • Skin cancer     face   • Ulcerative colitis (CMS/HCC)     Colitis/DrKaplan       CONSULTS   Consulting Physician(s)     Provider Relationship Specialty    Sacha Gates MD Consulting Physician Gastroenterology            PROCEDURES PERFORMED  Colonoscopy with biopsies    Clinical Information     Please stain for CMV and herpes in addition    Final Diagnosis   1. Colon, Cecum, Biopsy:                A. No hyperplastic or tubulovillous change.               B. No significant inflammation.               C. No crypt distortion or basement membrane thickening.               D. No viral inclusions or other organisms on  routinely stained sections (see comment).               E.  Negative for herpes by immunohistochemical stain.        2. Colon, 60 cm, Biopsy:                A. No hyperplastic or tubulovillous change.               B. No significant inflammation.               C. No crypt distortion or basement membrane thickening.               D. No viral inclusions or other organisms on routinely stained sections (see comment).               E.  Negative for herpes by immunohistochemical stain.     3. Colon, Rectum, Biopsy:               A. No hyperplastic or tubulovillous change.               B. No significant inflammation.               C. No crypt distortion or basement membrane thickening.               D. No viral inclusions or other organisms on routinely stained sections (see comment).               E.  Negative for herpes by immunohistochemical stain.     Comment: CMV stains will be ordered on specimens 1, 2, and 3 as requested.  Those results will follow in an addendum to this case.         HOSPITAL COURSE  Is critically-year-old female who was admitted for chronic diarrhea.  She also has a history of autoimmune hepatitis.  Normally she has about 6-7 loose stools which is nonbloody.  She was seen by infectious disease and gastroenterology.  Infectious disease did not think that this is infectious diarrhea.  She underwent colonoscopy and multiple biopsies so far all the results are negative.  She still continues to have loose bowels 3-4 times a day.  She is on Imodium and has been started on Xifaxan.  She had low potassium at the time of admission and underwent diuretic has been stopped.  She'll be going home with home health to monitor potassium and a follow-up with Dr. eHin in 2 weeks.        PHYSICAL EXAM  Temp:  [97 °F (36.1 °C)-98 °F (36.7 °C)] 98 °F (36.7 °C)  Heart Rate:  [68-75] 70  Resp:  [16-18] 18  BP: (114-149)/(67-77) 131/67  Body mass index is 25.24 kg/m².  Physical Exam  HEENT: Unremarkable, pupils are  round equal and reacting to light   NECK: No lymphadenopathy, throat is clear,   RESPRATORY SYSTEM: Breath sounds are equal on both sides and are normal, no wheezes or crackles  CARDIOVASULAR SYSTEM: Heart rate is regular without murmur  ABDOMEN: Soft, no ascites, no hepatosplenomegaly.  EXTREMITIES: No cyanosis, clubbing or edema    CONDITION ON DISCHARGE  Stable.      DISCHARGE DISPOSITION   Home      ALLERGIES  Allergies   Allergen Reactions   • Augmentin [Amoxicillin-Pot Clavulanate] Hives   • Keflex [Cephalexin] Hives       RECENT LABS    Results from last 7 days  Lab Units 11/04/18  0532   WBC 10*3/mm3 4.37*   HEMOGLOBIN g/dL 11.2*   HEMATOCRIT % 33.2*   PLATELETS 10*3/mm3 242       Results from last 7 days  Lab Units 11/07/18  0618 11/05/18  0649 11/04/18  0532   SODIUM mmol/L  --   --  140   POTASSIUM mmol/L 3.8 4.1 3.5   CHLORIDE mmol/L  --   --  104   CO2 mmol/L  --   --  27.3   BUN mg/dL  --   --  7*   CREATININE mg/dL  --   --  0.76   GLUCOSE mg/dL  --   --  84   CALCIUM mg/dL  --   --  8.9           DIET;  Diet Order   Procedures   • Diet Regular; Low Sodium; No Added Salt       DISCHARGE MEDICATIONS     Your medication list      START taking these medications      Instructions Last Dose Given Next Dose Due   lisinopril 10 MG tablet  Commonly known as:  PRINIVIL,ZESTRIL      Take 1 tablet by mouth Daily.       loperamide 2 MG capsule  Commonly known as:  IMODIUM      Take 1 capsule by mouth 4 (Four) Times a Day As Needed for Diarrhea.       rifaximin 550 MG tablet  Commonly known as:  XIFAXAN      Take 1 tablet by mouth Every 12 (Twelve) Hours for 14 days.          CONTINUE taking these medications      Instructions Last Dose Given Next Dose Due   aspirin 81 MG EC tablet      Take 81 mg by mouth Daily.       azaTHIOprine 50 MG tablet  Commonly known as:  IMURAN      Take 1.5 tablets by mouth Daily.       cetirizine 10 MG tablet  Commonly known as:  zyrTEC      Take 10 mg by mouth Every Night.        fluticasone 50 MCG/ACT nasal spray  Commonly known as:  FLONASE      1 spray into the nostril(s) as directed by provider Daily As Needed.       levothyroxine 75 MCG tablet  Commonly known as:  SYNTHROID, LEVOTHROID      Take 1 tablet by mouth Daily. For thyroid (generic accepted)       MULTI VITAMIN DAILY PO      Take  by mouth.          STOP taking these medications    ciprofloxacin 500 MG tablet  Commonly known as:  CIPRO        lisinopril-hydrochlorothiazide 10-12.5 MG per tablet  Commonly known as:  PRINZIDE,ZESTORETIC        metroNIDAZOLE 500 MG tablet  Commonly known as:  FLAGYL        raNITIdine 150 MG tablet  Commonly known as:  ZANTAC              Where to Get Your Medications      These medications were sent to [facility]/pharmacy #[id number] - [facility], [place] - [address] AT [facility] [phone] [facility] [phone]   [address] [facility] [address]    Phone:  [phone]   · lisinopril 10 MG tablet  · loperamide 2 MG capsule  · rifaximin 550 MG tablet        Future Appointments  Date Time Provider Department Center   11/19/2018 2:45 PM Pradip Rosenberg MD [facility] None     Additional Instructions for the Follow-ups that You Need to Schedule     Ambulatory Referral to Home Health    As directed      Face to Face Visit Date:  11/7/2018    Follow-up Provider for Plan of Care?:  I treated the patient in an acute care facility and will not continue treatment after discharge.    Follow-up Provider:  LALO JAMES [[id number]]    Reason/Clinical Findings:  Diarrhea    Describe mobility limitations that make leaving home difficult:  Chronic diarrhea    Nursing/Therapeutic Services Requested:  Skilled Nursing ([facility] twice a week and call results to PCP)    Skilled nursing orders:  Other    Frequency:  Other (2 weeks)           Follow-up Information     Shiva Hein MD Follow up in 2 week(s).    Specialty:  Gastroenterology  Contact information:  [address] [facility] [address]  [facility]  [address]  [facility] [address]  [phone]             [facility] HOME CARE REFERRAL [facility] .    Specialty:  Home Health Services  Contact information:  [address] [address]                 TEST  RESULTS PENDING AT DISCHARGE  None   Order Current Status    Virus Culture - Tissue, Large Intestine Preliminary result           CODE STATUS  Code Status and Medical Interventions:   Ordered at: 10/26/18 0309     Code Status:    CPR     Medical Interventions (Level of Support Prior to Arrest):    Full           Frida Barrientos MD  [facility]  11/07/18      Time: greater than 35 minutes.

## 2018-11-07 NOTE — PROGRESS NOTES
Continued Stay Note  [facility]     Patient Name: Mami Srivastava  MRN: [id number]  Today's Date: 11/7/2018    Admit Date: 10/25/2018          Discharge Plan     Row Name 11/07/18 1103       Plan    Plan Home with [facility].    Patient/Family in Agreement with Plan yes    Plan Comments Discussed case with Dr. Barrientos. Recommends HH to see after discharge. Spoke with patient at bedside. Agreeable with [facility]. States unable to afford private caregivers. Referral called to [facility] . Await call back regarding new referral. No additional needs noted.              Discharge Codes    No documentation.       Expected Discharge Date and Time     Expected Discharge Date Expected Discharge Time    Nov 7, 2018             Natalie Morton RN

## 2018-11-07 NOTE — TELEPHONE ENCOUNTER
CALLED PT AND ADVISED HER, BECAUSE WE HAVE NOT YET ASSESSED HER WE CANNOT GIVE HER ANY MEDICAL ADVISE AS TO WHAT IS GOING ON WITH HER. SHE WANTED SOONER APPT, BUT I ADVISED HER. DR. HOLCOMB DID NOT HAVE ANY SOONER APPT, HOWEVER I COULD LOOK AND SEE IF OTHER  HAD SOONER APPT'S SHE DECLINED. SHE IS HAVING DIARRHEA. HE HAS BEEN IN THE HOSPTIAL SINCE 10/24/18AND THEY HAVE RUN TEST ON HER. STOOL CULTURES INCLUDED. SHE STATES STILL HAVING DIARRHEA AND THEY ARE STILL GOING OT DISCHARGE HER. HOWEVER STILL CANNOT ACCESS HER UNTIL SHE IS SEEN BY DR. HOLCOMB.     ----- Message from Kathy Garber sent at 11/7/2018 10:43 AM EST -----  PATIENT CALLED AND SAID THAT SHE WILL BE DISCHARGED FROM THE HOSPITAL AND WANTED TO KNOW IF DR HOLCOMB SHOULD KNOW ABOUT IT BECAUSE SHE SAID THE HOSPITAL DOSNT KNOW WHAT IS GOING ON. THEY HAVE RUN TEST AND NOTHING IS COMING BACK ABNORMAL. PT DOES HAVE A NEW PT APPT COMING UP ON THE 19TH. PLEASE CALL PT WITH QUESTIONS YOU MAY HAVE.     -[phone] OK TO LEAVE A VMAIL    THANK YOU

## 2018-11-07 NOTE — PLAN OF CARE
Problem: Patient Care Overview  Goal: Plan of Care Review  Outcome: Ongoing (interventions implemented as appropriate)   11/07/18 0517   Coping/Psychosocial   Plan of Care Reviewed With patient   Plan of Care Review   Progress no change   OTHER   Outcome Summary vitals stable. pt expressed gas pain, abdominal discomfort, diarrhea, and indigestion last night. meds given per orders. skin breakdown noted. possible discharge today. pt has been frustrated regarding the fact MDs have not figured out the cause of the diarrhea and they are planning on sending her home. will continue to monitor.        Problem: Bowel Disease, Inflammatory (Adult)  Goal: Signs and Symptoms of Listed Potential Problems Will be Absent, Minimized or Managed (Bowel Disease, Inflammatory)  Outcome: Ongoing (interventions implemented as appropriate)      Problem: Fall Risk (Adult)  Goal: Identify Related Risk Factors and Signs and Symptoms  Outcome: Ongoing (interventions implemented as appropriate)    Goal: Absence of Fall  Outcome: Ongoing (interventions implemented as appropriate)      Problem: Diarrhea (Adult)  Goal: Identify Related Risk Factors and Signs and Symptoms  Outcome: Ongoing (interventions implemented as appropriate)    Goal: Improved/Reduced Symptoms  Outcome: Ongoing (interventions implemented as appropriate)

## 2018-11-08 ENCOUNTER — READMISSION MANAGEMENT (OUTPATIENT)
Dept: CALL CENTER | Facility: HOSPITAL | Age: 73
End: 2018-11-08

## 2018-11-08 NOTE — PROGRESS NOTES
Case Management Discharge Note    Final Note: Home with [facility]. Call placed to [facility] to inform of dc today. Friend transported per private auto. No additional needs noted.    Destination     No service has been selected for the patient.      Durable Medical Equipment     No service has been selected for the patient.      Dialysis/Infusion     No service has been selected for the patient.      Home Medical Care     No service has been selected for the patient.      Social Care     No service has been selected for the patient.             Final Discharge Disposition Code: 06 - home with home health care

## 2018-11-08 NOTE — OUTREACH NOTE
Prep Survey      Responses   Facility patient discharged from?  [facility]   Is patient eligible?  Yes   Discharge diagnosis  Irritable bowel syndrome with diarrhea   Autoimmune hepatitis treated with steroids    Does the patient have one of the following disease processes/diagnoses(primary or secondary)?  Other   Does the patient have Home health ordered?  Yes   What is the Home health agency?   [facility]    Is there a DME ordered?  No   Prep survey completed?  Yes          Victoria Young RN

## 2018-11-10 ENCOUNTER — READMISSION MANAGEMENT (OUTPATIENT)
Dept: CALL CENTER | Facility: HOSPITAL | Age: 73
End: 2018-11-10

## 2018-11-10 LAB
CYTO UR: NORMAL
LAB AP CASE REPORT: NORMAL
LAB AP CLINICAL INFORMATION: NORMAL
PATH REPORT.ADDENDUM SPEC: NORMAL
PATH REPORT.FINAL DX SPEC: NORMAL
PATH REPORT.GROSS SPEC: NORMAL

## 2018-11-10 NOTE — OUTREACH NOTE
Medical Week 1 Survey      Responses   Facility patient discharged from?  [place]   Does the patient have one of the following disease processes/diagnoses(primary or secondary)?  Other   Is there a successful TCM telephone encounter documented?  No   Week 1 attempt successful?  Yes   Call start time  1720   Call end time  1732   General alerts for this patient  Has lost 15# with this diarrhea since Oct. 19th   Discharge diagnosis  Irritable bowel syndrome with diarrhea   Autoimmune hepatitis treated with steroids    Is patient permission given to speak with other caregiver?  Yes   List who call center can speak with  julio Linn   Meds reviewed with patient/caregiver?  Yes   Is the patient having any side effects they believe may be caused by any medication additions or changes?  No   Does the patient have all medications ordered at discharge?  Yes   Is the patient taking all medications as directed (includes completed medication regime)?  Yes   Comments regarding appointments  Has appt. set up she says   Does the patient have a primary care provider?   Yes   Does the patient have an appointment with their PCP within 7 days of discharge?  Yes   Comments regarding PCP  Pradip Villarreal first time to see. 11/19   Has the patient kept scheduled appointments due by today?  N/A   Comments  New PCP to be seen.  and christopher coming to see her tomorrow   What is the Home health agency?   no HH to come   Has home health visited the patient within 72 hours of discharge?  N/A   Psychosocial issues?  No   Comments  She still has diarrhea, urine is dark, cannot eat or drink much, Christopher Nurse is coming tomorrow to evaluate her.    Did the patient receive a copy of their discharge instructions?  Yes   Nursing interventions  Reviewed instructions with patient, Educated on MyChart   What is the patient's perception of their health status since discharge?  Same   Is the patient/caregiver able to teach back signs and symptoms related  to disease process for when to call PCP?  Yes   Is the patient/caregiver able to teach back signs and symptoms related to disease process for when to call 911?  Yes   Is the patient/caregiver able to teach back the hierarchy of who to call/visit for symptoms/problems? PCP, Specialist, Home health nurse, Urgent Care, ED, 911  Yes   Additional teach back comments  non smoker   Week 1 call completed?  Yes          Laura Tong, RN

## 2018-11-12 ENCOUNTER — TELEPHONE (OUTPATIENT)
Dept: GASTROENTEROLOGY | Facility: CLINIC | Age: 73
End: 2018-11-12

## 2018-11-12 NOTE — TELEPHONE ENCOUNTER
----- Message from Anshu Zabala sent at 11/12/2018  9:37 AM EST -----  Regarding: CHANGES IN STOOL   Contact: [phone]  COMPLAIN OF A LOT DIARRHEA AND NOT FEELING WELL..

## 2018-11-12 NOTE — TELEPHONE ENCOUNTER
"See pt e-mail. Per Dr Hein: \"If can get in earlier with [facility], that could get things started.\"    Karma's first available appt is not until 12/14 (not any better than pt's current appt with MK on 12/18). Will overbook pt into 's schedule into a new pt slot 11/20  orweek of 11/27 and 11/29.     Called pt back... No answer; left a message for call back.     "

## 2018-11-13 ENCOUNTER — TELEPHONE (OUTPATIENT)
Dept: GASTROENTEROLOGY | Facility: CLINIC | Age: 73
End: 2018-11-13

## 2018-11-13 NOTE — TELEPHONE ENCOUNTER
Called pt again and appt made for 11/20 at 3:45 PM. Pt states she is still having really bad diarrhea. She states she is having 5-6 loose/watery stools per day. She states she is taking Imodium QID but it is not really helping. She';s been on Xifaxan for 1 week (total script is for 14 days). What should be do between now and Tuesday. Advised will update MD and call back with recs. Pt verb understanding.

## 2018-11-13 NOTE — TELEPHONE ENCOUNTER
Fmla paperwork has been faxed to patient's leave office at employer.  Copies scanned in under media and mailed to patient's home so she can have originals

## 2018-11-14 ENCOUNTER — TELEPHONE (OUTPATIENT)
Dept: FAMILY MEDICINE CLINIC | Facility: CLINIC | Age: 73
End: 2018-11-14

## 2018-11-14 RX ORDER — RANITIDINE 150 MG/1
150 TABLET ORAL 2 TIMES DAILY
Qty: 180 TABLET | Refills: 0 | Status: SHIPPED | OUTPATIENT
Start: 2018-11-14 | End: 2018-11-20 | Stop reason: ALTCHOICE

## 2018-11-14 NOTE — TELEPHONE ENCOUNTER
Request for a 90 day supply for pt's ranitidine received from pt's [facility].  Med e-scribed.

## 2018-11-15 NOTE — TELEPHONE ENCOUNTER
Patient called, advised as per Dr. Hein to take probiotics and to increase her fluids. Pt verb understanding and will restart her probiotics.

## 2018-11-16 LAB — VIRAL CULTURE, GENERAL: NORMAL

## 2018-11-16 RX ORDER — L.RHAMNOSUS/B.ANIMALIS(LACTIS) 3B CELL
1 CAPSULE ORAL DAILY
Qty: 30 CAPSULE | Refills: 5 | Status: SHIPPED | OUTPATIENT
Start: 2018-11-16 | End: 2020-09-19

## 2018-11-19 ENCOUNTER — READMISSION MANAGEMENT (OUTPATIENT)
Dept: CALL CENTER | Facility: HOSPITAL | Age: 73
End: 2018-11-19

## 2018-11-19 ENCOUNTER — OFFICE VISIT (OUTPATIENT)
Dept: FAMILY MEDICINE CLINIC | Facility: CLINIC | Age: 73
End: 2018-11-19

## 2018-11-19 VITALS
TEMPERATURE: 97.6 F | HEIGHT: 62 IN | SYSTOLIC BLOOD PRESSURE: 126 MMHG | WEIGHT: 129 LBS | RESPIRATION RATE: 16 BRPM | DIASTOLIC BLOOD PRESSURE: 66 MMHG | OXYGEN SATURATION: 99 % | HEART RATE: 70 BPM | BODY MASS INDEX: 23.74 KG/M2

## 2018-11-19 DIAGNOSIS — R19.7 DIARRHEA, UNSPECIFIED TYPE: ICD-10-CM

## 2018-11-19 DIAGNOSIS — R35.0 URINARY FREQUENCY: Primary | ICD-10-CM

## 2018-11-19 LAB
BILIRUB BLD-MCNC: ABNORMAL MG/DL
CLARITY, POC: ABNORMAL
COLOR UR: ABNORMAL
GLUCOSE UR STRIP-MCNC: NEGATIVE MG/DL
KETONES UR QL: ABNORMAL
LEUKOCYTE EST, POC: NEGATIVE
NITRITE UR-MCNC: NEGATIVE MG/ML
PH UR: 5.5 [PH] (ref 5–8)
PROT UR STRIP-MCNC: ABNORMAL MG/DL
RBC # UR STRIP: NEGATIVE /UL
SP GR UR: 1.03 (ref 1–1.03)
UROBILINOGEN UR QL: NORMAL

## 2018-11-19 PROCEDURE — 99214 OFFICE O/P EST MOD 30 MIN: CPT | Performed by: INTERNAL MEDICINE

## 2018-11-19 PROCEDURE — 81003 URINALYSIS AUTO W/O SCOPE: CPT | Performed by: INTERNAL MEDICINE

## 2018-11-19 NOTE — OUTREACH NOTE
Medical Week 2 Survey      Responses   Facility patient discharged from?  [place]   Does the patient have one of the following disease processes/diagnoses(primary or secondary)?  Other   Week 2 attempt successful?  No   Unsuccessful attempts  Attempt 1          Yessenia Heard RN

## 2018-11-20 ENCOUNTER — OFFICE VISIT (OUTPATIENT)
Dept: GASTROENTEROLOGY | Facility: CLINIC | Age: 73
End: 2018-11-20

## 2018-11-20 ENCOUNTER — READMISSION MANAGEMENT (OUTPATIENT)
Dept: CALL CENTER | Facility: HOSPITAL | Age: 73
End: 2018-11-20

## 2018-11-20 VITALS
BODY MASS INDEX: 23.66 KG/M2 | SYSTOLIC BLOOD PRESSURE: 112 MMHG | DIASTOLIC BLOOD PRESSURE: 78 MMHG | WEIGHT: 128.6 LBS | TEMPERATURE: 97.8 F | HEIGHT: 62 IN

## 2018-11-20 DIAGNOSIS — K75.4 AUTOIMMUNE HEPATITIS TREATED WITH STEROIDS (HCC): Primary | ICD-10-CM

## 2018-11-20 PROBLEM — R35.0 URINARY FREQUENCY: Status: ACTIVE | Noted: 2018-11-20

## 2018-11-20 LAB
ALBUMIN SERPL-MCNC: 4.2 G/DL (ref 3.5–5.2)
ALBUMIN/GLOB SERPL: 1.4 G/DL
ALP SERPL-CCNC: 62 U/L (ref 39–117)
ALT SERPL-CCNC: 29 U/L (ref 1–33)
APPEARANCE UR: CLEAR
AST SERPL-CCNC: 42 U/L (ref 1–32)
BACTERIA #/AREA URNS HPF: ABNORMAL /[HPF]
BILIRUB SERPL-MCNC: 0.9 MG/DL (ref 0.1–1.2)
BILIRUB UR QL STRIP: NEGATIVE
BUN SERPL-MCNC: 14 MG/DL (ref 8–23)
BUN/CREAT SERPL: 17.5 (ref 7–25)
CALCIUM SERPL-MCNC: 9.6 MG/DL (ref 8.6–10.5)
CASTS URNS MICRO: ABNORMAL
CASTS URNS QL MICRO: PRESENT /LPF
CHLORIDE SERPL-SCNC: 101 MMOL/L (ref 98–107)
CO2 SERPL-SCNC: 25.5 MMOL/L (ref 22–29)
COLOR UR: YELLOW
CREAT SERPL-MCNC: 0.8 MG/DL (ref 0.57–1)
CRYSTALS URNS MICRO: ABNORMAL
EPI CELLS #/AREA URNS HPF: ABNORMAL /HPF
GLOBULIN SER CALC-MCNC: 2.9 GM/DL
GLUCOSE SERPL-MCNC: 103 MG/DL (ref 65–99)
GLUCOSE UR QL: NEGATIVE
HGB UR QL STRIP: NEGATIVE
KETONES UR QL STRIP: ABNORMAL
LEUKOCYTE ESTERASE UR QL STRIP: NEGATIVE
MICRO URNS: ABNORMAL
MUCOUS THREADS URNS QL MICRO: PRESENT
NITRITE UR QL STRIP: NEGATIVE
PH UR STRIP: 5 [PH] (ref 5–7.5)
POTASSIUM SERPL-SCNC: 3.8 MMOL/L (ref 3.5–5.2)
PROT SERPL-MCNC: 7.1 G/DL (ref 6–8.5)
PROT UR QL STRIP: ABNORMAL
RBC #/AREA URNS HPF: ABNORMAL /HPF
SODIUM SERPL-SCNC: 141 MMOL/L (ref 136–145)
SP GR UR: 1.03 (ref 1–1.03)
SPECIMEN STATUS: NORMAL
UNIDENT CRYS URNS QL MICRO: PRESENT
URINALYSIS REFLEX: ABNORMAL
UROBILINOGEN UR STRIP-MCNC: 1 MG/DL (ref 0.2–1)
WBC #/AREA URNS HPF: ABNORMAL /HPF

## 2018-11-20 PROCEDURE — 99213 OFFICE O/P EST LOW 20 MIN: CPT | Performed by: INTERNAL MEDICINE

## 2018-11-20 NOTE — OUTREACH NOTE
Medical Week 2 Survey      Responses   Facility patient discharged from?  [place]   Does the patient have one of the following disease processes/diagnoses(primary or secondary)?  Other   Week 2 attempt successful?  No   Unsuccessful attempts  Attempt 2          Ruby Jauregui RN

## 2018-11-20 NOTE — PROGRESS NOTES
Subjective   Mami Srivastava is a 73 y.o. female. Patient is here today for   Chief Complaint   Patient presents with   • Establish Care     NP   • Urinary Frequency     pt has had urine culture            Vitals:    18 1444   BP: 126/66   Pulse: 70   Resp: 16   Temp: 97.6 °F (36.4 °C)   SpO2: 99%       Past Medical History:   Diagnosis Date   • Autoimmune hepatitis (CMS/HCC)    • Cholelithiasis 2018    Ultrasound   • Coronary artery disease     Dr Luis Rick   • Hemangioma of liver    • Hernia     Hiatal hernia-Prabhjot test   • Hyperlipidemia     Dr Luis Rick-atorvastatin   • Hypertension    • Hyperthyroidism    • Hypothyroidism    • Laceration of finger of right hand 05/15/2018    lac with knife and had 3 stitches   • Osteopenia after menopause    • Skin cancer     face   • Ulcerative colitis (CMS/HCC) 2014    Colitis/[facility]plan      Allergies   Allergen Reactions   • Augmentin [Amoxicillin-Pot Clavulanate] Hives   • Keflex [Cephalexin] Hives      Social History     Socioeconomic History   • Marital status:      Spouse name: Not on file   • Number of children: Not on file   • Years of education: Not on file   • Highest education level: Not on file   Social Needs   • Financial resource strain: Not on file   • Food insecurity - worry: Not on file   • Food insecurity - inability: Not on file   • Transportation needs - medical: Not on file   • Transportation needs - non-medical: Not on file   Occupational History   • Not on file   Tobacco Use   • Smoking status: Former Smoker     Packs/day: 1.50     Years: 12.00     Pack years: 18.00     Start date: 1963     Last attempt to quit: 1976     Years since quittin.8   • Smokeless tobacco: Never Used   • Tobacco comment: Chrinic bronchitis...smoke allergy   Substance and Sexual Activity   • Alcohol use: Yes     Comment: Seldom   • Drug use: No   • Sexual activity: Not Currently   Other Topics Concern   • Not on file   Social History  Narrative   • Not on file        Current Outpatient Medications:   •  aspirin 81 MG EC tablet, Take 81 mg by mouth Daily., Disp: , Rfl:   •  azaTHIOprine (IMURAN) 50 MG tablet, Take 1.5 tablets by mouth Daily., Disp: 45 tablet, Rfl: 11  •  cetirizine (zyrTEC) 10 MG tablet, Take 10 mg by mouth Every Night., Disp: , Rfl:   •  fluticasone (FLONASE) 50 MCG/ACT nasal spray, 1 spray into the nostril(s) as directed by provider Daily As Needed., Disp: , Rfl:   •  levothyroxine (SYNTHROID, LEVOTHROID) 75 MCG tablet, Take 1 tablet by mouth Daily. For thyroid (generic accepted), Disp: 90 tablet, Rfl: 3  •  lisinopril (PRINIVIL,ZESTRIL) 10 MG tablet, Take 1 tablet by mouth Daily., Disp: 30 tablet, Rfl: 0  •  loperamide (IMODIUM) 2 MG capsule, Take 1 capsule by mouth 4 (Four) Times a Day As Needed for Diarrhea., Disp: 50 capsule, Rfl: 0  •  Multiple Vitamin (MULTI VITAMIN DAILY PO), Take  by mouth., Disp: , Rfl:   •  Probiotic Product (groopify) capsule, Take 1 capsule by mouth Daily., Disp: 30 capsule, Rfl: 5  •  raNITIdine (ZANTAC) 150 MG tablet, Take 1 tablet by mouth 2 (Two) Times a Day., Disp: 180 tablet, Rfl: 0  •  rifaximin (XIFAXAN) 550 MG tablet, Take 1 tablet by mouth Every 12 (Twelve) Hours for 14 days., Disp: 28 tablet, Rfl: 0     Objective     This pleasant 73-year-old patient is new to my practice.    She has frequent loose bowel movements.  This symptom has been annoying her for more than a month.  She has an appointment to see Dr. Hein tomorrow regarding this very issue.    She has a past history of hypertension, hypertensive left ventricular hypertrophy, autoimmune hepatitis, irritable bowel syndrome with diarrhea, hypothyroidism, status post thyroidectomy, hyperglycemia..             Review of Systems   Constitutional: Negative.    HENT: Negative.    Respiratory: Negative.    Gastrointestinal: Positive for diarrhea.   Genitourinary: Positive for frequency.   Neurological: Negative.     Psychiatric/Behavioral: Negative.        Physical Exam   Constitutional: She is oriented to person, place, and time. She appears well-developed and well-nourished.   HENT:   Head: Normocephalic and atraumatic.   Cardiovascular: Normal rate and regular rhythm.   Pulmonary/Chest: Effort normal.   Neurological: She is alert and oriented to person, place, and time.   Psychiatric: She has a normal mood and affect. Her behavior is normal.   Nursing note and vitals reviewed.        Problem List Items Addressed This Visit        Digestive    Diarrhea    Relevant Orders    Comprehensive Metabolic Panel      Other Visit Diagnoses     Urinary frequency    -  Primary    Relevant Orders    POC Urinalysis Dipstick, Automated (Completed)            PLAN  Follow-up with Dr. Hein as directed by him.    I like to have her back in a month.  She'll be due for some labs at that time.  Week before that visit, I would like her to get the following labs: Lipid profile, comprehensive metabolic panel, TSH, free T4, urinalysis, CBC, hemoglobin A1c.  No Follow-up on file.

## 2018-11-20 NOTE — PROGRESS NOTES
Chief Complaint   Patient presents with   • Follow-up     hospital follow up   • Diarrhea   • GI Problem     pancreatic cyst       Mami Srivastava is a  73 y.o. female here for a follow up visit for chronic diarrhea and autoimmune hepatitis.    HPI    Patient 73-year-old female with history of hypertension, hypothyroidism and autoimmune hepatitis presenting for evaluation.  Patient follow-up after being hospitalized with no source of above complaints.  Patient was initially thought to have recurrent diverticulitis though colonoscopy done while inpatient revealed no evidence of acute diverticulitis.  Biopsies were normal with no inflammatory changes throughout.  Question whether she had collagenous colitis is well healed negative for pathology.  Patient's also sample sent for CMV were negative as well.  Patient reports improvement but still having diarrhea 5-6 times daily.  Liver enzymes have stayed the same kind of been well tolerated on current dose of Imuran.  Patient here for further recommendations.    Past Medical History:   Diagnosis Date   • Autoimmune hepatitis (CMS/HCC)    • Cholelithiasis 04-    Ultrasound   • Coronary artery disease     Dr Luis Rick   • Diverticulitis of colon 2005,2018   • Hemangioma of liver    • Hernia 2017    Hiatal hernia-Prabhjot test   • Hyperlipidemia     Dr Luis Rick-atorvastatin   • Hypertension    • Hyperthyroidism    • Hypothyroidism    • Laceration of finger of right hand 05/15/2018    lac with knife and had 3 stitches   • Osteopenia after menopause    • Skin cancer     face   • Ulcerative colitis (CMS/HCC) 2014    Colitis/[facility]         Current Outpatient Medications:   •  aspirin 81 MG EC tablet, Take 81 mg by mouth Daily., Disp: , Rfl:   •  azaTHIOprine (IMURAN) 50 MG tablet, Take 1.5 tablets by mouth Daily., Disp: 45 tablet, Rfl: 11  •  cetirizine (zyrTEC) 10 MG tablet, Take 10 mg by mouth Every Night., Disp: , Rfl:   •  fluticasone (FLONASE) 50 MCG/ACT nasal  spray, 1 spray into the nostril(s) as directed by provider Daily As Needed., Disp: , Rfl:   •  levothyroxine (SYNTHROID, LEVOTHROID) 75 MCG tablet, Take 1 tablet by mouth Daily. For thyroid (generic accepted), Disp: 90 tablet, Rfl: 3  •  lisinopril (PRINIVIL,ZESTRIL) 10 MG tablet, Take 1 tablet by mouth Daily., Disp: 30 tablet, Rfl: 0  •  loperamide (IMODIUM) 2 MG capsule, Take 1 capsule by mouth 4 (Four) Times a Day As Needed for Diarrhea., Disp: 50 capsule, Rfl: 0  •  Multiple Vitamin (MULTI VITAMIN DAILY PO), Take  by mouth., Disp: , Rfl:   •  Probiotic Product ([place]) capsule, Take 1 capsule by mouth Daily., Disp: 30 capsule, Rfl: 5  •  rifaximin (XIFAXAN) 550 MG tablet, Take 1 tablet by mouth Every 12 (Twelve) Hours for 14 days., Disp: 28 tablet, Rfl: 0    Allergies   Allergen Reactions   • Augmentin [Amoxicillin-Pot Clavulanate] Hives   • Keflex [Cephalexin] Hives       Social History     Socioeconomic History   • Marital status:      Spouse name: Not on file   • Number of children: Not on file   • Years of education: Not on file   • Highest education level: Not on file   Social Needs   • Financial resource strain: Not on file   • Food insecurity - worry: Not on file   • Food insecurity - inability: Not on file   • Transportation needs - medical: Not on file   • Transportation needs - non-medical: Not on file   Occupational History   • Not on file   Tobacco Use   • Smoking status: Former Smoker     Packs/day: 2.00     Years: 12.00     Pack years: 24.00     Start date: 1963     Last attempt to quit: 1976     Years since quittin.8   • Smokeless tobacco: Never Used   • Tobacco comment: Chrinic bronchitis...smoke allergy   Substance and Sexual Activity   • Alcohol use: Yes     Comment: Seldom   • Drug use: No   • Sexual activity: Not Currently   Other Topics Concern   • Not on file   Social History Narrative   • Not on file       Family History   Problem Relation Age of Onset    • Heart disease Mother    • Hypertension Mother    • Lung cancer Sister    • Thyroid disease Sister    • Lupus Sister    • Thyroid disease Brother    • Alcohol abuse Brother    • Glaucoma Maternal Grandmother    • Stomach cancer Maternal Grandmother          ’s   • Cirrhosis Father             • Liver disease Father        Review of Systems   Constitutional: Negative.    Respiratory: Negative.    Cardiovascular: Negative.    Gastrointestinal: Positive for abdominal pain and diarrhea. Negative for abdominal distention, anal bleeding, blood in stool, constipation, nausea and vomiting.   Musculoskeletal: Negative.    Hematological: Negative.    Psychiatric/Behavioral: Negative.        Vitals:    18 1556   BP: 112/78   Temp: 97.8 °F (36.6 °C)       Physical Exam   Constitutional: She is oriented to person, place, and time. She appears well-developed and well-nourished.   HENT:   Head: Normocephalic and atraumatic.   Eyes: Pupils are equal, round, and reactive to light. No scleral icterus.   Cardiovascular: Normal rate and regular rhythm.   Pulmonary/Chest: Effort normal and breath sounds normal.   Abdominal: Soft. Bowel sounds are normal. She exhibits no distension and no mass. There is no tenderness. No hernia.   Neurological: She is alert and oriented to person, place, and time. No cranial nerve deficit.   Skin: Skin is warm and dry. No rash noted.   Psychiatric: She has a normal mood and affect. Her behavior is normal.   Vitals reviewed.      Office Visit on 2018   Component Date Value Ref Range Status   • Color 2018 Dark Yellow  Yellow, Straw, Dark Yellow, Bryanna Final   • Clarity, UA 2018 Cloudy* Clear Final   • Specific Gravity  2018 1.030  1.005 - 1.030 Final   • pH, Urine 2018 5.5  5.0 - 8.0 Final   • Leukocytes 2018 Negative  Negative Final   • Nitrite, UA 2018 Negative  Negative Final   • Protein, POC 2018 30 mg/dL* Negative mg/dL Final   •  Glucose, UA 11/19/2018 Negative  Negative, 1000 mg/dL (3+) mg/dL Final   • Ketones, UA 11/19/2018 15 mg/dL* Negative Final   • Urobilinogen, UA 11/19/2018 Normal  Normal Final   • Bilirubin 11/19/2018 Small (1+)* Negative Final   • Blood, UA 11/19/2018 Negative  Negative Final   • Glucose 11/19/2018 103* 65 - 99 mg/dL Final   • BUN 11/19/2018 14  8 - 23 mg/dL Final   • Creatinine 11/19/2018 0.80  0.57 - 1.00 mg/dL Final   • eGFR Non  Am 11/19/2018 70  >60 mL/min/1.73 Final   • eGFR African Am 11/19/2018 85  >60 mL/min/1.73 Final   • BUN/Creatinine Ratio 11/19/2018 17.5  7.0 - 25.0 Final   • Sodium 11/19/2018 141  136 - 145 mmol/L Final   • Potassium 11/19/2018 3.8  3.5 - 5.2 mmol/L Final   • Chloride 11/19/2018 101  98 - 107 mmol/L Final   • Total CO2 11/19/2018 25.5  22.0 - 29.0 mmol/L Final   • Calcium 11/19/2018 9.6  8.6 - 10.5 mg/dL Final   • Total Protein 11/19/2018 7.1  6.0 - 8.5 g/dL Final   • Albumin 11/19/2018 4.20  3.50 - 5.20 g/dL Final   • Globulin 11/19/2018 2.9  gm/dL Final   • A/G Ratio 11/19/2018 1.4  g/dL Final   • Total Bilirubin 11/19/2018 0.9  0.1 - 1.2 mg/dL Final   • Alkaline Phosphatase 11/19/2018 62  39 - 117 U/L Final   • AST (SGOT) 11/19/2018 42* 1 - 32 U/L Final   • ALT (SGPT) 11/19/2018 29  1 - 33 U/L Final   • Specific Gravity, UA 11/19/2018 1.027  1.005 - 1.030 Final   • pH, UA 11/19/2018 5.0  5.0 - 7.5 Final   • Color, UA 11/19/2018 Yellow  Yellow Final   • Appearance, UA 11/19/2018 Clear  Clear Final   • Leukocytes, UA 11/19/2018 Negative  Negative Final   • Protein 11/19/2018 1+* Negative/Trace Final   • Glucose, UA 11/19/2018 Negative  Negative Final   • Ketones 11/19/2018 Trace* Negative Final   • Blood, UA 11/19/2018 Negative  Negative Final   • Bilirubin, UA 11/19/2018 Negative  Negative Final   • Urobilinogen, UA 11/19/2018 1.0  0.2 - 1.0 mg/dL Final   • Nitrite, UA 11/19/2018 Negative  Negative Final   • Microscopic Examination 11/19/2018 See below:   Final   •  Urinalysis Reflex 11/19/2018 Comment   Final   • WBC, UA 11/19/2018 0-5  0 - 5 /hpf Final   • RBC, UA 11/19/2018 0-2  0 - 2 /hpf Final   • Epithelial Cells (non renal) 11/19/2018 0-10  0 - 10 /hpf Final   • Casts 11/19/2018 Present* None seen /lpf Final   • Cast Type 11/19/2018 Hyaline casts  N/A Final   • Crystals, UA 11/19/2018 Present* N/A Final   • Crystal Type 11/19/2018 Amorphous Sediment  N/A Final   • Mucus, UA 11/19/2018 Present  Not Estab. Final   • Bacteria, UA 11/19/2018 None seen  None seen/Few Final   • Specimen Status 11/19/2018 CANCELED   Final-Edited   Admission on 10/25/2018, Discharged on 11/07/2018   No results displayed because visit has over 200 results.      Admission on 10/24/2018, Discharged on 10/24/2018   Component Date Value Ref Range Status   • Glucose 10/24/2018 104* 65 - 99 mg/dL Final   • BUN 10/24/2018 9  8 - 23 mg/dL Final   • Creatinine 10/24/2018 0.92  0.57 - 1.00 mg/dL Final   • Sodium 10/24/2018 138  136 - 145 mmol/L Final   • Potassium 10/24/2018 3.3* 3.5 - 5.2 mmol/L Final   • Chloride 10/24/2018 99  98 - 107 mmol/L Final   • CO2 10/24/2018 25.9  22.0 - 29.0 mmol/L Final   • Calcium 10/24/2018 9.9  8.6 - 10.5 mg/dL Final   • Total Protein 10/24/2018 6.7  6.0 - 8.5 g/dL Final   • Albumin 10/24/2018 4.20  3.50 - 5.20 g/dL Final   • ALT (SGPT) 10/24/2018 39* 1 - 33 U/L Final   • AST (SGOT) 10/24/2018 45* 1 - 32 U/L Final   • Alkaline Phosphatase 10/24/2018 55  39 - 117 U/L Final   • Total Bilirubin 10/24/2018 0.9  0.1 - 1.2 mg/dL Final   • eGFR Non  Amer 10/24/2018 60* >60 mL/min/1.73 Final   • Globulin 10/24/2018 2.5  gm/dL Final   • A/G Ratio 10/24/2018 1.7  g/dL Final   • BUN/Creatinine Ratio 10/24/2018 9.8  7.0 - 25.0 Final   • Anion Gap 10/24/2018 13.1  mmol/L Final   • Lipase 10/24/2018 59  13 - 60 U/L Final   • WBC 10/24/2018 8.30  4.50 - 10.70 10*3/mm3 Final   • RBC 10/24/2018 4.13  3.90 - 5.20 10*6/mm3 Final   • Hemoglobin 10/24/2018 12.5  11.9 - 15.5 g/dL Final    • Hematocrit 10/24/2018 37.6  35.6 - 45.5 % Final   • MCV 10/24/2018 91.0  80.5 - 98.2 fL Final   • MCH 10/24/2018 30.3  26.9 - 32.0 pg Final   • MCHC 10/24/2018 33.2  32.4 - 36.3 g/dL Final   • RDW 10/24/2018 14.7* 11.7 - 13.0 % Final   • RDW-SD 10/24/2018 48.9  37.0 - 54.0 fl Final   • MPV 10/24/2018 9.8  6.0 - 12.0 fL Final   • Platelets 10/24/2018 168  140 - 500 10*3/mm3 Final   • Neutrophil % 10/24/2018 68.7  42.7 - 76.0 % Final   • Lymphocyte % 10/24/2018 21.6  19.6 - 45.3 % Final   • Monocyte % 10/24/2018 6.9  5.0 - 12.0 % Final   • Eosinophil % 10/24/2018 2.5  0.3 - 6.2 % Final   • Basophil % 10/24/2018 0.1  0.0 - 1.5 % Final   • Immature Grans % 10/24/2018 0.2  0.0 - 0.5 % Final   • Neutrophils, Absolute 10/24/2018 5.70  1.90 - 8.10 10*3/mm3 Final   • Lymphocytes, Absolute 10/24/2018 1.79  0.90 - 4.80 10*3/mm3 Final   • Monocytes, Absolute 10/24/2018 0.57  0.20 - 1.20 10*3/mm3 Final   • Eosinophils, Absolute 10/24/2018 0.21  0.00 - 0.70 10*3/mm3 Final   • Basophils, Absolute 10/24/2018 0.01  0.00 - 0.20 10*3/mm3 Final   • Immature Grans, Absolute 10/24/2018 0.02  0.00 - 0.03 10*3/mm3 Final   Telephone on 10/19/2018   Component Date Value Ref Range Status   • Campylobacter 10/22/2018 Not Detected  Not Detected Final   • C.difficile toxin A/B 10/22/2018 Not Detected  Not Detected Final   • Plesiomonas shigelloides 10/22/2018 Not Detected  Not Detected Final   • Salmonella 10/22/2018 Not Detected  Not Detected Final   • Vibrio 10/22/2018 Not Detected  Not Detected Final   • Vibrio cholerae 10/22/2018 Not Detected  Not Detected Final   • Yersinia enterocolitica 10/22/2018 Not Detected  Not Detected Final   • Enteroaggregative E. coli 10/22/2018 Not Detected  Not Detected Final   • Enteropathogenic E. coli 10/22/2018 Not Detected  Not Detected Final   • Enterotoxigenic E. coli 10/22/2018 Not Detected  Not Detected Final   • Shiga-like toxin-producing E. coli  10/22/2018 Not Detected  Not Detected Final   • E.  coli O157 10/22/2018 Not applicable  Not Detected Final   • Shigella enteroinvasive E. coli 10/22/2018 Not Detected  Not Detected Final   • Cryptosporidium 10/22/2018 Not Detected  Not Detected Final   • Cyclospora cayetanensis 10/22/2018 Not Detected  Not Detected Final   • Entamoeba Histolytica Ag 10/22/2018 Not Detected  Not Detected Final   • Giardia lamblia 10/22/2018 Not Detected  Not Detected Final   • Adenovirus 40/41 Ag 10/22/2018 Not Detected  Not Detected Final   • Astrovirus 10/22/2018 Not Detected  Not Detected Final   • Norovirus GI/GII 10/22/2018 Not Detected  Not Detected Final   • Rotavirus A 10/22/2018 Not Detected  Not Detected Final   • Sapovirus 10/22/2018 Not Detected  Not Detected Final   Office Visit on 09/29/2018   Component Date Value Ref Range Status   • Urine Culture 09/29/2018 Final report*  Final   • Result 1 09/29/2018 Klebsiella pneumoniae*  Final   • Susceptibility Testing 09/29/2018 Comment   Final   • Color 09/29/2018 Yellow  Yellow, Straw, Dark Yellow, Bryanna Final   • Clarity, UA 09/29/2018 Cloudy* Clear Final   • Specific Gravity  09/29/2018 1.015  1.005 - 1.030 Final   • pH, Urine 09/29/2018 6.0  5.0 - 8.0 Final   • Leukocytes 09/29/2018 Large (3+)* Negative Final   • Nitrite, UA 09/29/2018 Negative  Negative Final   • Protein, POC 09/29/2018 Negative  Negative mg/dL Final   • Glucose, UA 09/29/2018 Negative  Negative, 1000 mg/dL (3+) mg/dL Final   • Ketones, UA 09/29/2018 Negative  Negative Final   • Urobilinogen, UA 09/29/2018 Normal  Normal Final   • Bilirubin 09/29/2018 Negative  Negative Final   • Blood, UA 09/29/2018 Trace* Negative Final       Mami was seen today for follow-up, diarrhea and gi problem.    Diagnoses and all orders for this visit:    Autoimmune hepatitis treated with steroids (CMS/HCC)  -     Comprehensive Metabolic Panel; Future  -     Protime-INR; Future  -     CBC (No Diff); Future    Other orders  -     SCANNED - LABS      Patient 73-year-old female  with history of autoimmune hepatitis hospitalized for persistent unrelenting diarrhea.  Patient with possible diverticulitis recurrent during hospitalization but colonoscopy revealed none of this.  Biopsies were normal with no evidence of inflammatory changes throughout.  Patient discharged home still having 5-6 bowel movements a day.  Patient's liver enzymes markedly improved but still taking Imuran daily which can cause these kind of symptoms.  At this point would recommend the seeing the Imuran will follow liver enzymes and monitor response of her diarrhea.  Hopefully this will help both her diarrhea issues as well as her change in taste.  We'll follow-up clinically with response and repeat labs in 2 weeks.

## 2018-11-21 ENCOUNTER — READMISSION MANAGEMENT (OUTPATIENT)
Dept: CALL CENTER | Facility: HOSPITAL | Age: 73
End: 2018-11-21

## 2018-11-21 NOTE — OUTREACH NOTE
Medical Week 2 Survey      Responses   Facility patient discharged from?  [facility]   Does the patient have one of the following disease processes/diagnoses(primary or secondary)?  Other   Week 2 attempt successful?  Yes   Call start time  1653   General alerts for this patient  Has lost 15# with this diarrhea since Oct. 19th   Discharge diagnosis  Irritable bowel syndrome with diarrhea   Autoimmune hepatitis treated with steroids    Call end time  1656   Is patient permission given to speak with other caregiver?  Yes   List who call center can speak with  julio Linn   Meds reviewed with patient/caregiver?  Yes   Is the patient having any side effects they believe may be caused by any medication additions or changes?  No   Does the patient have all medications ordered at discharge?  Yes   Is the patient taking all medications as directed (includes completed medication regime)?  Yes   Comments regarding appointments  Has appt. set up she says   Does the patient have a primary care provider?   Yes   Does the patient have an appointment with their PCP within 7 days of discharge?  Yes   Comments regarding PCP  Pradip Villarreal first time to see. 11/19   Has the patient kept scheduled appointments due by today?  N/A   Comments  New PCP to be seen.  and [facility] coming to see her tomorrow   Did the patient receive a copy of their discharge instructions?  Yes   Nursing interventions  Reviewed instructions with patient, Educated on MyChart   What is the patient's perception of their health status since discharge?  Same   Is the patient/caregiver able to teach back signs and symptoms related to disease process for when to call 911?  Yes   Is the patient/caregiver able to teach back the hierarchy of who to call/visit for symptoms/problems? PCP, Specialist, Home health nurse, Urgent Care, ED, 911  Yes   Additional teach back comments  non smoker   Graduated/Revoked comments  She states she can eat and drink a little and states  she feels much better.           Jacqui Ponce RN

## 2018-11-25 ENCOUNTER — RESULTS ENCOUNTER (OUTPATIENT)
Dept: GASTROENTEROLOGY | Facility: CLINIC | Age: 73
End: 2018-11-25

## 2018-11-25 DIAGNOSIS — K75.4 AUTOIMMUNE HEPATITIS TREATED WITH STEROIDS (HCC): ICD-10-CM

## 2018-12-05 ENCOUNTER — OFFICE VISIT (OUTPATIENT)
Dept: FAMILY MEDICINE CLINIC | Facility: CLINIC | Age: 73
End: 2018-12-05

## 2018-12-05 VITALS
HEIGHT: 62 IN | DIASTOLIC BLOOD PRESSURE: 66 MMHG | BODY MASS INDEX: 23.59 KG/M2 | SYSTOLIC BLOOD PRESSURE: 144 MMHG | RESPIRATION RATE: 16 BRPM | HEART RATE: 58 BPM | OXYGEN SATURATION: 99 % | WEIGHT: 128.2 LBS

## 2018-12-05 DIAGNOSIS — I10 ESSENTIAL HYPERTENSION: Primary | ICD-10-CM

## 2018-12-05 LAB
ALBUMIN SERPL-MCNC: 4.2 G/DL (ref 3.5–5.2)
ALBUMIN/GLOB SERPL: 1.9 G/DL
ALP SERPL-CCNC: 68 U/L (ref 39–117)
ALT SERPL-CCNC: 24 U/L (ref 1–33)
AST SERPL-CCNC: 27 U/L (ref 1–32)
BILIRUB SERPL-MCNC: 0.9 MG/DL (ref 0.1–1.2)
BUN SERPL-MCNC: 16 MG/DL (ref 8–23)
BUN/CREAT SERPL: 17 (ref 7–25)
CALCIUM SERPL-MCNC: 9.6 MG/DL (ref 8.6–10.5)
CHLORIDE SERPL-SCNC: 103 MMOL/L (ref 98–107)
CO2 SERPL-SCNC: 27.9 MMOL/L (ref 22–29)
CREAT SERPL-MCNC: 0.94 MG/DL (ref 0.57–1)
ERYTHROCYTE [DISTWIDTH] IN BLOOD BY AUTOMATED COUNT: 14.8 % (ref 11.7–13)
GLOBULIN SER CALC-MCNC: 2.2 GM/DL
GLUCOSE SERPL-MCNC: 112 MG/DL (ref 65–99)
HCT VFR BLD AUTO: 38.3 % (ref 35.6–45.5)
HGB BLD-MCNC: 12.4 G/DL (ref 11.9–15.5)
INR PPP: 1.02 (ref 0.9–1.1)
MCH RBC QN AUTO: 28.9 PG (ref 26.9–32)
MCHC RBC AUTO-ENTMCNC: 32.4 G/DL (ref 32.4–36.3)
MCV RBC AUTO: 89.3 FL (ref 80.5–98.2)
PLATELET # BLD AUTO: 192 10*3/MM3 (ref 140–500)
POTASSIUM SERPL-SCNC: 4 MMOL/L (ref 3.5–5.2)
PROT SERPL-MCNC: 6.4 G/DL (ref 6–8.5)
PROTHROMBIN TIME: 13.2 SECONDS (ref 11.7–14.2)
RBC # BLD AUTO: 4.29 10*6/MM3 (ref 3.9–5.2)
SODIUM SERPL-SCNC: 143 MMOL/L (ref 136–145)
WBC # BLD AUTO: 4.57 10*3/MM3 (ref 4.5–10.7)

## 2018-12-05 PROCEDURE — 99213 OFFICE O/P EST LOW 20 MIN: CPT | Performed by: INTERNAL MEDICINE

## 2018-12-05 RX ORDER — AMLODIPINE BESYLATE 5 MG/1
5 TABLET ORAL DAILY
Qty: 30 TABLET | Refills: 5 | Status: SHIPPED | OUTPATIENT
Start: 2018-12-05 | End: 2019-05-18 | Stop reason: SDUPTHER

## 2018-12-05 NOTE — PROGRESS NOTES
Subjective   Mami Srivastava is a 73 y.o. female. Patient is here today for   Chief Complaint   Patient presents with   • Blood Pressure Check          Vitals:    18 1306   BP: 144/66   Pulse: 58   Resp: 16   SpO2: 99%       Past Medical History:   Diagnosis Date   • Autoimmune hepatitis (CMS/HCC)    • Cholelithiasis 2018    Ultrasound   • Coronary artery disease     Dr Luis Rick   • Diverticulitis of colon ,   • Hemangioma of liver    • Hernia     Hiatal hernia-Prabhjot test   • Hyperlipidemia     Dr Luis Rick-atorvastatin   • Hypertension    • Hyperthyroidism    • Hypothyroidism    • Laceration of finger of right hand 05/15/2018    lac with knife and had 3 stitches   • Osteopenia after menopause    • Skin cancer     face   • Ulcerative colitis (CMS/HCC) 2014    Colitis/DrKaplan      Allergies   Allergen Reactions   • Augmentin [Amoxicillin-Pot Clavulanate] Hives   • Keflex [Cephalexin] Hives      Social History     Socioeconomic History   • Marital status:      Spouse name: Not on file   • Number of children: Not on file   • Years of education: Not on file   • Highest education level: Not on file   Social Needs   • Financial resource strain: Not on file   • Food insecurity - worry: Not on file   • Food insecurity - inability: Not on file   • Transportation needs - medical: Not on file   • Transportation needs - non-medical: Not on file   Occupational History   • Not on file   Tobacco Use   • Smoking status: Former Smoker     Packs/day: 2.00     Years: 12.00     Pack years: 24.00     Start date: 1963     Last attempt to quit: 1976     Years since quittin.8   • Smokeless tobacco: Never Used   • Tobacco comment: Chrinic bronchitis...smoke allergy   Substance and Sexual Activity   • Alcohol use: Yes     Comment: Seldom   • Drug use: No   • Sexual activity: Not Currently   Other Topics Concern   • Not on file   Social History Narrative   • Not on file        Current  Outpatient Medications:   •  aspirin 81 MG EC tablet, Take 81 mg by mouth Daily., Disp: , Rfl:   •  cetirizine (zyrTEC) 10 MG tablet, Take 10 mg by mouth Every Night., Disp: , Rfl:   •  fluticasone (FLONASE) 50 MCG/ACT nasal spray, 1 spray into the nostril(s) as directed by provider Daily As Needed., Disp: , Rfl:   •  levothyroxine (SYNTHROID, LEVOTHROID) 75 MCG tablet, Take 1 tablet by mouth Daily. For thyroid (generic accepted), Disp: 90 tablet, Rfl: 3  •  lisinopril (PRINIVIL,ZESTRIL) 10 MG tablet, Take 1 tablet by mouth Daily., Disp: 30 tablet, Rfl: 0  •  Multiple Vitamin (MULTI VITAMIN DAILY PO), Take  by mouth., Disp: , Rfl:   •  Probiotic Product ([employer]) capsule, Take 1 capsule by mouth Daily., Disp: 30 capsule, Rfl: 5  •  amLODIPine (NORVASC) 5 MG tablet, Take 1 tablet by mouth Daily., Disp: 30 tablet, Rfl: 5  •  azaTHIOprine (IMURAN) 50 MG tablet, Take 1.5 tablets by mouth Daily., Disp: 45 tablet, Rfl: 11  •  loperamide (IMODIUM) 2 MG capsule, Take 1 capsule by mouth 4 (Four) Times a Day As Needed for Diarrhea., Disp: 50 capsule, Rfl: 0     Objective     Her blood pressure is been elevated over the week.  Sometimes as high as 170.  She contacted her cardiologist to asked her to increase her lisinopril to 20 mg daily.  Next    She is here today to follow-up on her hypertension.    She had nasal congestion improved with nasal drainage up until today when she actually feels pretty good.  She has no further sinus complaints today.  Her sinus complaints did go on for the last week however.         Review of Systems   Constitutional: Negative.    HENT: Negative.    Respiratory: Negative.    Cardiovascular: Negative.    Musculoskeletal: Negative.    Psychiatric/Behavioral: Negative.        Physical Exam   Constitutional: She is oriented to person, place, and time. She appears well-developed and well-nourished.   HENT:   Head: Normocephalic and atraumatic.   Pulmonary/Chest: Effort normal.    Neurological: She is alert and oriented to person, place, and time.   Psychiatric: She has a normal mood and affect. Her behavior is normal.   Nursing note and vitals reviewed.        Problem List Items Addressed This Visit        Cardiovascular and Mediastinum    Hypertension - Primary    Relevant Medications    amLODIPine (NORVASC) 5 MG tablet            PLAN  Her hypertension is poorly controlled today despite having increased her lisinopril to 20 mg a day a week ago.  I asked her to start taking amlodipine 5 mg.  Like to have her back in a week to recheck her blood pressure.  No Follow-up on file.

## 2018-12-11 ENCOUNTER — OFFICE VISIT (OUTPATIENT)
Dept: FAMILY MEDICINE CLINIC | Facility: CLINIC | Age: 73
End: 2018-12-11

## 2018-12-11 VITALS
SYSTOLIC BLOOD PRESSURE: 138 MMHG | HEIGHT: 62 IN | RESPIRATION RATE: 16 BRPM | BODY MASS INDEX: 24.03 KG/M2 | OXYGEN SATURATION: 99 % | WEIGHT: 130.6 LBS | HEART RATE: 68 BPM | TEMPERATURE: 97.6 F | DIASTOLIC BLOOD PRESSURE: 66 MMHG

## 2018-12-11 DIAGNOSIS — I10 ESSENTIAL HYPERTENSION: Primary | ICD-10-CM

## 2018-12-11 DIAGNOSIS — R73.01 IMPAIRED FASTING GLUCOSE: ICD-10-CM

## 2018-12-11 DIAGNOSIS — E89.0 POSTOPERATIVE HYPOTHYROIDISM: ICD-10-CM

## 2018-12-11 LAB
ALBUMIN SERPL-MCNC: 4.5 G/DL (ref 3.5–5.2)
ALBUMIN/GLOB SERPL: 2.1 G/DL
ALP SERPL-CCNC: 85 U/L (ref 39–117)
ALT SERPL-CCNC: 30 U/L (ref 1–33)
AST SERPL-CCNC: 30 U/L (ref 1–32)
BILIRUB SERPL-MCNC: 1.2 MG/DL (ref 0.1–1.2)
BUN SERPL-MCNC: 14 MG/DL (ref 8–23)
BUN/CREAT SERPL: 17.5 (ref 7–25)
CALCIUM SERPL-MCNC: 9.6 MG/DL (ref 8.6–10.5)
CHLORIDE SERPL-SCNC: 104 MMOL/L (ref 98–107)
CHOLEST SERPL-MCNC: 191 MG/DL (ref 0–200)
CO2 SERPL-SCNC: 27.4 MMOL/L (ref 22–29)
CREAT SERPL-MCNC: 0.8 MG/DL (ref 0.57–1)
GLOBULIN SER CALC-MCNC: 2.1 GM/DL
GLUCOSE SERPL-MCNC: 107 MG/DL (ref 65–99)
HDLC SERPL-MCNC: 47 MG/DL (ref 40–60)
LDLC SERPL CALC-MCNC: 114 MG/DL (ref 0–100)
LDLC/HDLC SERPL: 2.43 {RATIO}
POTASSIUM SERPL-SCNC: 4 MMOL/L (ref 3.5–5.2)
PROT SERPL-MCNC: 6.6 G/DL (ref 6–8.5)
SODIUM SERPL-SCNC: 143 MMOL/L (ref 136–145)
T4 SERPL-MCNC: 8.83 MCG/DL (ref 4.5–11.7)
TRIGL SERPL-MCNC: 150 MG/DL (ref 0–150)
TSH SERPL DL<=0.005 MIU/L-ACNC: 5.39 MIU/ML (ref 0.27–4.2)
VLDLC SERPL CALC-MCNC: 30 MG/DL (ref 5–40)

## 2018-12-11 PROCEDURE — 99213 OFFICE O/P EST LOW 20 MIN: CPT | Performed by: INTERNAL MEDICINE

## 2018-12-11 NOTE — PROGRESS NOTES
Subjective   Mami Srivastava is a 73 y.o. female. Patient is here today for   Chief Complaint   Patient presents with   • Blood Pressure Check          Vitals:    18 1017   BP: 138/66   Pulse: 68   Resp: 16   Temp: 97.6 °F (36.4 °C)   SpO2: 99%       Past Medical History:   Diagnosis Date   • Autoimmune hepatitis (CMS/HCC)    • Cholelithiasis 2018    Ultrasound   • Coronary artery disease     Dr Luis Rick   • Diverticulitis of colon ,   • Hemangioma of liver    • Hernia     Hiatal hernia-Prabhjot test   • Hyperlipidemia     Dr Luis Rick-atorvastatin   • Hypertension    • Hyperthyroidism    • Hypothyroidism    • Laceration of finger of right hand 05/15/2018    lac with knife and had 3 stitches   • Osteopenia after menopause    • Skin cancer     face   • Ulcerative colitis (CMS/HCC) 2014    Colitis/Dr[facility]plan      Allergies   Allergen Reactions   • Augmentin [Amoxicillin-Pot Clavulanate] Hives   • Keflex [Cephalexin] Hives      Social History     Socioeconomic History   • Marital status:      Spouse name: Not on file   • Number of children: Not on file   • Years of education: Not on file   • Highest education level: Not on file   Social Needs   • Financial resource strain: Not on file   • Food insecurity - worry: Not on file   • Food insecurity - inability: Not on file   • Transportation needs - medical: Not on file   • Transportation needs - non-medical: Not on file   Occupational History   • Not on file   Tobacco Use   • Smoking status: Former Smoker     Packs/day: 2.00     Years: 12.00     Pack years: 24.00     Start date: 1963     Last attempt to quit: 1976     Years since quittin.8   • Smokeless tobacco: Never Used   • Tobacco comment: Chrinic bronchitis...smoke allergy   Substance and Sexual Activity   • Alcohol use: Yes     Comment: Seldom   • Drug use: No   • Sexual activity: Not Currently   Other Topics Concern   • Not on file   Social History Narrative   • Not  on file        Current Outpatient Medications:   •  amLODIPine (NORVASC) 5 MG tablet, Take 1 tablet by mouth Daily., Disp: 30 tablet, Rfl: 5  •  aspirin 81 MG EC tablet, Take 81 mg by mouth Daily., Disp: , Rfl:   •  azaTHIOprine (IMURAN) 50 MG tablet, Take 1.5 tablets by mouth Daily., Disp: 45 tablet, Rfl: 11  •  cetirizine (zyrTEC) 10 MG tablet, Take 10 mg by mouth Every Night., Disp: , Rfl:   •  fluticasone (FLONASE) 50 MCG/ACT nasal spray, 1 spray into the nostril(s) as directed by provider Daily As Needed., Disp: , Rfl:   •  levothyroxine (SYNTHROID, LEVOTHROID) 75 MCG tablet, Take 1 tablet by mouth Daily. For thyroid (generic accepted), Disp: 90 tablet, Rfl: 3  •  lisinopril (PRINIVIL,ZESTRIL) 10 MG tablet, Take 1 tablet by mouth Daily., Disp: 30 tablet, Rfl: 0  •  loperamide (IMODIUM) 2 MG capsule, Take 1 capsule by mouth 4 (Four) Times a Day As Needed for Diarrhea., Disp: 50 capsule, Rfl: 0  •  Multiple Vitamin (MULTI VITAMIN DAILY PO), Take  by mouth., Disp: , Rfl:   •  Probiotic Product ([employer])) capsule, Take 1 capsule by mouth Daily., Disp: 30 capsule, Rfl: 5     Objective     This pleasant 73-year-old is here to follow-up her blood pressure check.    She has no complaints.  She is due for lab work including a TSH, comprehensive metabolic panel, and a lipid profile.             Review of Systems   Constitutional: Negative.    HENT: Negative.    Respiratory: Negative.    Cardiovascular: Negative.    Musculoskeletal: Negative.    Psychiatric/Behavioral: Negative.        Physical Exam      Problem List Items Addressed This Visit        Cardiovascular and Mediastinum    Hypertension - Primary    Relevant Orders    Comprehensive Metabolic Panel       Endocrine    Impaired fasting glucose    Relevant Orders    Lipid Panel With LDL / HDL Ratio    Hypothyroidism    Relevant Orders    TSH    T4            PLAN  Her blood pressures well controlled.  Check her blood pressure while sitting in the left  arm I got 130/70.    She is acquired hypothyroidism.  She is on levothyroxin.  She is due for a TSH and free T4, she has a history of hyperglycemia, and is due for screening lipid profile.  I will arrange that today.    I asked her to follow-up in 6 months.      No Follow-up on file.

## 2018-12-14 DIAGNOSIS — E78.2 MIXED HYPERLIPIDEMIA: Primary | ICD-10-CM

## 2018-12-14 DIAGNOSIS — E89.0 POSTOPERATIVE HYPOTHYROIDISM: ICD-10-CM

## 2018-12-18 ENCOUNTER — OFFICE VISIT (OUTPATIENT)
Dept: GASTROENTEROLOGY | Facility: CLINIC | Age: 73
End: 2018-12-18

## 2018-12-18 VITALS
WEIGHT: 128.2 LBS | SYSTOLIC BLOOD PRESSURE: 108 MMHG | DIASTOLIC BLOOD PRESSURE: 74 MMHG | TEMPERATURE: 98.1 F | HEIGHT: 62 IN | BODY MASS INDEX: 23.59 KG/M2

## 2018-12-18 DIAGNOSIS — K75.4 AUTOIMMUNE HEPATITIS TREATED WITH STEROIDS (HCC): Primary | ICD-10-CM

## 2018-12-18 LAB
BASOPHILS # BLD AUTO: 0.01 10*3/MM3 (ref 0–0.2)
BASOPHILS NFR BLD AUTO: 0.2 % (ref 0–1.5)
EOSINOPHIL # BLD AUTO: 0.13 10*3/MM3 (ref 0–0.7)
EOSINOPHIL NFR BLD AUTO: 2 % (ref 0.3–6.2)
ERYTHROCYTE [DISTWIDTH] IN BLOOD BY AUTOMATED COUNT: 13.8 % (ref 11.7–13)
HCT VFR BLD AUTO: 38.8 % (ref 35.6–45.5)
HGB BLD-MCNC: 13.2 G/DL (ref 11.9–15.5)
IMM GRANULOCYTES # BLD: 0.01 10*3/MM3 (ref 0–0.03)
IMM GRANULOCYTES NFR BLD: 0.2 % (ref 0–0.5)
LYMPHOCYTES # BLD AUTO: 2.04 10*3/MM3 (ref 0.9–4.8)
LYMPHOCYTES NFR BLD AUTO: 31.8 % (ref 19.6–45.3)
MCH RBC QN AUTO: 29.9 PG (ref 26.9–32)
MCHC RBC AUTO-ENTMCNC: 34 G/DL (ref 32.4–36.3)
MCV RBC AUTO: 87.8 FL (ref 80.5–98.2)
MONOCYTES # BLD AUTO: 0.49 10*3/MM3 (ref 0.2–1.2)
MONOCYTES NFR BLD AUTO: 7.6 % (ref 5–12)
NEUTROPHILS # BLD AUTO: 3.74 10*3/MM3 (ref 1.9–8.1)
NEUTROPHILS NFR BLD AUTO: 58.4 % (ref 42.7–76)
PLATELET # BLD AUTO: 186 10*3/MM3 (ref 140–500)
RBC # BLD AUTO: 4.42 10*6/MM3 (ref 3.9–5.2)
WBC # BLD AUTO: 6.41 10*3/MM3 (ref 4.5–10.7)

## 2018-12-18 PROCEDURE — 99212 OFFICE O/P EST SF 10 MIN: CPT | Performed by: INTERNAL MEDICINE

## 2018-12-18 NOTE — PROGRESS NOTES
Chief Complaint   Patient presents with   • Follow-up     from [place]   • Hepatitis   • Diverticulitis       Mami Srivastava is a  73 y.o. female here for a follow up visit for autoimmune hepatitis.    HPI     Patient 73-year-old female with autoimmune hepatitis now doing better.  Patient had been admitted for persistent diarrhea with negative workup.  Patient did not respond well until taken off of the Imuran when her diarrhea stopped.  Since then patient has been feeling well with normal stools.  Patient did notice that her RDW on her labs was elevated but hemoglobin was otherwise normal.  Patient denies change in bowel habits no bright red blood per rectum or melena noted.    Past Medical History:   Diagnosis Date   • Autoimmune hepatitis (CMS/HCC)    • Cholelithiasis 04-    Ultrasound   • Coronary artery disease     Dr Luis Rick   • Diverticulitis of colon 2005,2018   • Hemangioma of liver    • Hernia 2017    Hiatal hernia-Fredislo test   • Hyperlipidemia     Dr Luis Rick-atorvastatin   • Hypertension    • Hyperthyroidism    • Hypothyroidism    • Laceration of finger of right hand 05/15/2018    lac with knife and had 3 stitches   • Osteopenia after menopause    • Skin cancer     face   • Ulcerative colitis (CMS/HCC) 2014    Colitis/Arben         Current Outpatient Medications:   •  amLODIPine (NORVASC) 5 MG tablet, Take 1 tablet by mouth Daily., Disp: 30 tablet, Rfl: 5  •  aspirin 81 MG EC tablet, Take 81 mg by mouth Daily., Disp: , Rfl:   •  azaTHIOprine (IMURAN) 50 MG tablet, Take 1.5 tablets by mouth Daily., Disp: 45 tablet, Rfl: 11  •  cetirizine (zyrTEC) 10 MG tablet, Take 10 mg by mouth Every Night., Disp: , Rfl:   •  fluticasone (FLONASE) 50 MCG/ACT nasal spray, 1 spray into the nostril(s) as directed by provider Daily As Needed., Disp: , Rfl:   •  levothyroxine (SYNTHROID, LEVOTHROID) 75 MCG tablet, Take 1 tablet by mouth Daily. For thyroid (generic accepted), Disp: 90 tablet, Rfl: 3  •   lisinopril (PRINIVIL,ZESTRIL) 10 MG tablet, Take 1 tablet by mouth Daily., Disp: 30 tablet, Rfl: 0  •  loperamide (IMODIUM) 2 MG capsule, Take 1 capsule by mouth 4 (Four) Times a Day As Needed for Diarrhea., Disp: 50 capsule, Rfl: 0  •  Multiple Vitamin (MULTI VITAMIN DAILY PO), Take  by mouth., Disp: , Rfl:   •  Probiotic Product ("[employer] capsule, Take 1 capsule by mouth Daily., Disp: 30 capsule, Rfl: 5    Allergies   Allergen Reactions   • Augmentin [Amoxicillin-Pot Clavulanate] Hives   • Keflex [Cephalexin] Hives       Social History     Socioeconomic History   • Marital status:      Spouse name: Not on file   • Number of children: Not on file   • Years of education: Not on file   • Highest education level: Not on file   Social Needs   • Financial resource strain: Not on file   • Food insecurity - worry: Not on file   • Food insecurity - inability: Not on file   • Transportation needs - medical: Not on file   • Transportation needs - non-medical: Not on file   Occupational History   • Not on file   Tobacco Use   • Smoking status: Former Smoker     Packs/day: 2.00     Years: 12.00     Pack years: 24.00     Start date: 1963     Last attempt to quit: 1976     Years since quittin.9   • Smokeless tobacco: Never Used   • Tobacco comment: Chrinic bronchitis...smoke allergy   Substance and Sexual Activity   • Alcohol use: Yes     Comment: Seldom   • Drug use: No   • Sexual activity: Not Currently   Other Topics Concern   • Not on file   Social History Narrative   • Not on file       Family History   Problem Relation Age of Onset   • Heart disease Mother    • Hypertension Mother    • Lung cancer Sister    • Thyroid disease Sister    • Lupus Sister    • Thyroid disease Brother    • Alcohol abuse Brother    • Glaucoma Maternal Grandmother    • Stomach cancer Maternal Grandmother          ’s   • Cirrhosis Father             • Liver disease Father        Review of Systems    Constitutional: Negative.    Respiratory: Negative.    Cardiovascular: Negative.    Gastrointestinal: Negative.    Musculoskeletal: Negative.    Skin: Negative.    Hematological: Negative.        Vitals:    12/18/18 1440   BP: 108/74   Temp: 98.1 °F (36.7 °C)       Physical Exam   Constitutional: She is oriented to person, place, and time. She appears well-developed and well-nourished.   HENT:   Head: Normocephalic and atraumatic.   Eyes: Pupils are equal, round, and reactive to light. No scleral icterus.   Neck: Normal range of motion.   Cardiovascular: Normal rate, regular rhythm and normal heart sounds.   Pulmonary/Chest: Effort normal and breath sounds normal.   Abdominal: Soft. Bowel sounds are normal. She exhibits no distension and no mass. There is no tenderness. No hernia.   Lymphadenopathy:     She has no cervical adenopathy.   Neurological: She is alert and oriented to person, place, and time.   Skin: Skin is warm and dry. No rash noted.   Psychiatric: She has a normal mood and affect. Her behavior is normal.   Vitals reviewed.      Office Visit on 12/11/2018   Component Date Value Ref Range Status   • Glucose 12/11/2018 107* 65 - 99 mg/dL Final   • BUN 12/11/2018 14  8 - 23 mg/dL Final   • Creatinine 12/11/2018 0.80  0.57 - 1.00 mg/dL Final   • eGFR Non  Am 12/11/2018 70  >60 mL/min/1.73 Final   • eGFR African Am 12/11/2018 85  >60 mL/min/1.73 Final   • BUN/Creatinine Ratio 12/11/2018 17.5  7.0 - 25.0 Final   • Sodium 12/11/2018 143  136 - 145 mmol/L Final   • Potassium 12/11/2018 4.0  3.5 - 5.2 mmol/L Final   • Chloride 12/11/2018 104  98 - 107 mmol/L Final   • Total CO2 12/11/2018 27.4  22.0 - 29.0 mmol/L Final   • Calcium 12/11/2018 9.6  8.6 - 10.5 mg/dL Final   • Total Protein 12/11/2018 6.6  6.0 - 8.5 g/dL Final   • Albumin 12/11/2018 4.50  3.50 - 5.20 g/dL Final   • Globulin 12/11/2018 2.1  gm/dL Final   • A/G Ratio 12/11/2018 2.1  g/dL Final   • Total Bilirubin 12/11/2018 1.2  0.1 - 1.2  mg/dL Final   • Alkaline Phosphatase 12/11/2018 85  39 - 117 U/L Final   • AST (SGOT) 12/11/2018 30  1 - 32 U/L Final   • ALT (SGPT) 12/11/2018 30  1 - 33 U/L Final   • TSH 12/11/2018 5.390* 0.270 - 4.200 mIU/mL Final   • T4, Total 12/11/2018 8.83  4.50 - 11.70 mcg/dL Final   • Total Cholesterol 12/11/2018 191  0 - 200 mg/dL Final   • Triglycerides 12/11/2018 150  0 - 150 mg/dL Final   • HDL Cholesterol 12/11/2018 47  40 - 60 mg/dL Final   • VLDL Cholesterol 12/11/2018 30  5 - 40 mg/dL Final   • LDL Cholesterol  12/11/2018 114* 0 - 100 mg/dL Final   • LDL/HDL Ratio 12/11/2018 2.43   Final   Results Encounter on 11/25/2018   Component Date Value Ref Range Status   • Glucose 12/05/2018 112* 65 - 99 mg/dL Final   • BUN 12/05/2018 16  8 - 23 mg/dL Final   • Creatinine 12/05/2018 0.94  0.57 - 1.00 mg/dL Final   • eGFR Non African Am 12/05/2018 58* >60 mL/min/1.73 Final   • eGFR African Am 12/05/2018 71  >60 mL/min/1.73 Final   • BUN/Creatinine Ratio 12/05/2018 17.0  7.0 - 25.0 Final   • Sodium 12/05/2018 143  136 - 145 mmol/L Final   • Potassium 12/05/2018 4.0  3.5 - 5.2 mmol/L Final   • Chloride 12/05/2018 103  98 - 107 mmol/L Final   • Total CO2 12/05/2018 27.9  22.0 - 29.0 mmol/L Final   • Calcium 12/05/2018 9.6  8.6 - 10.5 mg/dL Final   • Total Protein 12/05/2018 6.4  6.0 - 8.5 g/dL Final   • Albumin 12/05/2018 4.20  3.50 - 5.20 g/dL Final   • Globulin 12/05/2018 2.2  gm/dL Final   • A/G Ratio 12/05/2018 1.9  g/dL Final   • Total Bilirubin 12/05/2018 0.9  0.1 - 1.2 mg/dL Final   • Alkaline Phosphatase 12/05/2018 68  39 - 117 U/L Final   • AST (SGOT) 12/05/2018 27  1 - 32 U/L Final   • ALT (SGPT) 12/05/2018 24  1 - 33 U/L Final   • INR 12/05/2018 1.02  0.90 - 1.10 Final   • Protime 12/05/2018 13.2  11.7 - 14.2 Seconds Final   • WBC 12/05/2018 4.57  4.50 - 10.70 10*3/mm3 Final   • RBC 12/05/2018 4.29  3.90 - 5.20 10*6/mm3 Final   • Hemoglobin 12/05/2018 12.4  11.9 - 15.5 g/dL Final   • Hematocrit 12/05/2018 38.3  35.6  - 45.5 % Final   • MCV 12/05/2018 89.3  80.5 - 98.2 fL Final   • MCH 12/05/2018 28.9  26.9 - 32.0 pg Final   • MCHC 12/05/2018 32.4  32.4 - 36.3 g/dL Final   • RDW 12/05/2018 14.8* 11.7 - 13.0 % Final   • Platelets 12/05/2018 192  140 - 500 10*3/mm3 Final   Office Visit on 11/19/2018   Component Date Value Ref Range Status   • Color 11/19/2018 Dark Yellow  Yellow, Straw, Dark Yellow, Bryanna Final   • Clarity, UA 11/19/2018 Cloudy* Clear Final   • Specific Gravity  11/19/2018 1.030  1.005 - 1.030 Final   • pH, Urine 11/19/2018 5.5  5.0 - 8.0 Final   • Leukocytes 11/19/2018 Negative  Negative Final   • Nitrite, UA 11/19/2018 Negative  Negative Final   • Protein, POC 11/19/2018 30 mg/dL* Negative mg/dL Final   • Glucose, UA 11/19/2018 Negative  Negative, 1000 mg/dL (3+) mg/dL Final   • Ketones, UA 11/19/2018 15 mg/dL* Negative Final   • Urobilinogen, UA 11/19/2018 Normal  Normal Final   • Bilirubin 11/19/2018 Small (1+)* Negative Final   • Blood, UA 11/19/2018 Negative  Negative Final   • Glucose 11/19/2018 103* 65 - 99 mg/dL Final   • BUN 11/19/2018 14  8 - 23 mg/dL Final   • Creatinine 11/19/2018 0.80  0.57 - 1.00 mg/dL Final   • eGFR Non  Am 11/19/2018 70  >60 mL/min/1.73 Final   • eGFR African Am 11/19/2018 85  >60 mL/min/1.73 Final   • BUN/Creatinine Ratio 11/19/2018 17.5  7.0 - 25.0 Final   • Sodium 11/19/2018 141  136 - 145 mmol/L Final   • Potassium 11/19/2018 3.8  3.5 - 5.2 mmol/L Final   • Chloride 11/19/2018 101  98 - 107 mmol/L Final   • Total CO2 11/19/2018 25.5  22.0 - 29.0 mmol/L Final   • Calcium 11/19/2018 9.6  8.6 - 10.5 mg/dL Final   • Total Protein 11/19/2018 7.1  6.0 - 8.5 g/dL Final   • Albumin 11/19/2018 4.20  3.50 - 5.20 g/dL Final   • Globulin 11/19/2018 2.9  gm/dL Final   • A/G Ratio 11/19/2018 1.4  g/dL Final   • Total Bilirubin 11/19/2018 0.9  0.1 - 1.2 mg/dL Final   • Alkaline Phosphatase 11/19/2018 62  39 - 117 U/L Final   • AST (SGOT) 11/19/2018 42* 1 - 32 U/L Final   • ALT (SGPT)  11/19/2018 29  1 - 33 U/L Final   • Specific Gravity, UA 11/19/2018 1.027  1.005 - 1.030 Final   • pH, UA 11/19/2018 5.0  5.0 - 7.5 Final   • Color, UA 11/19/2018 Yellow  Yellow Final   • Appearance, UA 11/19/2018 Clear  Clear Final   • Leukocytes, UA 11/19/2018 Negative  Negative Final   • Protein 11/19/2018 1+* Negative/Trace Final   • Glucose, UA 11/19/2018 Negative  Negative Final   • Ketones 11/19/2018 Trace* Negative Final   • Blood, UA 11/19/2018 Negative  Negative Final   • Bilirubin, UA 11/19/2018 Negative  Negative Final   • Urobilinogen, UA 11/19/2018 1.0  0.2 - 1.0 mg/dL Final   • Nitrite, UA 11/19/2018 Negative  Negative Final   • Microscopic Examination 11/19/2018 See below:   Final   • Urinalysis Reflex 11/19/2018 Comment   Final   • WBC, UA 11/19/2018 0-5  0 - 5 /hpf Final   • RBC, UA 11/19/2018 0-2  0 - 2 /hpf Final   • Epithelial Cells (non renal) 11/19/2018 0-10  0 - 10 /hpf Final   • Casts 11/19/2018 Present* None seen /lpf Final   • Cast Type 11/19/2018 Hyaline casts  N/A Final   • Crystals, UA 11/19/2018 Present* N/A Final   • Crystal Type 11/19/2018 Amorphous Sediment  N/A Final   • Mucus, UA 11/19/2018 Present  Not Estab. Final   • Bacteria, UA 11/19/2018 None seen  None seen/Few Final   • Specimen Status 11/19/2018 CANCELED   Final-Edited   Admission on 10/25/2018, Discharged on 11/07/2018   No results displayed because visit has over 200 results.      Admission on 10/24/2018, Discharged on 10/24/2018   Component Date Value Ref Range Status   • Glucose 10/24/2018 104* 65 - 99 mg/dL Final   • BUN 10/24/2018 9  8 - 23 mg/dL Final   • Creatinine 10/24/2018 0.92  0.57 - 1.00 mg/dL Final   • Sodium 10/24/2018 138  136 - 145 mmol/L Final   • Potassium 10/24/2018 3.3* 3.5 - 5.2 mmol/L Final   • Chloride 10/24/2018 99  98 - 107 mmol/L Final   • CO2 10/24/2018 25.9  22.0 - 29.0 mmol/L Final   • Calcium 10/24/2018 9.9  8.6 - 10.5 mg/dL Final   • Total Protein 10/24/2018 6.7  6.0 - 8.5 g/dL Final   •  Albumin 10/24/2018 4.20  3.50 - 5.20 g/dL Final   • ALT (SGPT) 10/24/2018 39* 1 - 33 U/L Final   • AST (SGOT) 10/24/2018 45* 1 - 32 U/L Final   • Alkaline Phosphatase 10/24/2018 55  39 - 117 U/L Final   • Total Bilirubin 10/24/2018 0.9  0.1 - 1.2 mg/dL Final   • eGFR Non  Amer 10/24/2018 60* >60 mL/min/1.73 Final   • Globulin 10/24/2018 2.5  gm/dL Final   • A/G Ratio 10/24/2018 1.7  g/dL Final   • BUN/Creatinine Ratio 10/24/2018 9.8  7.0 - 25.0 Final   • Anion Gap 10/24/2018 13.1  mmol/L Final   • Lipase 10/24/2018 59  13 - 60 U/L Final   • WBC 10/24/2018 8.30  4.50 - 10.70 10*3/mm3 Final   • RBC 10/24/2018 4.13  3.90 - 5.20 10*6/mm3 Final   • Hemoglobin 10/24/2018 12.5  11.9 - 15.5 g/dL Final   • Hematocrit 10/24/2018 37.6  35.6 - 45.5 % Final   • MCV 10/24/2018 91.0  80.5 - 98.2 fL Final   • MCH 10/24/2018 30.3  26.9 - 32.0 pg Final   • MCHC 10/24/2018 33.2  32.4 - 36.3 g/dL Final   • RDW 10/24/2018 14.7* 11.7 - 13.0 % Final   • RDW-SD 10/24/2018 48.9  37.0 - 54.0 fl Final   • MPV 10/24/2018 9.8  6.0 - 12.0 fL Final   • Platelets 10/24/2018 168  140 - 500 10*3/mm3 Final   • Neutrophil % 10/24/2018 68.7  42.7 - 76.0 % Final   • Lymphocyte % 10/24/2018 21.6  19.6 - 45.3 % Final   • Monocyte % 10/24/2018 6.9  5.0 - 12.0 % Final   • Eosinophil % 10/24/2018 2.5  0.3 - 6.2 % Final   • Basophil % 10/24/2018 0.1  0.0 - 1.5 % Final   • Immature Grans % 10/24/2018 0.2  0.0 - 0.5 % Final   • Neutrophils, Absolute 10/24/2018 5.70  1.90 - 8.10 10*3/mm3 Final   • Lymphocytes, Absolute 10/24/2018 1.79  0.90 - 4.80 10*3/mm3 Final   • Monocytes, Absolute 10/24/2018 0.57  0.20 - 1.20 10*3/mm3 Final   • Eosinophils, Absolute 10/24/2018 0.21  0.00 - 0.70 10*3/mm3 Final   • Basophils, Absolute 10/24/2018 0.01  0.00 - 0.20 10*3/mm3 Final   • Immature Grans, Absolute 10/24/2018 0.02  0.00 - 0.03 10*3/mm3 Final   Telephone on 10/19/2018   Component Date Value Ref Range Status   • Campylobacter 10/22/2018 Not Detected  Not Detected  Final   • C.difficile toxin A/B 10/22/2018 Not Detected  Not Detected Final   • Plesiomonas shigelloides 10/22/2018 Not Detected  Not Detected Final   • Salmonella 10/22/2018 Not Detected  Not Detected Final   • Vibrio 10/22/2018 Not Detected  Not Detected Final   • Vibrio cholerae 10/22/2018 Not Detected  Not Detected Final   • Yersinia enterocolitica 10/22/2018 Not Detected  Not Detected Final   • Enteroaggregative E. coli 10/22/2018 Not Detected  Not Detected Final   • Enteropathogenic E. coli 10/22/2018 Not Detected  Not Detected Final   • Enterotoxigenic E. coli 10/22/2018 Not Detected  Not Detected Final   • Shiga-like toxin-producing E. coli  10/22/2018 Not Detected  Not Detected Final   • E. coli O157 10/22/2018 Not applicable  Not Detected Final   • Shigella enteroinvasive E. coli 10/22/2018 Not Detected  Not Detected Final   • Cryptosporidium 10/22/2018 Not Detected  Not Detected Final   • Cyclospora cayetanensis 10/22/2018 Not Detected  Not Detected Final   • Entamoeba Histolytica Ag 10/22/2018 Not Detected  Not Detected Final   • Giardia lamblia 10/22/2018 Not Detected  Not Detected Final   • Adenovirus 40/41 Ag 10/22/2018 Not Detected  Not Detected Final   • Astrovirus 10/22/2018 Not Detected  Not Detected Final   • Norovirus GI/GII 10/22/2018 Not Detected  Not Detected Final   • Rotavirus A 10/22/2018 Not Detected  Not Detected Final   • Sapovirus 10/22/2018 Not Detected  Not Detected Final       Mami was seen today for follow-up, hepatitis and diverticulitis.    Diagnoses and all orders for this visit:    Autoimmune hepatitis treated with steroids (CMS/HCC)  -     CBC & Differential  -     Comprehensive Metabolic Panel  -     Vitamin B12 & Folate  -     Iron Profile      Patient 73-year-old female with history of autoimmune hepatitis doing better now off of the Imuran.  Patient reports no further diarrhea and lasts several blood tests show normal liver enzymes.  Patient was concerned about the  elevated RDW but for now we'll check iron and B12 folate for possibility of explaining the abnormal elevation in RDW under blood tests.  We'll repeat the CBC and the CMP to confirm liver enzymes to normal in follow-up in 6 months if all things still remain normal.  Patient okay to return to work

## 2018-12-19 LAB
ALBUMIN SERPL-MCNC: 4.5 G/DL (ref 3.5–5.2)
ALBUMIN/GLOB SERPL: 1.7 G/DL
ALP SERPL-CCNC: 72 U/L (ref 39–117)
ALT SERPL-CCNC: 28 U/L (ref 1–33)
AST SERPL-CCNC: 30 U/L (ref 1–32)
BILIRUB SERPL-MCNC: 1.2 MG/DL (ref 0.1–1.2)
BUN SERPL-MCNC: 20 MG/DL (ref 8–23)
BUN/CREAT SERPL: 17.4 (ref 7–25)
CALCIUM SERPL-MCNC: 10.6 MG/DL (ref 8.6–10.5)
CHLORIDE SERPL-SCNC: 103 MMOL/L (ref 98–107)
CO2 SERPL-SCNC: 28.4 MMOL/L (ref 22–29)
CREAT SERPL-MCNC: 1.15 MG/DL (ref 0.57–1)
FOLATE SERPL-MCNC: >20 NG/ML (ref 4.78–24.2)
GLOBULIN SER CALC-MCNC: 2.6 GM/DL
GLUCOSE SERPL-MCNC: 96 MG/DL (ref 65–99)
IRON SATN MFR SERPL: 24 % (ref 20–50)
IRON SERPL-MCNC: 88 MCG/DL (ref 37–145)
POTASSIUM SERPL-SCNC: 3.8 MMOL/L (ref 3.5–5.2)
PROT SERPL-MCNC: 7.1 G/DL (ref 6–8.5)
SODIUM SERPL-SCNC: 144 MMOL/L (ref 136–145)
TIBC SERPL-MCNC: 372 MCG/DL
UIBC SERPL-MCNC: 284 MCG/DL
VIT B12 SERPL-MCNC: 443 PG/ML (ref 211–946)

## 2019-01-08 ENCOUNTER — TELEPHONE (OUTPATIENT)
Dept: GASTROENTEROLOGY | Facility: CLINIC | Age: 74
End: 2019-01-08

## 2019-01-08 NOTE — TELEPHONE ENCOUNTER
----- Message from Shiva Hein MD sent at 1/2/2019  9:55 AM EST -----  Labs remain normal.  Follow-up 6 months as directed.

## 2019-01-14 ENCOUNTER — TELEPHONE (OUTPATIENT)
Dept: GASTROENTEROLOGY | Facility: CLINIC | Age: 74
End: 2019-01-14

## 2019-01-14 NOTE — TELEPHONE ENCOUNTER
----- Message from Diana Mason sent at 1/14/2019  2:34 PM EST -----  Regarding: labs  Contact: [phone]  Does the pt need to have labs drawn on her June visit that is scheduled? Please call her regarding this. Thx

## 2019-01-14 NOTE — TELEPHONE ENCOUNTER
Returned patient's phone call. Advised Dr. Hein did not mention repeat labs in his notes. Advised if Dr. Hein wants lab work it can be drawn on the day of her visit. She verb undernstanding.

## 2019-03-27 ENCOUNTER — APPOINTMENT (OUTPATIENT)
Dept: WOMENS IMAGING | Facility: HOSPITAL | Age: 74
End: 2019-03-27

## 2019-03-27 PROCEDURE — 77063 BREAST TOMOSYNTHESIS BI: CPT | Performed by: RADIOLOGY

## 2019-03-27 PROCEDURE — 77067 SCR MAMMO BI INCL CAD: CPT | Performed by: RADIOLOGY

## 2019-03-27 PROCEDURE — MDREVIEWSP: Performed by: RADIOLOGY

## 2019-05-19 RX ORDER — LEVOTHYROXINE SODIUM 75 MCG
TABLET ORAL
Qty: 90 TABLET | Refills: 3 | OUTPATIENT
Start: 2019-05-19

## 2019-05-20 RX ORDER — AMLODIPINE BESYLATE 5 MG/1
TABLET ORAL
Qty: 30 TABLET | Refills: 5 | Status: SHIPPED | OUTPATIENT
Start: 2019-05-20 | End: 2019-11-13 | Stop reason: SDUPTHER

## 2019-05-28 RX ORDER — LEVOTHYROXINE SODIUM 0.07 MG/1
75 TABLET ORAL DAILY
Qty: 90 TABLET | Refills: 0 | Status: SHIPPED | OUTPATIENT
Start: 2019-05-28 | End: 2019-08-27 | Stop reason: SDUPTHER

## 2019-05-28 RX ORDER — LEVOTHYROXINE SODIUM 0.07 MG/1
75 TABLET ORAL DAILY
Qty: 90 TABLET | Refills: 0 | Status: SHIPPED | OUTPATIENT
Start: 2019-05-28 | End: 2019-05-28 | Stop reason: SDUPTHER

## 2019-06-07 DIAGNOSIS — E55.9 VITAMIN D DEFICIENCY: ICD-10-CM

## 2019-06-07 DIAGNOSIS — R73.01 IMPAIRED FASTING GLUCOSE: ICD-10-CM

## 2019-06-07 DIAGNOSIS — E89.0 POSTOPERATIVE HYPOTHYROIDISM: ICD-10-CM

## 2019-06-07 DIAGNOSIS — E78.2 MIXED HYPERLIPIDEMIA: Primary | ICD-10-CM

## 2019-06-11 NOTE — PROGRESS NOTES
[facility]     LOS: 9 days     Subjective:  Had colonoscopy  Still having watery stools again    Objective:    Vital Signs:  Temp:  [96.7 °F (35.9 °C)-97.6 °F (36.4 °C)] 96.7 °F (35.9 °C)  Heart Rate:  [62-79] 75  Resp:  [16-18] 17  BP: (119-147)/(65-76) 140/76    SpO2:  [96 %-99 %] 97 %  on   ;   Device (Oxygen Therapy): room air  Body mass index is 25.24 kg/m².    Physical Exam   Constitutional: She appears well-developed and well-nourished.   HENT:   Head: Normocephalic and atraumatic.   Cardiovascular: Normal rate and regular rhythm.    No murmur heard.  Pulmonary/Chest: Effort normal and breath sounds normal.   Abdominal: Soft. Bowel sounds are normal. There is no tenderness.   Neurological: She is alert.   Skin: Skin is warm and dry.       Results Review:    Glucose   Date Value Ref Range Status   11/04/2018 84 65 - 99 mg/dL Final       Results from last 7 days  Lab Units 11/04/18  0532   WBC 10*3/mm3 4.37*   HEMOGLOBIN g/dL 11.2*   HEMATOCRIT % 33.2*   PLATELETS 10*3/mm3 242       Results from last 7 days  Lab Units 11/04/18  0532   SODIUM mmol/L 140   POTASSIUM mmol/L 3.5   CHLORIDE mmol/L 104   CO2 mmol/L 27.3   BUN mg/dL 7*   CREATININE mg/dL 0.76   CALCIUM mg/dL 8.9   GLUCOSE mg/dL 84           Results from last 7 days  Lab Units 10/29/18  0736   MAGNESIUM mg/dL 1.5*         Cultures:       I have reviewed daily medications and changes in CPOE    Scheduled meds    aspirin 81 mg Oral Daily   azaTHIOprine 75 mg Oral Daily   cetirizine 10 mg Oral Nightly   enoxaparin 40 mg Subcutaneous Q24H   hydrocortisone  Topical Q12H   levothyroxine 75 mcg Oral Q AM   lisinopril 10 mg Oral Q24H   loperamide 2 mg Oral TID   multivitamin 1 tablet Oral Daily   sodium chloride 3 mL Intravenous Q12H          PRN meds  •  acetaminophen  •  fluticasone  •  HYDROcodone-acetaminophen  •  loperamide  •  melatonin  •  ondansetron **OR** ondansetron ODT **OR** ondansetron  •  potassium chloride  •   potassium chloride  •  sodium chloride        Diarrhea    Mixed hyperlipidemia    Hypothyroidism    Hypertension    Autoimmune hepatitis treated with steroids (CMS/HCC)    Diverticulitis    Cholelithiasis        Assessment/Plan:    Diarrhea  -ecoli- EPAC  -[facility] + for enteropathic Escherichia coli ( no antibiotics per ID)  -schedule the imodium  - Colonoscopy performed today results pending    Fevers-IS  -dopplers  -blood cultures, neg      Mixed hyperlipidemia      Hypothyroidism      Hypertension      Autoimmune hepatitis treated with steroids (CMS/HCC)  -imuran      Diverticulitis-slightly better but with fevers      Cholelithiasis      hypokalemia- resolved     pancreatic cysts-no further workup    dvt prophylaxis-lovenox 40    Frida Barrientos MD  11/04/18  1:47 PM     1 pair

## 2019-06-12 LAB
25(OH)D3+25(OH)D2 SERPL-MCNC: 32.7 NG/ML (ref 30–100)
ALBUMIN SERPL-MCNC: 4.4 G/DL (ref 3.5–5.2)
ALBUMIN/GLOB SERPL: 1.9 G/DL
ALP SERPL-CCNC: 73 U/L (ref 39–117)
ALT SERPL-CCNC: 16 U/L (ref 1–33)
APPEARANCE UR: CLEAR
AST SERPL-CCNC: 20 U/L (ref 1–32)
BACTERIA #/AREA URNS HPF: NORMAL /HPF
BASOPHILS # BLD AUTO: 0.01 10*3/MM3 (ref 0–0.2)
BASOPHILS NFR BLD AUTO: 0.2 % (ref 0–1.5)
BILIRUB SERPL-MCNC: 1.3 MG/DL (ref 0.2–1.2)
BILIRUB UR QL STRIP: NEGATIVE
BUN SERPL-MCNC: 18 MG/DL (ref 8–23)
BUN/CREAT SERPL: 18.8 (ref 7–25)
CALCIUM SERPL-MCNC: 9.7 MG/DL (ref 8.6–10.5)
CASTS URNS MICRO: NORMAL
CHLORIDE SERPL-SCNC: 101 MMOL/L (ref 98–107)
CHOLEST SERPL-MCNC: 215 MG/DL (ref 0–200)
CO2 SERPL-SCNC: 28.5 MMOL/L (ref 22–29)
COLOR UR: YELLOW
CREAT SERPL-MCNC: 0.96 MG/DL (ref 0.57–1)
EOSINOPHIL # BLD AUTO: 0.23 10*3/MM3 (ref 0–0.4)
EOSINOPHIL NFR BLD AUTO: 4.3 % (ref 0.3–6.2)
EPI CELLS #/AREA URNS HPF: NORMAL /HPF
ERYTHROCYTE [DISTWIDTH] IN BLOOD BY AUTOMATED COUNT: 12.5 % (ref 12.3–15.4)
GLOBULIN SER CALC-MCNC: 2.3 GM/DL
GLUCOSE SERPL-MCNC: 103 MG/DL (ref 65–99)
GLUCOSE UR QL: NEGATIVE
HBA1C MFR BLD: 5.6 % (ref 4.8–5.6)
HCT VFR BLD AUTO: 40.4 % (ref 34–46.6)
HDLC SERPL-MCNC: 53 MG/DL (ref 40–60)
HGB BLD-MCNC: 13.3 G/DL (ref 12–15.9)
HGB UR QL STRIP: NEGATIVE
IMM GRANULOCYTES # BLD AUTO: 0.02 10*3/MM3 (ref 0–0.05)
IMM GRANULOCYTES NFR BLD AUTO: 0.4 % (ref 0–0.5)
KETONES UR QL STRIP: NEGATIVE
LDLC SERPL CALC-MCNC: 127 MG/DL (ref 0–100)
LDLC/HDLC SERPL: 2.4 {RATIO}
LEUKOCYTE ESTERASE UR QL STRIP: (no result)
LYMPHOCYTES # BLD AUTO: 1.38 10*3/MM3 (ref 0.7–3.1)
LYMPHOCYTES NFR BLD AUTO: 25.8 % (ref 19.6–45.3)
MCH RBC QN AUTO: 29.9 PG (ref 26.6–33)
MCHC RBC AUTO-ENTMCNC: 32.9 G/DL (ref 31.5–35.7)
MCV RBC AUTO: 90.8 FL (ref 79–97)
MONOCYTES # BLD AUTO: 0.57 10*3/MM3 (ref 0.1–0.9)
MONOCYTES NFR BLD AUTO: 10.7 % (ref 5–12)
NEUTROPHILS # BLD AUTO: 3.13 10*3/MM3 (ref 1.7–7)
NEUTROPHILS NFR BLD AUTO: 58.6 % (ref 42.7–76)
NITRITE UR QL STRIP: NEGATIVE
NRBC BLD AUTO-RTO: 0 /100 WBC (ref 0–0.2)
PH UR STRIP: 6 [PH] (ref 5–8)
PLATELET # BLD AUTO: 166 10*3/MM3 (ref 140–450)
POTASSIUM SERPL-SCNC: 4.6 MMOL/L (ref 3.5–5.2)
PROT SERPL-MCNC: 6.7 G/DL (ref 6–8.5)
PROT UR QL STRIP: NEGATIVE
RBC # BLD AUTO: 4.45 10*6/MM3 (ref 3.77–5.28)
RBC #/AREA URNS HPF: NORMAL /HPF
SODIUM SERPL-SCNC: 140 MMOL/L (ref 136–145)
SP GR UR: 1.01 (ref 1–1.03)
T4 FREE SERPL-MCNC: 1.44 NG/DL (ref 0.93–1.7)
TRIGL SERPL-MCNC: 173 MG/DL (ref 0–150)
TSH SERPL DL<=0.005 MIU/L-ACNC: 1.9 MIU/ML (ref 0.27–4.2)
UROBILINOGEN UR STRIP-MCNC: (no result) MG/DL
VLDLC SERPL CALC-MCNC: 34.6 MG/DL
WBC # BLD AUTO: 5.34 10*3/MM3 (ref 3.4–10.8)
WBC #/AREA URNS HPF: NORMAL /HPF

## 2019-06-13 ENCOUNTER — OFFICE VISIT (OUTPATIENT)
Dept: GASTROENTEROLOGY | Facility: CLINIC | Age: 74
End: 2019-06-13

## 2019-06-13 VITALS
DIASTOLIC BLOOD PRESSURE: 70 MMHG | SYSTOLIC BLOOD PRESSURE: 130 MMHG | WEIGHT: 141.2 LBS | HEIGHT: 62 IN | BODY MASS INDEX: 25.98 KG/M2 | TEMPERATURE: 98.1 F

## 2019-06-13 DIAGNOSIS — K75.4 AUTOIMMUNE HEPATITIS TREATED WITH STEROIDS (HCC): Primary | ICD-10-CM

## 2019-06-13 PROCEDURE — 99212 OFFICE O/P EST SF 10 MIN: CPT | Performed by: INTERNAL MEDICINE

## 2019-06-13 RX ORDER — CHLORAL HYDRATE 500 MG
3000 CAPSULE ORAL
COMMUNITY
End: 2020-09-19

## 2019-06-13 NOTE — PROGRESS NOTES
Chief Complaint   Patient presents with   • Hepatitis       Mami Srivastava is a  73 y.o. female here for a follow up visit for history of autoimmune hepatitis.    HPI     Patient 73-year-old female with history of hypothyroidism and hypertension as well as hyperlipidemia treated for autoimmune hepatitis.  Patient reports feeling well with no further GI complaints.  Bowels are back to normal.  Patient reports no fever or chills no chest pain noted.  Patient here for follow-up off of Imuran.    Past Medical History:   Diagnosis Date   • Autoimmune hepatitis (CMS/HCC)    • Cholelithiasis 04-    Ultrasound   • Coronary artery disease     Dr Luis Rick   • Diverticulitis of colon 2005,2018   • Hemangioma of liver    • Hernia 2017    Hiatal hernia-Prabhjot test   • Hyperlipidemia     Dr Luis Rick-atorvastatin   • Hypertension    • Hyperthyroidism    • Hypothyroidism    • Laceration of finger of right hand 05/15/2018    lac with knife and had 3 stitches   • Osteopenia after menopause    • Skin cancer     face   • Ulcerative colitis (CMS/HCC) 2014    Colitis/Arben         Current Outpatient Medications:   •  amLODIPine (NORVASC) 5 MG tablet, TAKE 1 TABLET BY MOUTH EVERY DAY, Disp: 30 tablet, Rfl: 5  •  aspirin 81 MG EC tablet, Take 81 mg by mouth Daily., Disp: , Rfl:   •  cetirizine (zyrTEC) 10 MG tablet, Take 10 mg by mouth Every Night., Disp: , Rfl:   •  fluticasone (FLONASE) 50 MCG/ACT nasal spray, 1 spray into the nostril(s) as directed by provider Daily As Needed., Disp: , Rfl:   •  levothyroxine (SYNTHROID, LEVOTHROID) 75 MCG tablet, Take 1 tablet by mouth Daily. For thyroid (generic accepted), Disp: 90 tablet, Rfl: 0  •  lisinopril (PRINIVIL,ZESTRIL) 10 MG tablet, Take 1 tablet by mouth Daily., Disp: 30 tablet, Rfl: 0  •  Multiple Vitamin (MULTI VITAMIN DAILY PO), Take  by mouth., Disp: , Rfl:   •  Omega-3 Fatty Acids (FISH OIL) 1000 MG capsule capsule, Take 3,000 mg by mouth Daily With Breakfast.,  Disp: , Rfl:   •  Probiotic Product ([employer]) capsule, Take 1 capsule by mouth Daily., Disp: 30 capsule, Rfl: 5  •  loperamide (IMODIUM) 2 MG capsule, Take 1 capsule by mouth 4 (Four) Times a Day As Needed for Diarrhea., Disp: 50 capsule, Rfl: 0    Allergies   Allergen Reactions   • Imuran [Azathioprine] GI Intolerance   • Augmentin [Amoxicillin-Pot Clavulanate] Hives   • Keflex [Cephalexin] Hives       Social History     Socioeconomic History   • Marital status:      Spouse name: Not on file   • Number of children: Not on file   • Years of education: Not on file   • Highest education level: Not on file   Tobacco Use   • Smoking status: Former Smoker     Packs/day: 2.00     Years: 12.00     Pack years: 24.00     Start date: 1963     Last attempt to quit: 1976     Years since quittin.3   • Smokeless tobacco: Never Used   • Tobacco comment: Chrinic bronchitis...smoke allergy   Substance and Sexual Activity   • Alcohol use: Yes     Comment: Seldom   • Drug use: No   • Sexual activity: Not Currently       Family History   Problem Relation Age of Onset   • Heart disease Mother    • Hypertension Mother    • Lung cancer Sister    • Thyroid disease Sister    • Lupus Sister    • Thyroid disease Brother    • Alcohol abuse Brother    • Glaucoma Maternal Grandmother    • Stomach cancer Maternal Grandmother          ’s   • Cirrhosis Father             • Liver disease Father        Review of Systems   Constitutional: Negative.    Respiratory: Negative.    Cardiovascular: Negative.    Gastrointestinal: Negative.    Musculoskeletal: Negative.    Skin: Negative.    Hematological: Negative.        Vitals:    19 1055   BP: 130/70   Temp: 98.1 °F (36.7 °C)       Physical Exam   Constitutional: She is oriented to person, place, and time. She appears well-developed and well-nourished.   HENT:   Head: Normocephalic and atraumatic.   Eyes: Pupils are equal, round, and reactive to  light. No scleral icterus.   Cardiovascular: Normal rate, regular rhythm and normal heart sounds.   Pulmonary/Chest: Effort normal and breath sounds normal.   Abdominal: Soft. Bowel sounds are normal. She exhibits no distension and no mass. There is no tenderness. No hernia.   Neurological: She is alert and oriented to person, place, and time. No cranial nerve deficit.   Skin: Skin is warm and dry. No rash noted.   Psychiatric: She has a normal mood and affect. Her behavior is normal.   Vitals reviewed.      Orders Only on 06/07/2019   Component Date Value Ref Range Status   • 25 Hydroxy, Vitamin D 06/12/2019 32.7  30.0 - 100.0 ng/mL Final   • Specific Gravity, UA 06/12/2019 1.010  1.005 - 1.030 Final   • pH, UA 06/12/2019 6.0  5.0 - 8.0 Final   • Color, UA 06/12/2019 Yellow   Final   • Appearance, UA 06/12/2019 Clear  Clear Final   • Leukocytes, UA 06/12/2019 See below:* Negative Final   • Protein 06/12/2019 Negative  Negative Final   • Glucose, UA 06/12/2019 Negative  Negative Final   • Ketones 06/12/2019 Negative  Negative Final   • Blood, UA 06/12/2019 Negative  Negative Final   • Bilirubin, UA 06/12/2019 Negative  Negative Final   • Urobilinogen, UA 06/12/2019 Comment   Final   • Nitrite, UA 06/12/2019 Negative  Negative Final   • Free T4 06/12/2019 1.44  0.93 - 1.70 ng/dL Final   • TSH 06/12/2019 1.900  0.270 - 4.200 mIU/mL Final   • Hemoglobin A1C 06/12/2019 5.60  4.80 - 5.60 % Final   • Total Cholesterol 06/12/2019 215* 0 - 200 mg/dL Final   • Triglycerides 06/12/2019 173* 0 - 150 mg/dL Final   • HDL Cholesterol 06/12/2019 53  40 - 60 mg/dL Final   • VLDL Cholesterol 06/12/2019 34.6  mg/dL Final   • LDL Cholesterol  06/12/2019 127* 0 - 100 mg/dL Final   • LDL/HDL Ratio 06/12/2019 2.40   Final   • Glucose 06/12/2019 103* 65 - 99 mg/dL Final   • BUN 06/12/2019 18  8 - 23 mg/dL Final   • Creatinine 06/12/2019 0.96  0.57 - 1.00 mg/dL Final   • eGFR Non African Am 06/12/2019 57* >60 mL/min/1.73 Final   • eGFR   Am 06/12/2019 69  >60 mL/min/1.73 Final   • BUN/Creatinine Ratio 06/12/2019 18.8  7.0 - 25.0 Final   • Sodium 06/12/2019 140  136 - 145 mmol/L Final   • Potassium 06/12/2019 4.6  3.5 - 5.2 mmol/L Final   • Chloride 06/12/2019 101  98 - 107 mmol/L Final   • Total CO2 06/12/2019 28.5  22.0 - 29.0 mmol/L Final   • Calcium 06/12/2019 9.7  8.6 - 10.5 mg/dL Final   • Total Protein 06/12/2019 6.7  6.0 - 8.5 g/dL Final   • Albumin 06/12/2019 4.40  3.50 - 5.20 g/dL Final   • Globulin 06/12/2019 2.3  gm/dL Final   • A/G Ratio 06/12/2019 1.9  g/dL Final   • Total Bilirubin 06/12/2019 1.3* 0.2 - 1.2 mg/dL Final   • Alkaline Phosphatase 06/12/2019 73  39 - 117 U/L Final   • AST (SGOT) 06/12/2019 20  1 - 32 U/L Final   • ALT (SGPT) 06/12/2019 16  1 - 33 U/L Final   • WBC 06/12/2019 5.34  3.40 - 10.80 10*3/mm3 Final   • RBC 06/12/2019 4.45  3.77 - 5.28 10*6/mm3 Final   • Hemoglobin 06/12/2019 13.3  12.0 - 15.9 g/dL Final   • Hematocrit 06/12/2019 40.4  34.0 - 46.6 % Final   • MCV 06/12/2019 90.8  79.0 - 97.0 fL Final   • MCH 06/12/2019 29.9  26.6 - 33.0 pg Final   • MCHC 06/12/2019 32.9  31.5 - 35.7 g/dL Final   • RDW 06/12/2019 12.5  12.3 - 15.4 % Final   • Platelets 06/12/2019 166  140 - 450 10*3/mm3 Final   • Neutrophil Rel % 06/12/2019 58.6  42.7 - 76.0 % Final   • Lymphocyte Rel % 06/12/2019 25.8  19.6 - 45.3 % Final   • Monocyte Rel % 06/12/2019 10.7  5.0 - 12.0 % Final   • Eosinophil Rel % 06/12/2019 4.3  0.3 - 6.2 % Final   • Basophil Rel % 06/12/2019 0.2  0.0 - 1.5 % Final   • Neutrophils Absolute 06/12/2019 3.13  1.70 - 7.00 10*3/mm3 Final   • Lymphocytes Absolute 06/12/2019 1.38  0.70 - 3.10 10*3/mm3 Final   • Monocytes Absolute 06/12/2019 0.57  0.10 - 0.90 10*3/mm3 Final   • Eosinophils Absolute 06/12/2019 0.23  0.00 - 0.40 10*3/mm3 Final   • Basophils Absolute 06/12/2019 0.01  0.00 - 0.20 10*3/mm3 Final   • Immature Granulocyte Rel % 06/12/2019 0.4  0.0 - 0.5 % Final   • Immature Grans Absolute 06/12/2019 0.02   0.00 - 0.05 10*3/mm3 Final   • nRBC 06/12/2019 0.0  0.0 - 0.2 /100 WBC Final   • WBC, UA 06/12/2019 0-2  /HPF Final   • RBC, UA 06/12/2019 0-2  /HPF Final   • Epithelial Cells (non renal) 06/12/2019 0-2  /HPF Final   • Cast Type 06/12/2019 Comment   Final   • Bacteria, UA 06/12/2019 Comment  None Seen /HPF Final       Mami was seen today for hepatitis.    Diagnoses and all orders for this visit:    Autoimmune hepatitis treated with steroids (CMS/HCC)      Patient 73-year-old female with history of diverticulitis, hypertension and hypothyroidism presenting for follow-up.  Patient feeling well with no further diarrhea no GI complaints.  Liver enzymes from yesterday returns with normal AST ALT and alkaline phosphatase.  Total bilirubin was 0.1 point above normal may be related to non-liver issues.  At this point no further medication required for the autoimmune hepatitis but needs to be monitored.  Patient instructed to call for any febrile illness which may re-instigate her autoimmune hepatitis.  We will follow-up clinically as needed.

## 2019-06-17 ENCOUNTER — OFFICE VISIT (OUTPATIENT)
Dept: FAMILY MEDICINE CLINIC | Facility: CLINIC | Age: 74
End: 2019-06-17

## 2019-06-17 VITALS
BODY MASS INDEX: 25.95 KG/M2 | DIASTOLIC BLOOD PRESSURE: 77 MMHG | RESPIRATION RATE: 16 BRPM | SYSTOLIC BLOOD PRESSURE: 120 MMHG | TEMPERATURE: 98 F | HEIGHT: 62 IN | OXYGEN SATURATION: 98 % | HEART RATE: 72 BPM | WEIGHT: 141 LBS

## 2019-06-17 DIAGNOSIS — I10 ESSENTIAL HYPERTENSION: ICD-10-CM

## 2019-06-17 DIAGNOSIS — E89.0 POSTOPERATIVE HYPOTHYROIDISM: ICD-10-CM

## 2019-06-17 DIAGNOSIS — J20.9 ACUTE BRONCHITIS, UNSPECIFIED ORGANISM: Primary | ICD-10-CM

## 2019-06-17 PROCEDURE — 99214 OFFICE O/P EST MOD 30 MIN: CPT | Performed by: INTERNAL MEDICINE

## 2019-06-17 RX ORDER — AZITHROMYCIN 250 MG/1
TABLET, FILM COATED ORAL
Qty: 6 TABLET | Refills: 0 | Status: SHIPPED | OUTPATIENT
Start: 2019-06-17 | End: 2019-07-02

## 2019-06-17 NOTE — PROGRESS NOTES
Subjective   Mami Srivastava is a 73 y.o. female. Patient is here today for   Chief Complaint   Patient presents with   • Hypertension     lab f/u and sinus congretion isues    • Hyperlipidemia   • Sinus Problem          Vitals:    19 0913   BP: 120/77   Pulse: 72   Resp: 16   Temp: 98 °F (36.7 °C)   SpO2: 98%       Past Medical History:   Diagnosis Date   • Autoimmune hepatitis (CMS/HCC)    • Cholelithiasis 2018    Ultrasound   • Coronary artery disease     Dr Luis Rick   • Diverticulitis of colon ,   • Hemangioma of liver    • Hernia     Hiatal hernia-Prabhjot test   • Hyperlipidemia     Dr Luis Rick-atorvastatin   • Hypertension    • Hyperthyroidism    • Hypothyroidism    • Laceration of finger of right hand 05/15/2018    lac with knife and had 3 stitches   • Osteopenia after menopause    • Skin cancer     face   • Ulcerative colitis (CMS/HCC)     Colitis/DrKaplan      Allergies   Allergen Reactions   • Imuran [Azathioprine] GI Intolerance   • Augmentin [Amoxicillin-Pot Clavulanate] Hives   • Keflex [Cephalexin] Hives      Social History     Socioeconomic History   • Marital status:      Spouse name: Not on file   • Number of children: Not on file   • Years of education: Not on file   • Highest education level: Not on file   Tobacco Use   • Smoking status: Former Smoker     Packs/day: 2.00     Years: 12.00     Pack years: 24.00     Start date: 1963     Last attempt to quit: 1976     Years since quittin.4   • Smokeless tobacco: Never Used   • Tobacco comment: Chrinic bronchitis...smoke allergy   Substance and Sexual Activity   • Alcohol use: Yes     Comment: Seldom   • Drug use: No   • Sexual activity: Not Currently        Current Outpatient Medications:   •  amLODIPine (NORVASC) 5 MG tablet, TAKE 1 TABLET BY MOUTH EVERY DAY, Disp: 30 tablet, Rfl: 5  •  aspirin 81 MG EC tablet, Take 81 mg by mouth Daily., Disp: , Rfl:   •  cetirizine (zyrTEC) 10 MG tablet,  Take 10 mg by mouth Every Night., Disp: , Rfl:   •  fluticasone (FLONASE) 50 MCG/ACT nasal spray, 1 spray into the nostril(s) as directed by provider Daily As Needed., Disp: , Rfl:   •  levothyroxine (SYNTHROID, LEVOTHROID) 75 MCG tablet, Take 1 tablet by mouth Daily. For thyroid (generic accepted), Disp: 90 tablet, Rfl: 0  •  lisinopril (PRINIVIL,ZESTRIL) 10 MG tablet, Take 1 tablet by mouth Daily. (Patient taking differently: Take 20 mg by mouth Daily.), Disp: 30 tablet, Rfl: 0  •  loperamide (IMODIUM) 2 MG capsule, Take 1 capsule by mouth 4 (Four) Times a Day As Needed for Diarrhea., Disp: 50 capsule, Rfl: 0  •  Multiple Vitamin (MULTI VITAMIN DAILY PO), Take  by mouth., Disp: , Rfl:   •  Omega-3 Fatty Acids (FISH OIL) 1000 MG capsule capsule, Take 3,000 mg by mouth Daily With Breakfast., Disp: , Rfl:   •  Probiotic Product ([employer]) capsule, Take 1 capsule by mouth Daily., Disp: 30 capsule, Rfl: 5  •  azithromycin (ZITHROMAX) 250 MG tablet, Take 2 tablets the first day, then 1 tablet daily for 4 days., Disp: 6 tablet, Rfl: 0     Objective     This pleasant patient is here to follow-up on lab work done last week.    She complains of a productive cough for the last week.  She denies any fevers, chills, dyspnea etc.         Review of Systems   Constitutional: Negative.    HENT: Negative.    Respiratory: Positive for cough. Negative for choking, chest tightness, shortness of breath, wheezing and stridor.    Musculoskeletal: Negative.        Physical Exam   Constitutional: No distress.   Pleasant, neatly groomed, BMI 25.   HENT:   Head: Normocephalic and atraumatic.   Mouth/Throat: Oropharynx is clear and moist. No oropharyngeal exudate.   Eyes: Conjunctivae are normal.   Cardiovascular: Normal rate and regular rhythm.   Pulmonary/Chest:   She had rhonchi which clear with coughing bilaterally.   Psychiatric: She has a normal mood and affect. Her behavior is normal.   Nursing note and vitals  reviewed.        Problem List Items Addressed This Visit        Cardiovascular and Mediastinum    Hypertension       Respiratory    Acute bronchitis - Primary       Endocrine    Hypothyroidism            PLAN  Her hypertension is well controlled.    Her hypothyroidism is appropriately replaced.    She is in acute bronchitis.  I wrote her prescription for Zithromax Z-Jamie to take as directed.    I asked her to follow-up in 6 months.  No Follow-up on file.

## 2019-06-28 ENCOUNTER — TELEPHONE (OUTPATIENT)
Dept: GASTROENTEROLOGY | Facility: CLINIC | Age: 74
End: 2019-06-28

## 2019-06-28 DIAGNOSIS — R89.9 ABNORMAL LABORATORY TEST: Primary | ICD-10-CM

## 2019-06-29 ENCOUNTER — RESULTS ENCOUNTER (OUTPATIENT)
Dept: GASTROENTEROLOGY | Facility: CLINIC | Age: 74
End: 2019-06-29

## 2019-06-29 DIAGNOSIS — R89.9 ABNORMAL LABORATORY TEST: ICD-10-CM

## 2019-07-01 LAB
ALBUMIN SERPL-MCNC: 4.7 G/DL (ref 3.5–5.2)
ALBUMIN/GLOB SERPL: 2.5 G/DL
ALP SERPL-CCNC: 80 U/L (ref 39–117)
ALT SERPL-CCNC: 14 U/L (ref 1–33)
AST SERPL-CCNC: 17 U/L (ref 1–32)
BILIRUB SERPL-MCNC: 0.7 MG/DL (ref 0.2–1.2)
BUN SERPL-MCNC: 16 MG/DL (ref 8–23)
BUN/CREAT SERPL: 16.8 (ref 7–25)
CALCIUM SERPL-MCNC: 9.6 MG/DL (ref 8.6–10.5)
CHLORIDE SERPL-SCNC: 99 MMOL/L (ref 98–107)
CO2 SERPL-SCNC: 29.2 MMOL/L (ref 22–29)
CREAT SERPL-MCNC: 0.95 MG/DL (ref 0.57–1)
GLOBULIN SER CALC-MCNC: 1.9 GM/DL
GLUCOSE SERPL-MCNC: 135 MG/DL (ref 65–99)
POTASSIUM SERPL-SCNC: 4.7 MMOL/L (ref 3.5–5.2)
PROT SERPL-MCNC: 6.6 G/DL (ref 6–8.5)
SODIUM SERPL-SCNC: 140 MMOL/L (ref 136–145)

## 2019-07-02 ENCOUNTER — OFFICE VISIT (OUTPATIENT)
Dept: FAMILY MEDICINE CLINIC | Facility: CLINIC | Age: 74
End: 2019-07-02

## 2019-07-02 VITALS
TEMPERATURE: 98.3 F | RESPIRATION RATE: 18 BRPM | OXYGEN SATURATION: 98 % | HEIGHT: 62 IN | HEART RATE: 71 BPM | BODY MASS INDEX: 26.09 KG/M2 | SYSTOLIC BLOOD PRESSURE: 140 MMHG | WEIGHT: 141.8 LBS | DIASTOLIC BLOOD PRESSURE: 72 MMHG

## 2019-07-02 DIAGNOSIS — J01.00 ACUTE MAXILLARY SINUSITIS, RECURRENCE NOT SPECIFIED: Primary | ICD-10-CM

## 2019-07-02 PROBLEM — J20.9 ACUTE BRONCHITIS: Status: RESOLVED | Noted: 2019-06-17 | Resolved: 2019-07-02

## 2019-07-02 PROCEDURE — 99213 OFFICE O/P EST LOW 20 MIN: CPT | Performed by: NURSE PRACTITIONER

## 2019-07-02 RX ORDER — DOXYCYCLINE HYCLATE 100 MG/1
100 CAPSULE ORAL 2 TIMES DAILY
Qty: 20 CAPSULE | Refills: 0 | Status: SHIPPED | OUTPATIENT
Start: 2019-07-02 | End: 2019-08-22

## 2019-07-02 NOTE — PROGRESS NOTES
Subjective   Mami Srivastava is a 73 y.o. female.   Chief Complaint   Patient presents with   • Sinus Problem   • Fatigue     Vitals:    07/02/19 0809   BP: 140/72   Pulse: 71   Resp: 18   Temp: 98.3 °F (36.8 °C)   SpO2: 98%     No LMP recorded (lmp unknown). Patient is postmenopausal.    Mami is a patient of Dr Rosenberg who is here for an acute visit       Sinus Problem   This is a new problem. Episode onset: 3 weeks  The problem has been gradually improving since onset. There has been no fever. Associated symptoms include congestion, coughing, headaches and sinus pressure (left maxillary ). Pertinent negatives include no chills or shortness of breath. Past treatments include antibiotics (azithromycin, saw her dentist and he did an xray and told her that her sinuses were full ). The treatment provided mild relief.        The following portions of the patient's history were reviewed and updated as appropriate: allergies, current medications, past family history, past medical history, past social history, past surgical history and problem list.    Review of Systems   Constitutional: Positive for fatigue. Negative for chills and fever.   HENT: Positive for congestion, dental problem (tooth pain , saw her dentist and he told her that it wasn't her teeth , on xray it looked like her sinuses were full ) and sinus pressure (left maxillary ).    Respiratory: Positive for cough. Negative for chest tightness and shortness of breath.    Cardiovascular: Negative.    Neurological: Positive for headaches.       Objective   Physical Exam   Constitutional: Vital signs are normal. She appears well-developed and well-nourished. No distress.   HENT:   Head: Normocephalic.   Nose: Mucosal edema and rhinorrhea present. Left sinus exhibits maxillary sinus tenderness.   Mouth/Throat: Uvula is midline. Posterior oropharyngeal erythema present.   Cardiovascular: Normal rate, regular rhythm and normal heart sounds.   Pulmonary/Chest:  Effort normal and breath sounds normal.   Neurological: She is alert.       Assessment/Plan   Mami was seen today for sinus problem and fatigue.    Diagnoses and all orders for this visit:    Acute maxillary sinusitis, recurrence not specified    Other orders  -     doxycycline (VIBRAMYCIN) 100 MG capsule; Take 1 capsule by mouth 2 (Two) Times a Day.      Continue sinus rinses and flonase   Rest and drink plenty of fluids  Follow up if your symptoms persist, worsen or if new symptoms develop

## 2019-07-10 ENCOUNTER — OFFICE VISIT (OUTPATIENT)
Dept: FAMILY MEDICINE CLINIC | Facility: CLINIC | Age: 74
End: 2019-07-10

## 2019-07-10 VITALS
SYSTOLIC BLOOD PRESSURE: 106 MMHG | HEIGHT: 62 IN | OXYGEN SATURATION: 98 % | RESPIRATION RATE: 16 BRPM | DIASTOLIC BLOOD PRESSURE: 56 MMHG | WEIGHT: 141.6 LBS | BODY MASS INDEX: 26.06 KG/M2 | HEART RATE: 70 BPM | TEMPERATURE: 97.3 F

## 2019-07-10 DIAGNOSIS — J32.9 RHINOSINUSITIS: Primary | ICD-10-CM

## 2019-07-10 DIAGNOSIS — J31.0 RHINOSINUSITIS: Primary | ICD-10-CM

## 2019-07-10 PROCEDURE — 99213 OFFICE O/P EST LOW 20 MIN: CPT | Performed by: NURSE PRACTITIONER

## 2019-07-10 NOTE — PROGRESS NOTES
Subjective   Mami Srivastava is a 74 y.o. female.   Chief Complaint   Patient presents with   • Nasal Congestion     Vitals:    07/10/19 1247   BP: 106/56   Pulse: 70   Resp: 16   Temp: 97.3 °F (36.3 °C)   SpO2: 98%     No LMP recorded (lmp unknown). Patient is postmenopausal.    History of Present Illness  Mami is here for a follow up for rhinosinusitis . She was seen on July 2 and given 10 days of doxycycline. She is feeling better but used her nettipot and noticed some green drainage. She is going out of town and wants to be evaluated     The following portions of the patient's history were reviewed and updated as appropriate: allergies, current medications, past family history, past medical history, past social history, past surgical history and problem list.    Review of Systems   Constitutional: Negative for chills, fatigue and fever.   HENT: Positive for congestion, postnasal drip and rhinorrhea. Negative for sinus pressure and sinus pain.    Respiratory: Negative for cough.    Cardiovascular: Negative.        Objective   Physical Exam   Constitutional: Vital signs are normal. She appears well-developed and well-nourished. She does not appear ill. No distress.   HENT:   Head: Normocephalic.   Right Ear: Tympanic membrane and ear canal normal.   Left Ear: Tympanic membrane and ear canal normal.   Nose: Rhinorrhea (clear on the right, some yellow noted on the left ) present. No sinus tenderness. Mucosal edema: nasal mucosa inflammed on the left    Mouth/Throat: Uvula is midline, oropharynx is clear and moist and mucous membranes are normal.   Cardiovascular: Normal rate, regular rhythm and normal heart sounds.   Pulmonary/Chest: Effort normal and breath sounds normal.   Neurological: She is alert.   Skin: Skin is warm and dry.       Assessment/Plan   Mami was seen today for nasal congestion.    Diagnoses and all orders for this visit:    Rhinosinusitis    overall she is improving.   Finish course of  doxycycline  Ok to use netipot or recommend saline nasal spray  Follow up if symptoms persist, worsen or if new symptoms develop

## 2019-07-29 ENCOUNTER — TELEPHONE (OUTPATIENT)
Dept: GASTROENTEROLOGY | Facility: CLINIC | Age: 74
End: 2019-07-29

## 2019-07-29 NOTE — TELEPHONE ENCOUNTER
Patient called, no answer, left message, advised as per Dr. Hein's note. Advised to call back for questions/concerns.

## 2019-08-21 ENCOUNTER — TELEPHONE (OUTPATIENT)
Dept: FAMILY MEDICINE CLINIC | Facility: CLINIC | Age: 74
End: 2019-08-21

## 2019-08-21 NOTE — TELEPHONE ENCOUNTER
Called pt advised pt Per Dr. Rosenberg would like pt to be seen in office, weather it be by him or another physician. Pt has been called lm and notified her of such. Waiting for call back.     ----- Message from Taj Garrett sent at 8/21/2019  6:26 AM EDT -----  Contact: pt called on 8-20-19  PT CALLED AND WANTED DR ROSENBERG TO KNOW SHE HAS BEEN HAVING OCCASIONAL SWELLING IN HER FEET WHICH PT SAID ON HER AMLODIPINE BOTTLE THAT IS WHAT SHE IS READING IS ONE OF THE SIDE EFFECTS. PT IS WONDERING IF IT COULD POSSIBLY BE FROM THE HEAT, WEIGHT GAIN AND BEING ON HER FEET MORE OFTEN.     PT'S # [phone]

## 2019-08-22 ENCOUNTER — OFFICE VISIT (OUTPATIENT)
Dept: FAMILY MEDICINE CLINIC | Facility: CLINIC | Age: 74
End: 2019-08-22

## 2019-08-22 VITALS
BODY MASS INDEX: 26.87 KG/M2 | HEART RATE: 80 BPM | OXYGEN SATURATION: 98 % | TEMPERATURE: 97.4 F | WEIGHT: 146 LBS | RESPIRATION RATE: 17 BRPM | SYSTOLIC BLOOD PRESSURE: 118 MMHG | DIASTOLIC BLOOD PRESSURE: 60 MMHG | HEIGHT: 62 IN

## 2019-08-22 DIAGNOSIS — I10 ESSENTIAL HYPERTENSION: Primary | ICD-10-CM

## 2019-08-22 DIAGNOSIS — R60.0 PEDAL EDEMA: ICD-10-CM

## 2019-08-22 PROCEDURE — 99214 OFFICE O/P EST MOD 30 MIN: CPT | Performed by: INTERNAL MEDICINE

## 2019-08-22 RX ORDER — LISINOPRIL 20 MG/1
20 TABLET ORAL
COMMUNITY
Start: 2019-05-26 | End: 2021-11-02 | Stop reason: SDUPTHER

## 2019-08-22 NOTE — PROGRESS NOTES
Subjective   Mami Srivastava is a 74 y.o. female. Patient is here today for   Chief Complaint   Patient presents with   • Foot Swelling     both          Vitals:    19 1604   BP: 118/60   Pulse: 80   Resp: 17   Temp: 97.4 °F (36.3 °C)   SpO2: 98%     The following portions of the patient's history were reviewed and updated as appropriate: allergies, current medications, past family history, past medical history, past social history, past surgical history and problem list.    Past Medical History:   Diagnosis Date   • Autoimmune hepatitis (CMS/HCC)    • Cholelithiasis 2018    Ultrasound   • Coronary artery disease     Dr Luis Rick   • Diverticulitis of colon ,   • Hemangioma of liver    • Hernia     Hiatal hernia-Prabhjot test   • Hyperlipidemia     Dr Luis Rick-atorvastatin   • Hypertension    • Hyperthyroidism    • Hypothyroidism    • Laceration of finger of right hand 05/15/2018    lac with knife and had 3 stitches   • Osteopenia after menopause    • Skin cancer     face   • Ulcerative colitis (CMS/HCC)     Colitis/DrKaplan      Allergies   Allergen Reactions   • Imuran [Azathioprine] GI Intolerance   • Augmentin [Amoxicillin-Pot Clavulanate] Hives   • Keflex [Cephalexin] Hives      Social History     Socioeconomic History   • Marital status:      Spouse name: Not on file   • Number of children: Not on file   • Years of education: Not on file   • Highest education level: Not on file   Tobacco Use   • Smoking status: Former Smoker     Packs/day: 2.00     Years: 12.00     Pack years: 24.00     Start date: 1963     Last attempt to quit: 1976     Years since quittin.5   • Smokeless tobacco: Never Used   • Tobacco comment: Chrinic bronchitis...smoke allergy   Substance and Sexual Activity   • Alcohol use: Yes     Comment: Seldom   • Drug use: No   • Sexual activity: Not Currently        Current Outpatient Medications:   •  amLODIPine (NORVASC) 5 MG tablet, TAKE 1  TABLET BY MOUTH EVERY DAY, Disp: 30 tablet, Rfl: 5  •  aspirin 81 MG EC tablet, Take 81 mg by mouth Daily., Disp: , Rfl:   •  cetirizine (zyrTEC) 10 MG tablet, Take 10 mg by mouth Every Night., Disp: , Rfl:   •  Cholecalciferol (VITAMIN D3) 5000 units capsule capsule, Take 5,000 Units by mouth 2 (Two) Times a Week., Disp: , Rfl:   •  fluticasone (FLONASE) 50 MCG/ACT nasal spray, 1 spray into the nostril(s) as directed by provider Daily As Needed., Disp: , Rfl:   •  levothyroxine (SYNTHROID, LEVOTHROID) 75 MCG tablet, Take 1 tablet by mouth Daily. For thyroid (generic accepted), Disp: 90 tablet, Rfl: 0  •  lisinopril (PRINIVIL,ZESTRIL) 20 MG tablet, , Disp: , Rfl:   •  Multiple Vitamin (MULTI VITAMIN DAILY PO), Take  by mouth., Disp: , Rfl:   •  Omega-3 Fatty Acids (FISH OIL) 1000 MG capsule capsule, Take 3,000 mg by mouth Daily With Breakfast., Disp: , Rfl:   •  Probiotic Product ([employer]) capsule, Take 1 capsule by mouth Daily., Disp: 30 capsule, Rfl: 5     Objective     History of Present Illness Mami complains of intermittent feet swelling.  She has hypertension and is on amlodipine 5 mg and wonders if it may be causing it.  She also is on lisinopril 20 mg daily.  The swelling does not happen every day and is more likely to occur in the heat.  She does not watch her sodium intake.  Recent lab work showed normal renal function.    Review of Systems   Constitutional: Negative for activity change and unexpected weight change.   Respiratory: Negative for shortness of breath.    Cardiovascular: Positive for leg swelling.   Genitourinary: Negative.    Neurological: Negative.    Psychiatric/Behavioral: Negative.        Physical Exam   Constitutional: She appears well-developed and well-nourished.   Neck: No JVD present.   Cardiovascular: Normal rate, regular rhythm and normal heart sounds.   Pulmonary/Chest: Effort normal and breath sounds normal.   Musculoskeletal: She exhibits no edema.   Psychiatric:  She has a normal mood and affect. Her behavior is normal. Judgment and thought content normal.   Vitals reviewed.      ASSESSMENT     Problem List Items Addressed This Visit        Cardiovascular and Mediastinum    Hypertension - Primary    Relevant Medications    lisinopril (PRINIVIL,ZESTRIL) 20 MG tablet       Other    Pedal edema          PLAN  Patient Instructions   I do not believe amlodipine is causing the feet swelling as it is intermittent.  It is more likely due to dietary sodium intake and heat.  Discussed reducing dietary sodium intake.    No Follow-up on file.

## 2019-08-22 NOTE — PATIENT INSTRUCTIONS
I do not believe amlodipine is causing the feet swelling as it is intermittent.  It is more likely due to dietary sodium intake and heat.  Discussed reducing dietary sodium intake.

## 2019-08-27 RX ORDER — LEVOTHYROXINE SODIUM 75 MCG
TABLET ORAL
Qty: 90 TABLET | Refills: 1 | Status: SHIPPED | OUTPATIENT
Start: 2019-08-27 | End: 2020-02-24

## 2019-10-13 ENCOUNTER — OFFICE VISIT (OUTPATIENT)
Dept: RETAIL CLINIC | Facility: CLINIC | Age: 74
End: 2019-10-13

## 2019-10-13 VITALS
TEMPERATURE: 97.9 F | SYSTOLIC BLOOD PRESSURE: 120 MMHG | OXYGEN SATURATION: 96 % | HEART RATE: 78 BPM | DIASTOLIC BLOOD PRESSURE: 60 MMHG | RESPIRATION RATE: 18 BRPM

## 2019-10-13 DIAGNOSIS — J40 BRONCHITIS: Primary | ICD-10-CM

## 2019-10-13 PROCEDURE — 99213 OFFICE O/P EST LOW 20 MIN: CPT | Performed by: NURSE PRACTITIONER

## 2019-10-13 RX ORDER — ROSUVASTATIN CALCIUM 10 MG/1
10 TABLET, COATED ORAL
COMMUNITY
End: 2021-11-02 | Stop reason: SDUPTHER

## 2019-10-13 RX ORDER — METHYLPREDNISOLONE 4 MG/1
TABLET ORAL
Qty: 21 EACH | Refills: 0 | Status: SHIPPED | OUTPATIENT
Start: 2019-10-13 | End: 2020-01-02

## 2019-10-13 RX ORDER — DOXYCYCLINE 100 MG/1
100 CAPSULE ORAL 2 TIMES DAILY
Qty: 14 CAPSULE | Refills: 0 | Status: SHIPPED | OUTPATIENT
Start: 2019-10-13 | End: 2019-10-20

## 2019-10-13 NOTE — PROGRESS NOTES
Subjective   Mami Srivastava is a 74 y.o. female.     Cough   This is a new problem. The current episode started in the past 7 days. The problem has been gradually worsening. The cough is non-productive. Associated symptoms include nasal congestion, postnasal drip and a sore throat (irritated). Pertinent negatives include no fever, headaches or rhinorrhea. Treatments tried: mucinex, delsym. The treatment provided no relief. Her past medical history is significant for bronchitis (in June). There is no history of asthma or COPD.        The following portions of the patient's history were reviewed and updated as appropriate: allergies, current medications, past family history, past medical history, past social history, past surgical history and problem list.    Review of Systems   Constitutional: Negative for fever.   HENT: Positive for postnasal drip and sore throat (irritated). Negative for rhinorrhea.    Respiratory: Positive for cough.    Gastrointestinal: Negative.    Musculoskeletal: Negative.    Skin: Negative.    Neurological: Negative.        Objective   Physical Exam   Constitutional: She is cooperative. No distress.   HENT:   Head: Normocephalic.   Right Ear: Hearing, external ear and ear canal normal. A middle ear effusion is present.   Left Ear: Hearing, external ear and ear canal normal. A middle ear effusion is present.   Nose: Nose normal.   Mouth/Throat: Posterior oropharyngeal erythema present.   Eyes: Conjunctivae, EOM and lids are normal. Pupils are equal, round, and reactive to light.   Neck: Trachea normal and full passive range of motion without pain.   Cardiovascular: Normal rate, regular rhythm and normal pulses.   Pulmonary/Chest: Effort normal. She has decreased breath sounds in the right lower field and the left lower field. She has wheezes in the left upper field.   Lymphadenopathy:     She has cervical adenopathy.        Right cervical: Superficial cervical adenopathy present.         Left cervical: Superficial cervical adenopathy present.   Neurological: She is alert.   Skin: Skin is warm. Capillary refill takes less than 2 seconds.   Psychiatric: She has a normal mood and affect. Her speech is normal and behavior is normal.   Vitals reviewed.        Assessment/Plan   Mami was seen today for cough.    Diagnoses and all orders for this visit:    Bronchitis    Other orders  -     doxycycline (MONODOX) 100 MG capsule; Take 1 capsule by mouth 2 (Two) Times a Day for 7 days.  -     methylPREDNISolone (MEDROL, PAMELA,) 4 MG tablet; Take as directed on package instructions.

## 2019-11-02 NOTE — TELEPHONE ENCOUNTER
----- Message from Bonnie Emery sent at 9/14/2018 11:50 AM EDT -----  Regarding: Medication Question  Contact: [phone]  This patient forgot to ask about her prednisone medication.  Does she need to refill this medication or does she need to discontinue it.  Please give patient a call. Thank you  
Patient called advised she is to start taking Prednisone 5 mg one tablet everyday for 14 days, then on day 15 she is to take Prednisone 5 mg every other day. Patient verb understanding. Prescription called into patient's [facility] pharmacy.   
oral

## 2019-11-06 ENCOUNTER — OFFICE VISIT (OUTPATIENT)
Dept: FAMILY MEDICINE CLINIC | Facility: CLINIC | Age: 74
End: 2019-11-06

## 2019-11-06 VITALS
HEIGHT: 62 IN | HEART RATE: 76 BPM | SYSTOLIC BLOOD PRESSURE: 108 MMHG | DIASTOLIC BLOOD PRESSURE: 52 MMHG | OXYGEN SATURATION: 98 % | WEIGHT: 145.8 LBS | TEMPERATURE: 97.4 F | BODY MASS INDEX: 26.83 KG/M2 | RESPIRATION RATE: 18 BRPM

## 2019-11-06 DIAGNOSIS — J06.9 ACUTE URI: ICD-10-CM

## 2019-11-06 DIAGNOSIS — R22.30 SUBCUTANEOUS NODULE OF HAND: Primary | ICD-10-CM

## 2019-11-06 DIAGNOSIS — R05.9 COUGH: ICD-10-CM

## 2019-11-06 PROBLEM — K44.9 HIATAL HERNIA: Status: ACTIVE | Noted: 2019-11-06

## 2019-11-06 PROBLEM — D18.03 LIVER HEMANGIOMA: Status: ACTIVE | Noted: 2019-11-06

## 2019-11-06 PROCEDURE — 99213 OFFICE O/P EST LOW 20 MIN: CPT | Performed by: NURSE PRACTITIONER

## 2019-11-06 RX ORDER — ALBUTEROL SULFATE 90 UG/1
2 AEROSOL, METERED RESPIRATORY (INHALATION) EVERY 4 HOURS PRN
Qty: 1 INHALER | Refills: 0 | Status: SHIPPED | OUTPATIENT
Start: 2019-11-06 | End: 2020-01-02

## 2019-11-06 RX ORDER — BENZONATATE 100 MG/1
100 CAPSULE ORAL 2 TIMES DAILY PRN
Qty: 20 CAPSULE | Refills: 0 | Status: SHIPPED | OUTPATIENT
Start: 2019-11-06 | End: 2020-09-19

## 2019-11-06 NOTE — PATIENT INSTRUCTIONS

## 2019-11-06 NOTE — PROGRESS NOTES
Subjective     Mami Srivastava is a 74 y.o.. female.     Left hand knot noticed 2 days ago, tender to palpation, does not know if there is any family history of hand issues.       Cough   This is a new problem. Episode onset: 1 month. The problem has been unchanged. The cough is non-productive. Pertinent negatives include no ear pain, fever, headaches, rhinorrhea or sore throat. Treatments tried: all types of OTC.       The following portions of the patient's history were reviewed and updated as appropriate: allergies, current medications, past family history, past medical history, past social history, past surgical history and problem list.    Past Medical History:   Diagnosis Date   • Autoimmune hepatitis (CMS/HCC)    • Cholelithiasis 04-    Ultrasound   • Coronary artery disease     Dr Luis Rick   • Diverticulitis of colon 2005,2018   • Hemangioma of liver    • Hernia 2017    Hiatal hernia-Fredislo test   • Hyperlipidemia     Dr Luis Rick-atorvastatin   • Hypertension    • Hyperthyroidism    • Hypothyroidism    • Laceration of finger of right hand 05/15/2018    lac with knife and had 3 stitches   • Osteopenia after menopause    • Skin cancer     face   • Ulcerative colitis (CMS/HCC) 2014    Colitis/DrKaplan       Past Surgical History:   Procedure Laterality Date   • BUNIONECTOMY     • COLONOSCOPY  04/11/2014    Diverticulosis in the sigmoid colon, in the descending colon, in the transverse colon and in the ascending colon (Pathology: Collagenous colitis)   • COLONOSCOPY  12/09/2005    Hemorrhoids, pandiverticulosis, status post diverticulitis   • COLONOSCOPY N/A 11/2/2018    fair prep, tics   • LIVER BIOPSY     • LIVER BIOPSY     • THYROIDECTOMY  2008       Review of Systems   Constitutional: Negative for fever.   HENT: Negative for congestion, ear pain, rhinorrhea and sore throat.    Respiratory: Positive for cough (dry, better than previous, brought on by cold).    Gastrointestinal: Negative for  "diarrhea, nausea and vomiting.   Musculoskeletal: Positive for arthralgias (left hand, ).   Neurological: Negative for headaches.       Allergies   Allergen Reactions   • Imuran [Azathioprine] GI Intolerance   • Bactrim [Sulfamethoxazole-Trimethoprim] Other (See Comments)     fatigue   • Augmentin [Amoxicillin-Pot Clavulanate] Hives   • Keflex [Cephalexin] Hives       Objective     Vitals:    11/06/19 1403   BP: 108/52   Pulse: 76   Resp: 18   Temp: 97.4 °F (36.3 °C)   TempSrc: Oral   SpO2: 98%   Weight: 66.1 kg (145 lb 12.8 oz)   Height: 157.5 cm (62.01\")     Body mass index is 26.66 kg/m².    Physical Exam   Constitutional: She is oriented to person, place, and time. She appears well-developed and well-nourished.   HENT:   Head: Normocephalic and atraumatic.   Right Ear: Tympanic membrane is not erythematous. A middle ear effusion (clear) is present.   Left Ear: Tympanic membrane is not erythematous. A middle ear effusion (clear) is present.   Nose: Mucosal edema present. Right sinus exhibits no maxillary sinus tenderness and no frontal sinus tenderness. Left sinus exhibits no maxillary sinus tenderness and no frontal sinus tenderness.   Mouth/Throat: Oropharynx is clear and moist. Posterior oropharyngeal edema: pnd.   Eyes: Conjunctivae are normal. Pupils are equal, round, and reactive to light.   Cardiovascular: Normal rate and regular rhythm.   No murmur heard.  Pulmonary/Chest: Effort normal. No accessory muscle usage or stridor. No respiratory distress. She has no decreased breath sounds. She has no wheezes. She has no rhonchi. She has no rales.   Musculoskeletal: Normal range of motion.   Lymphadenopathy:     She has no cervical adenopathy.   Neurological: She is alert and oriented to person, place, and time.   Skin: Skin is warm and dry.   Left hand palm below middle finger small hard nodule   Vitals reviewed.        Current Outpatient Medications:   •  amLODIPine (NORVASC) 5 MG tablet, TAKE 1 TABLET BY " MOUTH EVERY DAY, Disp: 30 tablet, Rfl: 5  •  aspirin 81 MG EC tablet, Take 81 mg by mouth Daily., Disp: , Rfl:   •  cetirizine (zyrTEC) 10 MG tablet, Take 10 mg by mouth Every Night., Disp: , Rfl:   •  Cholecalciferol (VITAMIN D3) 5000 units capsule capsule, Take 5,000 Units by mouth 2 (Two) Times a Week., Disp: , Rfl:   •  fluticasone (FLONASE) 50 MCG/ACT nasal spray, 1 spray into the nostril(s) as directed by provider Daily As Needed., Disp: , Rfl:   •  lisinopril (PRINIVIL,ZESTRIL) 20 MG tablet, , Disp: , Rfl:   •  methylPREDNISolone (MEDROL, PAMELA,) 4 MG tablet, Take as directed on package instructions., Disp: 21 each, Rfl: 0  •  Multiple Vitamin (MULTI VITAMIN DAILY PO), Take  by mouth., Disp: , Rfl:   •  Omega-3 Fatty Acids (FISH OIL) 1000 MG capsule capsule, Take 3,000 mg by mouth Daily With Breakfast., Disp: , Rfl:   •  Probiotic Product ([employer] FX) capsule, Take 1 capsule by mouth Daily., Disp: 30 capsule, Rfl: 5  •  rosuvastatin (CRESTOR) 10 MG tablet, Take 10 mg by mouth Daily., Disp: , Rfl:   •  SYNTHROID 75 MCG tablet, TAKE 1 TABLET DAILY FOR    THYROID, Disp: 90 tablet, Rfl: 1  •  albuterol sulfate  (90 Base) MCG/ACT inhaler, Inhale 2 puffs Every 4 (Four) Hours As Needed for Wheezing or Shortness of Air (coughing episoded)., Disp: 1 inhaler, Rfl: 0  •  benzonatate (TESSALON PERLES) 100 MG capsule, Take 1 capsule by mouth 2 (Two) Times a Day As Needed for Cough., Disp: 20 capsule, Rfl: 0      Assessment/Plan   Mami was seen today for hand pain and cough.    Diagnoses and all orders for this visit:    Subcutaneous nodule of hand  -     US Nonvascular Extremity Limited; Future    Acute URI    Cough  -     albuterol sulfate  (90 Base) MCG/ACT inhaler; Inhale 2 puffs Every 4 (Four) Hours As Needed for Wheezing or Shortness of Air (coughing episoded).  -     benzonatate (TESSALON PERLES) 100 MG capsule; Take 1 capsule by mouth 2 (Two) Times a Day As Needed for Cough.    concern of  Dupuytren contracture to left palm      Patient Instructions   Upper Respiratory Infection, Adult  An upper respiratory infection (URI) is a common viral infection of the nose, throat, and upper air passages that lead to the lungs. The most common type of URI is the common cold. URIs usually get better on their own, without medical treatment.  What are the causes?  A URI is caused by a virus. You may catch a virus by:  · Breathing in droplets from an infected person's cough or sneeze.  · Touching something that has been exposed to the virus (contaminated) and then touching your mouth, nose, or eyes.  What increases the risk?  You are more likely to get a URI if:  · You are very young or very old.  · It is hortencia or winter.  · You have close contact with others, such as at a , school, or health care facility.  · You smoke.  · You have long-term (chronic) heart or lung disease.  · You have a weakened disease-fighting (immune) system.  · You have nasal allergies or asthma.  · You are experiencing a lot of stress.  · You work in an area that has poor air circulation.  · You have poor nutrition.  What are the signs or symptoms?  A URI usually involves some of the following symptoms:  · Runny or stuffy (congested) nose.  · Sneezing.  · Cough.  · Sore throat.  · Headache.  · Fatigue.  · Fever.  · Loss of appetite.  · Pain in your forehead, behind your eyes, and over your cheekbones (sinus pain).  · Muscle aches.  · Redness or irritation of the eyes.  · Pressure in the ears or face.  How is this diagnosed?  This condition may be diagnosed based on your medical history and symptoms, and a physical exam. Your health care provider may use a cotton swab to take a mucus sample from your nose (nasal swab). This sample can be tested to determine what virus is causing the illness.  How is this treated?  URIs usually get better on their own within 7-10 days. You can take steps at home to relieve your symptoms. Medicines  cannot cure URIs, but your health care provider may recommend certain medicines to help relieve symptoms, such as:  · Over-the-counter cold medicines.  · Cough suppressants. Coughing is a type of defense against infection that helps to clear the respiratory system, so take these medicines only as recommended by your health care provider.  · Fever-reducing medicines.  Follow these instructions at home:  Activity  · Rest as needed.  · If you have a fever, stay home from work or school until your fever is gone or until your health care provider says you are no longer contagious. Your health care provider may have you wear a face mask to prevent your infection from spreading.  Relieving symptoms  · Gargle with a salt-water mixture 3-4 times a day or as needed. To make a salt-water mixture, completely dissolve ½-1 tsp of salt in 1 cup of warm water.  · Use a cool-mist humidifier to add moisture to the air. This can help you breathe more easily.  Eating and drinking    · Drink enough fluid to keep your urine pale yellow.  · Eat soups and other clear broths.  General instructions    · Take over-the-counter and prescription medicines only as told by your health care provider. These include cold medicines, fever reducers, and cough suppressants.  · Do not use any products that contain nicotine or tobacco, such as cigarettes and e-cigarettes. If you need help quitting, ask your health care provider.  · Stay away from secondhand smoke.  · Stay up to date on all immunizations, including the yearly (annual) flu vaccine.  · Keep all follow-up visits as told by your health care provider. This is important.  How to prevent the spread of infection to others    · URIs can be passed from person to person (are contagious). To prevent the infection from spreading:  ? Wash your hands often with soap and water. If soap and water are not available, use hand .  ? Avoid touching your mouth, face, eyes, or nose.  ? Cough or sneeze  into a tissue or your sleeve or elbow instead of into your hand or into the air.  Contact a health care provider if:  · You are getting worse instead of better.  · You have a fever or chills.  · Your mucus is brown or red.  · You have yellow or brown discharge coming from your nose.  · You have pain in your face, especially when you bend forward.  · You have swollen neck glands.  · You have pain while swallowing.  · You have white areas in the back of your throat.  Get help right away if:  · You have shortness of breath that gets worse.  · You have severe or persistent:  ? Headache.  ? Ear pain.  ? Sinus pain.  ? Chest pain.  · You have chronic lung disease along with any of the following:  ? Wheezing.  ? Prolonged cough.  ? Coughing up blood.  ? A change in your usual mucus.  · You have a stiff neck.  · You have changes in your:  ? Vision.  ? Hearing.  ? Thinking.  ? Mood.  Summary  · An upper respiratory infection (URI) is a common infection of the nose, throat, and upper air passages that lead to the lungs.  · A URI is caused by a virus.  · URIs usually get better on their own within 7-10 days.  · Medicines cannot cure URIs, but your health care provider may recommend certain medicines to help relieve symptoms.  This information is not intended to replace advice given to you by your health care provider. Make sure you discuss any questions you have with your health care provider.  Document Released: 06/13/2002 Document Revised: 08/03/2018 Document Reviewed: 08/03/2018  [employer] Interactive Patient Education © 2019 [employer]        Return if symptoms worsen or fail to improve.

## 2019-11-13 RX ORDER — AMLODIPINE BESYLATE 5 MG/1
TABLET ORAL
Qty: 90 TABLET | Refills: 1 | Status: SHIPPED | OUTPATIENT
Start: 2019-11-13 | End: 2020-05-18

## 2019-11-20 ENCOUNTER — HOSPITAL ENCOUNTER (OUTPATIENT)
Dept: ULTRASOUND IMAGING | Facility: HOSPITAL | Age: 74
Discharge: HOME | End: 2019-11-20

## 2019-11-20 ENCOUNTER — DOCUMENTATION (OUTPATIENT)
Dept: FAMILY MEDICINE CLINIC | Facility: CLINIC | Age: 74
End: 2019-11-20

## 2019-11-20 DIAGNOSIS — R22.30 SUBCUTANEOUS NODULE OF HAND: Primary | ICD-10-CM

## 2019-11-20 DIAGNOSIS — R22.30 SUBCUTANEOUS NODULE OF HAND: ICD-10-CM

## 2019-11-20 NOTE — PROGRESS NOTES
Radiology called today stating they did not due u/s for dupuytren contractures. The radiologist did not have any suggestions for workup. Pt will be referred to emily and kline for further eval.

## 2019-12-20 DIAGNOSIS — E78.2 MIXED HYPERLIPIDEMIA: ICD-10-CM

## 2019-12-20 DIAGNOSIS — E55.9 VITAMIN D DEFICIENCY: Primary | ICD-10-CM

## 2019-12-20 DIAGNOSIS — M85.89 OSTEOPENIA OF MULTIPLE SITES: ICD-10-CM

## 2019-12-20 DIAGNOSIS — E89.0 S/P THYROIDECTOMY: ICD-10-CM

## 2019-12-31 ENCOUNTER — OFFICE VISIT (OUTPATIENT)
Dept: GASTROENTEROLOGY | Facility: CLINIC | Age: 74
End: 2019-12-31

## 2019-12-31 VITALS
DIASTOLIC BLOOD PRESSURE: 68 MMHG | TEMPERATURE: 97.5 F | WEIGHT: 144.8 LBS | BODY MASS INDEX: 26.65 KG/M2 | SYSTOLIC BLOOD PRESSURE: 140 MMHG | HEIGHT: 62 IN

## 2019-12-31 DIAGNOSIS — K75.4 AUTOIMMUNE HEPATITIS TREATED WITH STEROIDS (HCC): Primary | ICD-10-CM

## 2019-12-31 LAB
25(OH)D3+25(OH)D2 SERPL-MCNC: 35.2 NG/ML (ref 30–100)
ALBUMIN SERPL-MCNC: 4.4 G/DL (ref 3.5–5.2)
ALBUMIN/GLOB SERPL: 2.3 G/DL
ALP SERPL-CCNC: 62 U/L (ref 39–117)
ALT SERPL-CCNC: 27 U/L (ref 1–33)
AST SERPL-CCNC: 30 U/L (ref 1–32)
BASOPHILS # BLD AUTO: 0.02 10*3/MM3 (ref 0–0.2)
BASOPHILS NFR BLD AUTO: 0.4 % (ref 0–1.5)
BILIRUB SERPL-MCNC: 1.1 MG/DL (ref 0.2–1.2)
BUN SERPL-MCNC: 12 MG/DL (ref 8–23)
BUN/CREAT SERPL: 14 (ref 7–25)
CALCIUM SERPL-MCNC: 9.3 MG/DL (ref 8.6–10.5)
CHLORIDE SERPL-SCNC: 97 MMOL/L (ref 98–107)
CHOLEST SERPL-MCNC: 131 MG/DL (ref 0–200)
CO2 SERPL-SCNC: 27.7 MMOL/L (ref 22–29)
CREAT SERPL-MCNC: 0.86 MG/DL (ref 0.57–1)
EOSINOPHIL # BLD AUTO: 0.2 10*3/MM3 (ref 0–0.4)
EOSINOPHIL NFR BLD AUTO: 3.9 % (ref 0.3–6.2)
ERYTHROCYTE [DISTWIDTH] IN BLOOD BY AUTOMATED COUNT: 12.9 % (ref 12.3–15.4)
GLOBULIN SER CALC-MCNC: 1.9 GM/DL
GLUCOSE SERPL-MCNC: 110 MG/DL (ref 65–99)
HBA1C MFR BLD: 5.6 % (ref 4.8–5.6)
HCT VFR BLD AUTO: 36.2 % (ref 34–46.6)
HDLC SERPL-MCNC: 55 MG/DL (ref 40–60)
HGB BLD-MCNC: 12.5 G/DL (ref 12–15.9)
IMM GRANULOCYTES # BLD AUTO: 0.01 10*3/MM3 (ref 0–0.05)
IMM GRANULOCYTES NFR BLD AUTO: 0.2 % (ref 0–0.5)
LDLC SERPL CALC-MCNC: 49 MG/DL (ref 0–100)
LDLC/HDLC SERPL: 0.89 {RATIO}
LYMPHOCYTES # BLD AUTO: 1.33 10*3/MM3 (ref 0.7–3.1)
LYMPHOCYTES NFR BLD AUTO: 26.1 % (ref 19.6–45.3)
MCH RBC QN AUTO: 30 PG (ref 26.6–33)
MCHC RBC AUTO-ENTMCNC: 34.5 G/DL (ref 31.5–35.7)
MCV RBC AUTO: 86.8 FL (ref 79–97)
MONOCYTES # BLD AUTO: 0.49 10*3/MM3 (ref 0.1–0.9)
MONOCYTES NFR BLD AUTO: 9.6 % (ref 5–12)
NEUTROPHILS # BLD AUTO: 3.04 10*3/MM3 (ref 1.7–7)
NEUTROPHILS NFR BLD AUTO: 59.8 % (ref 42.7–76)
NRBC BLD AUTO-RTO: 0 /100 WBC (ref 0–0.2)
PLATELET # BLD AUTO: 156 10*3/MM3 (ref 140–450)
POTASSIUM SERPL-SCNC: 4.3 MMOL/L (ref 3.5–5.2)
PROT SERPL-MCNC: 6.3 G/DL (ref 6–8.5)
RBC # BLD AUTO: 4.17 10*6/MM3 (ref 3.77–5.28)
SODIUM SERPL-SCNC: 136 MMOL/L (ref 136–145)
T4 FREE SERPL-MCNC: 1.52 NG/DL (ref 0.93–1.7)
TRIGL SERPL-MCNC: 134 MG/DL (ref 0–150)
TSH SERPL DL<=0.005 MIU/L-ACNC: 2.92 UIU/ML (ref 0.27–4.2)
VLDLC SERPL CALC-MCNC: 26.8 MG/DL
WBC # BLD AUTO: 5.09 10*3/MM3 (ref 3.4–10.8)

## 2019-12-31 PROCEDURE — 99212 OFFICE O/P EST SF 10 MIN: CPT | Performed by: INTERNAL MEDICINE

## 2019-12-31 NOTE — PROGRESS NOTES
Chief Complaint   Patient presents with   • Follow-up       Mami Srivastava is a  74 y.o. female here for a follow up visit for autoimmune hepatitis.    HPI     Patient 74-year-old female with history of hypertension and hyperlipidemia as well as hypothyroidism presenting for follow-up for her autoimmune hepatitis.  Patient's off medication and liver enzymes have been normal here for follow-up.  Patient denies any GI complaints.  Patient reports no bleeding diathesis no darkening of the urine or lightening of the stool.    Past Medical History:   Diagnosis Date   • Autoimmune hepatitis (CMS/HCC)    • Cholelithiasis 04-    Ultrasound   • Coronary artery disease     Dr Luis Rick   • Diverticulitis of colon 2005,2018   • Hemangioma of liver    • Hernia 2017    Hiatal hernia-Prabhjot test   • Hyperlipidemia     Dr Luis Rick-atorvastatin   • Hypertension    • Hyperthyroidism    • Hypothyroidism    • Laceration of finger of right hand 05/15/2018    lac with knife and had 3 stitches   • Osteopenia after menopause    • Skin cancer     face   • Ulcerative colitis (CMS/HCC) 2014    Colitis/Arben         Current Outpatient Medications:   •  albuterol sulfate  (90 Base) MCG/ACT inhaler, Inhale 2 puffs Every 4 (Four) Hours As Needed for Wheezing or Shortness of Air (coughing episoded)., Disp: 1 inhaler, Rfl: 0  •  amLODIPine (NORVASC) 5 MG tablet, TAKE 1 TABLET BY MOUTH EVERY DAY, Disp: 90 tablet, Rfl: 1  •  aspirin 81 MG EC tablet, Take 81 mg by mouth Daily., Disp: , Rfl:   •  benzonatate (TESSALON PERLES) 100 MG capsule, Take 1 capsule by mouth 2 (Two) Times a Day As Needed for Cough., Disp: 20 capsule, Rfl: 0  •  cetirizine (zyrTEC) 10 MG tablet, Take 10 mg by mouth Every Night., Disp: , Rfl:   •  Cholecalciferol (VITAMIN D3) 5000 units capsule capsule, Take 5,000 Units by mouth 2 (Two) Times a Week., Disp: , Rfl:   •  fluticasone (FLONASE) 50 MCG/ACT nasal spray, 1 spray into the nostril(s) as directed  by provider Daily As Needed., Disp: , Rfl:   •  lisinopril (PRINIVIL,ZESTRIL) 20 MG tablet, , Disp: , Rfl:   •  methylPREDNISolone (MEDROL, PAMELA,) 4 MG tablet, Take as directed on package instructions., Disp: 21 each, Rfl: 0  •  Multiple Vitamin (MULTI VITAMIN DAILY PO), Take  by mouth., Disp: , Rfl:   •  Omega-3 Fatty Acids (FISH OIL) 1000 MG capsule capsule, Take 3,000 mg by mouth Daily With Breakfast., Disp: , Rfl:   •  Probiotic Product ([employer]version) capsule, Take 1 capsule by mouth Daily., Disp: 30 capsule, Rfl: 5  •  rosuvastatin (CRESTOR) 10 MG tablet, Take 10 mg by mouth Daily., Disp: , Rfl:   •  SYNTHROID 75 MCG tablet, TAKE 1 TABLET DAILY FOR    THYROID, Disp: 90 tablet, Rfl: 1    Allergies   Allergen Reactions   • Imuran [Azathioprine] GI Intolerance   • Bactrim [Sulfamethoxazole-Trimethoprim] Other (See Comments)     fatigue   • Augmentin [Amoxicillin-Pot Clavulanate] Hives   • Keflex [Cephalexin] Hives       Social History     Socioeconomic History   • Marital status:      Spouse name: Not on file   • Number of children: Not on file   • Years of education: Not on file   • Highest education level: Not on file   Tobacco Use   • Smoking status: Former Smoker     Packs/day: 2.00     Years: 12.00     Pack years: 24.00     Start date: 1963     Last attempt to quit: 1976     Years since quittin.9   • Smokeless tobacco: Never Used   • Tobacco comment: Chrinic bronchitis...smoke allergy   Substance and Sexual Activity   • Alcohol use: Yes     Comment: Seldom   • Drug use: No   • Sexual activity: Not Currently       Family History   Problem Relation Age of Onset   • Heart disease Mother    • Hypertension Mother    • Lung cancer Sister    • Thyroid disease Sister    • Lupus Sister    • Thyroid disease Brother    • Alcohol abuse Brother    • Glaucoma Maternal Grandmother    • Stomach cancer Maternal Grandmother          ’s   • Cirrhosis Father             • Liver  disease Father        Review of Systems   Constitutional: Negative.    Respiratory: Negative.    Cardiovascular: Negative.    Gastrointestinal: Negative.    Musculoskeletal: Negative.    Skin: Negative.    Hematological: Negative.        Vitals:    12/31/19 1042   BP: 140/68   Temp: 97.5 °F (36.4 °C)       Physical Exam   Constitutional: She is oriented to person, place, and time. She appears well-developed and well-nourished.   HENT:   Head: Normocephalic and atraumatic.   Eyes: Pupils are equal, round, and reactive to light. No scleral icterus.   Cardiovascular: Normal rate, regular rhythm and normal heart sounds.   Pulmonary/Chest: Effort normal and breath sounds normal.   Abdominal: Soft. Bowel sounds are normal. She exhibits no distension and no mass. There is no tenderness. No hernia.   Neurological: She is alert and oriented to person, place, and time.   Skin: Skin is warm and dry. No rash noted.   Psychiatric: She has a normal mood and affect. Her behavior is normal.   Vitals reviewed.      Orders Only on 12/20/2019   Component Date Value Ref Range Status   • 25 Hydroxy, Vitamin D 12/30/2019 35.2  30.0 - 100.0 ng/ml Final   • Free T4 12/30/2019 1.52  0.93 - 1.70 ng/dL Final   • Hemoglobin A1C 12/30/2019 5.60  4.80 - 5.60 % Final   • TSH 12/30/2019 2.920  0.270 - 4.200 uIU/mL Final   • WBC 12/30/2019 5.09  3.40 - 10.80 10*3/mm3 Final   • RBC 12/30/2019 4.17  3.77 - 5.28 10*6/mm3 Final   • Hemoglobin 12/30/2019 12.5  12.0 - 15.9 g/dL Final   • Hematocrit 12/30/2019 36.2  34.0 - 46.6 % Final   • MCV 12/30/2019 86.8  79.0 - 97.0 fL Final   • MCH 12/30/2019 30.0  26.6 - 33.0 pg Final   • MCHC 12/30/2019 34.5  31.5 - 35.7 g/dL Final   • RDW 12/30/2019 12.9  12.3 - 15.4 % Final   • Platelets 12/30/2019 156  140 - 450 10*3/mm3 Final   • Neutrophil Rel % 12/30/2019 59.8  42.7 - 76.0 % Final   • Lymphocyte Rel % 12/30/2019 26.1  19.6 - 45.3 % Final   • Monocyte Rel % 12/30/2019 9.6  5.0 - 12.0 % Final   • Eosinophil  Rel % 12/30/2019 3.9  0.3 - 6.2 % Final   • Basophil Rel % 12/30/2019 0.4  0.0 - 1.5 % Final   • Neutrophils Absolute 12/30/2019 3.04  1.70 - 7.00 10*3/mm3 Final   • Lymphocytes Absolute 12/30/2019 1.33  0.70 - 3.10 10*3/mm3 Final   • Monocytes Absolute 12/30/2019 0.49  0.10 - 0.90 10*3/mm3 Final   • Eosinophils Absolute 12/30/2019 0.20  0.00 - 0.40 10*3/mm3 Final   • Basophils Absolute 12/30/2019 0.02  0.00 - 0.20 10*3/mm3 Final   • Immature Granulocyte Rel % 12/30/2019 0.2  0.0 - 0.5 % Final   • Immature Grans Absolute 12/30/2019 0.01  0.00 - 0.05 10*3/mm3 Final   • nRBC 12/30/2019 0.0  0.0 - 0.2 /100 WBC Final   • Total Cholesterol 12/30/2019 131  0 - 200 mg/dL Final   • Triglycerides 12/30/2019 134  0 - 150 mg/dL Final   • HDL Cholesterol 12/30/2019 55  40 - 60 mg/dL Final   • VLDL Cholesterol 12/30/2019 26.8  mg/dL Final   • LDL Cholesterol  12/30/2019 49  0 - 100 mg/dL Final   • LDL/HDL Ratio 12/30/2019 0.89   Final   • Glucose 12/30/2019 110* 65 - 99 mg/dL Final   • BUN 12/30/2019 12  8 - 23 mg/dL Final   • Creatinine 12/30/2019 0.86  0.57 - 1.00 mg/dL Final   • eGFR Non  Am 12/30/2019 65  >60 mL/min/1.73 Final   • eGFR African Am 12/30/2019 78  >60 mL/min/1.73 Final   • BUN/Creatinine Ratio 12/30/2019 14.0  7.0 - 25.0 Final   • Sodium 12/30/2019 136  136 - 145 mmol/L Final   • Potassium 12/30/2019 4.3  3.5 - 5.2 mmol/L Final   • Chloride 12/30/2019 97* 98 - 107 mmol/L Final   • Total CO2 12/30/2019 27.7  22.0 - 29.0 mmol/L Final   • Calcium 12/30/2019 9.3  8.6 - 10.5 mg/dL Final   • Total Protein 12/30/2019 6.3  6.0 - 8.5 g/dL Final   • Albumin 12/30/2019 4.40  3.50 - 5.20 g/dL Final   • Globulin 12/30/2019 1.9  gm/dL Final   • A/G Ratio 12/30/2019 2.3  g/dL Final   • Total Bilirubin 12/30/2019 1.1  0.2 - 1.2 mg/dL Final   • Alkaline Phosphatase 12/30/2019 62  39 - 117 U/L Final   • AST (SGOT) 12/30/2019 30  1 - 32 U/L Final   • ALT (SGPT) 12/30/2019 27  1 - 33 U/L Final       Mami was seen today  for follow-up.    Diagnoses and all orders for this visit:    Autoimmune hepatitis treated with steroids (CMS/HCC)      Patient 74-year-old female with history of autoimmune hepatitis here for follow-up.  Patient labs remain normal with no GI complaints at this time.  Patient did ask about treatment of internal hemorrhoids explained Preparation H for now unless bleeding becomes significant.  Patient also recommended if severe viral infection she should have her liver enzymes rechecked but otherwise to follow-up yearly.

## 2020-01-02 ENCOUNTER — OFFICE VISIT (OUTPATIENT)
Dept: FAMILY MEDICINE CLINIC | Facility: CLINIC | Age: 75
End: 2020-01-02

## 2020-01-02 VITALS
DIASTOLIC BLOOD PRESSURE: 58 MMHG | HEIGHT: 62 IN | RESPIRATION RATE: 18 BRPM | OXYGEN SATURATION: 98 % | TEMPERATURE: 98 F | HEART RATE: 65 BPM | BODY MASS INDEX: 26.46 KG/M2 | WEIGHT: 143.8 LBS | SYSTOLIC BLOOD PRESSURE: 120 MMHG

## 2020-01-02 DIAGNOSIS — I10 ESSENTIAL HYPERTENSION: Primary | ICD-10-CM

## 2020-01-02 DIAGNOSIS — E78.2 MIXED HYPERLIPIDEMIA: ICD-10-CM

## 2020-01-02 DIAGNOSIS — E89.0 POSTOPERATIVE HYPOTHYROIDISM: ICD-10-CM

## 2020-01-02 DIAGNOSIS — R73.9 HYPERGLYCEMIA: ICD-10-CM

## 2020-01-02 PROCEDURE — 99214 OFFICE O/P EST MOD 30 MIN: CPT | Performed by: INTERNAL MEDICINE

## 2020-01-13 NOTE — PROGRESS NOTES
Subjective   Mami Srivastava is a 74 y.o. female. Patient is here today for   Chief Complaint   Patient presents with   • Hypertension          Vitals:    20 1325   BP: 120/58   Pulse: 65   Resp: 18   Temp: 98 °F (36.7 °C)   SpO2: 98%     Body mass index is 26.29 kg/m².      Past Medical History:   Diagnosis Date   • Autoimmune hepatitis (CMS/HCC)    • Cholelithiasis 2018    Ultrasound   • Coronary artery disease     Dr Luis Rick   • Diverticulitis of colon ,   • Hemangioma of liver    • Hernia     Hiatal hernia-Prabhjot test   • Hyperlipidemia     Dr Luis Rick-atorvastatin   • Hypertension    • Hyperthyroidism    • Hypothyroidism    • Laceration of finger of right hand 05/15/2018    lac with knife and had 3 stitches   • Osteopenia after menopause    • Skin cancer     face   • Ulcerative colitis (CMS/HCC)     Colitis/DrKaplan      Allergies   Allergen Reactions   • Imuran [Azathioprine] GI Intolerance   • Bactrim [Sulfamethoxazole-Trimethoprim] Other (See Comments)     fatigue   • Augmentin [Amoxicillin-Pot Clavulanate] Hives   • Keflex [Cephalexin] Hives      Social History     Socioeconomic History   • Marital status:      Spouse name: Not on file   • Number of children: Not on file   • Years of education: Not on file   • Highest education level: Not on file   Tobacco Use   • Smoking status: Former Smoker     Packs/day: 2.00     Years: 12.00     Pack years: 24.00     Start date: 1963     Last attempt to quit: 1976     Years since quittin.9   • Smokeless tobacco: Never Used   • Tobacco comment: Chrinic bronchitis...smoke allergy   Substance and Sexual Activity   • Alcohol use: Yes     Comment: Seldom   • Drug use: No   • Sexual activity: Not Currently        Current Outpatient Medications:   •  amLODIPine (NORVASC) 5 MG tablet, TAKE 1 TABLET BY MOUTH EVERY DAY, Disp: 90 tablet, Rfl: 1  •  aspirin 81 MG EC tablet, Take 81 mg by mouth Daily., Disp: , Rfl:   •   benzonatate (TESSALON PERLES) 100 MG capsule, Take 1 capsule by mouth 2 (Two) Times a Day As Needed for Cough., Disp: 20 capsule, Rfl: 0  •  cetirizine (zyrTEC) 10 MG tablet, Take 10 mg by mouth Every Night., Disp: , Rfl:   •  Cholecalciferol (VITAMIN D3) 5000 units capsule capsule, Take 5,000 Units by mouth 2 (Two) Times a Week., Disp: , Rfl:   •  fluticasone (FLONASE) 50 MCG/ACT nasal spray, 1 spray into the nostril(s) as directed by provider Daily As Needed., Disp: , Rfl:   •  lisinopril (PRINIVIL,ZESTRIL) 20 MG tablet, , Disp: , Rfl:   •  Multiple Vitamin (MULTI VITAMIN DAILY PO), Take  by mouth., Disp: , Rfl:   •  Omega-3 Fatty Acids (FISH OIL) 1000 MG capsule capsule, Take 3,000 mg by mouth Daily With Breakfast., Disp: , Rfl:   •  Probiotic Product ([employer]) capsule, Take 1 capsule by mouth Daily., Disp: 30 capsule, Rfl: 5  •  rosuvastatin (CRESTOR) 10 MG tablet, Take 10 mg by mouth Daily., Disp: , Rfl:   •  SYNTHROID 75 MCG tablet, TAKE 1 TABLET DAILY FOR    THYROID, Disp: 90 tablet, Rfl: 1     Objective     Patient is here to follow-up on hypertension and labs done last week.       Review of Systems   Constitutional: Negative.    HENT: Negative.    Respiratory: Negative.    Cardiovascular: Negative.    Musculoskeletal: Negative.    Psychiatric/Behavioral: Negative.        Physical Exam   Constitutional: She is oriented to person, place, and time. She appears well-developed and well-nourished.   HENT:   Head: Normocephalic and atraumatic.   Cardiovascular: Normal rate and regular rhythm.   Pulmonary/Chest: Effort normal and breath sounds normal.   Neurological: She is alert and oriented to person, place, and time.   Psychiatric: She has a normal mood and affect. Her behavior is normal. Thought content normal.   Nursing note and vitals reviewed.        Problem List Items Addressed This Visit        Cardiovascular and Mediastinum    Mixed hyperlipidemia    Hypertension - Primary       Endocrine     Hypothyroidism       Other    Hyperglycemia            PLAN  She and I reviewed her labs.  She has good control of her mixed hyperlipidemia on Crestor 10 mg daily.    Thyroid hormone is appropriately replaced.    Her hypertension is well controlled.    She has hyperglycemia.  She is prediabetic.  I asked her to do her best to maintain aerobic conditioning per American [employer] Association guidelines and if she could manage to do it to lose a couple pounds.    I will have her back in about 6 months to reassess.  No follow-ups on file.

## 2020-02-24 RX ORDER — LEVOTHYROXINE SODIUM 75 MCG
TABLET ORAL
Qty: 90 TABLET | Refills: 1 | Status: SHIPPED | OUTPATIENT
Start: 2020-02-24 | End: 2020-08-10

## 2020-05-13 ENCOUNTER — APPOINTMENT (OUTPATIENT)
Dept: WOMENS IMAGING | Facility: HOSPITAL | Age: 75
End: 2020-05-13

## 2020-05-13 PROCEDURE — 77063 BREAST TOMOSYNTHESIS BI: CPT | Performed by: RADIOLOGY

## 2020-05-13 PROCEDURE — 77067 SCR MAMMO BI INCL CAD: CPT | Performed by: RADIOLOGY

## 2020-05-18 RX ORDER — AMLODIPINE BESYLATE 5 MG/1
TABLET ORAL
Qty: 90 TABLET | Refills: 1 | Status: SHIPPED | OUTPATIENT
Start: 2020-05-18 | End: 2020-09-19

## 2020-06-12 DIAGNOSIS — E78.2 MIXED HYPERLIPIDEMIA: Primary | ICD-10-CM

## 2020-06-12 DIAGNOSIS — R73.9 HYPERGLYCEMIA: ICD-10-CM

## 2020-06-12 DIAGNOSIS — I10 ESSENTIAL HYPERTENSION: ICD-10-CM

## 2020-06-12 DIAGNOSIS — I11.9 HYPERTENSIVE LEFT VENTRICULAR HYPERTROPHY, WITHOUT HEART FAILURE: ICD-10-CM

## 2020-06-12 DIAGNOSIS — E55.9 VITAMIN D DEFICIENCY: ICD-10-CM

## 2020-06-12 DIAGNOSIS — E89.0 POSTOPERATIVE HYPOTHYROIDISM: ICD-10-CM

## 2020-06-30 LAB
25(OH)D3+25(OH)D2 SERPL-MCNC: 43.1 NG/ML (ref 30–100)
ALBUMIN SERPL-MCNC: 4.8 G/DL (ref 3.5–5.2)
ALBUMIN/GLOB SERPL: 2.3 G/DL
ALP SERPL-CCNC: 65 U/L (ref 39–117)
ALT SERPL-CCNC: 30 U/L (ref 1–33)
APPEARANCE UR: CLEAR
AST SERPL-CCNC: 28 U/L (ref 1–32)
BACTERIA #/AREA URNS HPF: ABNORMAL /HPF
BASOPHILS # BLD AUTO: 0.02 10*3/MM3 (ref 0–0.2)
BASOPHILS NFR BLD AUTO: 0.3 % (ref 0–1.5)
BILIRUB SERPL-MCNC: 1.2 MG/DL (ref 0.2–1.2)
BILIRUB UR QL STRIP: NEGATIVE
BUN SERPL-MCNC: 15 MG/DL (ref 8–23)
BUN/CREAT SERPL: 16.7 (ref 7–25)
CALCIUM SERPL-MCNC: 9.6 MG/DL (ref 8.6–10.5)
CASTS URNS MICRO: ABNORMAL
CHLORIDE SERPL-SCNC: 104 MMOL/L (ref 98–107)
CHOLEST SERPL-MCNC: 131 MG/DL (ref 0–200)
CO2 SERPL-SCNC: 28.2 MMOL/L (ref 22–29)
COLOR UR: YELLOW
CREAT SERPL-MCNC: 0.9 MG/DL (ref 0.57–1)
EOSINOPHIL # BLD AUTO: 0.31 10*3/MM3 (ref 0–0.4)
EOSINOPHIL NFR BLD AUTO: 4.5 % (ref 0.3–6.2)
EPI CELLS #/AREA URNS HPF: ABNORMAL /HPF
ERYTHROCYTE [DISTWIDTH] IN BLOOD BY AUTOMATED COUNT: 12.8 % (ref 12.3–15.4)
GLOBULIN SER CALC-MCNC: 2.1 GM/DL
GLUCOSE SERPL-MCNC: 134 MG/DL (ref 65–99)
GLUCOSE UR QL: NEGATIVE
HBA1C MFR BLD: 6.3 % (ref 4.8–5.6)
HCT VFR BLD AUTO: 37.7 % (ref 34–46.6)
HDLC SERPL-MCNC: 45 MG/DL (ref 40–60)
HGB BLD-MCNC: 13 G/DL (ref 12–15.9)
HGB UR QL STRIP: NEGATIVE
IMM GRANULOCYTES # BLD AUTO: 0.02 10*3/MM3 (ref 0–0.05)
IMM GRANULOCYTES NFR BLD AUTO: 0.3 % (ref 0–0.5)
KETONES UR QL STRIP: NEGATIVE
LDLC SERPL CALC-MCNC: 49 MG/DL (ref 0–100)
LDLC/HDLC SERPL: 1.08 {RATIO}
LEUKOCYTE ESTERASE UR QL STRIP: ABNORMAL
LYMPHOCYTES # BLD AUTO: 1.88 10*3/MM3 (ref 0.7–3.1)
LYMPHOCYTES NFR BLD AUTO: 27.3 % (ref 19.6–45.3)
MCH RBC QN AUTO: 30.3 PG (ref 26.6–33)
MCHC RBC AUTO-ENTMCNC: 34.5 G/DL (ref 31.5–35.7)
MCV RBC AUTO: 87.9 FL (ref 79–97)
MONOCYTES # BLD AUTO: 0.39 10*3/MM3 (ref 0.1–0.9)
MONOCYTES NFR BLD AUTO: 5.7 % (ref 5–12)
NEUTROPHILS # BLD AUTO: 4.27 10*3/MM3 (ref 1.7–7)
NEUTROPHILS NFR BLD AUTO: 61.9 % (ref 42.7–76)
NITRITE UR QL STRIP: NEGATIVE
NRBC BLD AUTO-RTO: 0.1 /100 WBC (ref 0–0.2)
PH UR STRIP: 6 [PH] (ref 5–8)
PLATELET # BLD AUTO: 175 10*3/MM3 (ref 140–450)
POTASSIUM SERPL-SCNC: 4.7 MMOL/L (ref 3.5–5.2)
PROT SERPL-MCNC: 6.9 G/DL (ref 6–8.5)
PROT UR QL STRIP: NEGATIVE
RBC # BLD AUTO: 4.29 10*6/MM3 (ref 3.77–5.28)
RBC #/AREA URNS HPF: ABNORMAL /HPF
SODIUM SERPL-SCNC: 141 MMOL/L (ref 136–145)
SP GR UR: 1.01 (ref 1–1.03)
T4 FREE SERPL-MCNC: 1.49 NG/DL (ref 0.93–1.7)
TRIGL SERPL-MCNC: 186 MG/DL (ref 0–150)
TSH SERPL DL<=0.005 MIU/L-ACNC: 3.18 UIU/ML (ref 0.27–4.2)
UROBILINOGEN UR STRIP-MCNC: ABNORMAL MG/DL
VLDLC SERPL CALC-MCNC: 37.2 MG/DL
WBC # BLD AUTO: 6.89 10*3/MM3 (ref 3.4–10.8)
WBC #/AREA URNS HPF: ABNORMAL /HPF

## 2020-07-02 ENCOUNTER — OFFICE VISIT (OUTPATIENT)
Dept: FAMILY MEDICINE CLINIC | Facility: CLINIC | Age: 75
End: 2020-07-02

## 2020-07-02 VITALS
BODY MASS INDEX: 27.97 KG/M2 | OXYGEN SATURATION: 98 % | DIASTOLIC BLOOD PRESSURE: 70 MMHG | TEMPERATURE: 97.8 F | SYSTOLIC BLOOD PRESSURE: 144 MMHG | HEART RATE: 87 BPM | HEIGHT: 62 IN | WEIGHT: 152 LBS | RESPIRATION RATE: 17 BRPM

## 2020-07-02 DIAGNOSIS — E89.0 POSTOPERATIVE HYPOTHYROIDISM: ICD-10-CM

## 2020-07-02 DIAGNOSIS — E78.2 MIXED HYPERLIPIDEMIA: Primary | ICD-10-CM

## 2020-07-02 DIAGNOSIS — I10 ESSENTIAL HYPERTENSION: ICD-10-CM

## 2020-07-02 DIAGNOSIS — R73.9 HYPERGLYCEMIA: ICD-10-CM

## 2020-07-02 DIAGNOSIS — N30.90 CYSTITIS: ICD-10-CM

## 2020-07-02 PROCEDURE — 99214 OFFICE O/P EST MOD 30 MIN: CPT | Performed by: INTERNAL MEDICINE

## 2020-07-02 RX ORDER — NYSTATIN 100000 [USP'U]/G
POWDER TOPICAL 2 TIMES DAILY
Qty: 15 G | Refills: 1 | Status: SHIPPED | OUTPATIENT
Start: 2020-07-02 | End: 2020-09-19

## 2020-07-02 RX ORDER — NITROFURANTOIN 25; 75 MG/1; MG/1
100 CAPSULE ORAL EVERY 12 HOURS SCHEDULED
Qty: 14 CAPSULE | Refills: 0 | Status: SHIPPED | OUTPATIENT
Start: 2020-07-02 | End: 2020-09-19

## 2020-07-06 ENCOUNTER — TELEPHONE (OUTPATIENT)
Dept: FAMILY MEDICINE CLINIC | Facility: CLINIC | Age: 75
End: 2020-07-06

## 2020-07-06 NOTE — TELEPHONE ENCOUNTER
PT CALLED STATING THAT AFTER TAKING SHE NOTICED THAT SHE STARTED SNEEZING, HER EYES WERE IRRITATED, HER FACE STARTED TO BURN UP AND SHE GOT A FEVER .2. DURING THIS TIME SHE HAD GONE OUT SIDE AND DONE SOME YARD WORK. SHE THOUGHT SHE COULD HAVE GOTTEN OVER HEATED SO SHE CAME INSIDE AND THIS GOT THE CHILLS. PT WOULD LIKE TO KNOW IF THIS COULD BE FROM THE MEDICATION?    CALL BACK# [phone]

## 2020-07-07 NOTE — TELEPHONE ENCOUNTER
PT CALLED STATING SHE HAS NOT HEARD BACK REGARDING THIS ISSUE. PT STATES HER TEMP GOT  AND IS ALSO EXPERIENCING CHILLS.     PLEASE ADVISE     PT CALL BACK   [phone]

## 2020-07-12 PROBLEM — N30.90 CYSTITIS: Status: ACTIVE | Noted: 2020-07-12

## 2020-07-12 NOTE — PROGRESS NOTES
Subjective   Mami Srivastava is a 75 y.o. female. Patient is here today for   Chief Complaint   Patient presents with   • Hypertension   • Hypothyroidism   • Hyperlipidemia          Vitals:    20 0851   BP: 144/70   Pulse: 87   Resp: 17   Temp: 97.8 °F (36.6 °C)   SpO2: 98%     Body mass index is 27.8 kg/m².      Past Medical History:   Diagnosis Date   • Autoimmune hepatitis (CMS/HCC)    • Cholelithiasis 2018    Ultrasound   • Coronary artery disease     Dr Luis Rick   • Diverticulitis of colon ,   • Hemangioma of liver    • Hernia     Hiatal hernia-Prabhjot test   • Hyperlipidemia     Dr Luis Rick-atorvastatin   • Hypertension    • Hyperthyroidism    • Hypothyroidism    • Laceration of finger of right hand 05/15/2018    lac with knife and had 3 stitches   • Osteopenia after menopause    • Skin cancer     face   • Ulcerative colitis (CMS/HCC)     Colitis/DrKaplan      Allergies   Allergen Reactions   • Imuran [Azathioprine] GI Intolerance   • Bactrim [Sulfamethoxazole-Trimethoprim] Other (See Comments)     fatigue   • Augmentin [Amoxicillin-Pot Clavulanate] Hives   • Keflex [Cephalexin] Hives      Social History     Socioeconomic History   • Marital status:      Spouse name: Not on file   • Number of children: Not on file   • Years of education: Not on file   • Highest education level: Not on file   Tobacco Use   • Smoking status: Former Smoker     Packs/day: 2.00     Years: 12.00     Pack years: 24.00     Start date: 1963     Last attempt to quit: 1976     Years since quittin.4   • Smokeless tobacco: Never Used   • Tobacco comment: Chrinic bronchitis...smoke allergy   Substance and Sexual Activity   • Alcohol use: Yes     Comment: Seldom   • Drug use: No   • Sexual activity: Not Currently        Current Outpatient Medications:   •  amLODIPine (NORVASC) 5 MG tablet, TAKE 1 TABLET BY MOUTH EVERY DAY, Disp: 90 tablet, Rfl: 1  •  aspirin 81 MG EC tablet, Take 81  mg by mouth Daily., Disp: , Rfl:   •  benzonatate (TESSALON PERLES) 100 MG capsule, Take 1 capsule by mouth 2 (Two) Times a Day As Needed for Cough., Disp: 20 capsule, Rfl: 0  •  cetirizine (zyrTEC) 10 MG tablet, Take 10 mg by mouth Every Night., Disp: , Rfl:   •  Cholecalciferol (VITAMIN D3) 5000 units capsule capsule, Take 5,000 Units by mouth 2 (Two) Times a Week., Disp: , Rfl:   •  ciprofloxacin (CIPRO) 500 MG tablet, Take 1 tablet by mouth Every 12 (Twelve) Hours for 7 days., Disp: 14 tablet, Rfl: 0  •  fluticasone (FLONASE) 50 MCG/ACT nasal spray, 1 spray into the nostril(s) as directed by provider Daily As Needed., Disp: , Rfl:   •  lisinopril (PRINIVIL,ZESTRIL) 20 MG tablet, , Disp: , Rfl:   •  Multiple Vitamin (MULTI VITAMIN DAILY PO), Take  by mouth., Disp: , Rfl:   •  nitrofurantoin, macrocrystal-monohydrate, (Macrobid) 100 MG capsule, Take 1 capsule by mouth Every 12 (Twelve) Hours., Disp: 14 capsule, Rfl: 0  •  nystatin (MYCOSTATIN) 955656 UNIT/GM powder, Apply  topically to the appropriate area as directed 2 (Two) Times a Day., Disp: 15 g, Rfl: 1  •  Omega-3 Fatty Acids (FISH OIL) 1000 MG capsule capsule, Take 3,000 mg by mouth Daily With Breakfast., Disp: , Rfl:   •  Probiotic Product ([employer]) capsule, Take 1 capsule by mouth Daily., Disp: 30 capsule, Rfl: 5  •  rosuvastatin (CRESTOR) 10 MG tablet, Take 10 mg by mouth Daily., Disp: , Rfl:   •  SYNTHROID 75 MCG tablet, TAKE 1 TABLET DAILY FOR    THYROID, Disp: 90 tablet, Rfl: 1     Objective     During her visit today, we both for facemasks.    She is here to follow-up on labs from last week.    She develops a fungal skin infection from time to time.  She had found nystatin powder to be useful in the past and requested a prescription for this.    She notes some burning with urination and an increase in urinary frequency and urgency.           Review of Systems   Constitutional: Negative.    HENT: Negative.    Respiratory: Negative.     Genitourinary: Positive for dysuria, frequency and urgency.   Psychiatric/Behavioral: Negative.        Physical Exam   Constitutional: She is oriented to person, place, and time. She appears well-developed and well-nourished.   Pleasant, neatly groomed, in no distress.   HENT:   Head: Normocephalic and atraumatic.   Cardiovascular: Normal rate, regular rhythm and normal heart sounds.   Pulmonary/Chest: Effort normal and breath sounds normal. No respiratory distress.   Neurological: She is alert and oriented to person, place, and time.   Psychiatric: She has a normal mood and affect. Her behavior is normal.   Nursing note and vitals reviewed.        Problem List Items Addressed This Visit        Cardiovascular and Mediastinum    Mixed hyperlipidemia - Primary    Hypertension       Endocrine    Hypothyroidism       Genitourinary    Cystitis    Relevant Medications    nitrofurantoin, macrocrystal-monohydrate, (Macrobid) 100 MG capsule       Other    Hyperglycemia            PLAN  Her mixed hyperlipidemia is well controlled on Crestor.    Her hypertension is well controlled.    Her hypothyroidism is appropriately replaced.    She has symptoms consistent with a cystitis.  Was given her prescription for nitrofurantoin.    She has hyperglycemia and an elevated hemoglobin A1c.  We will monitor that for now.    Would like to have her back in about 6 months.  Prior to that visit, she should have the following labs done: Hemoglobin A1c, TSH, free T4, comprehensive metabolic panel, urinalysis, CBC and a vitamin D level.  No follow-ups on file.

## 2020-08-10 ENCOUNTER — OFFICE VISIT (OUTPATIENT)
Dept: FAMILY MEDICINE CLINIC | Facility: CLINIC | Age: 75
End: 2020-08-10

## 2020-08-10 VITALS
TEMPERATURE: 97.3 F | OXYGEN SATURATION: 97 % | DIASTOLIC BLOOD PRESSURE: 70 MMHG | BODY MASS INDEX: 25.92 KG/M2 | HEIGHT: 64 IN | HEART RATE: 66 BPM | SYSTOLIC BLOOD PRESSURE: 130 MMHG | RESPIRATION RATE: 16 BRPM | WEIGHT: 151.8 LBS

## 2020-08-10 DIAGNOSIS — B37.9 CANDIDA INFECTION: ICD-10-CM

## 2020-08-10 DIAGNOSIS — L03.818 CELLULITIS OF OTHER SPECIFIED SITE: Primary | ICD-10-CM

## 2020-08-10 PROBLEM — Z87.19 HISTORY OF DIVERTICULOSIS: Status: RESOLVED | Noted: 2017-10-27 | Resolved: 2020-08-10

## 2020-08-10 PROBLEM — K57.32 DIVERTICULITIS OF LARGE INTESTINE WITHOUT PERFORATION OR ABSCESS WITHOUT BLEEDING: Status: RESOLVED | Noted: 2018-08-05 | Resolved: 2020-08-10

## 2020-08-10 PROBLEM — R19.7 DIARRHEA: Status: RESOLVED | Noted: 2018-10-26 | Resolved: 2020-08-10

## 2020-08-10 PROCEDURE — 99213 OFFICE O/P EST LOW 20 MIN: CPT | Performed by: NURSE PRACTITIONER

## 2020-08-10 RX ORDER — LEVOTHYROXINE SODIUM 75 MCG
TABLET ORAL
Qty: 90 TABLET | Refills: 1 | Status: SHIPPED | OUTPATIENT
Start: 2020-08-10 | End: 2020-09-19

## 2020-08-10 RX ORDER — TRIAMCINOLONE ACETONIDE 0.25 MG/G
OINTMENT TOPICAL
Qty: 1 TUBE | Refills: 0 | Status: SHIPPED | OUTPATIENT
Start: 2020-08-10 | End: 2020-09-19

## 2020-08-10 RX ORDER — NYSTATIN 100000 U/G
CREAM TOPICAL AS NEEDED
Qty: 30 G | Refills: 0 | Status: SHIPPED | OUTPATIENT
Start: 2020-08-10 | End: 2020-09-19

## 2020-08-10 RX ORDER — DOXYCYCLINE HYCLATE 100 MG/1
100 CAPSULE ORAL 2 TIMES DAILY
Qty: 14 CAPSULE | Refills: 0 | Status: SHIPPED | OUTPATIENT
Start: 2020-08-10 | End: 2020-08-14

## 2020-08-10 NOTE — PROGRESS NOTES
Subjective     Mami Srivastava is a 75 y.o.. female.     Pt stating that last Wednesday she went kayaking. Pt stating that on Thursday she was trimming bushes/flowers and felt something sharp hit toe. Pt stating that then she noticed this area come up on her right big toe. Pt stating a blister like area came up and she popped it and put hydrogen peroxide on area. Pt stating area continues to blister, have drainage, red and painful. Pt also c/o red rash to groin area that itches.     Insect Bite   This is a new problem. Episode onset: Thursday. The problem has been unchanged. Associated symptoms include a rash (red, drainage, discomfort). Pertinent negatives include no fever.       The following portions of the patient's history were reviewed and updated as appropriate: allergies, current medications, past family history, past medical history, past social history, past surgical history and problem list.    Past Medical History:   Diagnosis Date   • Autoimmune hepatitis (CMS/HCC)    • Cholelithiasis 04-    Ultrasound   • Coronary artery disease     Dr Luis Rick   • Diverticulitis of colon 2005,2018   • Hemangioma of liver    • Hernia 2017    Hiatal hernia-Prabhjot test   • Hyperlipidemia     Dr Luis Rick-atorvastatin   • Hypertension    • Hyperthyroidism    • Hypothyroidism    • Laceration of finger of right hand 05/15/2018    lac with knife and had 3 stitches   • Osteopenia after menopause    • Skin cancer     face   • Ulcerative colitis (CMS/HCC) 2014    Colitis/DrKaplan       Past Surgical History:   Procedure Laterality Date   • BUNIONECTOMY     • COLONOSCOPY  04/11/2014    Diverticulosis in the sigmoid colon, in the descending colon, in the transverse colon and in the ascending colon (Pathology: Collagenous colitis)   • COLONOSCOPY  12/09/2005    Hemorrhoids, pandiverticulosis, status post diverticulitis   • COLONOSCOPY N/A 11/2/2018    fair prep, tics   • LIVER BIOPSY     • LIVER BIOPSY     •  "THYROIDECTOMY  2008       Review of Systems   Constitutional: Negative for fever.   Skin: Positive for rash (red, drainage, discomfort).       Allergies   Allergen Reactions   • Imuran [Azathioprine] GI Intolerance   • Bactrim [Sulfamethoxazole-Trimethoprim] Other (See Comments)     fatigue   • Augmentin [Amoxicillin-Pot Clavulanate] Hives   • Keflex [Cephalexin] Hives       Objective     Vitals:    08/10/20 1413   BP: 130/70   Pulse: 66   Resp: 16   Temp: 97.3 °F (36.3 °C)   SpO2: 97%   Weight: 68.9 kg (151 lb 12.8 oz)   Height: 162.6 cm (64\")     Body mass index is 26.06 kg/m².    Physical Exam   Constitutional: She is oriented to person, place, and time. She appears well-developed.   HENT:   Head: Normocephalic.   Eyes: Pupils are equal, round, and reactive to light.   Cardiovascular: Normal rate and regular rhythm.   Pulmonary/Chest: Effort normal and breath sounds normal.   Musculoskeletal: Normal range of motion.   Neurological: She is alert and oriented to person, place, and time.   Skin:   Right big toe: large blister noted, erythema to blister and surrounding tissue, light yellow drainage noted, tender to palpation   Vitals reviewed.        Current Outpatient Medications:   •  amLODIPine (NORVASC) 5 MG tablet, TAKE 1 TABLET BY MOUTH EVERY DAY, Disp: 90 tablet, Rfl: 1  •  aspirin 81 MG EC tablet, Take 81 mg by mouth Daily., Disp: , Rfl:   •  benzonatate (TESSALON PERLES) 100 MG capsule, Take 1 capsule by mouth 2 (Two) Times a Day As Needed for Cough., Disp: 20 capsule, Rfl: 0  •  cetirizine (zyrTEC) 10 MG tablet, Take 10 mg by mouth Every Night., Disp: , Rfl:   •  Cholecalciferol (VITAMIN D3) 5000 units capsule capsule, Take 5,000 Units by mouth 2 (Two) Times a Week., Disp: , Rfl:   •  doxycycline (VIBRAMYCIN) 100 MG capsule, Take 1 capsule by mouth 2 (Two) Times a Day for 7 days., Disp: 14 capsule, Rfl: 0  •  fluticasone (FLONASE) 50 MCG/ACT nasal spray, 1 spray into the nostril(s) as directed by provider " Daily As Needed., Disp: , Rfl:   •  lisinopril (PRINIVIL,ZESTRIL) 20 MG tablet, , Disp: , Rfl:   •  Multiple Vitamin (MULTI VITAMIN DAILY PO), Take  by mouth., Disp: , Rfl:   •  mupirocin (Bactroban) 2 % ointment, Apply  topically to the appropriate area as directed 2 (Two) Times a Day for 10 days., Disp: 1 each, Rfl: 1  •  nitrofurantoin, macrocrystal-monohydrate, (Macrobid) 100 MG capsule, Take 1 capsule by mouth Every 12 (Twelve) Hours., Disp: 14 capsule, Rfl: 0  •  nystatin (MYCOSTATIN) 180827 UNIT/GM cream, Apply  topically to the appropriate area as directed As Needed (yeast infection)., Disp: 30 g, Rfl: 0  •  nystatin (MYCOSTATIN) 438430 UNIT/GM powder, Apply  topically to the appropriate area as directed 2 (Two) Times a Day., Disp: 15 g, Rfl: 1  •  Omega-3 Fatty Acids (FISH OIL) 1000 MG capsule capsule, Take 3,000 mg by mouth Daily With Breakfast., Disp: , Rfl:   •  Probiotic Product ([employer]) capsule, Take 1 capsule by mouth Daily., Disp: 30 capsule, Rfl: 5  •  rosuvastatin (CRESTOR) 10 MG tablet, Take 10 mg by mouth Daily., Disp: , Rfl:   •  SYNTHROID 75 MCG tablet, TAKE 1 TABLET DAILY FOR    THYROID, Disp: 90 tablet, Rfl: 1  •  triamcinolone (KENALOG) 0.025 % ointment, Apply to affected area twice a day  For 10 days, Disp: 1 tube, Rfl: 0      Assessment/Plan   Mami was seen today for insect bite.    Diagnoses and all orders for this visit:    Cellulitis of other specified site  -     triamcinolone (KENALOG) 0.025 % ointment; Apply to affected area twice a day  For 10 days  -     mupirocin (Bactroban) 2 % ointment; Apply  topically to the appropriate area as directed 2 (Two) Times a Day for 10 days.  -     doxycycline (VIBRAMYCIN) 100 MG capsule; Take 1 capsule by mouth 2 (Two) Times a Day for 7 days.    Candida infection  -     nystatin (MYCOSTATIN) 607573 UNIT/GM cream; Apply  topically to the appropriate area as directed As Needed (yeast infection).        Patient Instructions   Pt to  keep area clean and dry, to wash with soap and water twice a day; use dial soap or equivalent to wash twice a day, to keep covered during day with bandage.       Return if symptoms worsen or fail to improve.

## 2020-08-10 NOTE — PATIENT INSTRUCTIONS
Pt to keep area clean and dry, to wash with soap and water twice a day; use dial soap or equivalent to wash twice a day, to keep covered during day with bandage.

## 2020-08-14 ENCOUNTER — OFFICE VISIT (OUTPATIENT)
Dept: FAMILY MEDICINE CLINIC | Facility: CLINIC | Age: 75
End: 2020-08-14

## 2020-08-14 VITALS
TEMPERATURE: 97.7 F | HEART RATE: 72 BPM | SYSTOLIC BLOOD PRESSURE: 130 MMHG | BODY MASS INDEX: 25.95 KG/M2 | HEIGHT: 64 IN | RESPIRATION RATE: 16 BRPM | DIASTOLIC BLOOD PRESSURE: 74 MMHG | OXYGEN SATURATION: 99 % | WEIGHT: 152 LBS

## 2020-08-14 DIAGNOSIS — L30.9 DERMATITIS: Primary | ICD-10-CM

## 2020-08-14 DIAGNOSIS — L03.818 CELLULITIS OF OTHER SPECIFIED SITE: ICD-10-CM

## 2020-08-14 PROCEDURE — 99213 OFFICE O/P EST LOW 20 MIN: CPT | Performed by: NURSE PRACTITIONER

## 2020-08-14 RX ORDER — PREDNISONE 20 MG/1
20 TABLET ORAL 2 TIMES DAILY
Qty: 6 TABLET | Refills: 0 | Status: SHIPPED | OUTPATIENT
Start: 2020-08-14 | End: 2020-08-17

## 2020-08-14 RX ORDER — CLINDAMYCIN HYDROCHLORIDE 300 MG/1
300 CAPSULE ORAL 2 TIMES DAILY
Qty: 14 CAPSULE | Refills: 0 | Status: SHIPPED | OUTPATIENT
Start: 2020-08-14 | End: 2020-08-21

## 2020-08-14 NOTE — PATIENT INSTRUCTIONS
Pt informed to stop doxycyline. Pt informed to only wash with mild soap, do not use any lotions to affected areas, to stay out of sun and cover arms and legs when working on flowers/bushes. Pt informed will do labs today to do further eval and will call with results. Pt verb. Understanding.

## 2020-08-14 NOTE — PROGRESS NOTES
Subjective     Mami Srivastava is a 75 y.o.. female.     Pt here today c/o rash noted to arms and legs today. Pt stating she was at work when she noticed rash. Pt denies rash itching, burning or with discharge. Pt denies being out in sun a lot recently. Pt stating she goes outside to do trimming of flower/bushes at night. Pt stating she has been taking her doxycyline without issues. Pt stating her right big toe looking better. Pt denies fevers.       The following portions of the patient's history were reviewed and updated as appropriate: allergies, current medications, past family history, past medical history, past social history, past surgical history and problem list.    Past Medical History:   Diagnosis Date   • Autoimmune hepatitis (CMS/HCC)    • Cholelithiasis 04-    Ultrasound   • Coronary artery disease     Dr Luis Rick   • Diverticulitis of colon 2005,2018   • Hemangioma of liver    • Hernia 2017    Hiatal hernia-Fredislo test   • Hyperlipidemia     Dr Luis Rick-atorvastatin   • Hypertension    • Hyperthyroidism    • Hypothyroidism    • Laceration of finger of right hand 05/15/2018    lac with knife and had 3 stitches   • Osteopenia after menopause    • Skin cancer     face   • Ulcerative colitis (CMS/HCC) 2014    Colitis/DrKaplan       Past Surgical History:   Procedure Laterality Date   • BUNIONECTOMY     • COLONOSCOPY  04/11/2014    Diverticulosis in the sigmoid colon, in the descending colon, in the transverse colon and in the ascending colon (Pathology: Collagenous colitis)   • COLONOSCOPY  12/09/2005    Hemorrhoids, pandiverticulosis, status post diverticulitis   • COLONOSCOPY N/A 11/2/2018    fair prep, tics   • LIVER BIOPSY     • LIVER BIOPSY     • THYROIDECTOMY  2008       Review of Systems   Constitutional: Positive for fatigue. Negative for fever.   Skin: Positive for rash (red macular pin point rash noted, denies itching, denies burn, denies pain) and wound (right big toe cellulitis  "has improved since Monday).       Allergies   Allergen Reactions   • Imuran [Azathioprine] GI Intolerance   • Bactrim [Sulfamethoxazole-Trimethoprim] Other (See Comments)     fatigue   • Augmentin [Amoxicillin-Pot Clavulanate] Hives   • Keflex [Cephalexin] Hives       Objective     Vitals:    08/14/20 1346   BP: 130/74   BP Location: Left arm   Patient Position: Sitting   Cuff Size: Adult   Pulse: 72   Resp: 16   Temp: 97.7 °F (36.5 °C)   SpO2: 99%   Weight: 68.9 kg (152 lb)   Height: 162.6 cm (64\")     Body mass index is 26.09 kg/m².    Physical Exam   Constitutional: She is oriented to person, place, and time. She appears well-developed.   HENT:   Head: Normocephalic.   Eyes: Pupils are equal, round, and reactive to light.   Cardiovascular: Normal rate and regular rhythm.   Pulmonary/Chest: Effort normal and breath sounds normal.   Musculoskeletal: Normal range of motion.   Neurological: She is alert and oriented to person, place, and time.   Skin:   Joshua. Legs: pin point macular red rash noted, rash not seen in areas of where shoes cover skin, non-blanching, no open skin noted, no discharge noted, no swelling noted, no red streaks noted    Joshua. Arms: red macular rash noted, no open skin noted, no discharge noted, no swelling noted, no red streaks noted    Right great toe: large blister noted--improved from Monday, erythema to blister--improved from Monday, light yellow drainage noted, tender to palpation    Vitals reviewed.        Current Outpatient Medications:   •  amLODIPine (NORVASC) 5 MG tablet, TAKE 1 TABLET BY MOUTH EVERY DAY, Disp: 90 tablet, Rfl: 1  •  aspirin 81 MG EC tablet, Take 81 mg by mouth Daily., Disp: , Rfl:   •  benzonatate (TESSALON PERLES) 100 MG capsule, Take 1 capsule by mouth 2 (Two) Times a Day As Needed for Cough., Disp: 20 capsule, Rfl: 0  •  cetirizine (zyrTEC) 10 MG tablet, Take 10 mg by mouth Every Night., Disp: , Rfl:   •  Cholecalciferol (VITAMIN D3) 5000 units capsule capsule, Take " 5,000 Units by mouth 2 (Two) Times a Week., Disp: , Rfl:   •  clindamycin (Cleocin) 300 MG capsule, Take 1 capsule by mouth 2 (two) times a day for 7 days., Disp: 14 capsule, Rfl: 0  •  fluticasone (FLONASE) 50 MCG/ACT nasal spray, 1 spray into the nostril(s) as directed by provider Daily As Needed., Disp: , Rfl:   •  lisinopril (PRINIVIL,ZESTRIL) 20 MG tablet, , Disp: , Rfl:   •  Multiple Vitamin (MULTI VITAMIN DAILY PO), Take  by mouth., Disp: , Rfl:   •  mupirocin (Bactroban) 2 % ointment, Apply  topically to the appropriate area as directed 2 (Two) Times a Day for 10 days., Disp: 1 each, Rfl: 1  •  nitrofurantoin, macrocrystal-monohydrate, (Macrobid) 100 MG capsule, Take 1 capsule by mouth Every 12 (Twelve) Hours., Disp: 14 capsule, Rfl: 0  •  nystatin (MYCOSTATIN) 328889 UNIT/GM cream, Apply  topically to the appropriate area as directed As Needed (yeast infection)., Disp: 30 g, Rfl: 0  •  nystatin (MYCOSTATIN) 284611 UNIT/GM powder, Apply  topically to the appropriate area as directed 2 (Two) Times a Day., Disp: 15 g, Rfl: 1  •  Omega-3 Fatty Acids (FISH OIL) 1000 MG capsule capsule, Take 3,000 mg by mouth Daily With Breakfast., Disp: , Rfl:   •  predniSONE (DELTASONE) 20 MG tablet, Take 1 tablet by mouth 2 (Two) Times a Day for 3 days., Disp: 6 tablet, Rfl: 0  •  Probiotic Product ([employer]) capsule, Take 1 capsule by mouth Daily., Disp: 30 capsule, Rfl: 5  •  rosuvastatin (CRESTOR) 10 MG tablet, Take 10 mg by mouth Daily., Disp: , Rfl:   •  SYNTHROID 75 MCG tablet, TAKE 1 TABLET DAILY FOR    THYROID, Disp: 90 tablet, Rfl: 1  •  triamcinolone (KENALOG) 0.025 % ointment, Apply to affected area twice a day  For 10 days, Disp: 1 tube, Rfl: 0    Assessment/Plan   Mami was seen today for rash.    Diagnoses and all orders for this visit:    Dermatitis  -     CBC & Differential  -     Comprehensive metabolic panel  -     predniSONE (DELTASONE) 20 MG tablet; Take 1 tablet by mouth 2 (Two) Times a Day  for 3 days.    Cellulitis of other specified site  -     clindamycin (Cleocin) 300 MG capsule; Take 1 capsule by mouth 2 (two) times a day for 7 days.        Patient Instructions   Pt informed to stop doxycyline. Pt informed to only wash with mild soap, do not use any lotions to affected areas, to stay out of sun and cover arms and legs when working on flowers/bushes. Pt informed will do labs today to do further eval and will call with results. Pt verb. Understanding.      Return if symptoms worsen or fail to improve.

## 2020-08-15 LAB
ALBUMIN SERPL-MCNC: 4.9 G/DL (ref 3.5–5.2)
ALBUMIN/GLOB SERPL: 3.3 G/DL
ALP SERPL-CCNC: 63 U/L (ref 39–117)
ALT SERPL-CCNC: 27 U/L (ref 1–33)
AST SERPL-CCNC: 26 U/L (ref 1–32)
BASOPHILS # BLD AUTO: 0.02 10*3/MM3 (ref 0–0.2)
BASOPHILS NFR BLD AUTO: 0.3 % (ref 0–1.5)
BILIRUB SERPL-MCNC: 1.3 MG/DL (ref 0–1.2)
BUN SERPL-MCNC: 15 MG/DL (ref 8–23)
BUN/CREAT SERPL: 17.6 (ref 7–25)
CALCIUM SERPL-MCNC: 9.9 MG/DL (ref 8.6–10.5)
CHLORIDE SERPL-SCNC: 99 MMOL/L (ref 98–107)
CO2 SERPL-SCNC: 29.4 MMOL/L (ref 22–29)
CREAT SERPL-MCNC: 0.85 MG/DL (ref 0.57–1)
EOSINOPHIL # BLD AUTO: 0.19 10*3/MM3 (ref 0–0.4)
EOSINOPHIL NFR BLD AUTO: 3.1 % (ref 0.3–6.2)
ERYTHROCYTE [DISTWIDTH] IN BLOOD BY AUTOMATED COUNT: 13 % (ref 12.3–15.4)
GLOBULIN SER CALC-MCNC: 1.5 GM/DL
GLUCOSE SERPL-MCNC: 90 MG/DL (ref 65–99)
HCT VFR BLD AUTO: 37.6 % (ref 34–46.6)
HGB BLD-MCNC: 12.5 G/DL (ref 12–15.9)
IMM GRANULOCYTES # BLD AUTO: 0.01 10*3/MM3 (ref 0–0.05)
IMM GRANULOCYTES NFR BLD AUTO: 0.2 % (ref 0–0.5)
LYMPHOCYTES # BLD AUTO: 1.73 10*3/MM3 (ref 0.7–3.1)
LYMPHOCYTES NFR BLD AUTO: 28.3 % (ref 19.6–45.3)
MCH RBC QN AUTO: 29.4 PG (ref 26.6–33)
MCHC RBC AUTO-ENTMCNC: 33.2 G/DL (ref 31.5–35.7)
MCV RBC AUTO: 88.5 FL (ref 79–97)
MONOCYTES # BLD AUTO: 0.38 10*3/MM3 (ref 0.1–0.9)
MONOCYTES NFR BLD AUTO: 6.2 % (ref 5–12)
NEUTROPHILS # BLD AUTO: 3.78 10*3/MM3 (ref 1.7–7)
NEUTROPHILS NFR BLD AUTO: 61.9 % (ref 42.7–76)
NRBC BLD AUTO-RTO: 0 /100 WBC (ref 0–0.2)
PLATELET # BLD AUTO: 192 10*3/MM3 (ref 140–450)
POTASSIUM SERPL-SCNC: 4.4 MMOL/L (ref 3.5–5.2)
PROT SERPL-MCNC: 6.4 G/DL (ref 6–8.5)
RBC # BLD AUTO: 4.25 10*6/MM3 (ref 3.77–5.28)
SODIUM SERPL-SCNC: 138 MMOL/L (ref 136–145)
WBC # BLD AUTO: 6.11 10*3/MM3 (ref 3.4–10.8)

## 2020-09-17 ENCOUNTER — TELEPHONE (OUTPATIENT)
Dept: GASTROENTEROLOGY | Facility: CLINIC | Age: 75
End: 2020-09-17

## 2020-09-17 NOTE — TELEPHONE ENCOUNTER
----- Message from Taj Roberts Rep sent at 9/17/2020  8:10 AM EDT -----  Regarding: advise  Contact: [phone]  Pt called and would like advise. Stomach cramping and just mucus when she goes to bathroom.

## 2020-09-17 NOTE — TELEPHONE ENCOUNTER
Called pt back. Pt states on Saturday, she had one watery diarrhea, then several solid stools, followed by what she thought was another watery BM, but it turned out to be a large amount of mucous with no stool present. Since then, she has been having multiple soft/formed BMs per day with lots of mucous in the stool and sometimes all she passes is mucous. She has been eating a very bland diet for the last few days. She has some pain, very low like where the bladder is. Pain is not severe, mostly a cramping pain that comes and goes, not constant. She has not seen any blood in stool or mucous. Pt denies nausea, vomiting, fever and chills. She had diverticulitis 15 years ago, this feels a little bit like it in that pain increases after she eats but with the tics flare, pain was in the RLQ. No new meds recently and no new diet change. She feels like she may be feeling better today, but still early. Advised will update MD and call; back with recs. Pt verb understanding.

## 2020-09-19 ENCOUNTER — APPOINTMENT (OUTPATIENT)
Dept: CT IMAGING | Facility: HOSPITAL | Age: 75
End: 2020-09-19

## 2020-09-19 ENCOUNTER — HOSPITAL ENCOUNTER (OUTPATIENT)
Facility: HOSPITAL | Age: 75
Setting detail: OBSERVATION
Discharge: HOME | End: 2020-09-21
Attending: EMERGENCY MEDICINE | Admitting: HOSPITALIST

## 2020-09-19 DIAGNOSIS — K52.9 COLITIS: Primary | ICD-10-CM

## 2020-09-19 DIAGNOSIS — R10.30 LOWER ABDOMINAL PAIN: ICD-10-CM

## 2020-09-19 DIAGNOSIS — R19.7 DIARRHEA, UNSPECIFIED TYPE: ICD-10-CM

## 2020-09-19 LAB
ALBUMIN SERPL-MCNC: 4.2 G/DL (ref 3.5–5.2)
ALBUMIN/GLOB SERPL: 1.6 G/DL
ALP SERPL-CCNC: 64 U/L (ref 39–117)
ALT SERPL W P-5'-P-CCNC: 20 U/L (ref 1–33)
ANION GAP SERPL CALCULATED.3IONS-SCNC: 9.8 MMOL/L (ref 5–15)
AST SERPL-CCNC: 22 U/L (ref 1–32)
BASOPHILS # BLD AUTO: 0.02 10*3/MM3 (ref 0–0.2)
BASOPHILS NFR BLD AUTO: 0.3 % (ref 0–1.5)
BILIRUB SERPL-MCNC: 1.4 MG/DL (ref 0–1.2)
BILIRUB UR QL STRIP: NEGATIVE
BUN SERPL-MCNC: 11 MG/DL (ref 8–23)
BUN/CREAT SERPL: 11.6 (ref 7–25)
CALCIUM SPEC-SCNC: 9.5 MG/DL (ref 8.6–10.5)
CHLORIDE SERPL-SCNC: 104 MMOL/L (ref 98–107)
CLARITY UR: CLEAR
CO2 SERPL-SCNC: 26.2 MMOL/L (ref 22–29)
COLOR UR: YELLOW
CREAT SERPL-MCNC: 0.95 MG/DL (ref 0.57–1)
CRP SERPL-MCNC: 0.85 MG/DL (ref 0–0.5)
DEPRECATED RDW RBC AUTO: 42.4 FL (ref 37–54)
EOSINOPHIL # BLD AUTO: 0.16 10*3/MM3 (ref 0–0.4)
EOSINOPHIL NFR BLD AUTO: 2.5 % (ref 0.3–6.2)
ERYTHROCYTE [DISTWIDTH] IN BLOOD BY AUTOMATED COUNT: 13.1 % (ref 12.3–15.4)
ERYTHROCYTE [SEDIMENTATION RATE] IN BLOOD: 24 MM/HR (ref 0–30)
GFR SERPL CREATININE-BSD FRML MDRD: 57 ML/MIN/1.73
GLOBULIN UR ELPH-MCNC: 2.6 GM/DL
GLUCOSE SERPL-MCNC: 155 MG/DL (ref 65–99)
GLUCOSE UR STRIP-MCNC: NEGATIVE MG/DL
HCT VFR BLD AUTO: 37.6 % (ref 34–46.6)
HGB BLD-MCNC: 12.7 G/DL (ref 12–15.9)
HGB UR QL STRIP.AUTO: NEGATIVE
HOLD SPECIMEN: NORMAL
HOLD SPECIMEN: NORMAL
IMM GRANULOCYTES # BLD AUTO: 0.02 10*3/MM3 (ref 0–0.05)
IMM GRANULOCYTES NFR BLD AUTO: 0.3 % (ref 0–0.5)
KETONES UR QL STRIP: NEGATIVE
LEUKOCYTE ESTERASE UR QL STRIP.AUTO: NEGATIVE
LYMPHOCYTES # BLD AUTO: 1.48 10*3/MM3 (ref 0.7–3.1)
LYMPHOCYTES NFR BLD AUTO: 23.1 % (ref 19.6–45.3)
MCH RBC QN AUTO: 29.5 PG (ref 26.6–33)
MCHC RBC AUTO-ENTMCNC: 33.8 G/DL (ref 31.5–35.7)
MCV RBC AUTO: 87.2 FL (ref 79–97)
MONOCYTES # BLD AUTO: 0.54 10*3/MM3 (ref 0.1–0.9)
MONOCYTES NFR BLD AUTO: 8.4 % (ref 5–12)
NEUTROPHILS NFR BLD AUTO: 4.18 10*3/MM3 (ref 1.7–7)
NEUTROPHILS NFR BLD AUTO: 65.4 % (ref 42.7–76)
NITRITE UR QL STRIP: NEGATIVE
NRBC BLD AUTO-RTO: 0 /100 WBC (ref 0–0.2)
PH UR STRIP.AUTO: 6.5 [PH] (ref 5–8)
PLATELET # BLD AUTO: 204 10*3/MM3 (ref 140–450)
PMV BLD AUTO: 9.8 FL (ref 6–12)
POTASSIUM SERPL-SCNC: 3.7 MMOL/L (ref 3.5–5.2)
PROT SERPL-MCNC: 6.8 G/DL (ref 6–8.5)
PROT UR QL STRIP: NEGATIVE
RBC # BLD AUTO: 4.31 10*6/MM3 (ref 3.77–5.28)
SODIUM SERPL-SCNC: 140 MMOL/L (ref 136–145)
SP GR UR STRIP: 1.01 (ref 1–1.03)
UROBILINOGEN UR QL STRIP: NORMAL
WBC # BLD AUTO: 6.4 10*3/MM3 (ref 3.4–10.8)
WHOLE BLOOD HOLD SPECIMEN: NORMAL
WHOLE BLOOD HOLD SPECIMEN: NORMAL

## 2020-09-19 PROCEDURE — 74177 CT ABD & PELVIS W/CONTRAST: CPT

## 2020-09-19 PROCEDURE — C9803 HOPD COVID-19 SPEC COLLECT: HCPCS

## 2020-09-19 PROCEDURE — 81003 URINALYSIS AUTO W/O SCOPE: CPT | Performed by: NURSE PRACTITIONER

## 2020-09-19 PROCEDURE — U0004 COV-19 TEST NON-CDC HGH THRU: HCPCS | Performed by: NURSE PRACTITIONER

## 2020-09-19 PROCEDURE — G0378 HOSPITAL OBSERVATION PER HR: HCPCS

## 2020-09-19 PROCEDURE — 25010000002 IOPAMIDOL 61 % SOLUTION: Performed by: EMERGENCY MEDICINE

## 2020-09-19 PROCEDURE — 86140 C-REACTIVE PROTEIN: CPT | Performed by: HOSPITALIST

## 2020-09-19 PROCEDURE — 80053 COMPREHEN METABOLIC PANEL: CPT | Performed by: NURSE PRACTITIONER

## 2020-09-19 PROCEDURE — 96361 HYDRATE IV INFUSION ADD-ON: CPT

## 2020-09-19 PROCEDURE — 96365 THER/PROPH/DIAG IV INF INIT: CPT

## 2020-09-19 PROCEDURE — 85652 RBC SED RATE AUTOMATED: CPT | Performed by: HOSPITALIST

## 2020-09-19 PROCEDURE — 85025 COMPLETE CBC W/AUTO DIFF WBC: CPT | Performed by: NURSE PRACTITIONER

## 2020-09-19 PROCEDURE — 99284 EMERGENCY DEPT VISIT MOD MDM: CPT

## 2020-09-19 RX ORDER — L.RHAMNOSUS/B.ANIMALIS(LACTIS) 3B CELL
1 CAPSULE ORAL DAILY
COMMUNITY
End: 2020-09-21 | Stop reason: HOSPADM

## 2020-09-19 RX ORDER — SODIUM CHLORIDE 9 MG/ML
50 INJECTION, SOLUTION INTRAVENOUS CONTINUOUS
Status: DISCONTINUED | OUTPATIENT
Start: 2020-09-19 | End: 2020-09-21 | Stop reason: HOSPADM

## 2020-09-19 RX ORDER — AMLODIPINE BESYLATE 5 MG/1
5 TABLET ORAL DAILY
COMMUNITY
End: 2020-12-10

## 2020-09-19 RX ORDER — LEVOTHYROXINE SODIUM 0.07 MG/1
75 TABLET ORAL DAILY
COMMUNITY
End: 2021-02-08

## 2020-09-19 RX ORDER — SODIUM CHLORIDE 0.9 % (FLUSH) 0.9 %
10 SYRINGE (ML) INJECTION AS NEEDED
Status: DISCONTINUED | OUTPATIENT
Start: 2020-09-19 | End: 2020-09-21 | Stop reason: HOSPADM

## 2020-09-19 RX ORDER — ONDANSETRON 4 MG/1
4 TABLET, FILM COATED ORAL EVERY 6 HOURS PRN
Status: DISCONTINUED | OUTPATIENT
Start: 2020-09-19 | End: 2020-09-21 | Stop reason: HOSPADM

## 2020-09-19 RX ORDER — LEVOTHYROXINE SODIUM 0.07 MG/1
75 TABLET ORAL
Status: DISCONTINUED | OUTPATIENT
Start: 2020-09-20 | End: 2020-09-21 | Stop reason: HOSPADM

## 2020-09-19 RX ORDER — ROSUVASTATIN CALCIUM 10 MG/1
10 TABLET, COATED ORAL DAILY
Status: DISCONTINUED | OUTPATIENT
Start: 2020-09-20 | End: 2020-09-21 | Stop reason: HOSPADM

## 2020-09-19 RX ORDER — ASPIRIN 81 MG/1
81 TABLET ORAL DAILY
Status: DISCONTINUED | OUTPATIENT
Start: 2020-09-20 | End: 2020-09-21 | Stop reason: HOSPADM

## 2020-09-19 RX ORDER — CETIRIZINE HYDROCHLORIDE 10 MG/1
5 TABLET ORAL NIGHTLY
Status: DISCONTINUED | OUTPATIENT
Start: 2020-09-19 | End: 2020-09-21 | Stop reason: HOSPADM

## 2020-09-19 RX ORDER — L.ACID,PARA/B.BIFIDUM/S.THERM 8B CELL
1 CAPSULE ORAL DAILY
Status: DISCONTINUED | OUTPATIENT
Start: 2020-09-19 | End: 2020-09-21 | Stop reason: HOSPADM

## 2020-09-19 RX ORDER — LISINOPRIL 20 MG/1
20 TABLET ORAL DAILY
Status: DISCONTINUED | OUTPATIENT
Start: 2020-09-20 | End: 2020-09-21 | Stop reason: HOSPADM

## 2020-09-19 RX ORDER — AMLODIPINE BESYLATE 5 MG/1
5 TABLET ORAL DAILY
Status: DISCONTINUED | OUTPATIENT
Start: 2020-09-20 | End: 2020-09-21 | Stop reason: HOSPADM

## 2020-09-19 RX ORDER — ACETAMINOPHEN 325 MG/1
650 TABLET ORAL EVERY 4 HOURS PRN
Status: DISCONTINUED | OUTPATIENT
Start: 2020-09-19 | End: 2020-09-21 | Stop reason: HOSPADM

## 2020-09-19 RX ORDER — CALCIUM CARBONATE/VITAMIN D3 600 MG-10
1200 TABLET ORAL DAILY
COMMUNITY
End: 2021-11-03

## 2020-09-19 RX ORDER — SODIUM CHLORIDE 0.9 % (FLUSH) 0.9 %
10 SYRINGE (ML) INJECTION EVERY 12 HOURS SCHEDULED
Status: DISCONTINUED | OUTPATIENT
Start: 2020-09-19 | End: 2020-09-21 | Stop reason: HOSPADM

## 2020-09-19 RX ADMIN — METRONIDAZOLE 500 MG: 500 INJECTION, SOLUTION INTRAVENOUS at 14:56

## 2020-09-19 RX ADMIN — IOPAMIDOL 85 ML: 612 INJECTION, SOLUTION INTRAVENOUS at 13:43

## 2020-09-19 RX ADMIN — SODIUM CHLORIDE 1000 ML: 9 INJECTION, SOLUTION INTRAVENOUS at 13:19

## 2020-09-19 RX ADMIN — SODIUM CHLORIDE 100 ML/HR: 9 INJECTION, SOLUTION INTRAVENOUS at 17:15

## 2020-09-19 RX ADMIN — Medication 1 CAPSULE: at 23:36

## 2020-09-19 NOTE — ED PROVIDER NOTES
MD ATTESTATION NOTE    The SAVANNAH and I have discussed this patient's history, physical exam, and treatment plan.  I have reviewed the documentation and personally had a face to face interaction with the patient. I affirm the documentation and agree with the treatment and plan.  The attached note describes my personal findings.    Patient presents with some persistent mucus containing diarrhea.  She is nontoxic-appearing, she has little tenderness, but her CT shows a pretty significant colitis.  She will be admitted for IV antibiotics and cultures.     Paulino Han MD  09/19/20 [id number]

## 2020-09-19 NOTE — ED TRIAGE NOTES
Pt states that for the last week she has had mucous in her stools with abdominal cramping just before she has a BM. Called her GI doctor and was told to come to the ER. Pt masked at arrival and triage staff wore all appropriate PPE.

## 2020-09-19 NOTE — ED PROVIDER NOTES
EMERGENCY DEPARTMENT ENCOUNTER    Room Number:  25/25  Date of encounter:  9/19/2020  PCP: Pradip Rosenberg MD  Historian: Patient  GI: Dr. Hein    PPE    Patient was placed in face mask in first look. Patient was wearing facemask when I entered the room and throughout our encounter. I wore full protective equipment throughout this patient encounter including a face mask, and gloves. Hand hygiene was performed before donning protective equipment and after removal when leaving the room.        HPI:  Chief Complaint: Diarrhea  A complete HPI/ROS/PMH/PSH/SH/FH are unobtainable due to: Nothing    Context: Mami Srivastava is a 75 y.o. female who arrives to the ED via private vehicle from home with a friend.  Patient presents with c/o intermittent, mucousy, mild diarrhea for the past week.   Patient also complains of lower abdominal cramping right before she is getting ready to have an episode of diarrhea.  Patient denies fever, chills, nausea, vomiting, chest pain, dysuria, black or bloody stool.  Patient states that nothing makes the symptoms better and nothing worsens symptoms.  Patient states that she has been on a bland diet for the past week.  She had a somewhat normal bowel movement today.  She has a past medical history of hypertension, ulcerative colitis, coronary artery disease and hypothyroidism.        PAST MEDICAL HISTORY  Active Ambulatory Problems     Diagnosis Date Noted   • S/P thyroidectomy 10/27/2017   • Mixed hyperlipidemia 10/27/2017   • Vitamin D deficiency 10/27/2017   • Impaired fasting glucose 10/27/2017   • Hypothyroidism 10/27/2017   • Osteopenia of multiple sites 10/27/2017   • Hypertensive left ventricular hypertrophy, without heart failure 10/27/2017   • Chronic seasonal allergic rhinitis 10/27/2017   • History of heart disease 10/27/2017   • Hx of abnormal mammogram 10/27/2017   • Nodule of left lung 10/27/2017   • Hypertension 04/27/2018   • Hyperglycemia 08/05/2018   • Current  chronic use of systemic steroids 2018   • Autoimmune hepatitis treated with steroids (CMS/HCC) 2018   • Diverticulitis 10/26/2018   • Cholelithiasis 10/26/2018   • Irritable bowel syndrome with diarrhea 2018   • Urinary frequency 2018   • Pedal edema 2019   • Hiatal hernia 2019   • Liver hemangioma 2019   • Cystitis 2020     Resolved Ambulatory Problems     Diagnosis Date Noted   • History of diverticulosis 10/27/2017   • Diverticulitis of large intestine without perforation or abscess without bleeding 2018   • KEVIN (acute kidney injury) (CMS/HCC) 2018   • Diarrhea 10/26/2018   • Acute bronchitis 2019     Past Medical History:   Diagnosis Date   • Autoimmune hepatitis (CMS/HCC)    • Coronary artery disease    • Diverticulitis of colon    • Hemangioma of liver    • Hernia    • Hyperlipidemia    • Hyperthyroidism    • Laceration of finger of right hand 05/15/2018   • Osteopenia after menopause    • Skin cancer    • Ulcerative colitis (CMS/HCC)          PAST SURGICAL HISTORY  Past Surgical History:   Procedure Laterality Date   • BUNIONECTOMY     • COLONOSCOPY  2014    Diverticulosis in the sigmoid colon, in the descending colon, in the transverse colon and in the ascending colon (Pathology: Collagenous colitis)   • COLONOSCOPY  2005    Hemorrhoids, pandiverticulosis, status post diverticulitis   • COLONOSCOPY N/A 2018    fair prep, tics   • LIVER BIOPSY     • LIVER BIOPSY     • THYROIDECTOMY           FAMILY HISTORY  Family History   Problem Relation Age of Onset   • Heart disease Mother    • Hypertension Mother    • Lung cancer Sister    • Thyroid disease Sister    • Lupus Sister    • Thyroid disease Brother    • Alcohol abuse Brother    • Glaucoma Maternal Grandmother    • Stomach cancer Maternal Grandmother             • Cirrhosis Father             • Liver disease Father          SOCIAL  HISTORY  Social History     Socioeconomic History   • Marital status:      Spouse name: Not on file   • Number of children: Not on file   • Years of education: Not on file   • Highest education level: Not on file   Tobacco Use   • Smoking status: Former Smoker     Packs/day: 2.00     Years: 12.00     Pack years: 24.00     Start date: 1963     Quit date: 1976     Years since quittin.6   • Smokeless tobacco: Never Used   • Tobacco comment: Chrinic bronchitis...smoke allergy   Substance and Sexual Activity   • Alcohol use: Yes     Comment: Seldom   • Drug use: No   • Sexual activity: Not Currently         ALLERGIES  Imuran [azathioprine], Bactrim [sulfamethoxazole-trimethoprim], Augmentin [amoxicillin-pot clavulanate], and Keflex [cephalexin]        REVIEW OF SYSTEMS  Review of Systems     All systems reviewed and negative except for those discussed in HPI.        PHYSICAL EXAM    ED Triage Vitals   Temp Heart Rate Resp BP SpO2   20 1214 20 1214 20 1214 20 1221 20 1214   98.6 °F (37 °C) 105 18 145/77 96 %       Physical Exam  GENERAL: Well appearing, non-toxic appearing, not distressed  HENT: normocephalic, atraumatic  EYES: no scleral icterus, PERRL  CV: regular rhythm, regular rate, no murmur  RESPIRATORY: normal effort, CTAB  ABDOMEN: soft, normal bowel sounds, no tenderness to palpation  MUSCULOSKELETAL: no deformity  NEURO: alert, moves all extremities, follows commands, mental status normal/baseline  SKIN: warm, dry, no rash   Psych: Appropriate mood and affect  Nursing notes and vital signs reviewed      LAB RESULTS  Recent Results (from the past 24 hour(s))   Light Blue Top    Collection Time: 20 12:26 PM   Result Value Ref Range    Extra Tube hold for add-on    Green Top (Gel)    Collection Time: 20 12:26 PM   Result Value Ref Range    Extra Tube Hold for add-ons.    Lavender Top    Collection Time: 20 12:26 PM   Result Value Ref Range     Extra Tube hold for add-on    Gold Top - SST    Collection Time: 09/19/20 12:26 PM   Result Value Ref Range    Extra Tube Hold for add-ons.    Comprehensive Metabolic Panel    Collection Time: 09/19/20 12:26 PM    Specimen: Blood   Result Value Ref Range    Glucose 155 (H) 65 - 99 mg/dL    BUN 11 8 - 23 mg/dL    Creatinine 0.95 0.57 - 1.00 mg/dL    Sodium 140 136 - 145 mmol/L    Potassium 3.7 3.5 - 5.2 mmol/L    Chloride 104 98 - 107 mmol/L    CO2 26.2 22.0 - 29.0 mmol/L    Calcium 9.5 8.6 - 10.5 mg/dL    Total Protein 6.8 6.0 - 8.5 g/dL    Albumin 4.20 3.50 - 5.20 g/dL    ALT (SGPT) 20 1 - 33 U/L    AST (SGOT) 22 1 - 32 U/L    Alkaline Phosphatase 64 39 - 117 U/L    Total Bilirubin 1.4 (H) 0.0 - 1.2 mg/dL    eGFR Non African Amer 57 (L) >60 mL/min/1.73    Globulin 2.6 gm/dL    A/G Ratio 1.6 g/dL    BUN/Creatinine Ratio 11.6 7.0 - 25.0    Anion Gap 9.8 5.0 - 15.0 mmol/L   CBC Auto Differential    Collection Time: 09/19/20 12:26 PM    Specimen: Blood   Result Value Ref Range    WBC 6.40 3.40 - 10.80 10*3/mm3    RBC 4.31 3.77 - 5.28 10*6/mm3    Hemoglobin 12.7 12.0 - 15.9 g/dL    Hematocrit 37.6 34.0 - 46.6 %    MCV 87.2 79.0 - 97.0 fL    MCH 29.5 26.6 - 33.0 pg    MCHC 33.8 31.5 - 35.7 g/dL    RDW 13.1 12.3 - 15.4 %    RDW-SD 42.4 37.0 - 54.0 fl    MPV 9.8 6.0 - 12.0 fL    Platelets 204 140 - 450 10*3/mm3    Neutrophil % 65.4 42.7 - 76.0 %    Lymphocyte % 23.1 19.6 - 45.3 %    Monocyte % 8.4 5.0 - 12.0 %    Eosinophil % 2.5 0.3 - 6.2 %    Basophil % 0.3 0.0 - 1.5 %    Immature Grans % 0.3 0.0 - 0.5 %    Neutrophils, Absolute 4.18 1.70 - 7.00 10*3/mm3    Lymphocytes, Absolute 1.48 0.70 - 3.10 10*3/mm3    Monocytes, Absolute 0.54 0.10 - 0.90 10*3/mm3    Eosinophils, Absolute 0.16 0.00 - 0.40 10*3/mm3    Basophils, Absolute 0.02 0.00 - 0.20 10*3/mm3    Immature Grans, Absolute 0.02 0.00 - 0.05 10*3/mm3    nRBC 0.0 0.0 - 0.2 /100 WBC   Urinalysis With Microscopic If Indicated (No Culture) - Urine, Clean Catch     Collection Time: 09/19/20  1:18 PM    Specimen: Urine, Clean Catch   Result Value Ref Range    Color, UA Yellow Yellow, Straw    Appearance, UA Clear Clear    pH, UA 6.5 5.0 - 8.0    Specific Gravity, UA 1.012 1.005 - 1.030    Glucose, UA Negative Negative    Ketones, UA Negative Negative    Bilirubin, UA Negative Negative    Blood, UA Negative Negative    Protein, UA Negative Negative    Leuk Esterase, UA Negative Negative    Nitrite, UA Negative Negative    Urobilinogen, UA 0.2 E.U./dL 0.2 - 1.0 E.U./dL       Ordered the above labs and independently reviewed the results.      RADIOLOGY  Ct Abdomen Pelvis With Contrast    Result Date: 9/19/2020  CT ABDOMEN PELVIS W CONTRAST-  HISTORY:  Lower abdominal pain, cramping, diarrhea for one week.  TECHNIQUE:  CT of the abdomen and pelvis was performed following the administration of intravenous contrast. Radiation dose reduction techniques were utilized, including automated exposure control and exposure modulation based on body size.  COMPARISON:  MR abdomen with and without contrast 10/29/2018. CT abdomen and pelvis with contrast 10/24/2018.  FINDINGS:  Heart size is normal. There is no pericardial effusion. Pulmonary nodules in the lung bases are not significantly changed dating back to at least 10/24/2018. Pleural spaces are clear. The liver is normal in size. There is cholelithiasis. A 1.1 cm cystic lesion in the pancreatic body is not significantly changed, previously 1.0 cm on 10/24/2018 when remeasured. This was better assessed on prior MRI. The spleen, adrenal glands, and kidneys are within normal limits. There is no abdominal or pelvic lymph node enlargement. There is extensive calcific aortoiliac and branch vessel atherosclerosis. There is no free fluid. There is a small hiatal hernia. There is no bowel obstruction. There is mild diffuse gastric wall thickening. There is mild cecal and ascending colonic wall thickening with associated mucosal hyperenhancement.  There is colonic diverticulosis with mild adjacent fat stranding at the sigmoid colon, improved from prior CT, which may reflect chronic inflammation. The appendix is present and normal in size. The bladder is well distended and within normal limits. The uterus is present. There is no adnexal mass. There is multilevel degenerative disc disease. There is diffuse osseous demineralization.       1.  Small hiatal hernia with mild diffuse gastric wall thickening, which may be in part due to poor distention but can also be seen with mild gastritis. Wall thickening and mucosal hyperenhancement of the cecum and ascending colon is suggestive of a nonspecific colitis. Mild wall thickening with adjacent fat stranding involving the sigmoid colon, where there are colonic diverticula, has improved from 2018 and may be due to chronic inflammation but can also be seen with mild acute diverticulitis/colitis. 2.  No significant change in a pancreatic body cystic lesion, better assessed on prior MRI. 3.  Cholelithiasis.  Discussed with Diana Duckworth at 2:15 PM.  This report was finalized on 9/19/2020 2:27 PM by Dr. Theresa Jerez M.D.        I ordered the above noted radiological studies and viewed the images on the PACS system.         MEDICAL RECORD REVIEW  Medical records reviewed in epic      PROCEDURES    Procedures        DIFFERENTIAL DIAGNOSIS  Differential diagnosis for abdominal pain include but are not limited to the following:    -Hepatobiliary pathology such as cholecystitis, cholangitis, and symptomatic cholelithiasis  -Small or large bowel obstruction  -Appendicitis  -Diverticulitis  -UTI including pyelonephritis  -Colitis, including infectious and ischemic      PROGRESS, DATA ANALYSIS, CONSULTS, AND MEDICAL DECISION MAKING        ED Course as of Sep 19 1459   Sat Sep 19, 2020   1240 Discussed pertinent information from history and physical exam with patient.  Discussed differential diagnosis and plan for ED  evaluation/work-up and treatment including labs, urine and CT abdomen and pelvis.  All questions answered.  Patient is agreeable with this plan.        [MS]   1347 Reviewed pt's history and workup with Dr. Han.  After a bedside evaluation, they agree with the plan of care.          [MS]   1458 Consult Note    Discussed care with Dr Mauricio  Reviewed patient's history, exam, results and need for admission secondary to colitis  Dr. Mauricio accepts the patient to be admitted to [facility] bed.        [MS]      ED Course User Index  [MS] Diana Duckworth, APRN     ADMISSION    Discussed treatment plan and reason for admission with pt/family and admitting physician.  Pt/family voiced understanding of the plan for admission for further testing/treatment as needed.      DIAGNOSIS  Final diagnoses:   Colitis   Lower abdominal pain   Diarrhea, unspecified type           MEDICATIONS GIVEN IN ED    Medications   sodium chloride 0.9 % flush 10 mL (has no administration in time range)   sodium chloride 0.9 % flush 10 mL (has no administration in time range)   metroNIDAZOLE (FLAGYL) 500 mg/100mL IVPB (500 mg Intravenous New Bag 9/19/20 1456)   sodium chloride 0.9 % bolus 1,000 mL (1,000 mL Intravenous New Bag 9/19/20 1319)   iopamidol (ISOVUE-300) 61 % injection 100 mL (85 mL Intravenous Given by Other 9/19/20 1343)   iopamidol (ISOVUE-300) 61 % injection  - ADS Override Pull (has no administration in time range)           COURSE & MEDICAL DECISION MAKING  Any/All labs and Any/All Imaging studies that were ordered were reviewed and are noted above.  Results were reviewed/discussed with the patient and they were also made aware of online assess.   Pt also made aware that some labs, such as cultures, will not be resulted during ER visit and follow up with PMD is necessary.        Diana Duckworth, APRN  09/19/20 [id number]

## 2020-09-19 NOTE — H&P
Patient Name:  Mami Srivastava  YOB: 1945  MRN:  [id number]  Admit Date:  9/19/2020  Patient Care Team:  Pradip Rosenberg MD as PCP - General (Internal Medicine)  Tressa Howard MD as Consulting Physician (Internal Medicine)  Pradip Wilson MD (Dermatology)  Luis Rick MD as Consulting Physician (Cardiology)  Shiva Hein MD as Consulting Physician (Gastroenterology)      Subjective   History Present Illness     Chief Complaint   Patient presents with   • Abdominal Pain       Ms. Srivastava is a 75 y.o. female with a history of autoimmune hepatitis, not currently on any treatment, hyperlipidemia, hypertension, and prediabetes that presents to [facility] complaining of mucousy stools.  The listed chief complaint populating from the ER note is abdominal pain but she tells me that she has had no pain, just some mild cramping when the actual bowel movement is occurring.  She has had persistent mucousy stools going on now for a couple of weeks and getting worse.  Sometimes the actual stool component will be normal but she just passes a lot of mucus into the toilet with it.  At times the stools are loose.  About a week ago she was actually at a yard sale and as she was walking around had a sudden passage of mucus without warning which is when this started.  She does not have any recent ill contacts.  She has had a couple of courses of antibiotics over the course of the summer with most recent one finishing in early August for a wound/blister on her foot.  She was admitted here approximately 2 years ago with severe diarrhea and at that time had a colonoscopy which really did not show anything.  They eventually decided after discharge that the diarrhea was secondary to Imuran and once she stopped it her symptoms improved almost immediately.  There is a diagnosis of ulcerative colitis on her problem list but she does not know where that came from as she has  never been diagnosed with that to her knowledge.  Her biopsies from the colonoscopy 2 years ago certainly were not consistent with UC.         Review of Systems   Constitutional: Negative for chills, fatigue and fever.   HENT: Negative for congestion and trouble swallowing.    Eyes: Negative for visual disturbance.   Respiratory: Negative for cough, chest tightness and shortness of breath.    Cardiovascular: Negative for chest pain, palpitations and leg swelling.   Gastrointestinal: Positive for abdominal distention (Mild). Negative for abdominal pain, constipation, diarrhea, nausea and vomiting.        As above   Genitourinary: Negative for difficulty urinating, dysuria and frequency.   Musculoskeletal: Negative for arthralgias.   Skin: Negative for rash.   Neurological: Negative for dizziness and headaches.   Psychiatric/Behavioral: Negative for confusion.        Personal History     Past Medical History:   Diagnosis Date   • Autoimmune hepatitis (CMS/HCC)    • Cholelithiasis 04-    Ultrasound   • Coronary artery disease     Dr Luis Rick   • Diverticulitis of colon 2005,2018   • Hemangioma of liver    • Hernia 2017    Hiatal hernia-Prabhjot test   • Hyperlipidemia     Dr Luis Rick-atorvastatin   • Hypertension    • Hyperthyroidism    • Hypothyroidism    • Laceration of finger of right hand 05/15/2018    lac with knife and had 3 stitches   • Osteopenia after menopause    • Skin cancer     face   • Ulcerative colitis (CMS/HCC) 2014    Colitis/DrKaplan     Past Surgical History:   Procedure Laterality Date   • BUNIONECTOMY     • COLONOSCOPY  04/11/2014    Diverticulosis in the sigmoid colon, in the descending colon, in the transverse colon and in the ascending colon (Pathology: Collagenous colitis)   • COLONOSCOPY  12/09/2005    Hemorrhoids, pandiverticulosis, status post diverticulitis   • COLONOSCOPY N/A 11/2/2018    fair prep, tics   • LIVER BIOPSY     • LIVER BIOPSY     • THYROIDECTOMY  2008     Family  History   Problem Relation Age of Onset   • Heart disease Mother    • Hypertension Mother    • Lung cancer Sister    • Thyroid disease Sister    • Lupus Sister    • Thyroid disease Brother    • Alcohol abuse Brother    • Glaucoma Maternal Grandmother    • Stomach cancer Maternal Grandmother          ’s   • Cirrhosis Father             • Liver disease Father      Social History     Tobacco Use   • Smoking status: Former Smoker     Packs/day: 2.00     Years: 12.00     Pack years: 24.00     Start date: 1963     Quit date: 1976     Years since quittin.6   • Smokeless tobacco: Never Used   • Tobacco comment: Chrinic bronchitis...smoke allergy   Substance Use Topics   • Alcohol use: Yes     Comment: Seldom   • Drug use: No     No current facility-administered medications on file prior to encounter.      Current Outpatient Medications on File Prior to Encounter   Medication Sig Dispense Refill   • amLODIPine (NORVASC) 5 MG tablet Take 5 mg by mouth Daily.     • aspirin 81 MG EC tablet Take 81 mg by mouth Daily.     • cetirizine (zyrTEC) 10 MG tablet Take 10 mg by mouth Every Night.     • Cholecalciferol (VITAMIN D3) 5000 units capsule capsule Take 5,000 Units by mouth 2 (Two) Times a Week.     • levothyroxine (SYNTHROID, LEVOTHROID) 75 MCG tablet Take 75 mcg by mouth Daily.     • lisinopril (PRINIVIL,ZESTRIL) 20 MG tablet Take 20 mg by mouth Daily.     • Multiple Vitamin (MULTI VITAMIN DAILY PO) Take 1 tablet by mouth Daily.     • Omega 3 1200 MG capsule Take 1,200 mg by mouth Daily.     • Probiotic Product (Shopping Buddy) capsule Take 1 capsule by mouth Daily.     • rosuvastatin (CRESTOR) 10 MG tablet Take 10 mg by mouth Daily.     • [DISCONTINUED] amLODIPine (NORVASC) 5 MG tablet TAKE 1 TABLET BY MOUTH EVERY DAY 90 tablet 1   • [DISCONTINUED] benzonatate (TESSALON PERLES) 100 MG capsule Take 1 capsule by mouth 2 (Two) Times a Day As Needed for Cough. 20 capsule 0   • [DISCONTINUED]  fluticasone (FLONASE) 50 MCG/ACT nasal spray 1 spray into the nostril(s) as directed by provider Daily As Needed.     • [DISCONTINUED] nitrofurantoin, macrocrystal-monohydrate, (Macrobid) 100 MG capsule Take 1 capsule by mouth Every 12 (Twelve) Hours. 14 capsule 0   • [DISCONTINUED] nystatin (MYCOSTATIN) 924552 UNIT/GM cream Apply  topically to the appropriate area as directed As Needed (yeast infection). 30 g 0   • [DISCONTINUED] nystatin (MYCOSTATIN) 862268 UNIT/GM powder Apply  topically to the appropriate area as directed 2 (Two) Times a Day. 15 g 1   • [DISCONTINUED] Omega-3 Fatty Acids (FISH OIL) 1000 MG capsule capsule Take 3,000 mg by mouth Daily With Breakfast.     • [DISCONTINUED] Probiotic Product (Crowd Cast) capsule Take 1 capsule by mouth Daily. (Patient taking differently: Take  by mouth Daily.) 30 capsule 5   • [DISCONTINUED] SYNTHROID 75 MCG tablet TAKE 1 TABLET DAILY FOR    THYROID 90 tablet 1   • [DISCONTINUED] triamcinolone (KENALOG) 0.025 % ointment Apply to affected area twice a day  For 10 days 1 tube 0     Allergies   Allergen Reactions   • Imuran [Azathioprine] GI Intolerance   • Bactrim [Sulfamethoxazole-Trimethoprim] Other (See Comments)     fatigue   • Augmentin [Amoxicillin-Pot Clavulanate] Hives   • Keflex [Cephalexin] Hives       Objective    Objective     Vital Signs  Temp:  [96.9 °F (36.1 °C)-98.6 °F (37 °C)] 96.9 °F (36.1 °C)  Heart Rate:  [] 86  Resp:  [18] 18  BP: (135-165)/(60-77) 143/71  SpO2:  [96 %-100 %] 98 %  on   ;   Device (Oxygen Therapy): room air  Body mass index is 25.16 kg/m².    Physical Exam  Vitals signs and nursing note reviewed.   Constitutional:       General: She is not in acute distress.     Appearance: Normal appearance.   HENT:      Head: Normocephalic and atraumatic.      Mouth/Throat:      Mouth: Mucous membranes are moist.      Pharynx: No posterior oropharyngeal erythema.   Eyes:      General: No scleral icterus.  Neck:       Musculoskeletal: Neck supple.      Vascular: No JVD.   Cardiovascular:      Rate and Rhythm: Normal rate and regular rhythm.      Pulses: Normal pulses.      Heart sounds: Normal heart sounds. No murmur.   Pulmonary:      Effort: Pulmonary effort is normal. No respiratory distress.      Breath sounds: Normal breath sounds.   Abdominal:      General: Bowel sounds are normal. There is no distension.      Palpations: Abdomen is soft.      Tenderness: There is no abdominal tenderness.   Musculoskeletal:         General: No swelling or tenderness.   Skin:     General: Skin is warm and dry.      Coloration: Skin is not jaundiced.      Findings: No rash.   Neurological:      General: No focal deficit present.      Mental Status: She is alert and oriented to person, place, and time. Mental status is at baseline.   Psychiatric:         Mood and Affect: Mood normal.         Behavior: Behavior normal.         Results Review:  I reviewed the patient's new clinical results.  I reviewed the patient's new imaging results and agree with the interpretation.  I reviewed the patient's other test results and agree with the interpretation  I personally viewed and interpreted the patient's EKG/Telemetry data  Discussed with ED provider.    Lab Results (last 24 hours)     Procedure Component Value Units Date/Time    CBC & Differential [[id number]]  (Normal) Collected: 09/19/20 1226    Specimen: Blood Updated: 09/19/20 1256    Narrative:      The following orders were created for panel order CBC & Differential.  Procedure                               Abnormality         Status                     ---------                               -----------         ------                     CBC Auto Differential[[id number]]        Normal              Final result                 Please view results for these tests on the individual orders.    Comprehensive Metabolic Panel [[id number]]  (Abnormal) Collected: 09/19/20 1226    Specimen: Blood Updated:  09/19/20 1308     Glucose 155 mg/dL      BUN 11 mg/dL      Creatinine 0.95 mg/dL      Sodium 140 mmol/L      Potassium 3.7 mmol/L      Chloride 104 mmol/L      CO2 26.2 mmol/L      Calcium 9.5 mg/dL      Total Protein 6.8 g/dL      Albumin 4.20 g/dL      ALT (SGPT) 20 U/L      AST (SGOT) 22 U/L      Alkaline Phosphatase 64 U/L      Total Bilirubin 1.4 mg/dL      eGFR Non African Amer 57 mL/min/1.73      Globulin 2.6 gm/dL      A/G Ratio 1.6 g/dL      BUN/Creatinine Ratio 11.6     Anion Gap 9.8 mmol/L     Narrative:      GFR Normal >60  Chronic Kidney Disease <60  Kidney Failure <15      CBC Auto Differential [[id number]]  (Normal) Collected: 09/19/20 1226    Specimen: Blood Updated: 09/19/20 1256     WBC 6.40 10*3/mm3      RBC 4.31 10*6/mm3      Hemoglobin 12.7 g/dL      Hematocrit 37.6 %      MCV 87.2 fL      MCH 29.5 pg      MCHC 33.8 g/dL      RDW 13.1 %      RDW-SD 42.4 fl      MPV 9.8 fL      Platelets 204 10*3/mm3      Neutrophil % 65.4 %      Lymphocyte % 23.1 %      Monocyte % 8.4 %      Eosinophil % 2.5 %      Basophil % 0.3 %      Immature Grans % 0.3 %      Neutrophils, Absolute 4.18 10*3/mm3      Lymphocytes, Absolute 1.48 10*3/mm3      Monocytes, Absolute 0.54 10*3/mm3      Eosinophils, Absolute 0.16 10*3/mm3      Basophils, Absolute 0.02 10*3/mm3      Immature Grans, Absolute 0.02 10*3/mm3      nRBC 0.0 /100 WBC     Urinalysis With Microscopic If Indicated (No Culture) - Urine, Clean Catch [[id number]]  (Normal) Collected: 09/19/20 1318    Specimen: Urine, Clean Catch Updated: 09/19/20 1337     Color, UA Yellow     Appearance, UA Clear     pH, UA 6.5     Specific Gravity, UA 1.012     Glucose, UA Negative     Ketones, UA Negative     Bilirubin, UA Negative     Blood, UA Negative     Protein, UA Negative     Leuk Esterase, UA Negative     Nitrite, UA Negative     Urobilinogen, UA 0.2 E.U./dL    Narrative:      Urine microscopic not indicated.    COVID PRE-OP / PRE-PROCEDURE SCREENING ORDER (NO ISOLATION)  - Swab, Nasopharynx [[id number]] Collected: 09/19/20 1537    Specimen: Swab from Nasopharynx Updated: 09/19/20 1542    Narrative:      The following orders were created for panel order COVID PRE-OP / PRE-PROCEDURE SCREENING ORDER (NO ISOLATION) - Swab, Nasopharynx.  Procedure                               Abnormality         Status                     ---------                               -----------         ------                     COVID-19,BIOTAP, NP/OP S...[[id number]]                      In process                   Please view results for these tests on the individual orders.    COVID-19,BIOTAP, NP/OP SWAB IN TRANSPORT MEDIA OR SALINE 24-36 HR TAT - Swab, Nasopharynx [[id number]] Collected: 09/19/20 1537    Specimen: Swab from Nasopharynx Updated: 09/19/20 1545          Imaging Results (Last 24 Hours)     Procedure Component Value Units Date/Time    CT Abdomen Pelvis With Contrast [[id number]] Collected: 09/19/20 1425     Updated: 09/19/20 1430    Narrative:      CT ABDOMEN PELVIS W CONTRAST-     HISTORY:  Lower abdominal pain, cramping, diarrhea for one week.      TECHNIQUE:  CT of the abdomen and pelvis was performed following the  administration of intravenous contrast. Radiation dose reduction  techniques were utilized, including automated exposure control and  exposure modulation based on body size.     COMPARISON:  MR abdomen with and without contrast 10/29/2018. CT abdomen  and pelvis with contrast 10/24/2018.     FINDINGS:     Heart size is normal. There is no pericardial effusion. Pulmonary  nodules in the lung bases are not significantly changed dating back to  at least 10/24/2018. Pleural spaces are clear. The liver is normal in  size. There is cholelithiasis. A 1.1 cm cystic lesion in the pancreatic  body is not significantly changed, previously 1.0 cm on 10/24/2018 when  remeasured. This was better assessed on prior MRI. The spleen, adrenal  glands, and kidneys are within normal limits. There is  no abdominal or  pelvic lymph node enlargement. There is extensive calcific aortoiliac  and branch vessel atherosclerosis. There is no free fluid. There is a  small hiatal hernia. There is no bowel obstruction. There is mild  diffuse gastric wall thickening. There is mild cecal and ascending  colonic wall thickening with associated mucosal hyperenhancement. There  is colonic diverticulosis with mild adjacent fat stranding at the  sigmoid colon, improved from prior CT, which may reflect chronic  inflammation. The appendix is present and normal in size. The bladder is  well distended and within normal limits. The uterus is present. There is  no adnexal mass. There is multilevel degenerative disc disease. There is  diffuse osseous demineralization.       Impression:         1.  Small hiatal hernia with mild diffuse gastric wall thickening, which  may be in part due to poor distention but can also be seen with mild  gastritis. Wall thickening and mucosal hyperenhancement of the cecum and  ascending colon is suggestive of a nonspecific colitis. Mild wall  thickening with adjacent fat stranding involving the sigmoid colon,  where there are colonic diverticula, has improved from 2018 and may be  due to chronic inflammation but can also be seen with mild acute  diverticulitis/colitis.  2.  No significant change in a pancreatic body cystic lesion, better  assessed on prior MRI.  3.  Cholelithiasis.     Discussed with Diana Duckworth at 2:15 PM.     This report was finalized on 9/19/2020 2:27 PM by Dr. Theresa Jerez M.D.                Assessment/Plan     Active Hospital Problems    Diagnosis  POA   • **Colitis [K52.9]  Yes   • Autoimmune hepatitis (CMS/HCC) [K75.4]  Unknown   • Hypertension [I10]  Yes   • Hypothyroidism [E03.9]  Yes      Resolved Hospital Problems   No resolved problems to display.       · Mucousy bowel movements, with findings of colitis on CT scan  · Stool studies ordered for C. difficile and PCR  panel  · We will ask GI to see her given the colitis.  Her exam is totally benign.  She could have ischemic colitis but has no prior history of any vascular disease.  Likely could be worked up further as an outpatient if symptoms are fairly controlled overnight  · History of autoimmune hepatitis, in remission currently.  Followed by Dr. Hein.  LFTs within normal limits currently other than the total bilirubin of 1.4  · Hypertension-continue home meds    VTE Prophylaxis - SCDs.  Code Status - Full code.       Talha Mauricio MD  [facility]  09/19/20  19:23 EDT

## 2020-09-19 NOTE — ED NOTES
Pt reports having mucus with her BMs since last Saturday. Occasional abd pain reported, denies pain currently. Pt states she called her GI doctor on Thursday but has not heard back from them. Pt reports that since she hasn't heard anything from the GI doctor she wanted to be checked out. Hx of diverticulitis about 15 years ago.     Pt wearing mask and RN wearing mask and eyewear throughout encounter.        Doreen Lino, RN  09/19/20 1251

## 2020-09-19 NOTE — PROGRESS NOTES
Clinical Pharmacy Services: Medication History    Mami Srivastava is a 75 y.o. female presenting to [facility] for Colitis [K52.9]    She  has a past medical history of Autoimmune hepatitis (CMS/HCC), Cholelithiasis (04-), Coronary artery disease, Diverticulitis of colon (2005,2018), Hemangioma of liver, Hernia (2017), Hyperlipidemia, Hypertension, Hyperthyroidism, Hypothyroidism, Laceration of finger of right hand (05/15/2018), Osteopenia after menopause, Skin cancer, and Ulcerative colitis (CMS/HCC) (2014).    Allergies as of 09/19/2020 - Reviewed 09/19/2020   Allergen Reaction Noted   • Imuran [azathioprine] GI Intolerance 01/15/2019   • Bactrim [sulfamethoxazole-trimethoprim] Other (See Comments) 11/06/2019   • Augmentin [amoxicillin-pot clavulanate] Hives 10/27/2017   • Keflex [cephalexin] Hives 10/27/2017       Medication information was obtained from: Patient, Patient supplied medication list  Pharmacy and Phone Number: [facility] AT [facility] [phone] [facility] [phone] FX        Prior to Admission Medications     Prescriptions Last Dose Informant Patient Reported? Taking?    amLODIPine (NORVASC) 5 MG tablet 9/18/2020 Self Yes Yes    Take 5 mg by mouth Daily.    aspirin 81 MG EC tablet 9/18/2020 Self Yes Yes    Take 81 mg by mouth Daily.    cetirizine (zyrTEC) 10 MG tablet 9/18/2020 Self Yes Yes    Take 10 mg by mouth Every Night.    Cholecalciferol (VITAMIN D3) 5000 units capsule capsule 9/18/2020 Self Yes Yes    Take 5,000 Units by mouth 2 (Two) Times a Week.    levothyroxine (SYNTHROID, LEVOTHROID) 75 MCG tablet 9/19/2020 Self Yes Yes    Take 75 mcg by mouth Daily.    lisinopril (PRINIVIL,ZESTRIL) 20 MG tablet 9/18/2020 Self Yes Yes    Take 20 mg by mouth Daily.    Multiple Vitamin (MULTI VITAMIN DAILY PO) 9/18/2020 Self Yes Yes    Take 1 tablet by mouth Daily.    Omega 3 1200 MG capsule 9/18/2020 Self Yes  Yes    Take 1,200 mg by mouth Daily.    Probiotic Product (Qloud) capsule 9/18/2020 Self Yes Yes    Take 1 capsule by mouth Daily.    rosuvastatin (CRESTOR) 10 MG tablet 9/18/2020 Self Yes Yes    Take 10 mg by mouth Daily.            Medication notes:     This medication list is complete to the best of my knowledge as of 9/19/2020    Please call if questions.    Hebert Vela [facility]  9/19/2020 16:23 EDT

## 2020-09-20 PROBLEM — A04.72 C. DIFFICILE COLITIS: Status: ACTIVE | Noted: 2020-09-19

## 2020-09-20 LAB
ADV 40+41 DNA STL QL NAA+NON-PROBE: NOT DETECTED
ASTRO TYP 1-8 RNA STL QL NAA+NON-PROBE: NOT DETECTED
C CAYETANENSIS DNA STL QL NAA+NON-PROBE: NOT DETECTED
C DIFF TOX GENS STL QL NAA+PROBE: POSITIVE
CAMPY SP DNA.DIARRHEA STL QL NAA+PROBE: NOT DETECTED
CRYPTOSP STL CULT: NOT DETECTED
E COLI DNA SPEC QL NAA+PROBE: NOT DETECTED
E HISTOLYT AG STL-ACNC: NOT DETECTED
EAEC PAA PLAS AGGR+AATA ST NAA+NON-PRB: NOT DETECTED
EC STX1 + STX2 GENES STL NAA+PROBE: NOT DETECTED
EPEC EAE GENE STL QL NAA+NON-PROBE: NOT DETECTED
ETEC LTA+ST1A+ST1B TOX ST NAA+NON-PROBE: NOT DETECTED
G LAMBLIA DNA SPEC QL NAA+PROBE: NOT DETECTED
NOROVIRUS GI+II RNA STL QL NAA+NON-PROBE: NOT DETECTED
P SHIGELLOIDES DNA STL QL NAA+PROBE: NOT DETECTED
RV RNA STL NAA+PROBE: NOT DETECTED
SALMONELLA DNA SPEC QL NAA+PROBE: NOT DETECTED
SAPO I+II+IV+V RNA STL QL NAA+NON-PROBE: NOT DETECTED
SHIGELLA SP+EIEC IPAH STL QL NAA+PROBE: NOT DETECTED
V CHOLERAE DNA SPEC QL NAA+PROBE: NOT DETECTED
VIBRIO DNA SPEC NAA+PROBE: NOT DETECTED
YERSINIA STL CULT: NOT DETECTED

## 2020-09-20 PROCEDURE — 87493 C DIFF AMPLIFIED PROBE: CPT | Performed by: NURSE PRACTITIONER

## 2020-09-20 PROCEDURE — G0378 HOSPITAL OBSERVATION PER HR: HCPCS

## 2020-09-20 PROCEDURE — 96361 HYDRATE IV INFUSION ADD-ON: CPT

## 2020-09-20 PROCEDURE — 0097U HC BIOFIRE FILMARRAY GI PANEL: CPT | Performed by: NURSE PRACTITIONER

## 2020-09-20 PROCEDURE — 99214 OFFICE O/P EST MOD 30 MIN: CPT | Performed by: INTERNAL MEDICINE

## 2020-09-20 RX ADMIN — LISINOPRIL 20 MG: 20 TABLET ORAL at 08:17

## 2020-09-20 RX ADMIN — LEVOTHYROXINE SODIUM 75 MCG: 75 TABLET ORAL at 05:45

## 2020-09-20 RX ADMIN — VANCOMYCIN 125 MG: KIT at 23:14

## 2020-09-20 RX ADMIN — ROSUVASTATIN CALCIUM 10 MG: 10 TABLET, FILM COATED ORAL at 08:17

## 2020-09-20 RX ADMIN — VANCOMYCIN 125 MG: KIT at 17:57

## 2020-09-20 RX ADMIN — VANCOMYCIN 125 MG: KIT at 12:19

## 2020-09-20 RX ADMIN — AMLODIPINE BESYLATE 5 MG: 5 TABLET ORAL at 08:17

## 2020-09-20 RX ADMIN — ASPIRIN 81 MG: 81 TABLET, COATED ORAL at 08:17

## 2020-09-20 RX ADMIN — SODIUM CHLORIDE 100 ML/HR: 9 INJECTION, SOLUTION INTRAVENOUS at 03:26

## 2020-09-20 NOTE — CONSULTS
[facility]  Initial Inpatient Consult Note    Referring Provider: Ko    Reason for Consultation: Abnormal imaging    Subjective     History of present illness:    75 y.o. female with a history of autoimmune hepatitis, has had diarrhea and mucus stools for 2 weeks.  Lower abdominal cramping bilateral does not radiate worse with movement better at rest.  No relation to bowel movements.  Mucus in the stool.  Occasional blood in the stool.  Nausea but no vomiting.  No fevers.  She took antibiotics in early July and again in early August for urinary tract infection.    She has never had C. difficile in the past  Colonoscopy 2 years ago with diverticulosis and hemorrhoids biopsies negative for microscopic colitis  Colonoscopy 2014 biopsies positive for collagenous colitis    Past Medical History:  Past Medical History:   Diagnosis Date   • Autoimmune hepatitis (CMS/HCC)    • Cholelithiasis 04-    Ultrasound   • Coronary artery disease     Dr Luis Rick   • Diverticulitis of colon 2005,2018   • Hemangioma of liver    • Hernia 2017    Hiatal hernia-Sadlo test   • Hyperlipidemia     Dr Luis Rick-atorvastatin   • Hypertension    • Hyperthyroidism    • Hypothyroidism    • Laceration of finger of right hand 05/15/2018    lac with knife and had 3 stitches   • Osteopenia after menopause    • Skin cancer     face   • Ulcerative colitis (CMS/HCC) 2014    Colitis/DrKaplan     Past Surgical History:  Past Surgical History:   Procedure Laterality Date   • BUNIONECTOMY     • COLONOSCOPY  04/11/2014    Diverticulosis in the sigmoid colon, in the descending colon, in the transverse colon and in the ascending colon (Pathology: Collagenous colitis)   • COLONOSCOPY  12/09/2005    Hemorrhoids, pandiverticulosis, status post diverticulitis   • COLONOSCOPY N/A 11/2/2018    fair prep, tics   • LIVER BIOPSY     • LIVER BIOPSY     • THYROIDECTOMY  2008      Social History:   Social History     Tobacco  Use   • Smoking status: Former Smoker     Packs/day: 2.00     Years: 12.00     Pack years: 24.00     Start date: 1963     Quit date: 1976     Years since quittin.6   • Smokeless tobacco: Never Used   • Tobacco comment: Chrinic bronchitis...smoke allergy   Substance Use Topics   • Alcohol use: Yes     Comment: Seldom      Family History:  Family History   Problem Relation Age of Onset   • Heart disease Mother    • Hypertension Mother    • Lung cancer Sister    • Thyroid disease Sister    • Lupus Sister    • Thyroid disease Brother    • Alcohol abuse Brother    • Glaucoma Maternal Grandmother    • Stomach cancer Maternal Grandmother             • Cirrhosis Father             • Liver disease Father        Home Meds:  Medications Prior to Admission   Medication Sig Dispense Refill Last Dose   • amLODIPine (NORVASC) 5 MG tablet Take 5 mg by mouth Daily.   2020 at Unknown time   • aspirin 81 MG EC tablet Take 81 mg by mouth Daily.   2020 at Unknown time   • cetirizine (zyrTEC) 10 MG tablet Take 10 mg by mouth Every Night.   2020 at Unknown time   • Cholecalciferol (VITAMIN D3) 5000 units capsule capsule Take 5,000 Units by mouth 2 (Two) Times a Week.   2020 at Unknown time   • levothyroxine (SYNTHROID, LEVOTHROID) 75 MCG tablet Take 75 mcg by mouth Daily.   2020 at Unknown time   • lisinopril (PRINIVIL,ZESTRIL) 20 MG tablet Take 20 mg by mouth Daily.   2020 at Unknown time   • Multiple Vitamin (MULTI VITAMIN DAILY PO) Take 1 tablet by mouth Daily.   2020 at Unknown time   • Omega 3 1200 MG capsule Take 1,200 mg by mouth Daily.   2020 at Unknown time   • Probiotic Product (Cloud Elements) capsule Take 1 capsule by mouth Daily.   2020 at Unknown time   • rosuvastatin (CRESTOR) 10 MG tablet Take 10 mg by mouth Daily.   2020 at Unknown time     Current Meds:   amLODIPine, 5 mg, Oral, Daily  aspirin, 81 mg, Oral, Daily  cetirizine, 5 mg,  Oral, Nightly  lactobacillus acidophilus, 1 capsule, Oral, Daily  levothyroxine, 75 mcg, Oral, Q AM  lisinopril, 20 mg, Oral, Daily  rosuvastatin, 10 mg, Oral, Daily  sodium chloride, 10 mL, Intravenous, Q12H  vancomycin, 125 mg, Oral, Q6H      Allergies:  Allergies   Allergen Reactions   • Imuran [Azathioprine] GI Intolerance   • Bactrim [Sulfamethoxazole-Trimethoprim] Other (See Comments)     fatigue   • Augmentin [Amoxicillin-Pot Clavulanate] Hives   • Keflex [Cephalexin] Hives     Review of Systems  She has weakness fatigue all other systems reviewed and negative     Objective     Vital Signs  Temp:  [96.9 °F (36.1 °C)-98.7 °F (37.1 °C)] 97.3 °F (36.3 °C)  Heart Rate:  [] 80  Resp:  [16-18] 18  BP: (122-165)/(58-77) 130/58  Physical Exam:  General Appearance:    Alert, cooperative, in no acute distress   Head:    Normocephalic, without obvious abnormality, atraumatic   Eyes:          conjunctivae and sclerae normal, no   icterus   Throat:   no thrush, oral mucosa moist   Neck:   Supple, no adenopathy   Lungs:     Clear to auscultation bilaterally    Heart:    Regular rhythm and normal rate    Chest Wall:    No abnormalities observed   Abdomen:     Soft, nondistended, nontender; normal bowel sounds   Extremities:   no edema, no redness   Skin:   No bruising or rash   Psychiatric:  normal mood and insight     Results Review:   I reviewed the patient's new clinical results.    Results from last 7 days   Lab Units 09/19/20  1226   WBC 10*3/mm3 6.40   HEMOGLOBIN g/dL 12.7   HEMATOCRIT % 37.6   PLATELETS 10*3/mm3 204     Results from last 7 days   Lab Units 09/19/20  1226   SODIUM mmol/L 140   POTASSIUM mmol/L 3.7   CHLORIDE mmol/L 104   CO2 mmol/L 26.2   BUN mg/dL 11   CREATININE mg/dL 0.95   CALCIUM mg/dL 9.5   BILIRUBIN mg/dL 1.4*   ALK PHOS U/L 64   ALT (SGPT) U/L 20   AST (SGOT) U/L 22   GLUCOSE mg/dL 155*         Lab Results   Lab Value Date/Time    LIPASE 45 10/25/2018 2350    LIPASE 59 10/24/2018 0727     LIPASE 42 08/05/2018 1551    LIPASE 55 05/24/2018 0245       Radiology:  CT Abdomen Pelvis With Contrast   Final Result       1.  Small hiatal hernia with mild diffuse gastric wall thickening, which   may be in part due to poor distention but can also be seen with mild   gastritis. Wall thickening and mucosal hyperenhancement of the cecum and   ascending colon is suggestive of a nonspecific colitis. Mild wall   thickening with adjacent fat stranding involving the sigmoid colon,   where there are colonic diverticula, has improved from 2018 and may be   due to chronic inflammation but can also be seen with mild acute   diverticulitis/colitis.   2.  No significant change in a pancreatic body cystic lesion, better   assessed on prior MRI.   3.  Cholelithiasis.       Discussed with Diana Duckworth at 2:15 PM.       This report was finalized on 9/19/2020 2:27 PM by Dr. Theresa Jerez M.D.              Assessment/Plan   Patient Active Problem List   Diagnosis   • S/P thyroidectomy   • Mixed hyperlipidemia   • Vitamin D deficiency   • Impaired fasting glucose   • Hypothyroidism   • Osteopenia of multiple sites   • Hypertensive left ventricular hypertrophy, without heart failure   • Chronic seasonal allergic rhinitis   • History of heart disease   • Hx of abnormal mammogram   • Nodule of left lung   • Hypertension   • Hyperglycemia   • Current chronic use of systemic steroids   • Autoimmune hepatitis (CMS/HCC)   • Diverticulitis   • Cholelithiasis   • Irritable bowel syndrome with diarrhea   • Urinary frequency   • Pedal edema   • Hiatal hernia   • Liver hemangioma   • Cystitis   • Colitis       Assessment:  1.  Diarrhea, mucus in the stools abdominal pain and cramping for 2 weeks  2.  Abnormal imaging with colitis  3.  C. difficile toxin positive  4.  History of of autoimmune hepatitis, liver  tests normal this admission    Plan:  · Vancomycin 10 days  · Add a probiotic after that  · No utility in repeating colonoscopy  was just done 2 years ago  · Likely discharge home tomorrow      I discussed the patients findings and my recommendations with patient and nursing staff.    Sacha Gates MD

## 2020-09-20 NOTE — PROGRESS NOTES
Name: Mami Srivastava ADMIT: 2020   : 1945  PCP: Pradip Rosenberg MD    MRN: [id number] LOS: 0 days   AGE/SEX: 75 y.o. female  ROOM: [facility]     Subjective   Subjective   Does not feel as well, did not sleep well.  Only had one bowel movement since yesterday which occurred early this morning but was very loose and foul-smelling, came back positive for C. difficile    Review of Systems     Objective   Objective   Vital Signs  Temp:  [96.9 °F (36.1 °C)-98.7 °F (37.1 °C)] 97.3 °F (36.3 °C)  Heart Rate:  [] 80  Resp:  [16-18] 18  BP: (122-165)/(58-77) 130/58  SpO2:  [96 %-100 %] 97 %  on   ;   Device (Oxygen Therapy): room air  Body mass index is 25.16 kg/m².  Physical Exam  Vitals signs and nursing note reviewed.   Constitutional:       General: She is not in acute distress.     Appearance: Normal appearance.   HENT:      Head: Normocephalic and atraumatic.      Mouth/Throat:      Mouth: Mucous membranes are moist.      Pharynx: No posterior oropharyngeal erythema.   Eyes:      General: No scleral icterus.  Neck:      Musculoskeletal: Neck supple.      Vascular: No JVD.   Cardiovascular:      Rate and Rhythm: Normal rate and regular rhythm.      Pulses: Normal pulses.      Heart sounds: Normal heart sounds. No murmur.   Pulmonary:      Effort: Pulmonary effort is normal. No respiratory distress.      Breath sounds: Normal breath sounds.   Abdominal:      General: Bowel sounds are normal. There is no distension.      Palpations: Abdomen is soft.      Tenderness: There is no abdominal tenderness.   Musculoskeletal:         General: No swelling or tenderness.   Skin:     General: Skin is warm and dry.      Coloration: Skin is not jaundiced.      Findings: No rash.   Neurological:      General: No focal deficit present.      Mental Status: She is alert and oriented to person, place, and time. Mental status is at baseline.   Psychiatric:         Mood and Affect: Mood normal.         Behavior:  Behavior normal.         Results Review     I reviewed the patient's new clinical results.  Results from last 7 days   Lab Units 09/19/20  1226   WBC 10*3/mm3 6.40   HEMOGLOBIN g/dL 12.7   PLATELETS 10*3/mm3 204     Results from last 7 days   Lab Units 09/19/20  1226   SODIUM mmol/L 140   POTASSIUM mmol/L 3.7   CHLORIDE mmol/L 104   CO2 mmol/L 26.2   BUN mg/dL 11   CREATININE mg/dL 0.95   GLUCOSE mg/dL 155*   Estimated Creatinine Clearance: 48 mL/min (by C-G formula based on SCr of 0.95 mg/dL).  Results from last 7 days   Lab Units 09/19/20  1226   ALBUMIN g/dL 4.20   BILIRUBIN mg/dL 1.4*   ALK PHOS U/L 64   AST (SGOT) U/L 22   ALT (SGPT) U/L 20     Results from last 7 days   Lab Units 09/19/20  1226   CALCIUM mg/dL 9.5   ALBUMIN g/dL 4.20       SARS-CoV-2, JORGE   Date Value Ref Range Status   07/07/2020 Not Detected Not Detected Final     Comment:     This test was developed and its performance characteristics determined  by [facility]. This test has not been FDA cleared or  approved. This test has been authorized by FDA under an Emergency Use  Authorization (EUA). This test is only authorized for the duration of  time the declaration that circumstances exist justifying the  authorization of the emergency use of in vitro diagnostic tests for  detection of SARS-CoV-2 virus and/or diagnosis of COVID-19 infection  under section 564(b)(1) of the Act, 21 U.S.C. 360bbb-3(b)(1), unless  the authorization is terminated or revoked sooner.  When diagnostic testing is negative, the possibility of a false  negative result should be considered in the context of a patient's  recent exposures and the presence of clinical signs and symptoms  consistent with COVID-19. An individual without symptoms of COVID-19  and who is not shedding SARS-CoV-2 virus would expect to have a  negative (not detected) result in this assay.     No results found for: HGBA1C, POCGLU    CT Abdomen Pelvis With Contrast  Narrative: CT ABDOMEN  PELVIS W CONTRAST-     HISTORY:  Lower abdominal pain, cramping, diarrhea for one week.      TECHNIQUE:  CT of the abdomen and pelvis was performed following the  administration of intravenous contrast. Radiation dose reduction  techniques were utilized, including automated exposure control and  exposure modulation based on body size.     COMPARISON:  MR abdomen with and without contrast 10/29/2018. CT abdomen  and pelvis with contrast 10/24/2018.     FINDINGS:     Heart size is normal. There is no pericardial effusion. Pulmonary  nodules in the lung bases are not significantly changed dating back to  at least 10/24/2018. Pleural spaces are clear. The liver is normal in  size. There is cholelithiasis. A 1.1 cm cystic lesion in the pancreatic  body is not significantly changed, previously 1.0 cm on 10/24/2018 when  remeasured. This was better assessed on prior MRI. The spleen, adrenal  glands, and kidneys are within normal limits. There is no abdominal or  pelvic lymph node enlargement. There is extensive calcific aortoiliac  and branch vessel atherosclerosis. There is no free fluid. There is a  small hiatal hernia. There is no bowel obstruction. There is mild  diffuse gastric wall thickening. There is mild cecal and ascending  colonic wall thickening with associated mucosal hyperenhancement. There  is colonic diverticulosis with mild adjacent fat stranding at the  sigmoid colon, improved from prior CT, which may reflect chronic  inflammation. The appendix is present and normal in size. The bladder is  well distended and within normal limits. The uterus is present. There is  no adnexal mass. There is multilevel degenerative disc disease. There is  diffuse osseous demineralization.     Impression:    1.  Small hiatal hernia with mild diffuse gastric wall thickening, which  may be in part due to poor distention but can also be seen with mild  gastritis. Wall thickening and mucosal hyperenhancement of the cecum  and  ascending colon is suggestive of a nonspecific colitis. Mild wall  thickening with adjacent fat stranding involving the sigmoid colon,  where there are colonic diverticula, has improved from 2018 and may be  due to chronic inflammation but can also be seen with mild acute  diverticulitis/colitis.  2.  No significant change in a pancreatic body cystic lesion, better  assessed on prior MRI.  3.  Cholelithiasis.     Discussed with Diana Duckworth at 2:15 PM.     This report was finalized on 9/19/2020 2:27 PM by Dr. Theresa Jerez M.D.       Scheduled Medications  amLODIPine, 5 mg, Oral, Daily  aspirin, 81 mg, Oral, Daily  cetirizine, 5 mg, Oral, Nightly  lactobacillus acidophilus, 1 capsule, Oral, Daily  levothyroxine, 75 mcg, Oral, Q AM  lisinopril, 20 mg, Oral, Daily  rosuvastatin, 10 mg, Oral, Daily  sodium chloride, 10 mL, Intravenous, Q12H  vancomycin, 125 mg, Oral, Q6H    Infusions  Pharmacy Consult,   sodium chloride, 100 mL/hr, Last Rate: 100 mL/hr (09/20/20 0326)    Diet  Diet Regular       Assessment/Plan     Active Hospital Problems    Diagnosis  POA   • **C. difficile colitis [A04.72]  Yes   • Autoimmune hepatitis (CMS/HCC) [K75.4]  Unknown   • Hypertension [I10]  Yes   • Hypothyroidism [E03.9]  Yes      Resolved Hospital Problems   No resolved problems to display.       75 y.o. female admitted with Colitis, found to be positive for C. difficile    · Oral vancomycin started. GI recs reviewed  · Will not use any Imodium or anything of that nature for now  · Patient did not feel up to going home today, will continue low rate fluids and oral vancomycin.  Hope to discharge tomorrow  · CCP to see to assist with obtaining Marlena Mauricio MD  [facility]  09/20/20  10:19 EDT    I wore protective equipment throughout this patient encounter including a face mask, gloves and protective eyewear.  Hand hygiene was performed before donning protective equipment and  after removal when leaving the room.

## 2020-09-20 NOTE — PLAN OF CARE
Goal Outcome Evaluation:  Plan of Care Reviewed With: patient  Progress: improving  Outcome Summary: vss, no pain, vanc po given, ivf infusing, plan to possibly d/c today, c-diff isolation

## 2020-09-20 NOTE — PLAN OF CARE
Goal Outcome Evaluation:  Plan of Care Reviewed With: patient  Progress: no change  Outcome Summary: ivf, up ad chacho, vdg, isolation for poss c diff-gave specimen early this am-positive-called [facility] re results, scd, denies pain

## 2020-09-21 ENCOUNTER — READMISSION MANAGEMENT (OUTPATIENT)
Dept: CALL CENTER | Facility: HOSPITAL | Age: 75
End: 2020-09-21

## 2020-09-21 VITALS
WEIGHT: 146.6 LBS | HEIGHT: 64 IN | TEMPERATURE: 96.9 F | BODY MASS INDEX: 25.03 KG/M2 | OXYGEN SATURATION: 99 % | DIASTOLIC BLOOD PRESSURE: 60 MMHG | RESPIRATION RATE: 18 BRPM | SYSTOLIC BLOOD PRESSURE: 140 MMHG | HEART RATE: 70 BPM

## 2020-09-21 LAB — SARS-COV-2 RNA RESP QL NAA+PROBE: NOT DETECTED

## 2020-09-21 PROCEDURE — 99213 OFFICE O/P EST LOW 20 MIN: CPT | Performed by: INTERNAL MEDICINE

## 2020-09-21 PROCEDURE — G0378 HOSPITAL OBSERVATION PER HR: HCPCS

## 2020-09-21 RX ORDER — VANCOMYCIN HYDROCHLORIDE 125 MG/1
125 CAPSULE ORAL EVERY 6 HOURS SCHEDULED
Qty: 36 CAPSULE | Refills: 0 | Status: SHIPPED | OUTPATIENT
Start: 2020-09-21 | End: 2020-09-30

## 2020-09-21 RX ORDER — ALUMINUM ZIRCONIUM OCTACHLOROHYDREX GLY 16 G/100G
1 GEL TOPICAL DAILY
Qty: 30 CAPSULE | Refills: 0 | Status: SHIPPED | OUTPATIENT
Start: 2020-09-22 | End: 2020-11-09

## 2020-09-21 RX ADMIN — ASPIRIN 81 MG: 81 TABLET, COATED ORAL at 09:58

## 2020-09-21 RX ADMIN — ROSUVASTATIN CALCIUM 10 MG: 10 TABLET, FILM COATED ORAL at 09:59

## 2020-09-21 RX ADMIN — LEVOTHYROXINE SODIUM 75 MCG: 75 TABLET ORAL at 05:50

## 2020-09-21 RX ADMIN — VANCOMYCIN 125 MG: KIT at 05:50

## 2020-09-21 RX ADMIN — VANCOMYCIN 125 MG: KIT at 11:44

## 2020-09-21 RX ADMIN — Medication 10 ML: at 09:58

## 2020-09-21 RX ADMIN — Medication 1 CAPSULE: at 09:58

## 2020-09-21 NOTE — DISCHARGE SUMMARY
Patient Name: Mami Srivastava  : 1945  MRN: [id number]    Date of Admission: 2020  Date of Discharge:  2020  Primary Care Physician: Pradip Rosenberg MD      Chief Complaint:   Abdominal Pain      Discharge Diagnoses     Active Hospital Problems    Diagnosis  POA   • **C. difficile colitis [A04.72]  Yes   • Autoimmune hepatitis (CMS/HCC) [K75.4]  Yes   • Hypertension [I10]  Yes   • Hypothyroidism [E03.9]  Yes      Resolved Hospital Problems   No resolved problems to display.        Hospital Course     Ms. Srivastava is a 75 y.o. female with a history of autoimmune hepatitis, HLD, HTN and prediabetes who presented to [facility] initially complaining of mucousy stool and abdominal discomfort.  Please see the admitting history and physical for further details.  She was found to have c diff colitis and was admitted to the hospital for further evaluation and treatment. She was placed on PO vancomycin and will need to complete a 10 day course followed by daily probiotic. Stools have backed down in frequency though mucous still present. She is tolerating a diet and abdominal pain has much improved. She had a colonoscopy 2 years ago. Gastroenterology saw in consultation and does not think she will require any future endoscopic evaluation in regards to c diff diagnosis. She will be discharged home today in stable condition and should follow-up with her PCP in 2 weeks.      Day of Discharge     no new complaints or events overnight. still with some abdominal pain but has much improved and she is ready to go home.     denies chest pain, palpitations, SOA, edema, fever, chills, nausea and vomiting.    Physical Exam:  Temp:  [96.9 °F (36.1 °C)-97.6 °F (36.4 °C)] 96.9 °F (36.1 °C)  Heart Rate:  [63-73] 70  Resp:  [18] 18  BP: ()/(54-71) 140/60  Body mass index is 25.16 kg/m².  Physical Exam  Vitals signs and nursing note reviewed.   Constitutional:       Appearance: Normal appearance.      HENT:      Head: Normocephalic and atraumatic.   Eyes:      Extraocular Movements: Extraocular movements intact.      Conjunctiva/sclera: Conjunctivae normal.   Neck:      Musculoskeletal: Normal range of motion and neck supple.   Cardiovascular:      Rate and Rhythm: Normal rate and regular rhythm.   Abdominal:      General: Bowel sounds are normal. There is no distension.      Tenderness: There is abdominal tenderness (lower quadrants, mild).   Musculoskeletal: Normal range of motion.         General: No swelling or tenderness.   Skin:     General: Skin is warm and dry.   Neurological:      General: No focal deficit present.      Mental Status: She is alert and oriented to person, place, and time.   Psychiatric:         Mood and Affect: Mood normal.         Behavior: Behavior normal.         Consultants     Consult Orders (all) (From admission, onward)     Start     Ordered    09/19/20 1558  Inpatient Gastroenterology Consult  Once     Specialty:  Gastroenterology  Provider:  Shiva Hein MD    09/19/20 1558              Procedures     Imaging Results (All)     Procedure Component Value Units Date/Time    CT Abdomen Pelvis With Contrast [[id number]] Collected: 09/19/20 1425     Updated: 09/19/20 1430    Narrative:      CT ABDOMEN PELVIS W CONTRAST-     HISTORY:  Lower abdominal pain, cramping, diarrhea for one week.      TECHNIQUE:  CT of the abdomen and pelvis was performed following the  administration of intravenous contrast. Radiation dose reduction  techniques were utilized, including automated exposure control and  exposure modulation based on body size.     COMPARISON:  MR abdomen with and without contrast 10/29/2018. CT abdomen  and pelvis with contrast 10/24/2018.     FINDINGS:     Heart size is normal. There is no pericardial effusion. Pulmonary  nodules in the lung bases are not significantly changed dating back to  at least 10/24/2018. Pleural spaces are clear. The liver is normal in  size.  There is cholelithiasis. A 1.1 cm cystic lesion in the pancreatic  body is not significantly changed, previously 1.0 cm on 10/24/2018 when  remeasured. This was better assessed on prior MRI. The spleen, adrenal  glands, and kidneys are within normal limits. There is no abdominal or  pelvic lymph node enlargement. There is extensive calcific aortoiliac  and branch vessel atherosclerosis. There is no free fluid. There is a  small hiatal hernia. There is no bowel obstruction. There is mild  diffuse gastric wall thickening. There is mild cecal and ascending  colonic wall thickening with associated mucosal hyperenhancement. There  is colonic diverticulosis with mild adjacent fat stranding at the  sigmoid colon, improved from prior CT, which may reflect chronic  inflammation. The appendix is present and normal in size. The bladder is  well distended and within normal limits. The uterus is present. There is  no adnexal mass. There is multilevel degenerative disc disease. There is  diffuse osseous demineralization.       Impression:         1.  Small hiatal hernia with mild diffuse gastric wall thickening, which  may be in part due to poor distention but can also be seen with mild  gastritis. Wall thickening and mucosal hyperenhancement of the cecum and  ascending colon is suggestive of a nonspecific colitis. Mild wall  thickening with adjacent fat stranding involving the sigmoid colon,  where there are colonic diverticula, has improved from 2018 and may be  due to chronic inflammation but can also be seen with mild acute  diverticulitis/colitis.  2.  No significant change in a pancreatic body cystic lesion, better  assessed on prior MRI.  3.  Cholelithiasis.     Discussed with Diana Duckworth at 2:15 PM.     This report was finalized on 9/19/2020 2:27 PM by Dr. Theresa Jerez M.D.             Pertinent Labs     Results from last 7 days   Lab Units 09/19/20  1226   WBC 10*3/mm3 6.40   HEMOGLOBIN g/dL 12.7   PLATELETS  10*3/mm3 204     Results from last 7 days   Lab Units 09/19/20  1226   SODIUM mmol/L 140   POTASSIUM mmol/L 3.7   CHLORIDE mmol/L 104   CO2 mmol/L 26.2   BUN mg/dL 11   CREATININE mg/dL 0.95   GLUCOSE mg/dL 155*   Estimated Creatinine Clearance: 48 mL/min (by C-G formula based on SCr of 0.95 mg/dL).  Results from last 7 days   Lab Units 09/19/20  1226   ALBUMIN g/dL 4.20   BILIRUBIN mg/dL 1.4*   ALK PHOS U/L 64   AST (SGOT) U/L 22   ALT (SGPT) U/L 20     Results from last 7 days   Lab Units 09/19/20  1226   CALCIUM mg/dL 9.5   ALBUMIN g/dL 4.20               Invalid input(s): LDLCALC        Test Results Pending at Discharge     Pending Labs     Order Current Status    COVID PRE-OP / PRE-PROCEDURE SCREENING ORDER (NO ISOLATION) - Swab, Nasopharynx In process    COVID-19,BIOTAP, NP/OP SWAB IN TRANSPORT MEDIA OR SALINE 24-36 HR TAT - Swab, Nasopharynx In process          Discharge Details        Discharge Medications      New Medications      Instructions Start Date   vancomycin 125 MG capsule  Commonly known as: VANCOCIN   125 mg, Oral, Every 6 Hours Scheduled         Continue These Medications      Instructions Start Date   amLODIPine 5 MG tablet  Commonly known as: NORVASC   5 mg, Oral, Daily      aspirin 81 MG EC tablet   81 mg, Oral, Daily      cetirizine 10 MG tablet  Commonly known as: zyrTEC   10 mg, Oral, Nightly      lactobacillus acidophilus capsule capsule   1 capsule, Oral, Daily   Start Date: September 22, 2020     levothyroxine 75 MCG tablet  Commonly known as: SYNTHROID, LEVOTHROID   75 mcg, Oral, Daily      lisinopril 20 MG tablet  Commonly known as: PRINIVIL,ZESTRIL   20 mg, Oral, Daily      MULTI VITAMIN DAILY PO   1 tablet, Oral, Daily      Omega 3 1200 MG capsule   1,200 mg, Oral, Daily      rosuvastatin 10 MG tablet  Commonly known as: CRESTOR   10 mg, Oral, Daily      vitamin D3 125 MCG (5000 UT) capsule capsule   5,000 Units, Oral, 2 Times Weekly             Allergies   Allergen Reactions   •  Imuran [Azathioprine] GI Intolerance   • Bactrim [Sulfamethoxazole-Trimethoprim] Other (See Comments)     fatigue   • Augmentin [Amoxicillin-Pot Clavulanate] Hives   • Keflex [Cephalexin] Hives         Discharge Disposition:  Home or Self Care    Discharge Diet:  Diet Order   Procedures   • Diet Regular       Discharge Activity:   Activity Instructions     Activity as Tolerated            CODE STATUS:    Code Status and Medical Interventions:   Ordered at: 09/19/20 1557     Code Status:    CPR     Medical Interventions (Level of Support Prior to Arrest):    Full       Future Appointments   Date Time Provider Department Center   10/6/2020  8:15 AM [facility] None   10/14/2020  2:00 PM Pradip Rosenberg MD [facility] None   12/29/2020  9:30 AM Shiva Hein MD [facility] None     Additional Instructions for the Follow-ups that You Need to Schedule     Discharge Follow-up with PCP   As directed       Currently Documented PCP:    Pradip Rosenberg MD    PCP Phone Number:    [phone]     Follow Up Details: 2 weeks           Follow-up Information     Pradip Rosenberg MD .    Specialty: Internal Medicine  Why: 2 weeks  Contact information:  [address] [address]  [phone]                   Additional Instructions for the Follow-ups that You Need to Schedule     Discharge Follow-up with PCP   As directed       Currently Documented PCP:    Pradip Rosenberg MD    PCP Phone Number:    [phone]     Follow Up Details: 2 weeks           Time Spent on Discharge:  Greater than 30 minutes      RYAN Dennis  [facility]  09/21/20  11:07 EDT

## 2020-09-21 NOTE — PROGRESS NOTES
Continued Stay Note  [facility]     Patient Name: Mami Srivastava  MRN: [id number]  Today's Date: 9/21/2020    Admit Date: 9/19/2020    Discharge Plan     Row Name 09/21/20 1138       Plan    Plan  Home no needs    Plan Comments  Discharge order noted. Patient to be DC'd on PO vanc. Called [facility] and spoke to Juaquin. Juaquin stated that he had already talked with patient and that patient was agreeable to cost of $93.62 without insurance verses $170+ with insurance. Called into room (5190) and spoke with patient. DC plan is home. Stated a friend would be providing transportation. Patient confirmed she was agreeable to the cost of $93.62 for PO Vanc. Denies any needs/equipment.        Discharge Codes    No documentation.       Expected Discharge Date and Time     Expected Discharge Date Expected Discharge Time    Sep 21, 2020             Janette Crespo RN

## 2020-09-21 NOTE — PROGRESS NOTES
[facility]  Inpatient Progress Note    Reason for Follow Up:  c diff colitis    Subjective     Interval History:   Passing mucus-formed stools.  No abdominal pain or blood in the stool.    Current Facility-Administered Medications:   •  acetaminophen (TYLENOL) tablet 650 mg, 650 mg, Oral, Q4H PRN, Talha Mauricio MD  •  amLODIPine (NORVASC) tablet 5 mg, 5 mg, Oral, Daily, Talha Mauricio MD, 5 mg at 09/20/20 0817  •  aspirin EC tablet 81 mg, 81 mg, Oral, Daily, Talha Mauricio MD, 81 mg at 09/21/20 0958  •  cetirizine (zyrTEC) tablet 5 mg, 5 mg, Oral, Nightly, Talha Mauricio MD  •  lactobacillus acidophilus (RISAQUAD) capsule 1 capsule, 1 capsule, Oral, Daily, Talha Mauricio MD, 1 capsule at 09/21/20 0958  •  levothyroxine (SYNTHROID, LEVOTHROID) tablet 75 mcg, 75 mcg, Oral, Q AM, Talha Mauricio MD, 75 mcg at 09/21/20 0550  •  lisinopril (PRINIVIL,ZESTRIL) tablet 20 mg, 20 mg, Oral, Daily, Talha Mauricio MD, 20 mg at 09/20/20 0817  •  ondansetron (ZOFRAN) tablet 4 mg, 4 mg, Oral, Q6H PRN, Talha Mauricio MD  •  Pharmacy Consult, , Does not apply, Continuous PRN, Talha Mauricio MD  •  rosuvastatin (CRESTOR) tablet 10 mg, 10 mg, Oral, Daily, Talha Mauricio MD, 10 mg at 09/21/20 0959  •  sodium chloride 0.9 % flush 10 mL, 10 mL, Intravenous, PRN, Paulino Han MD  •  [COMPLETED] Insert peripheral IV, , , Once **AND** sodium chloride 0.9 % flush 10 mL, 10 mL, Intravenous, PRN, Diana Duckworth, APRN  •  sodium chloride 0.9 % flush 10 mL, 10 mL, Intravenous, Q12H, Talha Mauricio MD, 10 mL at 09/21/20 0958  •  sodium chloride 0.9 % flush 10 mL, 10 mL, Intravenous, PRN, Talha Mauricio MD  •  sodium chloride 0.9 % infusion, 50 mL/hr, Intravenous, Continuous, Talha Mauricio MD, Last Rate: 50 mL/hr at 09/20/20 1754, 50 mL/hr at 09/20/20  1754  •  vancomycin oral solution reconstituted 125 mg, 125 mg, Oral, Q6H, Talha Mauricio MD, 125 mg at 09/21/20 0550  Review of Systems:    Negative for fevers or chills, negative for nausea or vomiting    Objective     Vital Signs  Temp:  [96.9 °F (36.1 °C)-97.6 °F (36.4 °C)] 96.9 °F (36.1 °C)  Heart Rate:  [63-73] 70  Resp:  [18] 18  BP: ()/(54-71) 140/60  Body mass index is 25.16 kg/m².    Intake/Output Summary (Last 24 hours) at 9/21/2020 1108  Last data filed at 9/21/2020 1105  Gross per 24 hour   Intake 920 ml   Output 400 ml   Net 520 ml     I/O this shift:  In: 120 [P.O.:120]  Out: -      Physical Exam:   General: patient awake, alert and cooperative   Eyes: Normal lids and lashes, no scleral icterus   Neck: supple, normal ROM   Skin: warm and dry, not jaundiced   Cardiovascular: regular rhythm and rate    Pulm:   regular and unlabored   Abdomen: soft, nontender, nondistended    Psychiatric: Normal mood and behavior; memory intact     Results Review:     I reviewed the patient's new clinical results.    Results from last 7 days   Lab Units 09/19/20  1226   WBC 10*3/mm3 6.40   HEMOGLOBIN g/dL 12.7   HEMATOCRIT % 37.6   PLATELETS 10*3/mm3 204     Results from last 7 days   Lab Units 09/19/20  1226   SODIUM mmol/L 140   POTASSIUM mmol/L 3.7   CHLORIDE mmol/L 104   CO2 mmol/L 26.2   BUN mg/dL 11   CREATININE mg/dL 0.95   CALCIUM mg/dL 9.5   BILIRUBIN mg/dL 1.4*   ALK PHOS U/L 64   ALT (SGPT) U/L 20   AST (SGOT) U/L 22   GLUCOSE mg/dL 155*         Lab Results   Lab Value Date/Time    LIPASE 45 10/25/2018 2350    LIPASE 59 10/24/2018 0727    LIPASE 42 08/05/2018 1551    LIPASE 55 05/24/2018 0245       Radiology:  CT Abdomen Pelvis With Contrast   Final Result       1.  Small hiatal hernia with mild diffuse gastric wall thickening, which   may be in part due to poor distention but can also be seen with mild   gastritis. Wall thickening and mucosal hyperenhancement of the cecum and   ascending  colon is suggestive of a nonspecific colitis. Mild wall   thickening with adjacent fat stranding involving the sigmoid colon,   where there are colonic diverticula, has improved from 2018 and may be   due to chronic inflammation but can also be seen with mild acute   diverticulitis/colitis.   2.  No significant change in a pancreatic body cystic lesion, better   assessed on prior MRI.   3.  Cholelithiasis.       Discussed with Diana Duckworth at 2:15 PM.       This report was finalized on 9/19/2020 2:27 PM by Dr. Theresa Jerez M.D.              Assessment/Plan     Patient Active Problem List   Diagnosis   • S/P thyroidectomy   • Mixed hyperlipidemia   • Vitamin D deficiency   • Impaired fasting glucose   • Hypothyroidism   • Osteopenia of multiple sites   • Hypertensive left ventricular hypertrophy, without heart failure   • Chronic seasonal allergic rhinitis   • History of heart disease   • Hx of abnormal mammogram   • Nodule of left lung   • Hypertension   • Hyperglycemia   • Current chronic use of systemic steroids   • Autoimmune hepatitis (CMS/HCC)   • Diverticulitis   • Cholelithiasis   • Irritable bowel syndrome with diarrhea   • Urinary frequency   • Pedal edema   • Hiatal hernia   • Liver hemangioma   • Cystitis   • C. difficile colitis       Assessment:  1.  Diarrhea, mucus in the stools abdominal pain and cramping for 2 weeks  2.  Abnormal imaging with colitis  3.  C. difficile toxin positive  4.  History of of autoimmune hepatitis, liver  tests normal this admission      Plan:  · Continue vancomycin for 10 days  · She is up-to-date on colonoscopies with recent exam approximately 2 years ago  · She should remain off work until she completes her antibiotics given that she is contagious and works in the  industry  · Discussed caution regarding antibiotics in the future  · Office follow-up in 2 weeks w/np    I discussed the patients findings and my recommendations with patient.    Marry DOTSON  MD Bernie

## 2020-09-21 NOTE — PLAN OF CARE
Goal Outcome Evaluation:  Plan of Care Reviewed With: patient  Progress: improving  Outcome Summary: ivf, no c/o pain, had several mucous bm's, vdg, isolation for c diff, poss d/c today

## 2020-09-21 NOTE — OUTREACH NOTE
Prep Survey      Responses   [facility] patient discharged from?  [place]   Is LACE score < 7 ?  Yes   Eligibility  [facility]   Date of Admission  09/19/20   Date of Discharge  09/21/20   Discharge Disposition  Home or Self Care   Discharge diagnosis  C. diff colitis   COVID-19 Test Status  Negative   Does the patient have one of the following disease processes/diagnoses(primary or secondary)?  Other   Does the patient have Home health ordered?  No   Is there a DME ordered?  No   Prep survey completed?  Yes          Sheyla Brown RN

## 2020-09-21 NOTE — PROGRESS NOTES
Case Management Discharge Note      Final Note: Discharged home. Janette Crespo RN         Selected Continued Care - Discharged on 9/21/2020 Admission date: 9/19/2020 - Discharge disposition: Home or Self Care   Transportation Services  Private: Car    Final Discharge Disposition Code: 01 - home or self-care

## 2020-09-22 ENCOUNTER — TRANSITIONAL CARE MANAGEMENT TELEPHONE ENCOUNTER (OUTPATIENT)
Dept: CALL CENTER | Facility: HOSPITAL | Age: 75
End: 2020-09-22

## 2020-09-22 NOTE — OUTREACH NOTE
Call Center TCM Note      Responses   [facility] patient discharged from?  [place]   COVID-19 Test Status  Negative   Does the patient have one of the following disease processes/diagnoses(primary or secondary)?  Other   TCM attempt successful?  No   Unsuccessful attempts  Attempt 2          Victoria Cortes RN    9/22/2020, 14:40 EDT

## 2020-09-22 NOTE — OUTREACH NOTE
Call Center TCM Note      Responses   [facility] patient discharged from?  [place]   COVID-19 Test Status  Negative   Does the patient have one of the following disease processes/diagnoses(primary or secondary)?  Other   TCM attempt successful?  No   Unsuccessful attempts  Attempt 1          Victoria Cortes RN    9/22/2020, 12:09 EDT

## 2020-09-23 ENCOUNTER — TRANSITIONAL CARE MANAGEMENT TELEPHONE ENCOUNTER (OUTPATIENT)
Dept: CALL CENTER | Facility: HOSPITAL | Age: 75
End: 2020-09-23

## 2020-09-23 NOTE — OUTREACH NOTE
Call Center TCM Note      Responses   [facility] [place]   COVID-19 Test Status  Negative   Does the patient have one of the following disease processes/diagnoses(primary or secondary)?  Other   TCM attempt successful?  Yes   Call start time  1044   Call end time  1052   Discharge diagnosis  C. diff colitis   Meds reviewed with patient/caregiver?  Yes   Is the patient having any side effects they believe may be caused by any medication additions or changes?  No   Does the patient have all medications ordered at discharge?  Yes   Does the patient have a primary care provider?   Yes   Does the patient have an appointment with their PCP within 7 days of discharge?  Greater than 7 days   Comments regarding PCP  Has an appt with Dr Rosenberg (previously scheduled on 10/14/2020 and patient would like to keep as scheduled. )   What is preventing the patient from scheduling follow up appointments within 7 days of discharge?  -- [See above]   Nursing Interventions  Verified appointment date/time/provider   Has the patient kept scheduled appointments due by today?  Yes   Has home health visited the patient within 72 hours of discharge?  N/A   Psychosocial issues?  No   Comments  Patient reports he diarrhea is improving.    Did the patient receive a copy of their discharge instructions?  Yes   Nursing interventions  Reviewed instructions with patient   What is the patient's perception of their health status since discharge?  Improving   Is the patient/caregiver able to teach back signs and symptoms related to disease process for when to call PCP?  Yes   Is the patient/caregiver able to teach back signs and symptoms related to disease process for when to call 911?  Yes   Is the patient/caregiver able to teach back the hierarchy of who to call/visit for symptoms/problems? PCP, Specialist, Home health nurse, Urgent Care, ED, 911  Yes   TCM call completed?  Yes          William Alfaro,  RN    9/23/2020, 10:53 EDT

## 2020-09-28 NOTE — TELEPHONE ENCOUNTER
"Per Dr Heni: \"Mucus is sign of inflammation. If fever develops or bleed head to urgent care. Continue low residue diet for now and otherwise call Monday if no improvement\"   "

## 2020-09-29 DIAGNOSIS — E78.2 MIXED HYPERLIPIDEMIA: ICD-10-CM

## 2020-09-29 DIAGNOSIS — R73.9 HYPERGLYCEMIA: ICD-10-CM

## 2020-09-29 DIAGNOSIS — E89.0 POSTOPERATIVE HYPOTHYROIDISM: ICD-10-CM

## 2020-09-29 DIAGNOSIS — E55.9 VITAMIN D DEFICIENCY: Primary | ICD-10-CM

## 2020-09-29 DIAGNOSIS — N30.90 CYSTITIS: ICD-10-CM

## 2020-10-07 LAB
25(OH)D3+25(OH)D2 SERPL-MCNC: 41.5 NG/ML (ref 30–100)
ALBUMIN SERPL-MCNC: 4.6 G/DL (ref 3.5–5.2)
ALBUMIN/GLOB SERPL: 2.9 G/DL
ALP SERPL-CCNC: 70 U/L (ref 39–117)
ALT SERPL-CCNC: 37 U/L (ref 1–33)
AST SERPL-CCNC: 28 U/L (ref 1–32)
BASOPHILS # BLD AUTO: 0.02 10*3/MM3 (ref 0–0.2)
BASOPHILS NFR BLD AUTO: 0.4 % (ref 0–1.5)
BILIRUB SERPL-MCNC: 1.1 MG/DL (ref 0–1.2)
BUN SERPL-MCNC: 12 MG/DL (ref 8–23)
BUN/CREAT SERPL: 14.8 (ref 7–25)
CALCIUM SERPL-MCNC: 9.3 MG/DL (ref 8.6–10.5)
CHLORIDE SERPL-SCNC: 106 MMOL/L (ref 98–107)
CO2 SERPL-SCNC: 26.1 MMOL/L (ref 22–29)
CREAT SERPL-MCNC: 0.81 MG/DL (ref 0.57–1)
EOSINOPHIL # BLD AUTO: 0.21 10*3/MM3 (ref 0–0.4)
EOSINOPHIL NFR BLD AUTO: 4.3 % (ref 0.3–6.2)
ERYTHROCYTE [DISTWIDTH] IN BLOOD BY AUTOMATED COUNT: 13.4 % (ref 12.3–15.4)
GLOBULIN SER CALC-MCNC: 1.6 GM/DL
GLUCOSE SERPL-MCNC: 116 MG/DL (ref 65–99)
HBA1C MFR BLD: 5.7 % (ref 4.8–5.6)
HCT VFR BLD AUTO: 38.3 % (ref 34–46.6)
HGB BLD-MCNC: 13.2 G/DL (ref 12–15.9)
IMM GRANULOCYTES # BLD AUTO: 0.01 10*3/MM3 (ref 0–0.05)
IMM GRANULOCYTES NFR BLD AUTO: 0.2 % (ref 0–0.5)
LYMPHOCYTES # BLD AUTO: 1.36 10*3/MM3 (ref 0.7–3.1)
LYMPHOCYTES NFR BLD AUTO: 27.6 % (ref 19.6–45.3)
MCH RBC QN AUTO: 29.5 PG (ref 26.6–33)
MCHC RBC AUTO-ENTMCNC: 34.5 G/DL (ref 31.5–35.7)
MCV RBC AUTO: 85.7 FL (ref 79–97)
MONOCYTES # BLD AUTO: 0.31 10*3/MM3 (ref 0.1–0.9)
MONOCYTES NFR BLD AUTO: 6.3 % (ref 5–12)
NEUTROPHILS # BLD AUTO: 3.02 10*3/MM3 (ref 1.7–7)
NEUTROPHILS NFR BLD AUTO: 61.2 % (ref 42.7–76)
NRBC BLD AUTO-RTO: 0 /100 WBC (ref 0–0.2)
PLATELET # BLD AUTO: 207 10*3/MM3 (ref 140–450)
POTASSIUM SERPL-SCNC: 4.2 MMOL/L (ref 3.5–5.2)
PROT SERPL-MCNC: 6.2 G/DL (ref 6–8.5)
RBC # BLD AUTO: 4.47 10*6/MM3 (ref 3.77–5.28)
SODIUM SERPL-SCNC: 143 MMOL/L (ref 136–145)
T4 FREE SERPL-MCNC: 1.58 NG/DL (ref 0.93–1.7)
TSH SERPL DL<=0.005 MIU/L-ACNC: 2.87 UIU/ML (ref 0.27–4.2)
WBC # BLD AUTO: 4.93 10*3/MM3 (ref 3.4–10.8)

## 2020-10-12 ENCOUNTER — TELEPHONE (OUTPATIENT)
Dept: GASTROENTEROLOGY | Facility: CLINIC | Age: 75
End: 2020-10-12

## 2020-10-12 NOTE — TELEPHONE ENCOUNTER
----- Message from Mami Srivastava sent at 10/12/2020  1:39 PM EDT -----  Regarding: Visit Follow-Up Question  Contact: [phone]  Dr Den Rangel was on call this weekend and I discussed with him that although I was diagnosed on Sept 19-20 with “ cdiff” at [facility] and completed the prescription Vancomyycin, my symptoms had reappeared on Saturday, Oct 10, 2020.     He called in a prescription for a 10 day supply of Vancomycin with the understanding that I would call you, Dr Hein, today.    I called at 8:30 this morning and again at 1:00....no answer either time but I did leave a message at 8:30 requesting a return call.    Also, I spoke to your nurse early Thursday morning, Sept 17 and described my symptoms,  she stated she would call me back.  I never received a return call that day or the next.  I went to the ER on Saturday and diagnosed with “cdiff”

## 2020-10-12 NOTE — TELEPHONE ENCOUNTER
Patient called, she states she was diagnosed with c dfficle in September. She took her last Vancomycin 125 mg on 09/30/2020.   She states on Saturday her symptoms returned. She had abdominal pain and was passing large amounts of mucus in her stool again. She called the MD on call and he placed her back on Vancomcyin. She states she has passed a small amount of bright red blood, continues with abdominal cramping and passing mucus. She states there is not much change in her stool.   Verbal update given to ECHO Elizabeth. Per verbal order of Clara, patient will need to been seen on Friday 10/16@5266.   Patient called advised as per Clara's order. Patient states she will come to the office and is agreeable to the time and date.

## 2020-10-15 ENCOUNTER — TELEPHONE (OUTPATIENT)
Dept: FAMILY MEDICINE CLINIC | Facility: CLINIC | Age: 75
End: 2020-10-15

## 2020-10-15 NOTE — TELEPHONE ENCOUNTER
PATIENT STATES: that she would like to know her results from her antibody covid test please advise     PATIENT CAN BE REACHED ON:[phone]

## 2020-10-16 ENCOUNTER — TELEPHONE (OUTPATIENT)
Dept: FAMILY MEDICINE CLINIC | Facility: CLINIC | Age: 75
End: 2020-10-16

## 2020-10-16 DIAGNOSIS — A04.72 C. DIFFICILE COLITIS: Primary | ICD-10-CM

## 2020-10-16 NOTE — TELEPHONE ENCOUNTER
Spoke with pt. She is requesting a referral to a new Gastroenterologist. I spoke to Jacquelin who will speak with pt regarding new referral request. Referral was placed for pt.

## 2020-10-21 ENCOUNTER — TELEPHONE (OUTPATIENT)
Dept: GASTROENTEROLOGY | Facility: CLINIC | Age: 75
End: 2020-10-21

## 2020-10-26 ENCOUNTER — OFFICE VISIT (OUTPATIENT)
Dept: FAMILY MEDICINE CLINIC | Facility: CLINIC | Age: 75
End: 2020-10-26

## 2020-10-26 VITALS
HEIGHT: 64 IN | WEIGHT: 149.6 LBS | DIASTOLIC BLOOD PRESSURE: 62 MMHG | HEART RATE: 59 BPM | SYSTOLIC BLOOD PRESSURE: 136 MMHG | BODY MASS INDEX: 25.54 KG/M2 | TEMPERATURE: 96.9 F | OXYGEN SATURATION: 98 % | RESPIRATION RATE: 18 BRPM

## 2020-10-26 DIAGNOSIS — R73.9 HYPERGLYCEMIA: ICD-10-CM

## 2020-10-26 DIAGNOSIS — E89.0 POSTOPERATIVE HYPOTHYROIDISM: ICD-10-CM

## 2020-10-26 DIAGNOSIS — E78.2 MIXED HYPERLIPIDEMIA: ICD-10-CM

## 2020-10-26 DIAGNOSIS — I10 ESSENTIAL HYPERTENSION: Primary | ICD-10-CM

## 2020-10-26 PROCEDURE — 99214 OFFICE O/P EST MOD 30 MIN: CPT | Performed by: INTERNAL MEDICINE

## 2020-10-27 PROBLEM — R73.01 IMPAIRED FASTING GLUCOSE: Status: RESOLVED | Noted: 2017-10-27 | Resolved: 2020-10-27

## 2020-10-27 NOTE — PROGRESS NOTES
Subjective   Mami Srivastava is a 75 y.o. female. Patient is here today for   Chief Complaint   Patient presents with   • Hypertension     f/u labs   • Hypothyroidism   • Hyperlipidemia   • Vitamin D Deficiency          Vitals:    10/26/20 1521   BP: 136/62   Pulse: 59   Resp: 18   Temp: 96.9 °F (36.1 °C)   SpO2: 98%     Body mass index is 25.67 kg/m².      Past Medical History:   Diagnosis Date   • Autoimmune hepatitis (CMS/HCC)    • Cholelithiasis 2018    Ultrasound   • Coronary artery disease     Dr Luis Rick   • Diverticulitis of colon ,   • Hemangioma of liver    • Hernia     Hiatal hernia-Prabhjot test   • Hyperlipidemia     Dr Luis Rick-atorvastatin   • Hypertension    • Hyperthyroidism    • Hypothyroidism    • Laceration of finger of right hand 05/15/2018    lac with knife and had 3 stitches   • Osteopenia after menopause    • Skin cancer     face   • Ulcerative colitis (CMS/HCC) 2014    Colitis/DrKaplan      Allergies   Allergen Reactions   • Imuran [Azathioprine] GI Intolerance   • Bactrim [Sulfamethoxazole-Trimethoprim] Other (See Comments)     fatigue   • Clindamycin/Lincomycin Diarrhea   • Augmentin [Amoxicillin-Pot Clavulanate] Hives   • Keflex [Cephalexin] Hives      Social History     Socioeconomic History   • Marital status:      Spouse name: Not on file   • Number of children: Not on file   • Years of education: Not on file   • Highest education level: Not on file   Tobacco Use   • Smoking status: Former Smoker     Packs/day: 2.00     Years: 12.00     Pack years: 24.00     Start date: 1963     Quit date: 1976     Years since quittin.7   • Smokeless tobacco: Never Used   • Tobacco comment: Chrinic bronchitis...smoke allergy   Substance and Sexual Activity   • Alcohol use: Yes     Comment: Seldom   • Drug use: No   • Sexual activity: Not Currently        Current Outpatient Medications:   •  amLODIPine (NORVASC) 5 MG tablet, Take 5 mg by mouth Daily., Disp:  , Rfl:   •  aspirin 81 MG EC tablet, Take 81 mg by mouth Daily., Disp: , Rfl:   •  cetirizine (zyrTEC) 10 MG tablet, Take 10 mg by mouth Every Night., Disp: , Rfl:   •  Cholecalciferol (VITAMIN D3) 5000 units capsule capsule, Take 5,000 Units by mouth 2 (Two) Times a Week., Disp: , Rfl:   •  levothyroxine (SYNTHROID, LEVOTHROID) 75 MCG tablet, Take 75 mcg by mouth Daily., Disp: , Rfl:   •  lisinopril (PRINIVIL,ZESTRIL) 20 MG tablet, Take 20 mg by mouth Daily., Disp: , Rfl:   •  Multiple Vitamin (MULTI VITAMIN DAILY PO), Take 1 tablet by mouth Daily., Disp: , Rfl:   •  Omega 3 1200 MG capsule, Take 1,200 mg by mouth Daily., Disp: , Rfl:   •  Probiotic Product (Align) capsule, Take 1 capsule by mouth Daily., Disp: 30 capsule, Rfl: 0  •  rosuvastatin (CRESTOR) 10 MG tablet, Take 10 mg by mouth Daily., Disp: , Rfl:      Objective     This pleasant patient is here to follow-up on labs from last week.    She has no complaints.    Hypertension    Hypothyroidism    Hyperlipidemia  Exacerbating diseases include hypothyroidism.        Review of Systems   Constitutional: Negative.    HENT: Negative.    Respiratory: Negative.    Cardiovascular: Negative.    Musculoskeletal: Negative.    Psychiatric/Behavioral: Negative.        Physical Exam  Vitals signs and nursing note reviewed.   Constitutional:       Appearance: Normal appearance.      Comments: Pleasant, neatly groomed, in no distress.   Neck:      Vascular: No carotid bruit.   Cardiovascular:      Rate and Rhythm: Normal rate.      Heart sounds: Normal heart sounds. No murmur. No gallop.    Neurological:      Mental Status: She is alert and oriented to person, place, and time.   Psychiatric:         Mood and Affect: Mood normal.         Behavior: Behavior normal.         Thought Content: Thought content normal.         Judgment: Judgment normal.           Problems Addressed this Visit        Cardiovascular and Mediastinum    Mixed hyperlipidemia    Hypertension - Primary        Endocrine    Hypothyroidism       Other    Hyperglycemia      Diagnoses       Codes Comments    Essential hypertension    -  Primary ICD-10-CM: I10  ICD-9-CM: 401.9     Mixed hyperlipidemia     ICD-10-CM: E78.2  ICD-9-CM: 272.2     Postoperative hypothyroidism     ICD-10-CM: E89.0  ICD-9-CM: 244.0     Hyperglycemia     ICD-10-CM: R73.9  ICD-9-CM: 790.29             PLAN  She and I reviewed her labs.  She has hypothyroidism and is on appropriate thyroid hormone replacement.    Her hypertension is well controlled.    Her mixed hyperlipidemia is well controlled on rosuvastatin.    She has hyperglycemia.  She is prediabetic.  Actually her hemoglobin A1c has improved somewhat since we checked it last.  Certainly, we should continue to watch this.    I asked her to follow-up for a Medicare wellness visit once yearly.    I would like to see her back in about 4 months.  Prior to that visit, the following labs should be done: Hemoglobin A1c, lipid profile, comprehensive metabolic panel, CBC, urinalysis, vitamin D.  No follow-ups on file.

## 2020-10-28 ENCOUNTER — TELEPHONE (OUTPATIENT)
Dept: GASTROENTEROLOGY | Facility: CLINIC | Age: 75
End: 2020-10-28

## 2020-10-28 NOTE — TELEPHONE ENCOUNTER
Forms signed.  Faxed to [facility] leave center, scanned into media and mailed copy to patient per request.

## 2020-10-30 ENCOUNTER — OFFICE VISIT (OUTPATIENT)
Dept: GASTROENTEROLOGY | Facility: CLINIC | Age: 75
End: 2020-10-30

## 2020-10-30 ENCOUNTER — LAB (OUTPATIENT)
Dept: LAB | Facility: HOSPITAL | Age: 75
End: 2020-10-30

## 2020-10-30 VITALS — BODY MASS INDEX: 25.1 KG/M2 | WEIGHT: 147 LBS | TEMPERATURE: 96.7 F | HEIGHT: 64 IN

## 2020-10-30 DIAGNOSIS — R19.5 LOOSE STOOLS: Primary | ICD-10-CM

## 2020-10-30 DIAGNOSIS — R19.5 MUCOUS IN STOOLS: ICD-10-CM

## 2020-10-30 LAB — C DIFF TOX GENS STL QL NAA+PROBE: POSITIVE

## 2020-10-30 PROCEDURE — 99214 OFFICE O/P EST MOD 30 MIN: CPT | Performed by: NURSE PRACTITIONER

## 2020-10-30 PROCEDURE — 87493 C DIFF AMPLIFIED PROBE: CPT | Performed by: NURSE PRACTITIONER

## 2020-10-30 NOTE — PROGRESS NOTES
Chief Complaint   Patient presents with   • Hospital Follow Up Visit     HPI    Mami Srivastava is a  75 y.o. female here for a follow up visit for C. difficile diarrhea status post hospital discharge reviewed dated 9/19/2020 as follows (established patient of Dr. Hein):    Hospital Course     Ms. Srivastava is a 75 y.o. female with a history of autoimmune hepatitis, HLD, HTN and prediabetes who presented to [facility] initially complaining of mucousy stool and abdominal discomfort.  Please see the admitting history and physical for further details.  She was found to have c diff colitis and was admitted to the hospital for further evaluation and treatment. She was placed on PO vancomycin and will need to complete a 10 day course followed by daily probiotic. Stools have backed down in frequency though mucous still present. She is tolerating a diet and abdominal pain has much improved. She had a colonoscopy 2 years ago. Gastroenterology saw in consultation and does not think she will require any future endoscopic evaluation in regards to c diff diagnosis. She will be discharged home today in stable condition and should follow-up with her PCP in 2 weeks.    Patient had return of diarrhea symptoms as an outpatient and her prescription of vancomycin was refilled.    Today she reports being off vancomycin for approximately 1 week.  She is been doing quite well until she ate some grilled cauliflower and subsequently developed some looser stools with passage of mucus during this timeframe.  She is concerned that C. difficile could be back.  Appetite is good.  Weight is stable.  No rectal bleeding, abdominal pain, rectal pain.  She on daily probiotic Seo colon coUrbanize.    No issues nausea, vomiting, acid reflux/heartburn, or dysphagia.      Past Medical History:   Diagnosis Date   • Autoimmune hepatitis (CMS/HCC)    • Cholelithiasis 04-    Ultrasound   • Coronary artery disease     Dr Luis Rick    • Diverticulitis of colon 2005,2018   • Hemangioma of liver    • Hernia 2017    Hiatal hernia-Prabhjot test   • Hyperlipidemia     Dr Luis Rick-atorvastatin   • Hypertension    • Hyperthyroidism    • Hypothyroidism    • Laceration of finger of right hand 05/15/2018    lac with knife and had 3 stitches   • Osteopenia after menopause    • Skin cancer     face   • Ulcerative colitis (CMS/HCC) 2014    Colitis/DrKaplan       Past Surgical History:   Procedure Laterality Date   • BUNIONECTOMY     • COLONOSCOPY  04/11/2014    Diverticulosis in the sigmoid colon, in the descending colon, in the transverse colon and in the ascending colon (Pathology: Collagenous colitis)   • COLONOSCOPY  12/09/2005    Hemorrhoids, pandiverticulosis, status post diverticulitis   • COLONOSCOPY N/A 11/2/2018    fair prep, tics   • LIVER BIOPSY     • LIVER BIOPSY     • THYROIDECTOMY  2008       Scheduled Meds:  Outpatient Encounter Medications as of 10/30/2020   Medication Sig Dispense Refill   • amLODIPine (NORVASC) 5 MG tablet Take 5 mg by mouth Daily.     • aspirin 81 MG EC tablet Take 81 mg by mouth Daily.     • cetirizine (zyrTEC) 10 MG tablet Take 10 mg by mouth Every Night.     • Cholecalciferol (VITAMIN D3) 5000 units capsule capsule Take 5,000 Units by mouth 2 (Two) Times a Week.     • levothyroxine (SYNTHROID, LEVOTHROID) 75 MCG tablet Take 75 mcg by mouth Daily.     • lisinopril (PRINIVIL,ZESTRIL) 20 MG tablet Take 20 mg by mouth Daily.     • Multiple Vitamin (MULTI VITAMIN DAILY PO) Take 1 tablet by mouth Daily.     • Omega 3 1200 MG capsule Take 1,200 mg by mouth Daily.     • Probiotic Product (Align) capsule Take 1 capsule by mouth Daily. 30 capsule 0   • rosuvastatin (CRESTOR) 10 MG tablet Take 10 mg by mouth Daily.       No facility-administered encounter medications on file as of 10/30/2020.        Continuous Infusions:No current facility-administered medications for this visit.       PRN Meds:.    Allergies   Allergen  Reactions   • Imuran [Azathioprine] GI Intolerance   • Bactrim [Sulfamethoxazole-Trimethoprim] Other (See Comments)     fatigue   • Clindamycin/Lincomycin Diarrhea   • Augmentin [Amoxicillin-Pot Clavulanate] Hives   • Keflex [Cephalexin] Hives       Social History     Socioeconomic History   • Marital status:      Spouse name: Not on file   • Number of children: Not on file   • Years of education: Not on file   • Highest education level: Not on file   Tobacco Use   • Smoking status: Former Smoker     Packs/day: 2.00     Years: 12.00     Pack years: 24.00     Start date: 1963     Quit date: 1976     Years since quittin.7   • Smokeless tobacco: Never Used   • Tobacco comment: Chrinic bronchitis...smoke allergy   Substance and Sexual Activity   • Alcohol use: Yes     Comment: Seldom   • Drug use: No   • Sexual activity: Not Currently       Family History   Problem Relation Age of Onset   • Heart disease Mother    • Hypertension Mother    • Lung cancer Sister    • Thyroid disease Sister    • Lupus Sister    • Thyroid disease Brother    • Alcohol abuse Brother    • Glaucoma Maternal Grandmother    • Stomach cancer Maternal Grandmother             • Cirrhosis Father             • Liver disease Father        Review of Systems   Constitutional: Negative for activity change, appetite change, fatigue, fever and unexpected weight change.   HENT: Negative for trouble swallowing.    Respiratory: Negative for apnea, cough, choking, chest tightness, shortness of breath and wheezing.    Cardiovascular: Negative for chest pain, palpitations and leg swelling.   Gastrointestinal: Negative for abdominal distention, abdominal pain, anal bleeding, blood in stool, constipation, diarrhea, nausea, rectal pain and vomiting.        + Loose stools and mucus       Vitals:    10/30/20 1444   Temp: 96.7 °F (35.9 °C)       Physical Exam  Constitutional:       Appearance: She is well-developed.    Cardiovascular:      Rate and Rhythm: Normal rate and regular rhythm.      Heart sounds: Normal heart sounds.   Pulmonary:      Effort: Pulmonary effort is normal. No respiratory distress.      Breath sounds: Normal breath sounds. No wheezing.   Abdominal:      General: Bowel sounds are normal. There is no distension.      Palpations: Abdomen is soft. There is no mass.      Tenderness: There is no abdominal tenderness. There is no guarding.      Hernia: No hernia is present.   Skin:     General: Skin is warm and dry.      Capillary Refill: Capillary refill takes less than 2 seconds.   Neurological:      Mental Status: She is alert and oriented to person, place, and time.   Psychiatric:         Behavior: Behavior normal.     Problem list    Loose stools  Mucus  History of C. difficile diarrhea  Autoimmune hepatitis    Assessment/plan    Pleasant 75-year-old female seen today in follow-up after she was hospitalized for C. difficile diarrhea in September.  She had a second round of vancomycin as an outpatient and was doing well for approximately 1 week of antibiotics until she ate some grilled cauliflower.  I informed the patient that this could be postinfectious irritable bowel syndrome but we will complete stool test and verify eradication of C. difficile.  Patient will drop off her stool sample this weekend.  Continue probiotics.    Autoimmune hepatitis has remained stable off of medication.  LFTs are normal.    Keep previously scheduled follow-up with Dr. Hein in December.

## 2020-11-01 NOTE — PROGRESS NOTES
Attempted to reach the patient over the phone to inform her of her positive C. difficile test.  Left a message with directions to go  antibiotic therapy and take off work till Thursday.  We will attempt to reach the patient again tomorrow during normal business hours.  E scribed Dificid 200 mg p.o. twice daily x10 days.

## 2020-11-02 ENCOUNTER — OFFICE (OUTPATIENT)
Dept: URBAN - METROPOLITAN AREA CLINIC 2 | Facility: CLINIC | Age: 75
End: 2020-11-02

## 2020-11-02 VITALS — WEIGHT: 5 LBS

## 2020-11-02 DIAGNOSIS — K75.4 AUTOIMMUNE HEPATITIS: ICD-10-CM

## 2020-11-02 DIAGNOSIS — K57.30 DIVERTICULOSIS OF LARGE INTESTINE WITHOUT PERFORATION OR ABS: ICD-10-CM

## 2020-11-02 DIAGNOSIS — A04.72 ENTEROCOLITIS DUE TO CLOSTRIDIUM DIFFICILE, NOT SPECIFIED AS: ICD-10-CM

## 2020-11-02 DIAGNOSIS — R94.5 ABNORMAL RESULTS OF LIVER FUNCTION STUDIES: ICD-10-CM

## 2020-11-02 DIAGNOSIS — K57.92 DIVERTICULITIS OF INTESTINE, PART UNSPECIFIED, WITHOUT PERFO: ICD-10-CM

## 2020-11-02 PROCEDURE — 99204 OFFICE O/P NEW MOD 45 MIN: CPT | Performed by: INTERNAL MEDICINE

## 2020-11-06 ENCOUNTER — TELEPHONE (OUTPATIENT)
Dept: GASTROENTEROLOGY | Facility: CLINIC | Age: 75
End: 2020-11-06

## 2020-11-06 NOTE — TELEPHONE ENCOUNTER
Received message from phone room that patient is needed release forms for work.  I have not received any forms that haven't already been completed and patient has been informed they had already be faxed back.  Attempted to contact patient today and was hung up on when phone was answered.  Will attempt phone call back at a later time.

## 2020-11-09 ENCOUNTER — OFFICE VISIT (OUTPATIENT)
Dept: GASTROENTEROLOGY | Facility: CLINIC | Age: 75
End: 2020-11-09

## 2020-11-09 VITALS
BODY MASS INDEX: 24.59 KG/M2 | DIASTOLIC BLOOD PRESSURE: 67 MMHG | HEIGHT: 64 IN | WEIGHT: 144 LBS | SYSTOLIC BLOOD PRESSURE: 139 MMHG

## 2020-11-09 DIAGNOSIS — A04.72 C. DIFFICILE DIARRHEA: Primary | ICD-10-CM

## 2020-11-09 DIAGNOSIS — K75.4 AUTOIMMUNE HEPATITIS (HCC): ICD-10-CM

## 2020-11-09 PROCEDURE — 99443 PR PHYS/QHP TELEPHONE EVALUATION 21-30 MIN: CPT | Performed by: NURSE PRACTITIONER

## 2020-11-09 RX ORDER — SACCHAROMYCES BOULARDII 250 MG
250 CAPSULE ORAL DAILY
COMMUNITY
End: 2020-11-09

## 2020-11-09 RX ORDER — SACCHAROMYCES BOULARDII 250 MG
250 CAPSULE ORAL 2 TIMES DAILY
Qty: 60 CAPSULE | Refills: 2 | Status: SHIPPED | OUTPATIENT
Start: 2020-11-09 | End: 2021-11-03

## 2020-11-09 NOTE — PROGRESS NOTES
Chief Complaint   Patient presents with   • C. difficile diarrhea     HPI    You have chosen to receive care through a telephone visit. Do you consent to use a telephone visit for your medical care today? YES    Mami Srivastava is a  75 y.o. female here for a follow up visit for C. difficile diarrhea. She also follows with Dr. Hein.  She has history of autoimmune hepatitis, HLD, HTN and and prediabetes.  This patient was hospitalized at [facility] in September found to have C. difficile colitis.  She was treated with 10 days of vancomycin and then received a refill as an outpatient when symptoms return.  I saw her in the office 2 weeks ago and again stool was positive for C. difficile therefore patient was treated with a course of Dificid.     This patient is about jail through treatment with Dificid for C. difficile diarrhea.  She has had significant reduction in stool frequency.  Mucus has resolved.  She had 1 episode of loose stool yesterday but feeling much better.  No rectal bleeding.  No fever or chills.  No abdominal pain or rectal pain.  Overall much improved.  She did start Florastor probiotics as well.    Past Medical History:   Diagnosis Date   • Autoimmune hepatitis (CMS/HCC)    • Cholelithiasis 04-    Ultrasound   • Coronary artery disease     Dr Luis Rick   • Diverticulitis of colon 2005,2018   • Hemangioma of liver    • Hernia 2017    Hiatal hernia-Prabhjot test   • Hyperlipidemia     Dr Luis Rick-atorvastatin   • Hypertension    • Hyperthyroidism    • Hypothyroidism    • Laceration of finger of right hand 05/15/2018    lac with knife and had 3 stitches   • Osteopenia after menopause    • Skin cancer     face   • Ulcerative colitis (CMS/HCC) 2014    Colitis/[facility]       Past Surgical History:   Procedure Laterality Date   • BUNIONECTOMY     • COLONOSCOPY  04/11/2014    Diverticulosis in the sigmoid colon, in the descending colon, in the transverse colon and in the ascending colon  (Pathology: Collagenous colitis)   • COLONOSCOPY  12/09/2005    Hemorrhoids, pandiverticulosis, status post diverticulitis   • COLONOSCOPY N/A 11/2/2018    fair prep, tics   • LIVER BIOPSY     • LIVER BIOPSY     • THYROIDECTOMY  2008       Scheduled Meds:  Outpatient Encounter Medications as of 11/9/2020   Medication Sig Dispense Refill   • amLODIPine (NORVASC) 5 MG tablet Take 5 mg by mouth Daily.     • aspirin 81 MG EC tablet Take 81 mg by mouth Daily.     • cetirizine (zyrTEC) 10 MG tablet Take 10 mg by mouth As Needed.     • Cholecalciferol (VITAMIN D3) 5000 units capsule capsule Take 5,000 Units by mouth 2 (Two) Times a Week.     • fidaxomicin (Dificid) 200 MG tablet Take 1 tablet by mouth 2 (Two) Times a Day for 10 days. 20 tablet 0   • levothyroxine (SYNTHROID, LEVOTHROID) 75 MCG tablet Take 75 mcg by mouth Daily.     • lisinopril (PRINIVIL,ZESTRIL) 20 MG tablet Take 20 mg by mouth Daily.     • Multiple Vitamin (MULTI VITAMIN DAILY PO) Take 1 tablet by mouth Daily.     • Omega 3 1200 MG capsule Take 1,200 mg by mouth Daily.     • rosuvastatin (CRESTOR) 10 MG tablet Take 10 mg by mouth Daily.     • saccharomyces boulardii (FLORASTOR) 250 MG capsule Take 250 mg by mouth Daily.     • Probiotic Product (Align) capsule Take 1 capsule by mouth Daily. 30 capsule 0     No facility-administered encounter medications on file as of 11/9/2020.        Continuous Infusions:No current facility-administered medications for this visit.       PRN Meds:.    Allergies   Allergen Reactions   • Imuran [Azathioprine] GI Intolerance   • Bactrim [Sulfamethoxazole-Trimethoprim] Other (See Comments)     fatigue   • Clindamycin/Lincomycin Diarrhea   • Augmentin [Amoxicillin-Pot Clavulanate] Hives   • Keflex [Cephalexin] Hives       Social History     Socioeconomic History   • Marital status:      Spouse name: Not on file   • Number of children: Not on file   • Years of education: Not on file   • Highest education level: Not on  file   Tobacco Use   • Smoking status: Former Smoker     Packs/day: 2.00     Years: 12.00     Pack years: 24.00     Start date: 1963     Quit date: 1976     Years since quittin.8   • Smokeless tobacco: Never Used   • Tobacco comment: Chrinic bronchitis...smoke allergy   Substance and Sexual Activity   • Alcohol use: Yes     Comment: Seldom   • Drug use: No   • Sexual activity: Not Currently       Family History   Problem Relation Age of Onset   • Heart disease Mother    • Hypertension Mother    • Lung cancer Sister    • Thyroid disease Sister    • Lupus Sister    • Thyroid disease Brother    • Alcohol abuse Brother    • Glaucoma Maternal Grandmother    • Stomach cancer Maternal Grandmother          ’s   • Cirrhosis Father             • Liver disease Father        Review of Systems   Constitutional: Negative for activity change, appetite change, fatigue, fever and unexpected weight change.   HENT: Negative for trouble swallowing.    Respiratory: Negative for apnea, cough, choking, chest tightness, shortness of breath and wheezing.    Cardiovascular: Negative for chest pain, palpitations and leg swelling.   Gastrointestinal: Negative for abdominal distention, abdominal pain, anal bleeding, blood in stool, constipation, diarrhea, nausea, rectal pain and vomiting.       Vitals:    20 1119   BP: 139/67       Physical Exam  Constitutional:       Appearance: She is well-developed.   Neurological:      Mental Status: She is oriented to person, place, and time.   Psychiatric:         Behavior: Behavior normal.         No radiology results for the last 7 days    Diagnoses and all orders for this visit:    1. C. difficile diarrhea (Primary)    2. Autoimmune hepatitis (CMS/HCC)      Assessment/plan    Pleasant 75-year-old female seen today via telehealth visit/phone call to follow-up on recent C. difficile positive stools treated with course of Dificid.  She is doing much better.  Mucus has  resolved.  She had 1 loose stool yesterday but bowels are more formed and frequency has reduced by 50%.  Recommend she finish Dificid as prescribed.  Continue Florastor probiotics for at least the next 6 weeks preferably up to 3 months.  I did caution the patient against antibiotic therapy in the future as she will always be at risk for development of C. difficile diarrhea.  She can return to work November 16.      Stable autoimmune hepatitis, recent LFTs normal.  Consider repeat liver function test at next office visit.    Keep previously scheduled follow-up with Dr. Hein in December.  Call with any questions or concerns in the interim.    (Telehealth visit/phone call 25 minutes duration.)

## 2020-12-08 ENCOUNTER — LAB (OUTPATIENT)
Dept: LAB | Facility: HOSPITAL | Age: 75
End: 2020-12-08

## 2020-12-08 ENCOUNTER — OFFICE VISIT (OUTPATIENT)
Dept: GASTROENTEROLOGY | Facility: CLINIC | Age: 75
End: 2020-12-08

## 2020-12-08 ENCOUNTER — TRANSCRIBE ORDERS (OUTPATIENT)
Dept: ADMINISTRATIVE | Facility: HOSPITAL | Age: 75
End: 2020-12-08

## 2020-12-08 VITALS — TEMPERATURE: 96.9 F | BODY MASS INDEX: 24.82 KG/M2 | WEIGHT: 145.4 LBS | HEIGHT: 64 IN

## 2020-12-08 DIAGNOSIS — K75.4 CHRONIC AGGRESSIVE HEPATITIS (HCC): Primary | ICD-10-CM

## 2020-12-08 DIAGNOSIS — K75.4 AUTOIMMUNE HEPATITIS (HCC): Primary | ICD-10-CM

## 2020-12-08 LAB
ALBUMIN SERPL-MCNC: 4.8 G/DL (ref 3.5–5.2)
ALBUMIN/GLOB SERPL: 2 G/DL
ALP SERPL-CCNC: 74 U/L (ref 39–117)
ALT SERPL W P-5'-P-CCNC: 25 U/L (ref 1–33)
ANION GAP SERPL CALCULATED.3IONS-SCNC: 9.1 MMOL/L (ref 5–15)
AST SERPL-CCNC: 32 U/L (ref 1–32)
BILIRUB SERPL-MCNC: 1.1 MG/DL (ref 0–1.2)
BUN SERPL-MCNC: 14 MG/DL (ref 8–23)
BUN/CREAT SERPL: 17.9 (ref 7–25)
CALCIUM SPEC-SCNC: 9.8 MG/DL (ref 8.6–10.5)
CHLORIDE SERPL-SCNC: 101 MMOL/L (ref 98–107)
CO2 SERPL-SCNC: 27.9 MMOL/L (ref 22–29)
CREAT SERPL-MCNC: 0.78 MG/DL (ref 0.57–1)
GFR SERPL CREATININE-BSD FRML MDRD: 72 ML/MIN/1.73
GLOBULIN UR ELPH-MCNC: 2.4 GM/DL
GLUCOSE SERPL-MCNC: 100 MG/DL (ref 65–99)
POTASSIUM SERPL-SCNC: 3.9 MMOL/L (ref 3.5–5.2)
PROT SERPL-MCNC: 7.2 G/DL (ref 6–8.5)
SODIUM SERPL-SCNC: 138 MMOL/L (ref 136–145)

## 2020-12-08 PROCEDURE — 99213 OFFICE O/P EST LOW 20 MIN: CPT | Performed by: INTERNAL MEDICINE

## 2020-12-08 PROCEDURE — 36415 COLL VENOUS BLD VENIPUNCTURE: CPT | Performed by: INTERNAL MEDICINE

## 2020-12-08 PROCEDURE — 80053 COMPREHEN METABOLIC PANEL: CPT | Performed by: INTERNAL MEDICINE

## 2020-12-08 NOTE — PROGRESS NOTES
Chief Complaint   Patient presents with   • Follow-up       Mami Srivastava is a  75 y.o. female here for a follow up visit for autoimmune hepatitis.    HPI     Patient 75-year-old female with history of hyperlipidemia, hypertension, hypothyroid status post C. difficile colitis here for follow-up.  Patient's GI tract is doing well now status post Dificid therapy for her C. difficile.  Patient's last CMP did show mild rise in ALT levels.  Patient here now for follow-up.    Past Medical History:   Diagnosis Date   • Autoimmune hepatitis (CMS/HCC)    • Cholelithiasis 04-    Ultrasound   • Coronary artery disease     Dr Luis Rick   • Diverticulitis of colon 2005,2018   • Hemangioma of liver    • Hernia 2017    Hiatal hernia-Prabhjot test   • Hyperlipidemia     Dr Luis Rick-atorvastatin   • Hypertension    • Hyperthyroidism    • Hypothyroidism    • Laceration of finger of right hand 05/15/2018    lac with knife and had 3 stitches   • Osteopenia after menopause    • Skin cancer     face   • Ulcerative colitis (CMS/HCC) 2014    Colitis/Arben         Current Outpatient Medications:   •  amLODIPine (NORVASC) 5 MG tablet, Take 5 mg by mouth Daily., Disp: , Rfl:   •  aspirin 81 MG EC tablet, Take 81 mg by mouth Daily., Disp: , Rfl:   •  cetirizine (zyrTEC) 10 MG tablet, Take 10 mg by mouth As Needed., Disp: , Rfl:   •  Cholecalciferol (VITAMIN D3) 5000 units capsule capsule, Take 5,000 Units by mouth 2 (Two) Times a Week., Disp: , Rfl:   •  levothyroxine (SYNTHROID, LEVOTHROID) 75 MCG tablet, Take 75 mcg by mouth Daily., Disp: , Rfl:   •  lisinopril (PRINIVIL,ZESTRIL) 20 MG tablet, Take 20 mg by mouth Daily., Disp: , Rfl:   •  Multiple Vitamin (MULTI VITAMIN DAILY PO), Take 1 tablet by mouth Daily., Disp: , Rfl:   •  Omega 3 1200 MG capsule, Take 1,200 mg by mouth Daily., Disp: , Rfl:   •  rosuvastatin (CRESTOR) 10 MG tablet, Take 10 mg by mouth Daily., Disp: , Rfl:   •  saccharomyces boulardii (Florastor) 250 MG  capsule, Take 1 capsule by mouth 2 (Two) Times a Day., Disp: 60 capsule, Rfl: 2    Allergies   Allergen Reactions   • Imuran [Azathioprine] GI Intolerance   • Bactrim [Sulfamethoxazole-Trimethoprim] Other (See Comments)     fatigue   • Clindamycin/Lincomycin Diarrhea   • Augmentin [Amoxicillin-Pot Clavulanate] Hives   • Keflex [Cephalexin] Hives       Social History     Socioeconomic History   • Marital status:      Spouse name: Not on file   • Number of children: Not on file   • Years of education: Not on file   • Highest education level: Not on file   Tobacco Use   • Smoking status: Former Smoker     Packs/day: 2.00     Years: 12.00     Pack years: 24.00     Start date: 1963     Quit date: 1976     Years since quittin.8   • Smokeless tobacco: Never Used   • Tobacco comment: Chrinic bronchitis...smoke allergy   Substance and Sexual Activity   • Alcohol use: Yes     Comment: Seldom   • Drug use: No   • Sexual activity: Not Currently       Family History   Problem Relation Age of Onset   • Heart disease Mother    • Hypertension Mother    • Lung cancer Sister    • Thyroid disease Sister    • Lupus Sister    • Thyroid disease Brother    • Alcohol abuse Brother    • Glaucoma Maternal Grandmother    • Stomach cancer Maternal Grandmother          ’s   • Cirrhosis Father             • Liver disease Father        Review of Systems   Constitutional: Negative.    Respiratory: Negative.    Cardiovascular: Negative.    Gastrointestinal: Negative.    Musculoskeletal: Negative.    Skin: Negative.    Hematological: Negative.        Vitals:    20 1513   Temp: 96.9 °F (36.1 °C)       Physical Exam  Vitals signs reviewed.   Constitutional:       Appearance: She is well-developed.   HENT:      Head: Normocephalic and atraumatic.   Eyes:      General: No scleral icterus.     Pupils: Pupils are equal, round, and reactive to light.   Cardiovascular:      Rate and Rhythm: Normal rate and  regular rhythm.      Heart sounds: Normal heart sounds.   Pulmonary:      Effort: Pulmonary effort is normal. No respiratory distress.      Breath sounds: Normal breath sounds.   Abdominal:      General: Bowel sounds are normal. There is no distension.      Palpations: Abdomen is soft. There is no mass.      Tenderness: There is no abdominal tenderness.      Hernia: No hernia is present.   Skin:     General: Skin is warm and dry.      Coloration: Skin is not jaundiced.      Findings: No rash.   Neurological:      General: No focal deficit present.      Mental Status: She is alert and oriented to person, place, and time.   Psychiatric:         Behavior: Behavior normal.         Thought Content: Thought content normal.         Office Visit on 10/30/2020   Component Date Value Ref Range Status   • C. Difficile Toxins by PCR 10/30/2020 Positive* Negative Final       Diagnoses and all orders for this visit:    1. Autoimmune hepatitis (CMS/HCC) (Primary)  -     Comprehensive Metabolic Panel      Patient with history of autoimmune hepatitis last CMP with mildly elevated ALT level.  Patient status post C. difficile colitis in October November.  Okay for patient to stop the Florastor but keep on hand in case she needs antibiotics to restart.  Will check LFTs today see if her liver enzymes starting to rise again, may need repeat therapy for hepatitis if improved will just monitor.  Patient to follow-up in 6 months.

## 2020-12-10 RX ORDER — AMLODIPINE BESYLATE 5 MG/1
TABLET ORAL
Qty: 90 TABLET | Refills: 3 | Status: SHIPPED | OUTPATIENT
Start: 2020-12-10 | End: 2021-11-02 | Stop reason: SDUPTHER

## 2020-12-11 ENCOUNTER — TELEPHONE (OUTPATIENT)
Dept: GASTROENTEROLOGY | Facility: CLINIC | Age: 75
End: 2020-12-11

## 2020-12-11 NOTE — TELEPHONE ENCOUNTER
----- Message from Shiva Hein MD sent at 12/10/2020  1:23 PM EST -----  Liver enzymes returned to normal.  Follow-up as directed

## 2021-02-08 RX ORDER — LEVOTHYROXINE SODIUM 75 MCG
TABLET ORAL
Qty: 90 TABLET | Refills: 1 | Status: SHIPPED | OUTPATIENT
Start: 2021-02-08 | End: 2021-07-26

## 2021-03-02 DIAGNOSIS — Z23 IMMUNIZATION DUE: ICD-10-CM

## 2021-03-23 ENCOUNTER — TELEPHONE (OUTPATIENT)
Dept: FAMILY MEDICINE CLINIC | Facility: CLINIC | Age: 76
End: 2021-03-23

## 2021-03-23 NOTE — TELEPHONE ENCOUNTER
PT CALLED STATING SHE IS GOING OUT OF TOWN ON SAT 03/27 AND THE STATE IS REQUIRING A COVID TEST BEFORE  SHE CAN GO. BUT SHE HAS HAD BOTH COVID VACCINES AND SHE IS WANTING TO KNOW IF THIS MAY CAUSE HER TO HAVE A POSITIVE TEST EVEN WHEN SHED NEGATIVE.

## 2021-04-01 NOTE — TELEPHONE ENCOUNTER
Caller: Mami Srivastava    Relationship: Self    Best call back number: [phone]    What is the best time to reach you: ANYTIME    Who are you requesting to speak with (clinical staff, provider,  specific staff member): CLINICAL STAFF    Do you know the name of the person who called: JUDSON    What was the call regarding: PATIENT HAD CALLED IN ABOUT THE COVID TESTING AND SHE SAID NO ONE CALLED HER BACK LAST WEEK. SHE JUST MISSED A CALL LAST NIGHT. SHE HAS LEFT OUT ON VACATION LAST WEEK AND WILL BE COMING HOME Saturday.     Do you require a callback: YES

## 2021-04-22 DIAGNOSIS — R73.9 HYPERGLYCEMIA: ICD-10-CM

## 2021-04-22 DIAGNOSIS — E78.2 MIXED HYPERLIPIDEMIA: Primary | ICD-10-CM

## 2021-04-22 DIAGNOSIS — E55.9 VITAMIN D DEFICIENCY: ICD-10-CM

## 2021-04-27 LAB
25(OH)D3+25(OH)D2 SERPL-MCNC: 43.6 NG/ML (ref 30–100)
ALBUMIN SERPL-MCNC: 4.6 G/DL (ref 3.5–5.2)
ALBUMIN/GLOB SERPL: 2.6 G/DL
ALP SERPL-CCNC: 65 U/L (ref 39–117)
ALT SERPL-CCNC: 27 U/L (ref 1–33)
APPEARANCE UR: CLEAR
AST SERPL-CCNC: 26 U/L (ref 1–32)
BACTERIA #/AREA URNS HPF: ABNORMAL /HPF
BASOPHILS # BLD AUTO: 0.03 10*3/MM3 (ref 0–0.2)
BASOPHILS NFR BLD AUTO: 0.5 % (ref 0–1.5)
BILIRUB SERPL-MCNC: 1.2 MG/DL (ref 0–1.2)
BILIRUB UR QL STRIP: NEGATIVE
BUN SERPL-MCNC: 17 MG/DL (ref 8–23)
BUN/CREAT SERPL: 20.5 (ref 7–25)
CALCIUM SERPL-MCNC: 9.9 MG/DL (ref 8.6–10.5)
CASTS URNS MICRO: ABNORMAL
CHLORIDE SERPL-SCNC: 104 MMOL/L (ref 98–107)
CHOLEST SERPL-MCNC: 120 MG/DL (ref 0–200)
CO2 SERPL-SCNC: 28 MMOL/L (ref 22–29)
COLOR UR: YELLOW
CREAT SERPL-MCNC: 0.83 MG/DL (ref 0.57–1)
EOSINOPHIL # BLD AUTO: 0.29 10*3/MM3 (ref 0–0.4)
EOSINOPHIL NFR BLD AUTO: 5.2 % (ref 0.3–6.2)
EPI CELLS #/AREA URNS HPF: ABNORMAL /HPF
ERYTHROCYTE [DISTWIDTH] IN BLOOD BY AUTOMATED COUNT: 12.8 % (ref 12.3–15.4)
GLOBULIN SER CALC-MCNC: 1.8 GM/DL
GLUCOSE SERPL-MCNC: 114 MG/DL (ref 65–99)
GLUCOSE UR QL: NEGATIVE
HBA1C MFR BLD: 6.3 % (ref 4.8–5.6)
HCT VFR BLD AUTO: 38.5 % (ref 34–46.6)
HDLC SERPL-MCNC: 47 MG/DL (ref 40–60)
HGB BLD-MCNC: 13.2 G/DL (ref 12–15.9)
HGB UR QL STRIP: NEGATIVE
IMM GRANULOCYTES # BLD AUTO: 0.02 10*3/MM3 (ref 0–0.05)
IMM GRANULOCYTES NFR BLD AUTO: 0.4 % (ref 0–0.5)
KETONES UR QL STRIP: NEGATIVE
LDLC SERPL CALC-MCNC: 51 MG/DL (ref 0–100)
LDLC/HDLC SERPL: 1.01 {RATIO}
LEUKOCYTE ESTERASE UR QL STRIP: ABNORMAL
LYMPHOCYTES # BLD AUTO: 1.44 10*3/MM3 (ref 0.7–3.1)
LYMPHOCYTES NFR BLD AUTO: 25.6 % (ref 19.6–45.3)
MCH RBC QN AUTO: 29.9 PG (ref 26.6–33)
MCHC RBC AUTO-ENTMCNC: 34.3 G/DL (ref 31.5–35.7)
MCV RBC AUTO: 87.3 FL (ref 79–97)
MONOCYTES # BLD AUTO: 0.45 10*3/MM3 (ref 0.1–0.9)
MONOCYTES NFR BLD AUTO: 8 % (ref 5–12)
NEUTROPHILS # BLD AUTO: 3.39 10*3/MM3 (ref 1.7–7)
NEUTROPHILS NFR BLD AUTO: 60.3 % (ref 42.7–76)
NITRITE UR QL STRIP: NEGATIVE
NRBC BLD AUTO-RTO: 0 /100 WBC (ref 0–0.2)
PH UR STRIP: 5.5 [PH] (ref 5–8)
PLATELET # BLD AUTO: 181 10*3/MM3 (ref 140–450)
POTASSIUM SERPL-SCNC: 4.7 MMOL/L (ref 3.5–5.2)
PROT SERPL-MCNC: 6.4 G/DL (ref 6–8.5)
PROT UR QL STRIP: NEGATIVE
RBC # BLD AUTO: 4.41 10*6/MM3 (ref 3.77–5.28)
RBC #/AREA URNS HPF: ABNORMAL /HPF
SODIUM SERPL-SCNC: 142 MMOL/L (ref 136–145)
SP GR UR: 1.02 (ref 1–1.03)
TRIGL SERPL-MCNC: 127 MG/DL (ref 0–150)
UROBILINOGEN UR STRIP-MCNC: ABNORMAL MG/DL
VLDLC SERPL CALC-MCNC: 22 MG/DL (ref 5–40)
WBC # BLD AUTO: 5.62 10*3/MM3 (ref 3.4–10.8)
WBC #/AREA URNS HPF: ABNORMAL /HPF

## 2021-04-30 ENCOUNTER — OFFICE VISIT (OUTPATIENT)
Dept: FAMILY MEDICINE CLINIC | Facility: CLINIC | Age: 76
End: 2021-04-30

## 2021-04-30 VITALS
HEART RATE: 70 BPM | WEIGHT: 147.8 LBS | SYSTOLIC BLOOD PRESSURE: 116 MMHG | OXYGEN SATURATION: 97 % | DIASTOLIC BLOOD PRESSURE: 74 MMHG | BODY MASS INDEX: 25.23 KG/M2 | HEIGHT: 64 IN | TEMPERATURE: 96.9 F

## 2021-04-30 DIAGNOSIS — R35.0 URINARY FREQUENCY: Primary | ICD-10-CM

## 2021-04-30 DIAGNOSIS — E78.2 MIXED HYPERLIPIDEMIA: ICD-10-CM

## 2021-04-30 DIAGNOSIS — I10 ESSENTIAL HYPERTENSION: ICD-10-CM

## 2021-04-30 DIAGNOSIS — R73.9 HYPERGLYCEMIA: ICD-10-CM

## 2021-04-30 DIAGNOSIS — E03.9 HYPOTHYROIDISM, UNSPECIFIED TYPE: Primary | ICD-10-CM

## 2021-04-30 DIAGNOSIS — K75.4 AUTOIMMUNE HEPATITIS (HCC): ICD-10-CM

## 2021-04-30 PROCEDURE — 99214 OFFICE O/P EST MOD 30 MIN: CPT | Performed by: INTERNAL MEDICINE

## 2021-04-30 NOTE — PROGRESS NOTES
Subjective   Mami Srivastava is a 75 y.o. female. Patient is here today for   Chief Complaint   Patient presents with   • Follow-up     lab results          Vitals:    21 1356   BP: 116/74   Pulse: 70   Temp: 96.9 °F (36.1 °C)   SpO2: 97%     Body mass index is 25.37 kg/m².      Past Medical History:   Diagnosis Date   • Autoimmune hepatitis (CMS/HCC)    • Cholelithiasis 2018    Ultrasound   • Coronary artery disease     Dr Luis Rick   • Diverticulitis of colon ,   • Hemangioma of liver    • Hernia 2017    Hiatal hernia-Prabhjot test   • Hyperlipidemia     Dr Luis Rick-atorvastatin   • Hypertension    • Hyperthyroidism    • Hypothyroidism    • Laceration of finger of right hand 05/15/2018    lac with knife and had 3 stitches   • Osteopenia after menopause    • Skin cancer     face   • Ulcerative colitis (CMS/HCC)     Colitis/DrKaplan      Allergies   Allergen Reactions   • Imuran [Azathioprine] GI Intolerance   • Bactrim [Sulfamethoxazole-Trimethoprim] Other (See Comments)     fatigue   • Clindamycin/Lincomycin Diarrhea   • Augmentin [Amoxicillin-Pot Clavulanate] Hives   • Keflex [Cephalexin] Hives      Social History     Socioeconomic History   • Marital status:      Spouse name: Not on file   • Number of children: Not on file   • Years of education: Not on file   • Highest education level: Not on file   Tobacco Use   • Smoking status: Former Smoker     Packs/day: 2.00     Years: 12.00     Pack years: 24.00     Start date: 1963     Quit date: 1976     Years since quittin.2   • Smokeless tobacco: Never Used   • Tobacco comment: Chrinic bronchitis...smoke allergy   Substance and Sexual Activity   • Alcohol use: Not Currently     Comment: none in 3 years   • Drug use: No   • Sexual activity: Not Currently        Current Outpatient Medications:   •  amLODIPine (NORVASC) 5 MG tablet, TAKE 1 TABLET BY MOUTH EVERY DAY, Disp: 90 tablet, Rfl: 3  •  aspirin 81 MG EC tablet,  Take 81 mg by mouth Daily., Disp: , Rfl:   •  cetirizine (zyrTEC) 10 MG tablet, Take 10 mg by mouth As Needed., Disp: , Rfl:   •  Cholecalciferol (VITAMIN D3) 5000 units capsule capsule, Take 5,000 Units by mouth 2 (Two) Times a Week., Disp: , Rfl:   •  lisinopril (PRINIVIL,ZESTRIL) 20 MG tablet, Take 20 mg by mouth Daily., Disp: , Rfl:   •  Multiple Vitamin (MULTI VITAMIN DAILY PO), Take 1 tablet by mouth Daily., Disp: , Rfl:   •  Omega 3 1200 MG capsule, Take 1,200 mg by mouth Daily., Disp: , Rfl:   •  rosuvastatin (CRESTOR) 10 MG tablet, Take 10 mg by mouth Daily., Disp: , Rfl:   •  saccharomyces boulardii (Florastor) 250 MG capsule, Take 1 capsule by mouth 2 (Two) Times a Day., Disp: 60 capsule, Rfl: 2  •  Synthroid 75 MCG tablet, TAKE 1 TABLET DAILY FOR    THYROID, Disp: 90 tablet, Rfl: 1     Objective     This pleasant lady is here to follow-up on labs from last week.    He tells me that she is feeling very tired.       Review of Systems   Constitutional: Positive for fatigue.   HENT: Negative.    Respiratory: Negative.    Cardiovascular: Negative.    Musculoskeletal: Negative.    Psychiatric/Behavioral: Negative.        Physical Exam  Vitals and nursing note reviewed.   Constitutional:       Appearance: Normal appearance. She is normal weight.      Comments: Pleasant, neatly groomed, in no distress.   HENT:      Head: Atraumatic.   Neck:      Vascular: No carotid bruit.   Cardiovascular:      Heart sounds: Normal heart sounds. No murmur heard.   No gallop.    Pulmonary:      Effort: Pulmonary effort is normal. No respiratory distress.      Breath sounds: Normal breath sounds. No rales.   Neurological:      Mental Status: She is alert and oriented to person, place, and time.   Psychiatric:         Mood and Affect: Mood normal.         Behavior: Behavior normal.         Thought Content: Thought content normal.           Problems Addressed this Visit        Cardiac and Vasculature    Mixed hyperlipidemia     Hypertension       Endocrine and Metabolic    Hypothyroidism - Primary    Relevant Orders    TSH    Hyperglycemia       Gastrointestinal Abdominal     Autoimmune hepatitis (CMS/HCC)      Diagnoses       Codes Comments    Hypothyroidism, unspecified type    -  Primary ICD-10-CM: E03.9  ICD-9-CM: 244.9     Essential hypertension     ICD-10-CM: I10  ICD-9-CM: 401.9     Mixed hyperlipidemia     ICD-10-CM: E78.2  ICD-9-CM: 272.2     Hyperglycemia     ICD-10-CM: R73.9  ICD-9-CM: 790.29     Autoimmune hepatitis (CMS/HCC)     ICD-10-CM: K75.4  ICD-9-CM: 571.42             PLAN  She and I reviewed her labs.    She has hypothyroidism.  Her TSH and free T4 were not checked.  I would add that lab work onto labs done last week.    Her hypercholesterolemia is very well controlled on rosuvastatin 10 mg daily.    Regarding fatigue.  I will check a TSH and free T4.  She is not anemic, she has not developed any chronic renal insufficiency or hepatic insufficiency.  I told her that if her thyroid levels were normal I will probably be inclined to asked her to go for sleep study but she tells me that she would not be inclined to follow-up on this.    Analysis showed some white cells in urine but no bacteria and no leukocyte esterase.  Does not have any urinary tract symptoms today except that she does have some urgency occasionally but there is no frequency or burning.  Send her urine off for culture to see what grows.    I like to see her back every 6 months or so depending on the labs (      No follow-ups on file.

## 2021-05-01 LAB
Lab: NORMAL
TSH SERPL DL<=0.005 MIU/L-ACNC: 1.75 UIU/ML (ref 0.27–4.2)
WRITTEN AUTHORIZATION: NORMAL

## 2021-05-03 LAB
BACTERIA UR CULT: ABNORMAL
BACTERIA UR CULT: ABNORMAL
OTHER ANTIBIOTIC SUSC ISLT: ABNORMAL

## 2021-05-04 ENCOUNTER — TELEPHONE (OUTPATIENT)
Dept: FAMILY MEDICINE CLINIC | Facility: CLINIC | Age: 76
End: 2021-05-04

## 2021-05-04 NOTE — TELEPHONE ENCOUNTER
Caller: Mami Srivastava    Relationship: Self    Best call back number: [phone]    What test was performed: URINE CULTURE    When was the test performed: 04/30/21    Where was the test performed: [facility]    Additional notes: PATIENT STATES THAT SHE RECEIVED HER URINE CULTURE RESULTS ON [facility] AND IS WANTING TO BE ADVISED

## 2021-05-06 ENCOUNTER — OFFICE VISIT (OUTPATIENT)
Dept: FAMILY MEDICINE CLINIC | Facility: CLINIC | Age: 76
End: 2021-05-06

## 2021-05-06 VITALS
WEIGHT: 149.5 LBS | BODY MASS INDEX: 25.52 KG/M2 | HEIGHT: 64 IN | TEMPERATURE: 97.5 F | SYSTOLIC BLOOD PRESSURE: 124 MMHG | HEART RATE: 69 BPM | DIASTOLIC BLOOD PRESSURE: 62 MMHG | RESPIRATION RATE: 18 BRPM | OXYGEN SATURATION: 97 %

## 2021-05-06 DIAGNOSIS — N39.0 URINARY TRACT INFECTION WITHOUT HEMATURIA, SITE UNSPECIFIED: Primary | ICD-10-CM

## 2021-05-06 PROCEDURE — 99213 OFFICE O/P EST LOW 20 MIN: CPT | Performed by: INTERNAL MEDICINE

## 2021-05-08 LAB
BACTERIA UR CULT: ABNORMAL
OTHER ANTIBIOTIC SUSC ISLT: ABNORMAL

## 2021-05-10 ENCOUNTER — TELEPHONE (OUTPATIENT)
Dept: FAMILY MEDICINE CLINIC | Facility: CLINIC | Age: 76
End: 2021-05-10

## 2021-05-10 NOTE — TELEPHONE ENCOUNTER
Caller: Mami Srivastava    Relationship: Self    Best call back number: [phone]     What test was performed: UA     When was the test performed: 05/6    Where was the test performed: OFFICE

## 2021-05-12 ENCOUNTER — OFFICE VISIT (OUTPATIENT)
Dept: FAMILY MEDICINE CLINIC | Facility: CLINIC | Age: 76
End: 2021-05-12

## 2021-05-12 VITALS
DIASTOLIC BLOOD PRESSURE: 60 MMHG | HEART RATE: 75 BPM | OXYGEN SATURATION: 99 % | BODY MASS INDEX: 24.92 KG/M2 | HEIGHT: 64 IN | TEMPERATURE: 96.4 F | SYSTOLIC BLOOD PRESSURE: 128 MMHG | WEIGHT: 146 LBS | RESPIRATION RATE: 16 BRPM

## 2021-05-12 DIAGNOSIS — A49.9 BACTERIAL UTI: Primary | ICD-10-CM

## 2021-05-12 DIAGNOSIS — N39.0 BACTERIAL UTI: Primary | ICD-10-CM

## 2021-05-12 PROCEDURE — 99213 OFFICE O/P EST LOW 20 MIN: CPT | Performed by: INTERNAL MEDICINE

## 2021-05-12 RX ORDER — CIPROFLOXACIN 500 MG/1
500 TABLET, FILM COATED ORAL 2 TIMES DAILY
Qty: 10 TABLET | Refills: 0 | Status: SHIPPED | OUTPATIENT
Start: 2021-05-12 | End: 2021-07-12

## 2021-05-12 RX ORDER — NITROFURANTOIN 25; 75 MG/1; MG/1
100 CAPSULE ORAL EVERY 12 HOURS SCHEDULED
Qty: 10 CAPSULE | Refills: 0 | Status: SHIPPED | OUTPATIENT
Start: 2021-05-12 | End: 2021-05-12

## 2021-05-12 NOTE — PROGRESS NOTES
Subjective   Mami Srivastava is a 75 y.o. female. Patient is here today for   Chief Complaint   Patient presents with   • Hypothyroidism     lab result follow          Vitals:    21 0802   BP: 128/60   Pulse: 75   Resp: 16   Temp: 96.4 °F (35.8 °C)   SpO2: 99%     Body mass index is 25.05 kg/m².      Past Medical History:   Diagnosis Date   • Autoimmune hepatitis (CMS/HCC)    • Cholelithiasis 2018    Ultrasound   • Coronary artery disease     Dr Luis Rick   • Diverticulitis of colon ,   • Hemangioma of liver    • Hernia 2017    Hiatal hernia-Prabhjot test   • Hyperlipidemia     Dr Luis Rick-atorvastatin   • Hypertension    • Hyperthyroidism    • Hypothyroidism    • Laceration of finger of right hand 05/15/2018    lac with knife and had 3 stitches   • Osteopenia after menopause    • Skin cancer     face   • Ulcerative colitis (CMS/HCC)     Colitis/DrKaplan      Allergies   Allergen Reactions   • Imuran [Azathioprine] GI Intolerance   • Bactrim [Sulfamethoxazole-Trimethoprim] Other (See Comments)     fatigue   • Clindamycin/Lincomycin Diarrhea   • Augmentin [Amoxicillin-Pot Clavulanate] Hives   • Keflex [Cephalexin] Hives      Social History     Socioeconomic History   • Marital status:      Spouse name: Not on file   • Number of children: Not on file   • Years of education: Not on file   • Highest education level: Not on file   Tobacco Use   • Smoking status: Former Smoker     Packs/day: 2.00     Years: 12.00     Pack years: 24.00     Start date: 1963     Quit date: 1976     Years since quittin.3   • Smokeless tobacco: Never Used   • Tobacco comment: Chrinic bronchitis...smoke allergy   Substance and Sexual Activity   • Alcohol use: Not Currently     Comment: none in 3 years   • Drug use: No   • Sexual activity: Not Currently        Current Outpatient Medications:   •  amLODIPine (NORVASC) 5 MG tablet, TAKE 1 TABLET BY MOUTH EVERY DAY, Disp: 90 tablet, Rfl: 3  •   aspirin 81 MG EC tablet, Take 81 mg by mouth Daily., Disp: , Rfl:   •  cetirizine (zyrTEC) 10 MG tablet, Take 10 mg by mouth As Needed., Disp: , Rfl:   •  Cholecalciferol (VITAMIN D3) 5000 units capsule capsule, Take 5,000 Units by mouth 2 (Two) Times a Week., Disp: , Rfl:   •  lisinopril (PRINIVIL,ZESTRIL) 20 MG tablet, Take 20 mg by mouth Daily., Disp: , Rfl:   •  Multiple Vitamin (MULTI VITAMIN DAILY PO), Take 1 tablet by mouth Daily., Disp: , Rfl:   •  Omega 3 1200 MG capsule, Take 1,200 mg by mouth Daily., Disp: , Rfl:   •  rosuvastatin (CRESTOR) 10 MG tablet, Take 10 mg by mouth Daily., Disp: , Rfl:   •  saccharomyces boulardii (Florastor) 250 MG capsule, Take 1 capsule by mouth 2 (Two) Times a Day., Disp: 60 capsule, Rfl: 2  •  Synthroid 75 MCG tablet, TAKE 1 TABLET DAILY FOR    THYROID, Disp: 90 tablet, Rfl: 1  •  ciprofloxacin (Cipro) 500 MG tablet, Take 1 tablet by mouth 2 (Two) Times a Day., Disp: 10 tablet, Rfl: 0     Objective     This patient is here to follow-up on urinalysis last week.    She tells me that she feels well.  She is not have any urinary tract symptoms.    Her urinalysis grew out Raoutella Planticola.  This is a pretty unusual cause for bacterial UTI.  Her urinalysis grew the same bacterium out the last time we checked it and I thought it might have been a contaminant or just wrong so we repeated it and got the same result.    She has a prior history of was treated difficile colitis and is concerned that a round of antibiotics may cause the same complication.            Hypothyroidism         Review of Systems   Constitutional: Negative.    HENT: Negative.    Respiratory: Negative.    Cardiovascular: Negative.    Musculoskeletal: Negative.    Psychiatric/Behavioral: Negative.        Physical Exam  Vitals and nursing note reviewed.   Constitutional:       Comments: Pleasant, neatly groomed, no distress.   Psychiatric:         Mood and Affect: Mood normal.         Behavior: Behavior  normal.         Thought Content: Thought content normal.           Problems Addressed this Visit        Genitourinary and Reproductive     Bacterial UTI - Primary    Relevant Medications    ciprofloxacin (Cipro) 500 MG tablet      Diagnoses       Codes Comments    Bacterial UTI    -  Primary ICD-10-CM: N39.0, A49.9  ICD-9-CM: 599.0, 041.9             PLAN  Is bacterial UTI.  I sent out a prescription for ciprofloxacin 500 mg 1 p.o. twice daily.  Like to have her back in a week for repeat urinalysis and urine culture.  No follow-ups on file.

## 2021-05-13 DIAGNOSIS — R35.0 URINARY FREQUENCY: Primary | ICD-10-CM

## 2021-05-18 NOTE — PROGRESS NOTES
Subjective   Mami Srivastava is a 75 y.o. female. Patient is here today for   Chief Complaint   Patient presents with   • Results     fu urinalysis results          Vitals:    21 1344   BP: 124/62   Pulse: 69   Resp: 18   Temp: 97.5 °F (36.4 °C)   SpO2: 97%     Body mass index is 25.65 kg/m².      Past Medical History:   Diagnosis Date   • Autoimmune hepatitis (CMS/HCC)    • Cholelithiasis 2018    Ultrasound   • Coronary artery disease     Dr Luis Rick   • Diverticulitis of colon ,   • Hemangioma of liver    • Hernia     Hiatal hernia-Prabhjot test   • Hyperlipidemia     Dr Luis Rick-atorvastatin   • Hypertension    • Hyperthyroidism    • Hypothyroidism    • Laceration of finger of right hand 05/15/2018    lac with knife and had 3 stitches   • Osteopenia after menopause    • Skin cancer     face   • Ulcerative colitis (CMS/HCC) 2014    Colitis/DrKaplan      Allergies   Allergen Reactions   • Imuran [Azathioprine] GI Intolerance   • Bactrim [Sulfamethoxazole-Trimethoprim] Other (See Comments)     fatigue   • Clindamycin/Lincomycin Diarrhea   • Augmentin [Amoxicillin-Pot Clavulanate] Hives   • Keflex [Cephalexin] Hives      Social History     Socioeconomic History   • Marital status:      Spouse name: Not on file   • Number of children: Not on file   • Years of education: Not on file   • Highest education level: Not on file   Tobacco Use   • Smoking status: Former Smoker     Packs/day: 2.00     Years: 12.00     Pack years: 24.00     Start date: 1963     Quit date: 1976     Years since quittin.3   • Smokeless tobacco: Never Used   • Tobacco comment: Chrinic bronchitis...smoke allergy   Substance and Sexual Activity   • Alcohol use: Not Currently     Comment: none in 3 years   • Drug use: No   • Sexual activity: Not Currently        Current Outpatient Medications:   •  amLODIPine (NORVASC) 5 MG tablet, TAKE 1 TABLET BY MOUTH EVERY DAY, Disp: 90 tablet, Rfl: 3  •   aspirin 81 MG EC tablet, Take 81 mg by mouth Daily., Disp: , Rfl:   •  cetirizine (zyrTEC) 10 MG tablet, Take 10 mg by mouth As Needed., Disp: , Rfl:   •  Cholecalciferol (VITAMIN D3) 5000 units capsule capsule, Take 5,000 Units by mouth 2 (Two) Times a Week., Disp: , Rfl:   •  lisinopril (PRINIVIL,ZESTRIL) 20 MG tablet, Take 20 mg by mouth Daily., Disp: , Rfl:   •  Multiple Vitamin (MULTI VITAMIN DAILY PO), Take 1 tablet by mouth Daily., Disp: , Rfl:   •  Omega 3 1200 MG capsule, Take 1,200 mg by mouth Daily., Disp: , Rfl:   •  rosuvastatin (CRESTOR) 10 MG tablet, Take 10 mg by mouth Daily., Disp: , Rfl:   •  saccharomyces boulardii (Florastor) 250 MG capsule, Take 1 capsule by mouth 2 (Two) Times a Day., Disp: 60 capsule, Rfl: 2  •  Synthroid 75 MCG tablet, TAKE 1 TABLET DAILY FOR    THYROID, Disp: 90 tablet, Rfl: 1  •  ciprofloxacin (Cipro) 500 MG tablet, Take 1 tablet by mouth 2 (Two) Times a Day., Disp: 10 tablet, Rfl: 0     Objective     She is here to follow-up on urinalysis.           Review of Systems   Constitutional: Negative.    HENT: Negative.    Respiratory: Negative.    Cardiovascular: Negative.    Musculoskeletal: Negative.    Psychiatric/Behavioral: Negative.        Physical Exam  Vitals and nursing note reviewed.   Constitutional:       Appearance: Normal appearance.      Comments: Pleasant, neatly groomed, in no distress.   Cardiovascular:      Rate and Rhythm: Regular rhythm.      Heart sounds: Normal heart sounds. No murmur heard.   No gallop.    Pulmonary:      Effort: Pulmonary effort is normal.      Breath sounds: Normal breath sounds.   Neurological:      Mental Status: She is alert and oriented to person, place, and time.   Psychiatric:         Mood and Affect: Mood normal.         Behavior: Behavior normal.         Thought Content: Thought content normal.           Problems Addressed this Visit     None      Visit Diagnoses     Urinary tract infection without hematuria, site unspecified     -  Primary    Relevant Orders    Urine Culture - Urine, Urine, Clean Catch (Completed)      Diagnoses       Codes Comments    Urinary tract infection without hematuria, site unspecified    -  Primary ICD-10-CM: N39.0  ICD-9-CM: 599.0             RAOULTELLA PLANTICOLA grew out on her urine culture.  She is asymptomatic.  I do not know that I believe it.  It is an extremely rare pathogen in the urinary tract.  We will reculture her urine and see what turns up.  No follow-ups on file.

## 2021-05-19 ENCOUNTER — TELEPHONE (OUTPATIENT)
Dept: FAMILY MEDICINE CLINIC | Facility: CLINIC | Age: 76
End: 2021-05-19

## 2021-05-19 NOTE — TELEPHONE ENCOUNTER
Caller: Mami Srivastava    Relationship: Self    Best call back number: [phone]    What is the best time to reach you: ANY      What was the call regarding: PATIENT HAS APPOINTMENT TODAY AT 4 WITH DR HOLCOMB, SHE IS NOT SURE IF HER TEST RESULTS ARE IN. IF THEY ARE NOT, SHE WOULD LIKE TO RESCHEDULE. BUT IF  HAS THEM SHE WANTS TO KEEP HER APPOINTMENT. PLEASE CALL PATIENT TO VERIFY IF  HAS LAB RESULTS.

## 2021-05-19 NOTE — TELEPHONE ENCOUNTER
She is going to reschedule since results are not back, but she wants to for Friday. You are completely booked and I even checked for Monday 9she really did not want to wait until Monday.)    Please advise what you prefer to do regarding lab follow up phone appt, thanks.

## 2021-05-20 LAB
BACTERIA UR CULT: NORMAL
BACTERIA UR CULT: NORMAL

## 2021-05-20 NOTE — TELEPHONE ENCOUNTER
PT NEEDS A TELEPHONE CALL WITH DR. HOLCOMB- SHE WANTS ON A Friday BUT HE DOESN'T HAVE ANYTHING OPEN

## 2021-05-24 ENCOUNTER — TELEPHONE (OUTPATIENT)
Dept: FAMILY MEDICINE CLINIC | Facility: CLINIC | Age: 76
End: 2021-05-24

## 2021-05-24 NOTE — TELEPHONE ENCOUNTER
Caller: Mami Srivastava    Relationship to patient: Self    Best call back number:[phone] (H)    Patient is needing: PATIENT CALLED IN STATING THAT SHE WAS TAKING ciprofloxacin (Cipro) 500 MG tablet. SHE FINISHED IT ON 5/16/21, SHE STATED THAT SHE NOW HAS DEVELOPED SOME ITCHING AND REDNESS  IN THE VAGINAL AREA. ALSO SWOLLEN IN THE SKIN IN THAT AREA. PATIENT WOULD LIKE TO KNOW IF DR. HOLCOMB WOULD LIKE TO CALL IN [facility] OR SOMETHING ELSE FOR IT. ALSO WANTED TO KNOW IF DR. HOLCOMB HAD A CHANCE TO LOOK AT HER UA TO SEE IF THERES ANYTHING HE WANTS TO DO ABOUT THAT. PATIENT HAS AN APPOINTMENT TOMORROW AT 1:45 WITH NP SHELBI BERGERON. PLEASE ADVISE. THANK YOU.

## 2021-05-25 ENCOUNTER — OFFICE VISIT (OUTPATIENT)
Dept: FAMILY MEDICINE CLINIC | Facility: CLINIC | Age: 76
End: 2021-05-25

## 2021-05-25 VITALS
BODY MASS INDEX: 25.37 KG/M2 | DIASTOLIC BLOOD PRESSURE: 70 MMHG | HEIGHT: 64 IN | SYSTOLIC BLOOD PRESSURE: 110 MMHG | RESPIRATION RATE: 18 BRPM | WEIGHT: 148.6 LBS | HEART RATE: 74 BPM | TEMPERATURE: 97.3 F | OXYGEN SATURATION: 98 %

## 2021-05-25 DIAGNOSIS — R30.0 DYSURIA: ICD-10-CM

## 2021-05-25 DIAGNOSIS — B37.31 CANDIDA VAGINITIS: Primary | ICD-10-CM

## 2021-05-25 LAB
BILIRUB BLD-MCNC: NEGATIVE MG/DL
CLARITY, POC: ABNORMAL
COLOR UR: YELLOW
GLUCOSE UR STRIP-MCNC: NEGATIVE MG/DL
KETONES UR QL: NEGATIVE
LEUKOCYTE EST, POC: ABNORMAL
NITRITE UR-MCNC: NEGATIVE MG/ML
PH UR: 7 [PH] (ref 5–8)
PROT UR STRIP-MCNC: NEGATIVE MG/DL
RBC # UR STRIP: ABNORMAL /UL
SP GR UR: 1.01 (ref 1–1.03)
UROBILINOGEN UR QL: NORMAL

## 2021-05-25 PROCEDURE — 99214 OFFICE O/P EST MOD 30 MIN: CPT | Performed by: NURSE PRACTITIONER

## 2021-05-25 PROCEDURE — 81003 URINALYSIS AUTO W/O SCOPE: CPT | Performed by: NURSE PRACTITIONER

## 2021-05-25 RX ORDER — NYSTATIN 100000 U/G
CREAM TOPICAL 2 TIMES DAILY PRN
Qty: 30 G | Refills: 0 | Status: SHIPPED | OUTPATIENT
Start: 2021-05-25 | End: 2021-11-03

## 2021-05-25 RX ORDER — FLUCONAZOLE 100 MG/1
TABLET ORAL
Qty: 2 TABLET | Refills: 0 | Status: SHIPPED | OUTPATIENT
Start: 2021-05-25 | End: 2021-11-03

## 2021-05-25 NOTE — PROGRESS NOTES
Subjective     Mami Srivastava is a 75 y.o.. female.     Pt stating she was recently dx with cystitis, prescribed and completed cipro on 5/16 and then 2-3 days later developed vaginal itching and dysuria.      The following portions of the patient's history were reviewed and updated as appropriate: allergies, current medications, past family history, past medical history, past social history, past surgical history and problem list.    Past Medical History:   Diagnosis Date   • Autoimmune hepatitis (CMS/HCC)    • Cholelithiasis 04-    Ultrasound   • Coronary artery disease     Dr Luis Rick   • Diverticulitis of colon 2005,2018   • Hemangioma of liver    • Hernia 2017    Hiatal hernia-Prabhjot test   • Hyperlipidemia     Dr Luis Rick-atorvastatin   • Hypertension    • Hyperthyroidism    • Hypothyroidism    • Laceration of finger of right hand 05/15/2018    lac with knife and had 3 stitches   • Osteopenia after menopause    • Skin cancer     face   • Ulcerative colitis (CMS/HCC) 2014    Colitis/[facility]Kaplan       Past Surgical History:   Procedure Laterality Date   • BUNIONECTOMY     • COLONOSCOPY  04/11/2014    Diverticulosis in the sigmoid colon, in the descending colon, in the transverse colon and in the ascending colon (Pathology: Collagenous colitis)   • COLONOSCOPY  12/09/2005    Hemorrhoids, pandiverticulosis, status post diverticulitis   • COLONOSCOPY N/A 11/2/2018    fair prep, tics   • LIVER BIOPSY     • LIVER BIOPSY     • THYROIDECTOMY  2008       Review of Systems   Constitutional: Negative.  Negative for fever.   Respiratory: Negative.    Cardiovascular: Negative.    Genitourinary: Positive for dysuria. Negative for frequency, hematuria and urgency.        Vaginal itching, burning and red       Allergies   Allergen Reactions   • Imuran [Azathioprine] GI Intolerance   • Bactrim [Sulfamethoxazole-Trimethoprim] Other (See Comments)     fatigue   • Clindamycin/Lincomycin Diarrhea   • Augmentin  "[Amoxicillin-Pot Clavulanate] Hives   • Keflex [Cephalexin] Hives       Objective     Vitals:    05/25/21 1349   BP: 110/70   BP Location: Left arm   Patient Position: Sitting   Pulse: 74   Resp: 18   Temp: 97.3 °F (36.3 °C)   TempSrc: Oral   SpO2: 98%   Weight: 67.4 kg (148 lb 9.6 oz)   Height: 162.6 cm (64.02\")     Body mass index is 25.49 kg/m².    Physical Exam  Vitals reviewed.   HENT:      Head: Normocephalic.   Eyes:      Pupils: Pupils are equal, round, and reactive to light.   Cardiovascular:      Rate and Rhythm: Normal rate and regular rhythm.   Pulmonary:      Effort: Pulmonary effort is normal.      Breath sounds: Normal breath sounds.   Genitourinary:     Labia:         Right: Rash (red) present.         Left: Rash (red) present.    Musculoskeletal:         General: Normal range of motion.   Neurological:      Mental Status: She is alert and oriented to person, place, and time.   Psychiatric:         Behavior: Behavior normal.           Current Outpatient Medications:   •  amLODIPine (NORVASC) 5 MG tablet, TAKE 1 TABLET BY MOUTH EVERY DAY, Disp: 90 tablet, Rfl: 3  •  aspirin 81 MG EC tablet, Take 81 mg by mouth Daily., Disp: , Rfl:   •  cetirizine (zyrTEC) 10 MG tablet, Take 10 mg by mouth As Needed., Disp: , Rfl:   •  Cholecalciferol (VITAMIN D3) 5000 units capsule capsule, Take 5,000 Units by mouth 2 (Two) Times a Week., Disp: , Rfl:   •  ciprofloxacin (Cipro) 500 MG tablet, Take 1 tablet by mouth 2 (Two) Times a Day., Disp: 10 tablet, Rfl: 0  •  fluconazole (Diflucan) 100 MG tablet, Take 1 tab now and then 1 tab in 3 days if needed, Disp: 2 tablet, Rfl: 0  •  lisinopril (PRINIVIL,ZESTRIL) 20 MG tablet, Take 20 mg by mouth Daily., Disp: , Rfl:   •  Multiple Vitamin (MULTI VITAMIN DAILY PO), Take 1 tablet by mouth Daily., Disp: , Rfl:   •  nystatin (MYCOSTATIN) 833492 UNIT/GM cream, Apply  topically to the appropriate area as directed 2 (Two) Times a Day As Needed (yeast infection/itching)., Disp: 30 " g, Rfl: 0  •  Omega 3 1200 MG capsule, Take 1,200 mg by mouth Daily., Disp: , Rfl:   •  rosuvastatin (CRESTOR) 10 MG tablet, Take 10 mg by mouth Daily., Disp: , Rfl:   •  saccharomyces boulardii (Florastor) 250 MG capsule, Take 1 capsule by mouth 2 (Two) Times a Day., Disp: 60 capsule, Rfl: 2  •  Synthroid 75 MCG tablet, TAKE 1 TABLET DAILY FOR    THYROID, Disp: 90 tablet, Rfl: 1    Recent Results (from the past 168 hour(s))   POC Urinalysis Dipstick, Automated    Collection Time: 05/25/21  2:10 PM    Specimen: Urine   Result Value Ref Range    Color Yellow Yellow, Straw, Dark Yellow, Bryanna    Clarity, UA Slightly Cloudy (A) Clear    Specific Gravity  1.015 1.005 - 1.030    pH, Urine 7.0 5.0 - 8.0    Leukocytes Trace (A) Negative    Nitrite, UA Negative Negative    Protein, POC Negative Negative mg/dL    Glucose, UA Negative Negative, 1000 mg/dL (3+) mg/dL    Ketones, UA Negative Negative    Urobilinogen, UA Normal Normal    Bilirubin Negative Negative    Blood, UA Moderate (A) Negative         Assessment/Plan   Diagnoses and all orders for this visit:    1. Candida vaginitis (Primary)  -     fluconazole (Diflucan) 100 MG tablet; Take 1 tab now and then 1 tab in 3 days if needed  Dispense: 2 tablet; Refill: 0  -     nystatin (MYCOSTATIN) 445479 UNIT/GM cream; Apply  topically to the appropriate area as directed 2 (Two) Times a Day As Needed (yeast infection/itching).  Dispense: 30 g; Refill: 0    2. Dysuria  -     POC Urinalysis Dipstick, Automated        Patient Instructions   use nystatin cream as prescribed, take the diflucan as prescribed, keep area clean and dry, wash area twice a day with soap and water, change underwear twice a day, dry off areas well, do not sit for long periods of time, wear loose underwear and clothing.       Return if symptoms worsen or fail to improve, for Medicare Wellness.

## 2021-05-26 ENCOUNTER — APPOINTMENT (OUTPATIENT)
Dept: WOMENS IMAGING | Facility: HOSPITAL | Age: 76
End: 2021-05-26

## 2021-05-26 PROCEDURE — 77063 BREAST TOMOSYNTHESIS BI: CPT | Performed by: RADIOLOGY

## 2021-05-26 PROCEDURE — 77067 SCR MAMMO BI INCL CAD: CPT | Performed by: RADIOLOGY

## 2021-05-26 NOTE — PATIENT INSTRUCTIONS
use nystatin cream as prescribed, take the diflucan as prescribed, keep area clean and dry, wash area twice a day with soap and water, change underwear twice a day, dry off areas well, do not sit for long periods of time, wear loose underwear and clothing.

## 2021-06-08 ENCOUNTER — OFFICE VISIT (OUTPATIENT)
Dept: GASTROENTEROLOGY | Facility: CLINIC | Age: 76
End: 2021-06-08

## 2021-06-08 VITALS — WEIGHT: 145.8 LBS | BODY MASS INDEX: 24.89 KG/M2 | TEMPERATURE: 97.8 F | HEIGHT: 64 IN

## 2021-06-08 DIAGNOSIS — K75.4 AUTOIMMUNE HEPATITIS (HCC): Primary | ICD-10-CM

## 2021-06-08 PROCEDURE — 99213 OFFICE O/P EST LOW 20 MIN: CPT | Performed by: INTERNAL MEDICINE

## 2021-06-08 NOTE — PROGRESS NOTES
Chief Complaint   Patient presents with   • Follow-up       Mami Srivastava is a  75 y.o. female here for a follow up visit for history of autoimmune hepatitis and C. difficile colitis.    HPI     Patient 75-year-old female with history of hypothyroidism, hypertension and hyperlipidemia diagnosed with autoimmune hepatitis currently doing very well.  Patient also with history of IBS currently asymptomatic.  Patient does report some upper abdominal bloating that does not seem to affect her diet or be affected by eating or drinking.  Patient concerned that it might be related to her hepatitis though LFTs have normalized.    Past Medical History:   Diagnosis Date   • Autoimmune hepatitis (CMS/HCC)    • Cholelithiasis 04-    Ultrasound   • Coronary artery disease     Dr Luis Rick   • Diverticulitis of colon 2005,2018   • Hemangioma of liver    • Hernia 2017    Hiatal hernia-Fredislo test   • Hyperlipidemia     Dr Luis Rick-atorvastatin   • Hypertension    • Hyperthyroidism    • Hypothyroidism    • Laceration of finger of right hand 05/15/2018    lac with knife and had 3 stitches   • Osteopenia after menopause    • Skin cancer     face   • Ulcerative colitis (CMS/HCC) 2014    Colitis/[facility]         Current Outpatient Medications:   •  amLODIPine (NORVASC) 5 MG tablet, TAKE 1 TABLET BY MOUTH EVERY DAY, Disp: 90 tablet, Rfl: 3  •  aspirin 81 MG EC tablet, Take 81 mg by mouth Daily., Disp: , Rfl:   •  cetirizine (zyrTEC) 10 MG tablet, Take 10 mg by mouth As Needed., Disp: , Rfl:   •  Cholecalciferol (VITAMIN D3) 5000 units capsule capsule, Take 5,000 Units by mouth 2 (Two) Times a Week., Disp: , Rfl:   •  fluconazole (Diflucan) 100 MG tablet, Take 1 tab now and then 1 tab in 3 days if needed, Disp: 2 tablet, Rfl: 0  •  lisinopril (PRINIVIL,ZESTRIL) 20 MG tablet, Take 20 mg by mouth Daily., Disp: , Rfl:   •  Multiple Vitamin (MULTI VITAMIN DAILY PO), Take 1 tablet by mouth Daily., Disp: , Rfl:   •  nystatin  (MYCOSTATIN) 849905 UNIT/GM cream, Apply  topically to the appropriate area as directed 2 (Two) Times a Day As Needed (yeast infection/itching)., Disp: 30 g, Rfl: 0  •  Omega 3 1200 MG capsule, Take 1,200 mg by mouth Daily., Disp: , Rfl:   •  rosuvastatin (CRESTOR) 10 MG tablet, Take 10 mg by mouth Daily., Disp: , Rfl:   •  saccharomyces boulardii (Florastor) 250 MG capsule, Take 1 capsule by mouth 2 (Two) Times a Day., Disp: 60 capsule, Rfl: 2  •  Synthroid 75 MCG tablet, TAKE 1 TABLET DAILY FOR    THYROID, Disp: 90 tablet, Rfl: 1  •  ciprofloxacin (Cipro) 500 MG tablet, Take 1 tablet by mouth 2 (Two) Times a Day., Disp: 10 tablet, Rfl: 0    Allergies   Allergen Reactions   • Imuran [Azathioprine] GI Intolerance   • Bactrim [Sulfamethoxazole-Trimethoprim] Other (See Comments)     fatigue   • Clindamycin/Lincomycin Diarrhea   • Augmentin [Amoxicillin-Pot Clavulanate] Hives   • Keflex [Cephalexin] Hives       Social History     Socioeconomic History   • Marital status:      Spouse name: Not on file   • Number of children: Not on file   • Years of education: Not on file   • Highest education level: Not on file   Tobacco Use   • Smoking status: Former Smoker     Packs/day: 2.00     Years: 12.00     Pack years: 24.00     Start date: 1963     Quit date: 1976     Years since quittin.3   • Smokeless tobacco: Never Used   • Tobacco comment: Chrinic bronchitis...smoke allergy   Substance and Sexual Activity   • Alcohol use: Not Currently     Comment: none in 3 years   • Drug use: No   • Sexual activity: Not Currently       Family History   Problem Relation Age of Onset   • Heart disease Mother    • Hypertension Mother    • Lung cancer Sister    • Thyroid disease Sister    • Lupus Sister    • Thyroid disease Brother    • Alcohol abuse Brother    • Glaucoma Maternal Grandmother    • Stomach cancer Maternal Grandmother          ’s   • Cirrhosis Father             • Liver disease Father         Review of Systems   Constitutional: Negative.    Respiratory: Negative.    Cardiovascular: Negative.    Gastrointestinal: Positive for abdominal distention. Negative for abdominal pain, anal bleeding, blood in stool, constipation, diarrhea, nausea, rectal pain and vomiting.   Musculoskeletal: Negative.    Skin: Negative.    Hematological: Negative.        Vitals:    06/08/21 1011   Temp: 97.8 °F (36.6 °C)       Physical Exam  Vitals reviewed.   Constitutional:       Appearance: Normal appearance. She is well-developed and normal weight.   HENT:      Head: Normocephalic and atraumatic.   Eyes:      General: No scleral icterus.     Pupils: Pupils are equal, round, and reactive to light.   Pulmonary:      Effort: Pulmonary effort is normal.      Breath sounds: Normal breath sounds.   Abdominal:      General: Bowel sounds are normal. There is no distension.      Palpations: Abdomen is soft. There is no mass.      Tenderness: There is no abdominal tenderness.      Hernia: No hernia is present.   Skin:     General: Skin is warm and dry.      Coloration: Skin is not jaundiced.      Findings: No rash.   Neurological:      General: No focal deficit present.      Mental Status: She is alert and oriented to person, place, and time.   Psychiatric:         Behavior: Behavior normal.         Thought Content: Thought content normal.         Office Visit on 05/25/2021   Component Date Value Ref Range Status   • Color 05/25/2021 Yellow  Yellow, Straw, Dark Yellow, Bryanna Final   • Clarity, UA 05/25/2021 Slightly Cloudy* Clear Final   • Specific Gravity  05/25/2021 1.015  1.005 - 1.030 Final   • pH, Urine 05/25/2021 7.0  5.0 - 8.0 Final   • Leukocytes 05/25/2021 Trace* Negative Final   • Nitrite, UA 05/25/2021 Negative  Negative Final   • Protein, POC 05/25/2021 Negative  Negative mg/dL Final   • Glucose, UA 05/25/2021 Negative  Negative, 1000 mg/dL (3+) mg/dL Final   • Ketones, UA 05/25/2021 Negative  Negative Final   •  Urobilinogen, UA 05/25/2021 Normal  Normal Final   • Bilirubin 05/25/2021 Negative  Negative Final   • Blood, UA 05/25/2021 Moderate* Negative Final   Orders Only on 05/13/2021   Component Date Value Ref Range Status   • Urine Culture 05/18/2021 Final report   Final   • Result 1 05/18/2021 Comment   Final   Office Visit on 05/06/2021   Component Date Value Ref Range Status   • Urine Culture 05/06/2021 Final report*  Final   • Result 1 05/06/2021 Raoultella planticola*  Final   • Result 2 05/06/2021 Comment   Final   • Susceptibility Testing 05/06/2021 Comment   Final   Orders Only on 04/30/2021   Component Date Value Ref Range Status   • Urine Culture 04/30/2021 Final report*  Final   • Result 1 04/30/2021 Raoultella planticola*  Final   • Susceptibility Testing 04/30/2021 Comment   Final   Orders Only on 04/22/2021   Component Date Value Ref Range Status   • WBC 04/26/2021 5.62  3.40 - 10.80 10*3/mm3 Final   • RBC 04/26/2021 4.41  3.77 - 5.28 10*6/mm3 Final   • Hemoglobin 04/26/2021 13.2  12.0 - 15.9 g/dL Final   • Hematocrit 04/26/2021 38.5  34.0 - 46.6 % Final   • MCV 04/26/2021 87.3  79.0 - 97.0 fL Final   • MCH 04/26/2021 29.9  26.6 - 33.0 pg Final   • MCHC 04/26/2021 34.3  31.5 - 35.7 g/dL Final   • RDW 04/26/2021 12.8  12.3 - 15.4 % Final   • Platelets 04/26/2021 181  140 - 450 10*3/mm3 Final   • Neutrophil Rel % 04/26/2021 60.3  42.7 - 76.0 % Final   • Lymphocyte Rel % 04/26/2021 25.6  19.6 - 45.3 % Final   • Monocyte Rel % 04/26/2021 8.0  5.0 - 12.0 % Final   • Eosinophil Rel % 04/26/2021 5.2  0.3 - 6.2 % Final   • Basophil Rel % 04/26/2021 0.5  0.0 - 1.5 % Final   • Neutrophils Absolute 04/26/2021 3.39  1.70 - 7.00 10*3/mm3 Final   • Lymphocytes Absolute 04/26/2021 1.44  0.70 - 3.10 10*3/mm3 Final   • Monocytes Absolute 04/26/2021 0.45  0.10 - 0.90 10*3/mm3 Final   • Eosinophils Absolute 04/26/2021 0.29  0.00 - 0.40 10*3/mm3 Final   • Basophils Absolute 04/26/2021 0.03  0.00 - 0.20 10*3/mm3 Final   •  Immature Granulocyte Rel % 04/26/2021 0.4  0.0 - 0.5 % Final   • Immature Grans Absolute 04/26/2021 0.02  0.00 - 0.05 10*3/mm3 Final   • nRBC 04/26/2021 0.0  0.0 - 0.2 /100 WBC Final   • Glucose 04/26/2021 114* 65 - 99 mg/dL Final   • BUN 04/26/2021 17  8 - 23 mg/dL Final   • Creatinine 04/26/2021 0.83  0.57 - 1.00 mg/dL Final   • eGFR Non  Am 04/26/2021 67  >60 mL/min/1.73 Final   • eGFR African Am 04/26/2021 81  >60 mL/min/1.73 Final   • BUN/Creatinine Ratio 04/26/2021 20.5  7.0 - 25.0 Final   • Sodium 04/26/2021 142  136 - 145 mmol/L Final   • Potassium 04/26/2021 4.7  3.5 - 5.2 mmol/L Final   • Chloride 04/26/2021 104  98 - 107 mmol/L Final   • Total CO2 04/26/2021 28.0  22.0 - 29.0 mmol/L Final   • Calcium 04/26/2021 9.9  8.6 - 10.5 mg/dL Final   • Total Protein 04/26/2021 6.4  6.0 - 8.5 g/dL Final   • Albumin 04/26/2021 4.60  3.50 - 5.20 g/dL Final   • Globulin 04/26/2021 1.8  gm/dL Final   • A/G Ratio 04/26/2021 2.6  g/dL Final   • Total Bilirubin 04/26/2021 1.2  0.0 - 1.2 mg/dL Final   • Alkaline Phosphatase 04/26/2021 65  39 - 117 U/L Final   • AST (SGOT) 04/26/2021 26  1 - 32 U/L Final   • ALT (SGPT) 04/26/2021 27  1 - 33 U/L Final   • Total Cholesterol 04/26/2021 120  0 - 200 mg/dL Final   • Triglycerides 04/26/2021 127  0 - 150 mg/dL Final   • HDL Cholesterol 04/26/2021 47  40 - 60 mg/dL Final   • VLDL Cholesterol [place] 04/26/2021 22  5 - 40 mg/dL Final   • LDL Chol Calc ([facility]) 04/26/2021 51  0 - 100 mg/dL Final   • LDL/HDL RATIO 04/26/2021 1.01   Final   • Hemoglobin A1C 04/26/2021 6.30* 4.80 - 5.60 % Final   • Specific Gravity, UA 04/26/2021 1.017  1.005 - 1.030 Final   • pH, UA 04/26/2021 5.5  5.0 - 8.0 Final   • Color, UA 04/26/2021 Yellow   Final   • Appearance, UA 04/26/2021 Clear  Clear Final   • Leukocytes, UA 04/26/2021 See below:* Negative Final   • Protein 04/26/2021 Negative  Negative Final   • Glucose, UA 04/26/2021 Negative  Negative Final   • Ketones 04/26/2021 Negative  Negative Final    • Blood, UA 04/26/2021 Negative  Negative Final   • Bilirubin, UA 04/26/2021 Negative  Negative Final   • Urobilinogen, UA 04/26/2021 Comment   Final   • Nitrite, UA 04/26/2021 Negative  Negative Final   • 25 Hydroxy, Vitamin D 04/26/2021 43.6  30.0 - 100.0 ng/mL Final   • WBC, UA 04/26/2021 6-12* /HPF Final   • RBC, UA 04/26/2021 0-2  /HPF Final   • Epithelial Cells (non renal) 04/26/2021 0-2  /HPF Final   • Cast Type 04/26/2021 Comment   Final   • Bacteria, UA 04/26/2021 Comment  None Seen /HPF Final   • TSH 04/26/2021 1.750  0.270 - 4.200 uIU/mL Final   • Please note 04/26/2021 Comment   Final   • Written Authorization 04/26/2021 Comment   Final       Diagnoses and all orders for this visit:    1. Autoimmune hepatitis (CMS/HCC) (Primary)  -     US Liver; Future      Patient 75-year-old female with history of autoimmune hepatitis and C. difficile colitis status post recent Cipro course did well with Florastor.  Patient denies any diarrhea or nausea vomiting though did have some yeast infection post therapy is doing well currently.  Patient does report some recent bloating that does not seem to affect her diet or appetite wondering if it might be her liver.  Patient's repeat liver enzymes in April were normal.  At this point would not treat any differently but would recommend patient have right upper quadrant ultrasound to evaluate liver and we will follow-up clinically with plans on repeat labs in 6 months.  Patient to call if symptoms change.

## 2021-06-17 ENCOUNTER — HOSPITAL ENCOUNTER (OUTPATIENT)
Dept: ULTRASOUND IMAGING | Facility: HOSPITAL | Age: 76
Discharge: HOME | End: 2021-06-17
Admitting: INTERNAL MEDICINE

## 2021-06-17 DIAGNOSIS — K75.4 AUTOIMMUNE HEPATITIS (HCC): ICD-10-CM

## 2021-06-17 PROCEDURE — 76705 ECHO EXAM OF ABDOMEN: CPT

## 2021-06-30 ENCOUNTER — TELEPHONE (OUTPATIENT)
Dept: GASTROENTEROLOGY | Facility: CLINIC | Age: 76
End: 2021-06-30

## 2021-06-30 DIAGNOSIS — K75.4 AUTOIMMUNE HEPATITIS (HCC): Primary | ICD-10-CM

## 2021-07-12 ENCOUNTER — OFFICE VISIT (OUTPATIENT)
Dept: FAMILY MEDICINE CLINIC | Facility: CLINIC | Age: 76
End: 2021-07-12

## 2021-07-12 VITALS
HEIGHT: 64 IN | BODY MASS INDEX: 25.16 KG/M2 | HEART RATE: 75 BPM | RESPIRATION RATE: 18 BRPM | DIASTOLIC BLOOD PRESSURE: 60 MMHG | OXYGEN SATURATION: 97 % | SYSTOLIC BLOOD PRESSURE: 126 MMHG | WEIGHT: 147.4 LBS | TEMPERATURE: 97.7 F

## 2021-07-12 DIAGNOSIS — S46.211A STRAIN OF RIGHT BICEPS, INITIAL ENCOUNTER: Primary | ICD-10-CM

## 2021-07-12 PROCEDURE — 99213 OFFICE O/P EST LOW 20 MIN: CPT | Performed by: NURSE PRACTITIONER

## 2021-07-12 RX ORDER — ACETAMINOPHEN, ASPIRIN AND CAFFEINE 250; 250; 65 MG/1; MG/1; MG/1
1 TABLET, FILM COATED ORAL EVERY 6 HOURS PRN
COMMUNITY
End: 2021-11-03

## 2021-07-12 RX ORDER — PREDNISONE 20 MG/1
20 TABLET ORAL 2 TIMES DAILY
Qty: 8 TABLET | Refills: 0 | Status: SHIPPED | OUTPATIENT
Start: 2021-07-12 | End: 2021-07-16

## 2021-07-12 NOTE — PATIENT INSTRUCTIONS
May use cold compress/ice pack 10-15 minutes at a time several times a day   May use warm compress/heating pad 10-15 minutes at at time several times a day  May use over the counter biofreeze as needed (wash off from skin before using heating pad)

## 2021-07-12 NOTE — PROGRESS NOTES
Subjective     Mami Srivastava is a 76 y.o.. female.     Pt stating she noticed arm pain start up when she started playing her game more at night on phone. Pt stating she holds phone up where her arm is at a 90% angle with arms lifted up. Pt stating she also had been sleeping with her right arm up on a pillow. Pt stating that when she wakes up her right arm hurts the worst. Pt stating as she gets up and starts moving around her arm feels better.      Arm Injury   Incident onset: 5 days. There was no injury mechanism. Pain location: right arm from shoulder/upper arm. The quality of the pain is described as aching. The pain has been intermittent since the incident. Pertinent negatives include no numbness or tingling. Exacerbated by: playing on phone at night and sleeping. Treatments tried: otc aspirin/caffeine medication. The treatment provided mild relief.       The following portions of the patient's history were reviewed and updated as appropriate: allergies, current medications, past family history, past medical history, past social history, past surgical history and problem list.    Past Medical History:   Diagnosis Date   • Autoimmune hepatitis (CMS/HCC)    • Cholelithiasis 04-    Ultrasound   • Coronary artery disease     Dr Luis Rick   • Diverticulitis of colon 2005,2018   • Hemangioma of liver    • Hernia 2017    Hiatal hernia-Prabhjot test   • Hyperlipidemia     Dr Luis Rick-atorvastatin   • Hypertension    • Hyperthyroidism    • Hypothyroidism    • Laceration of finger of right hand 05/15/2018    lac with knife and had 3 stitches   • Osteopenia after menopause    • Skin cancer     face   • Ulcerative colitis (CMS/HCC) 2014    Colitis/Arben       Past Surgical History:   Procedure Laterality Date   • BUNIONECTOMY     • COLONOSCOPY  04/11/2014    Diverticulosis in the sigmoid colon, in the descending colon, in the transverse colon and in the ascending colon (Pathology: Collagenous colitis)   •  "COLONOSCOPY  12/09/2005    Hemorrhoids, pandiverticulosis, status post diverticulitis   • COLONOSCOPY N/A 11/2/2018    fair prep, tics   • LIVER BIOPSY     • LIVER BIOPSY     • THYROIDECTOMY  2008       Review of Systems   Constitutional: Negative for fever.   Musculoskeletal: Positive for arthralgias (right shoulder/right upper arm).   Neurological: Negative for tingling, weakness and numbness.       Allergies   Allergen Reactions   • Imuran [Azathioprine] GI Intolerance   • Bactrim [Sulfamethoxazole-Trimethoprim] Other (See Comments)     fatigue   • Clindamycin/Lincomycin Diarrhea   • Augmentin [Amoxicillin-Pot Clavulanate] Hives   • Keflex [Cephalexin] Hives       Objective     Vitals:    07/12/21 1115   BP: 126/60   BP Location: Left arm   Patient Position: Sitting   Pulse: 75   Resp: 18   Temp: 97.7 °F (36.5 °C)   TempSrc: Oral   SpO2: 97%   Weight: 66.9 kg (147 lb 6.4 oz)   Height: 162.6 cm (64.02\")     Body mass index is 25.29 kg/m².    Physical Exam  Vitals reviewed.   HENT:      Head: Normocephalic.   Eyes:      Pupils: Pupils are equal, round, and reactive to light.   Cardiovascular:      Rate and Rhythm: Normal rate and regular rhythm.   Pulmonary:      Effort: Pulmonary effort is normal.      Breath sounds: Normal breath sounds.   Musculoskeletal:      Comments: Right upper arm:    Right shoulder:   Skin:     General: Skin is warm and dry.   Neurological:      Mental Status: She is alert and oriented to person, place, and time.   Psychiatric:         Behavior: Behavior normal.           Current Outpatient Medications:   •  amLODIPine (NORVASC) 5 MG tablet, TAKE 1 TABLET BY MOUTH EVERY DAY, Disp: 90 tablet, Rfl: 3  •  aspirin 81 MG EC tablet, Take 81 mg by mouth Daily., Disp: , Rfl:   •  aspirin-acetaminophen-caffeine (EXCEDRIN MIGRAINE) 250-250-65 MG per tablet, Take 1 tablet by mouth Every 6 (Six) Hours As Needed for Headache., Disp: , Rfl:   •  cetirizine (zyrTEC) 10 MG tablet, Take 10 mg by mouth As " Needed., Disp: , Rfl:   •  Cholecalciferol (VITAMIN D3) 5000 units capsule capsule, Take 5,000 Units by mouth 2 (Two) Times a Week., Disp: , Rfl:   •  fluconazole (Diflucan) 100 MG tablet, Take 1 tab now and then 1 tab in 3 days if needed, Disp: 2 tablet, Rfl: 0  •  lisinopril (PRINIVIL,ZESTRIL) 20 MG tablet, Take 20 mg by mouth Daily., Disp: , Rfl:   •  Multiple Vitamin (MULTI VITAMIN DAILY PO), Take 1 tablet by mouth Daily., Disp: , Rfl:   •  nystatin (MYCOSTATIN) [id number] UNIT/GM cream, Apply  topically to the appropriate area as directed 2 (Two) Times a Day As Needed (yeast infection/itching)., Disp: 30 g, Rfl: 0  •  Omega 3 1200 MG capsule, Take 1,200 mg by mouth Daily., Disp: , Rfl:   •  rosuvastatin (CRESTOR) 10 MG tablet, Take 10 mg by mouth Daily., Disp: , Rfl:   •  saccharomyces boulardii (Florastor) 250 MG capsule, Take 1 capsule by mouth 2 (Two) Times a Day., Disp: 60 capsule, Rfl: 2  •  Synthroid 75 MCG tablet, TAKE 1 TABLET DAILY FOR    THYROID, Disp: 90 tablet, Rfl: 1  •  predniSONE (DELTASONE) 20 MG tablet, Take 1 tablet by mouth 2 (Two) Times a Day for 4 days., Disp: 8 tablet, Rfl: 0        Assessment/Plan   Diagnoses and all orders for this visit:    1. Strain of right biceps, initial encounter (Primary)  -     predniSONE (DELTASONE) 20 MG tablet; Take 1 tablet by mouth 2 (Two) Times a Day for 4 days.  Dispense: 8 tablet; Refill: 0        Patient Instructions   May use cold compress/ice pack 10-15 minutes at a time several times a day   May use warm compress/heating pad 10-15 minutes at at time several times a day  May use over the counter biofreeze as needed (wash off from skin before using heating pad)        Return if symptoms worsen or fail to improve.

## 2021-07-19 ENCOUNTER — TELEPHONE (OUTPATIENT)
Dept: FAMILY MEDICINE CLINIC | Facility: CLINIC | Age: 76
End: 2021-07-19

## 2021-07-19 NOTE — TELEPHONE ENCOUNTER
Caller: Mami Srivastava    Relationship: Self    Best call back number: [phone]    Who are you requesting to speak with (clinical staff, provider,  specific staff member): CLINICAL STAFF     What was the call regarding: PATIENT SAY SHELBI ON 7/12/2021 AND WAS GIVEN PREDNISONE AND AFTER FINISHING MEDICATION PATIENT BROKE BACK IN [facility] AND THE PAIN CAME BACK     Do you require a callback: YES

## 2021-07-21 ENCOUNTER — OFFICE VISIT (OUTPATIENT)
Dept: FAMILY MEDICINE CLINIC | Facility: CLINIC | Age: 76
End: 2021-07-21

## 2021-07-21 VITALS
OXYGEN SATURATION: 97 % | HEIGHT: 64 IN | WEIGHT: 145 LBS | RESPIRATION RATE: 18 BRPM | DIASTOLIC BLOOD PRESSURE: 70 MMHG | TEMPERATURE: 98 F | HEART RATE: 90 BPM | SYSTOLIC BLOOD PRESSURE: 130 MMHG | BODY MASS INDEX: 24.75 KG/M2

## 2021-07-21 DIAGNOSIS — M79.651 RIGHT THIGH PAIN: ICD-10-CM

## 2021-07-21 DIAGNOSIS — M79.601 RIGHT ARM PAIN: Primary | ICD-10-CM

## 2021-07-21 PROCEDURE — 99214 OFFICE O/P EST MOD 30 MIN: CPT | Performed by: INTERNAL MEDICINE

## 2021-07-21 RX ORDER — METHYLPREDNISOLONE 4 MG/1
TABLET ORAL
Qty: 21 TABLET | Refills: 0 | Status: SHIPPED | OUTPATIENT
Start: 2021-07-21 | End: 2021-11-03

## 2021-07-21 NOTE — PROGRESS NOTES
Subjective   Mami Srivastava is a 76 y.o. female. Patient is here today for   Chief Complaint   Patient presents with   • Shoulder Pain     shoulder/legs night sweat           Vitals:    21 1353   BP: 130/70   Pulse: 90   Resp: 18   Temp: 98 °F (36.7 °C)   SpO2: 97%     Body mass index is 24.88 kg/m².      Past Medical History:   Diagnosis Date   • Autoimmune hepatitis (CMS/HCC)    • Cholelithiasis 2018    Ultrasound   • Coronary artery disease     Dr Luis Rick   • Diverticulitis of colon ,   • Hemangioma of liver    • Hernia 2017    Hiatal hernia-Prabhjot test   • Hyperlipidemia     Dr Luis Rick-atorvastatin   • Hypertension    • Hyperthyroidism    • Hypothyroidism    • Laceration of finger of right hand 05/15/2018    lac with knife and had 3 stitches   • Osteopenia after menopause    • Skin cancer     face   • Ulcerative colitis (CMS/HCC)     Colitis/DrKaplan      Allergies   Allergen Reactions   • Imuran [Azathioprine] GI Intolerance   • Bactrim [Sulfamethoxazole-Trimethoprim] Other (See Comments)     fatigue   • Clindamycin/Lincomycin Diarrhea   • Augmentin [Amoxicillin-Pot Clavulanate] Hives   • Keflex [Cephalexin] Hives      Social History     Socioeconomic History   • Marital status:      Spouse name: Not on file   • Number of children: Not on file   • Years of education: Not on file   • Highest education level: Not on file   Tobacco Use   • Smoking status: Former Smoker     Packs/day: 2.00     Years: 12.00     Pack years: 24.00     Start date: 1963     Quit date: 1976     Years since quittin.5   • Smokeless tobacco: Never Used   • Tobacco comment: Chrinic bronchitis...smoke allergy   Substance and Sexual Activity   • Alcohol use: Not Currently     Comment: none in 3 years   • Drug use: No   • Sexual activity: Not Currently        Current Outpatient Medications:   •  amLODIPine (NORVASC) 5 MG tablet, TAKE 1 TABLET BY MOUTH EVERY DAY, Disp: 90 tablet, Rfl:  3  •  aspirin 81 MG EC tablet, Take 81 mg by mouth Daily., Disp: , Rfl:   •  aspirin-acetaminophen-caffeine (EXCEDRIN MIGRAINE) 250-250-65 MG per tablet, Take 1 tablet by mouth Every 6 (Six) Hours As Needed for Headache., Disp: , Rfl:   •  cetirizine (zyrTEC) 10 MG tablet, Take 10 mg by mouth As Needed., Disp: , Rfl:   •  Cholecalciferol (VITAMIN D3) 5000 units capsule capsule, Take 5,000 Units by mouth 2 (Two) Times a Week., Disp: , Rfl:   •  fluconazole (Diflucan) 100 MG tablet, Take 1 tab now and then 1 tab in 3 days if needed, Disp: 2 tablet, Rfl: 0  •  lisinopril (PRINIVIL,ZESTRIL) 20 MG tablet, Take 20 mg by mouth Daily., Disp: , Rfl:   •  Multiple Vitamin (MULTI VITAMIN DAILY PO), Take 1 tablet by mouth Daily., Disp: , Rfl:   •  nystatin (MYCOSTATIN) 706705 UNIT/GM cream, Apply  topically to the appropriate area as directed 2 (Two) Times a Day As Needed (yeast infection/itching)., Disp: 30 g, Rfl: 0  •  Omega 3 1200 MG capsule, Take 1,200 mg by mouth Daily., Disp: , Rfl:   •  rosuvastatin (CRESTOR) 10 MG tablet, Take 10 mg by mouth Daily., Disp: , Rfl:   •  saccharomyces boulardii (Florastor) 250 MG capsule, Take 1 capsule by mouth 2 (Two) Times a Day., Disp: 60 capsule, Rfl: 2  •  Synthroid 75 MCG tablet, TAKE 1 TABLET DAILY FOR    THYROID, Disp: 90 tablet, Rfl: 1  •  methylPREDNISolone (MEDROL) 4 MG dose pack, Take as directed on package instructions., Disp: 21 tablet, Rfl: 0     Objective     She complains of right bicep pain and right posterior thigh pain.    She was seen by Diana Calderon  in our office for a bicipital tendinitis a bit more than a week ago.  She was given a oral steroid which seems to work very well however after discontinuing her weeklong prednisone, her symptoms returned.    On the last day of her oral prednisone she developed hives which resolved in a day.  She suspected that this may have been an allergy to oral steroids.       Review of Systems   Constitutional: Negative.    HENT:  Negative.    Respiratory: Negative.    Cardiovascular: Negative.    Musculoskeletal:        She complains of pain in bicep towards the shoulder and in her posterior right thigh.   Psychiatric/Behavioral: Negative.        Physical Exam  Vitals and nursing note reviewed.   Constitutional:       Appearance: Normal appearance.      Comments: Pleasant, neatly groomed, in no distress.   Cardiovascular:      Rate and Rhythm: Normal rate.      Heart sounds: Normal heart sounds. No murmur heard.   No gallop.    Pulmonary:      Effort: No respiratory distress.      Breath sounds: Normal breath sounds. No wheezing or rales.   Musculoskeletal:      Comments: She had pain in her bicep tendon in the right shoulder.    I could not palpate any abnormality in the thigh.   Neurological:      Mental Status: She is alert and oriented to person, place, and time.   Psychiatric:         Mood and Affect: Mood normal.         Behavior: Behavior normal.         Thought Content: Thought content normal.           Problems Addressed this Visit     None      Visit Diagnoses     Right arm pain    -  Primary    Relevant Orders    Ambulatory Referral to Physical Therapy Evaluate and treat    Right thigh pain        Relevant Orders    Ambulatory Referral to Physical Therapy Evaluate and treat      Diagnoses       Codes Comments    Right arm pain    -  Primary ICD-10-CM: M79.601  ICD-9-CM: 729.5     Right thigh pain     ICD-10-CM: M79.651  ICD-9-CM: 729.5             PLAN  I think that her thigh pain and her right bicep pain are consistent with tendinitis.  Found prednisone to be useful to suppress her pain and I have given her prescription for a Medrol Dosepak today.    I do not think it is likely that she has an allergy to systemic oral steroids though possible.  If she develops any symptoms after taking methylprednisolone, of course she will let me know.    I have referred her to physical therapy.  If she fails to improve, I will be referring her  to orthopedic surgery.  No follow-ups on file.  Answers for HPI/ROS submitted by the patient on 7/21/2021  Please describe your symptoms.: Follow up from July 12 visit…..pain in right bicept, hamstring muscle in right leg.  Also allergic reaction (hives) to prednisone last week  Have you had these symptoms before?: Yes  How long have you been having these symptoms?: 1-2 weeks  What is the primary reason for your visit?: Other

## 2021-07-26 RX ORDER — LEVOTHYROXINE SODIUM 75 MCG
TABLET ORAL
Qty: 90 TABLET | Refills: 1 | Status: SHIPPED | OUTPATIENT
Start: 2021-07-26 | End: 2022-02-15 | Stop reason: SDUPTHER

## 2021-08-04 ENCOUNTER — TELEPHONE (OUTPATIENT)
Dept: FAMILY MEDICINE CLINIC | Facility: CLINIC | Age: 76
End: 2021-08-04

## 2021-08-04 ENCOUNTER — TREATMENT (OUTPATIENT)
Dept: PHYSICAL THERAPY | Facility: CLINIC | Age: 76
End: 2021-08-04

## 2021-08-04 DIAGNOSIS — M79.601 RIGHT ARM PAIN: Primary | ICD-10-CM

## 2021-08-04 DIAGNOSIS — M25.561 POSTERIOR KNEE PAIN, RIGHT: ICD-10-CM

## 2021-08-04 PROCEDURE — 97535 SELF CARE MNGMENT TRAINING: CPT | Performed by: PHYSICAL THERAPIST

## 2021-08-04 PROCEDURE — 97162 PT EVAL MOD COMPLEX 30 MIN: CPT | Performed by: PHYSICAL THERAPIST

## 2021-08-04 PROCEDURE — 97140 MANUAL THERAPY 1/> REGIONS: CPT | Performed by: PHYSICAL THERAPIST

## 2021-08-04 PROCEDURE — 97110 THERAPEUTIC EXERCISES: CPT | Performed by: PHYSICAL THERAPIST

## 2021-08-04 NOTE — PROGRESS NOTES
Physical Therapy Initial Evaluation and Plan of Care    Patient Name: Mami Srivastava          :  1945  Referring Physician: Pradip Rosenberg MD  Diagnosis: Right arm pain [M79.601]    Date of Evaluation: 2021  ______________________________________________________________________    Subjective Evaluation    History of Present Illness  Onset date: One month -   Mechanism of injury: Insidious vs twisted while moving items in basement vs sleeping on couch wrong -   Monday morning - couldn't get out of bed / move (R) arm -   prednizone issued - helped shoulder some, but alleviated thigh pain, but returned once stopped -   On another steroid now - not as helpful     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  Alleviating factors: Steroids; Ibuprofen;   Exacerbated by: Reaching across body ; FE, IRB, lifting items of wt out in front - (R) sidelying;   Symptom course: Improved overall, but still very painful     Hand dominance: right    Diagnostic Tests  No diagnostic tests performed    Treatments  Current treatment: medication  Patient Goals  Patient/family treatment goals: Pain alleviation; Mobility and strength to allow ADls, etc w/ minimal restrictions.         ___________________________________________________  Objective          Postural Observations    Additional Postural Observation Details  Rounded shoulder / Fwd head posture; Atrophy of supraspinatus fossa (R);   ABD scap R>L -     Tenderness     Additional Tenderness Details  Tender anterior shoulder / LH Biceps into proximal mm (R)  Tender ant / lateral shoulder / subacromial region (R)      Active Range of Motion     Additional Active Range of Motion  Details  (R) shoulder: FE: 120-deg w/ most pain at 60-deg and above;   ERB to UT (R) w/ pain; IRB to LSS w/ pain   (L) Shoulder: FE Full; ERB to T3; IRB to T10    Strength/Myotome Testing     Additional Strength Details  (R) Shoulder: Painful weak: ABDuction 4/5; Empty Can 4-/5; IRS 3+/5;        Pain w/ Supination;       Tests     Additional Tests Details  (R) shoulder:  (+) Speeds; (+) Sevier's > (+) Empty Can;        See Treatment Flow sheet for Exercises, Manual therapy, and modalities.   Self Care X 10 min  · TAPING / BRACING: NA  · Jt protection, ADL modification; Posture/positioining instruction for sleeping, etc     ___________________________________________________  Assessment & Plan     Assessment  Assessment details: 77 yo female w/ (R) arm / shoulder pain - probable LH biceps strain  w/ possible RTC involvement - may require Ortho referral if no better w/ PT;    Posterior knee strain (R) -     PROBLEMS: Pain; Limited mobility; Weakness; Intolerance to ADLs incolving RUE -   PROGNOSIS: Good    GOALS:   SHORT TERM GOALS: 2 weeks:  1) HEP Initiated; 2) Pain decreased 50%:   3) AROM (R) shoulder FE increased 10-de) Improved functional ability grossly;     LONG TERM GOALS: 4 weeks (or at time of DISCHARGE): 1) (I) HEP; 2) AROM WFL and pain free; 3) Strength / mobility to be able to perform all ADL's and job-related activities w/o restrictions; 4) Able to ambulate functional distances w/o pain in (R) knee / LE -       Plan  Planned therapy interventions: flexibility, home exercise program, manual therapy, postural training, soft tissue mobilization, strengthening, stretching and therapeutic activities (Modalities prn; Taping prn; )  Other planned therapy interventions: Fully Assess Posterior (R) knee/LE pain next session w/ PT -    Frequency: 2x week  Duration in weeks: 4  Treatment plan discussed with: patient      ___________________________________________________  Manual Therapy:    13     mins   96893;  Therapeutic Exercise:    15     mins  73726;     Neuromuscular Sana:        mins  09877;    Therapeutic Activity:          mins  70878;   Self Care:                      10     mins  63644  Ultrasound:     08     mins  33943;  Iontophoresis:          mins  29510;    Electrical Stimulation:    15     mins  50003 ( );  Mechanical Traction:          mins  18323  Dry Needling          mins self-pay    Eval:   20   mins    Timed Treatment:   46   mins                  Total Treatment:     85   mins    PT SIGNATURE:   Tor Chase, PT  DATE TREATMENT INITIATED: 8/4/2021  ___________________________________________________  Initial Certification  Certification Period: 11/2/2021  I certify that the therapy services are furnished while this patient is under my care.  The services outlined above are required by this patient, and will be reviewed every 90 days.     PHYSICIAN: ________________________________  DATE: ______  Pradip Rosenberg MD        Please sign and return via fax to [phone].. Thank you, [facility].  ______________________________________________________________________  [address] [place] [address]  Phone: [phone] Fax: [phone]

## 2021-08-04 NOTE — TELEPHONE ENCOUNTER
CALLED AND SPOKE WITH PT, INFORMED HER  WAS OUT AND WE MAY NOT BE ABLE TO GET LETTER DONE TODAY BY ANOTHER PROVIDER, SHE STATED SHE WILL CALL HER PHYSICAL THERAPIST WHO SUGGESTED SHE BE ON RESTRICTIONS FROM LIFTING ANYTHING HEAVY AND SEE IF THEY CAN WRITE THE LETTER, SHE WILL CALL OFFICE BACK IF SHE NEEDS ANYTHING FURTHER.

## 2021-08-04 NOTE — TELEPHONE ENCOUNTER
Caller: Mami Srivastava    Relationship to patient: Self    Best call back number: [phone] (H)    Patient is needing: PATIENT CALLED IN STATING THAT SHE NEEDS A LETTER WITH RESTRICTIONS FOR HER TO RETURN TO WORK. STATED THAT SHE WOULD LIKE TO PICK IT UP TODAY IF POSSIBLE BECAUSE SHE IS LEAVING TOWN IN THE MORNING AND WONT BE BACK UNTIL Sunday. ADVISED PATIENT THAT MD SHON HOLCOMB DOES HAVE 48 HOURS TO RESPOND TO MESSAGES. PATIENT STATES THAT IF HE CANNOT GET IT DONE TODAY TO PLEASE MAIL IT TO HER. THANK YOU

## 2021-08-09 ENCOUNTER — TREATMENT (OUTPATIENT)
Dept: PHYSICAL THERAPY | Facility: CLINIC | Age: 76
End: 2021-08-09

## 2021-08-09 DIAGNOSIS — M25.561 POSTERIOR KNEE PAIN, RIGHT: ICD-10-CM

## 2021-08-09 DIAGNOSIS — M79.601 RIGHT ARM PAIN: Primary | ICD-10-CM

## 2021-08-09 PROCEDURE — 97110 THERAPEUTIC EXERCISES: CPT | Performed by: PHYSICAL THERAPIST

## 2021-08-09 PROCEDURE — 97140 MANUAL THERAPY 1/> REGIONS: CPT | Performed by: PHYSICAL THERAPIST

## 2021-08-09 PROCEDURE — 97112 NEUROMUSCULAR REEDUCATION: CPT | Performed by: PHYSICAL THERAPIST

## 2021-08-09 NOTE — PROGRESS NOTES
Physical Therapy Daily Progress Note        VISIT#: 2      Mami Srivastava reports: Her shoulder is her chief concern and hurts the most. It is the worst in the morning. She has taken tylenol at night and that knocks her out. Her RLE/knee is doing better but now she gets some pain in her L LE in the gastroc and HS.   Current Pain Level:    5-6/10; Worst:   9/10; Best:  4/10  Location Of Pain: R shoulder, anterior and lateral  Response to Previous Session: No issues  Functional Deficits/Irritating Factors: Reaching across body ; FE, IRB, lifting items of wt out in front - (R) sidelying;   Progression: no significant changes.   Compliance with HEP Reported: Yes      Objective   Presents: Rounded shoulder / Fwd head posture; Atrophy of supraspinatus fossa (R); ABD scap R>L -   Increased sets/reps of:  none   Increased resistance on:  none  Added to Program:   HS stretch      See Exercise, Manual, and Modality Logs for complete treatment.     Patient Education: Pt was educated on exercise biomechanical correctness, intensity, and speed.     Assessment:  Cues for scapular retraction and depression during scapular retraction/rows. Some increase in pain with wall push ups but tolerable for pt - stating it was not a lot. Pt reporting HS and gastroc tightness/pain today so added a HS stretch. Pt not significantly lacking in HS length but still tight. Pt needed assist in supine to bring shoulder into 90 deg flexion but once there was able to perform ABCs. Pt guarded during PROM and needed mod cuing to remember to relax to get the most from her stretching.   Pt will continue to benefit from skilled PT interventions to address current functional deficits and impairments.       Plan: Progress to/Continue with current program. Return to evaluating therapist. Assess response to today's session        Manual Therapy:    20     mins  42252;  Ultrasound:   0 mins 92548  Electrical Stimulation: __0____ mins  94931/  Iontophoresis: 0 mins 17476  Traction: 0 mins  37594  Work Conditionin mins 39816  Therapeutic Exercise:    10/20     mins  86592;     Neuromuscular Sana:    10    mins  51776;    Therapeutic Activity:     0     mins  74307;     Timed Treatment:   40   mins   Total Treatment:     60   mins    YEE Lomeli License # [id number]  Physical Therapist Assistant

## 2021-08-11 ENCOUNTER — TELEPHONE (OUTPATIENT)
Dept: FAMILY MEDICINE CLINIC | Facility: CLINIC | Age: 76
End: 2021-08-11

## 2021-08-11 NOTE — TELEPHONE ENCOUNTER
Caller: Mami Srivastava    Relationship: Self    Best call back number: [phone]    What orders are you requesting (i.e. lab or imaging): MRI OF RIGHT SHOULDER    In what timeframe would the patient need to come in: ASAP    Where will you receive your lab/imaging services: CLOSEST TO PATIENT'S HOME    Additional notes:

## 2021-08-11 NOTE — TELEPHONE ENCOUNTER
Please call pt and let her know that she will need to be seen to be re-assessed and discussion on imaging vs referral.  Thanks  carla

## 2021-08-11 NOTE — TELEPHONE ENCOUNTER
Patient stated she was seen in the office by carla on 7/12/21 for her right shoulder pain, would you be able to put in a order for a MRI, the pt has been to physical therapy and is still having pain. The PT asked if she had a MRI done to rule out a muscle strain and she hasn't so she wanted to do that first before she gets referred to ortho.

## 2021-08-11 NOTE — TELEPHONE ENCOUNTER
Called pt back no answer left VM that she would need to be seen to to re assess   the need for a referral or MRI. Can pt be called to schedule a appt.

## 2021-08-12 ENCOUNTER — TREATMENT (OUTPATIENT)
Dept: PHYSICAL THERAPY | Facility: CLINIC | Age: 76
End: 2021-08-12

## 2021-08-12 ENCOUNTER — OFFICE VISIT (OUTPATIENT)
Dept: FAMILY MEDICINE CLINIC | Facility: CLINIC | Age: 76
End: 2021-08-12

## 2021-08-12 VITALS
SYSTOLIC BLOOD PRESSURE: 110 MMHG | HEART RATE: 92 BPM | DIASTOLIC BLOOD PRESSURE: 66 MMHG | TEMPERATURE: 97.2 F | BODY MASS INDEX: 25 KG/M2 | HEIGHT: 64 IN | OXYGEN SATURATION: 97 % | WEIGHT: 146.4 LBS

## 2021-08-12 DIAGNOSIS — M25.521 ARTHRALGIA OF RIGHT UPPER ARM: ICD-10-CM

## 2021-08-12 DIAGNOSIS — M25.561 POSTERIOR KNEE PAIN, RIGHT: ICD-10-CM

## 2021-08-12 DIAGNOSIS — M75.21 BICEPS TENDINITIS OF RIGHT UPPER EXTREMITY: Primary | ICD-10-CM

## 2021-08-12 DIAGNOSIS — M79.601 RIGHT ARM PAIN: Primary | ICD-10-CM

## 2021-08-12 DIAGNOSIS — S76.111D STRAIN OF RIGHT QUADRICEPS, SUBSEQUENT ENCOUNTER: ICD-10-CM

## 2021-08-12 DIAGNOSIS — M79.651 RIGHT THIGH PAIN: ICD-10-CM

## 2021-08-12 PROBLEM — I25.10 CALCIFIC CORONARY ARTERIOSCLEROSIS: Status: ACTIVE | Noted: 2021-07-28

## 2021-08-12 PROCEDURE — 97110 THERAPEUTIC EXERCISES: CPT | Performed by: PHYSICAL THERAPIST

## 2021-08-12 PROCEDURE — 97014 ELECTRIC STIMULATION THERAPY: CPT | Performed by: PHYSICAL THERAPIST

## 2021-08-12 PROCEDURE — 99213 OFFICE O/P EST LOW 20 MIN: CPT | Performed by: NURSE PRACTITIONER

## 2021-08-12 NOTE — PATIENT INSTRUCTIONS
May use cold compress/ice pack 15 minutes at a time several times a day   May use warm compress/heating pad 15 minutes at at time several times a   Day  Continue physical therapy  Referring to ortho. For further eval. And tx (?MRI for possible rotator cuff injury)

## 2021-08-12 NOTE — PROGRESS NOTES
Physical Therapy Daily Progress Note    Patient Name: Mami Srivastava         :  1945  Referring Physician: Pradip Rosenberg MD      Subjective   Mami Srivastava reports: went to [place] - did exercises - Increased pain following new exercises -Awaiting appointment for MRI for her shoulder - Very painful to move (R) shoulder    Objective   Pt demonstrates painful and limited AROM (R) shoulder - limiting her lucia to TE, etc in PT    See Exercise, Manual, and Modality Logs for complete treatment.     Functional / Therapeutic Activities:   min  · TAPING / BRACING: NA  · Jt protection, ADL modification; Posture and      Assessment/Plan  77 yo female w/ (R) arm / shoulder pain - probable LH biceps strain  w/ possible RTC involvement - would benefit from further assessment (MRI / Ortho referral ) due to poor tolerance to PT and ADLs ;      Progress strengthening /stabilization /functional activity       _________________________________________________    Manual Therapy:                 mins  47281;  Therapeutic Exercise:    30    mins  05028;     Neuromuscular Sana:        mins  08778;    Therapeutic Activity:           mins  78118;     Ultrasound:                          mins  22047;  Iontophoresis:                     mins  56719;    Electrical Stimulation:     15    mins  81144 ( );  Mechanical Traction:          mins  93774  Dry Needling                       mins self-pay     Timed Treatment:   28    mins                  Total Treatment:     50    mins    Tor Chase PT  Physical Therapist

## 2021-08-12 NOTE — PROGRESS NOTES
Subjective \    Mami Srivastava is a 76 y.o.. female.     Pt here today fr follow up on right bicep pain and right posterior thigh pain. Pt was seen in July twice for bicep tendinitis,  was given prednisone pack x 2 and started in physical therapy. Pt denies any issues after second steroid pack. Pt has been going to physical therapy and denies improvement. Pt stating she can not lift right arm above 1/2 way point on her own. Pt complains with pain on movement of right arm.       The following portions of the patient's history were reviewed and updated as appropriate: allergies, current medications, past family history, past medical history, past social history, past surgical history and problem list.    Past Medical History:   Diagnosis Date   • Autoimmune hepatitis (CMS/HCC)    • Cholelithiasis 04-    Ultrasound   • Coronary artery disease     Dr Luis Rick   • Diverticulitis of colon 2005,2018   • Hemangioma of liver    • Hernia 2017    Hiatal hernia-Fredislo test   • Hyperlipidemia     Dr Luis Rick-atorvastatin   • Hypertension    • Hyperthyroidism    • Hypothyroidism    • Laceration of finger of right hand 05/15/2018    lac with knife and had 3 stitches   • Osteopenia after menopause    • Skin cancer     face   • Ulcerative colitis (CMS/HCC) 2014    Colitis/DrKaplan       Past Surgical History:   Procedure Laterality Date   • BUNIONECTOMY     • COLONOSCOPY  04/11/2014    Diverticulosis in the sigmoid colon, in the descending colon, in the transverse colon and in the ascending colon (Pathology: Collagenous colitis)   • COLONOSCOPY  12/09/2005    Hemorrhoids, pandiverticulosis, status post diverticulitis   • COLONOSCOPY N/A 11/2/2018    fair prep, tics   • LIVER BIOPSY     • LIVER BIOPSY     • THYROIDECTOMY  2008       Review of Systems   Constitutional: Negative.    Respiratory: Negative.    Cardiovascular: Negative.    Musculoskeletal: Positive for arthralgias.       Allergies   Allergen Reactions   •  "Imuran [Azathioprine] GI Intolerance   • Bactrim [Sulfamethoxazole-Trimethoprim] Other (See Comments)     fatigue   • Clindamycin/Lincomycin Diarrhea   • Augmentin [Amoxicillin-Pot Clavulanate] Hives   • Keflex [Cephalexin] Hives       Objective     Vitals:    08/12/21 0858   BP: 110/66   Pulse: 92   Temp: 97.2 °F (36.2 °C)   SpO2: 97%   Weight: 66.4 kg (146 lb 6.4 oz)   Height: 161.3 cm (63.5\")     Body mass index is 25.53 kg/m².    Physical Exam  Vitals reviewed.   HENT:      Head: Normocephalic.   Eyes:      Pupils: Pupils are equal, round, and reactive to light.   Cardiovascular:      Rate and Rhythm: Normal rate and regular rhythm.   Pulmonary:      Effort: Pulmonary effort is normal.      Breath sounds: Normal breath sounds.   Musculoskeletal:      Right shoulder: No swelling, deformity, effusion or bony tenderness. Decreased range of motion. Decreased strength.      Right upper arm: Tenderness present. No swelling, edema, deformity, lacerations or bony tenderness.      Right lower leg: Tenderness (to post upper leg (thigh)) present. No swelling, deformity, lacerations or bony tenderness.   Neurological:      Mental Status: She is alert and oriented to person, place, and time.   Psychiatric:         Behavior: Behavior normal.           Current Outpatient Medications:   •  amLODIPine (NORVASC) 5 MG tablet, TAKE 1 TABLET BY MOUTH EVERY DAY, Disp: 90 tablet, Rfl: 3  •  aspirin 81 MG EC tablet, Take 81 mg by mouth Daily., Disp: , Rfl:   •  aspirin-acetaminophen-caffeine (EXCEDRIN MIGRAINE) 250-250-65 MG per tablet, Take 1 tablet by mouth Every 6 (Six) Hours As Needed for Headache., Disp: , Rfl:   •  cetirizine (zyrTEC) 10 MG tablet, Take 10 mg by mouth As Needed., Disp: , Rfl:   •  Cholecalciferol (VITAMIN D3) 5000 units capsule capsule, Take 5,000 Units by mouth 2 (Two) Times a Week., Disp: , Rfl:   •  fluconazole (Diflucan) 100 MG tablet, Take 1 tab now and then 1 tab in 3 days if needed, Disp: 2 tablet, Rfl: " 0  •  lisinopril (PRINIVIL,ZESTRIL) 20 MG tablet, Take 20 mg by mouth Daily., Disp: , Rfl:   •  methylPREDNISolone (MEDROL) 4 MG dose pack, Take as directed on package instructions., Disp: 21 tablet, Rfl: 0  •  Multiple Vitamin (MULTI VITAMIN DAILY PO), Take 1 tablet by mouth Daily., Disp: , Rfl:   •  nystatin (MYCOSTATIN) 281349 UNIT/GM cream, Apply  topically to the appropriate area as directed 2 (Two) Times a Day As Needed (yeast infection/itching)., Disp: 30 g, Rfl: 0  •  Omega 3 1200 MG capsule, Take 1,200 mg by mouth Daily., Disp: , Rfl:   •  rosuvastatin (CRESTOR) 10 MG tablet, Take 10 mg by mouth Daily., Disp: , Rfl:   •  saccharomyces boulardii (Florastor) 250 MG capsule, Take 1 capsule by mouth 2 (Two) Times a Day., Disp: 60 capsule, Rfl: 2  •  Synthroid 75 MCG tablet, TAKE 1 TABLET DAILY FOR    THYROID, Disp: 90 tablet, Rfl: 1        Assessment/Plan   Diagnoses and all orders for this visit:    1. Biceps tendinitis of right upper extremity (Primary)  -     Ambulatory Referral to Orthopedic Surgery    2. Arthralgia of right upper arm  -     Ambulatory Referral to Orthopedic Surgery    3. Strain of right quadriceps, subsequent encounter    4. Right thigh pain        Patient Instructions   May use cold compress/ice pack 15 minutes at a time several times a day   May use warm compress/heating pad 15 minutes at at time several times a   Day  Continue physical therapy  Referring to ortho. For further eval. And tx (?MRI for possible rotator cuff injury)        Return if symptoms worsen or fail to improve.

## 2021-08-17 ENCOUNTER — TREATMENT (OUTPATIENT)
Dept: PHYSICAL THERAPY | Facility: CLINIC | Age: 76
End: 2021-08-17

## 2021-08-17 DIAGNOSIS — M79.601 RIGHT ARM PAIN: Primary | ICD-10-CM

## 2021-08-17 PROCEDURE — 97530 THERAPEUTIC ACTIVITIES: CPT | Performed by: PHYSICAL THERAPIST

## 2021-08-17 PROCEDURE — 97110 THERAPEUTIC EXERCISES: CPT | Performed by: PHYSICAL THERAPIST

## 2021-08-17 PROCEDURE — 97014 ELECTRIC STIMULATION THERAPY: CPT | Performed by: PHYSICAL THERAPIST

## 2021-08-17 NOTE — PROGRESS NOTES
Physical Therapy Daily Progress Note    Patient Name: Mami Srivastava         :  1945  Referring Physician: Pradip Rosenberg MD      Subjective   Mami Srivastava reports: to see Dr. Craft Monday - Persistent shoulder pain anterior and nt/lat, lat shoulder  Doing pulleys regulalry    Objective   Pt minimally able to raise (R) shoulder today -   (+) Drop ARM; (+) Empty Can; (+) [place]riens;   Painful and weak ABD, Empty Can; ERS (R) Shoulder -   Poor tolerance to PT     See Exercise, Manual, and Modality Logs for complete treatment.     Functional / Therapeutic Activities:  15 min  · TAPING / BRACING: NA  · Shoulder assessment -   · Discussed w/ Pt to return to MD for further assessment - and referral to ortho, etc  · Jt protection, ADL modification; Posture and      Assessment/Plan  75 yo female w/ (R) arm / shoulder pain - probable LH biceps strain  w/probable RTC involvement - would benefit from further assessment (MRI / Ortho referral ) due to poor tolerance to PT and ADLs ;      DC PT - refer back to MD - for further assessment -        _________________________________________________    Manual Therapy:                 mins  04930;  Therapeutic Exercise:    15    mins  58749;     Neuromuscular Sana:        mins  58272;    Therapeutic Activity:     15      mins  58242;     Ultrasound:                          mins  06053;  Iontophoresis:                     mins  38815;    Electrical Stimulation:   15      mins  33734 ( );  Mechanical Traction:          mins  84498  Dry Needling                       mins self-pay     Timed Treatment:  30    mins                  Total Treatment:     55    mins    Tor Chase PT  Physical Therapist

## 2021-08-23 ENCOUNTER — OFFICE VISIT (OUTPATIENT)
Dept: ORTHOPEDIC SURGERY | Facility: CLINIC | Age: 76
End: 2021-08-23

## 2021-08-23 VITALS — TEMPERATURE: 98 F | WEIGHT: 145 LBS | HEIGHT: 63 IN | BODY MASS INDEX: 25.69 KG/M2

## 2021-08-23 DIAGNOSIS — M25.511 RIGHT SHOULDER PAIN, UNSPECIFIED CHRONICITY: Primary | ICD-10-CM

## 2021-08-23 PROCEDURE — 99203 OFFICE O/P NEW LOW 30 MIN: CPT | Performed by: ORTHOPAEDIC SURGERY

## 2021-08-23 PROCEDURE — 73030 X-RAY EXAM OF SHOULDER: CPT | Performed by: ORTHOPAEDIC SURGERY

## 2021-08-23 NOTE — PROGRESS NOTES
Patient: Mami Srivastava    YOB: 1945    Medical Record Number: [id number]    Chief Complaints:  Right shoulder and upper arm pain    History of Present Illness:     76 y.o. female patient who presents with a complaint of right shoulder pain and weakness.  She reports that the symptoms first started in July.  She does not recall a specific injury.  Her pain is moderate to severe, varying from 6-10 out of 10 in severity.  She reports a constant aching discomfort but she also reports stabbing pain when trying to reach or lift.  Symptoms are worse with standing, working and driving.  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  Advil and Tylenol were helping somewhat but she had to stop taking the Advil due to chest pain.  She has done some limited physical therapy but did not see much improvement.  She is very frustrated by her persistent symptoms.  She denies any shooting pain down the arm, distal weakness, numbness or paresthesias.    Allergies:   Allergies   Allergen Reactions   • Imuran [Azathioprine] GI Intolerance   • Bactrim [Sulfamethoxazole-Trimethoprim] Other (See Comments)     fatigue   • Clindamycin/Lincomycin Diarrhea   • Augmentin [Amoxicillin-Pot Clavulanate] Hives   • Keflex [Cephalexin] Hives       Home Medications:    Current Outpatient Medications:   •  amLODIPine (NORVASC) 5 MG tablet, TAKE 1 TABLET BY MOUTH EVERY DAY, Disp: 90 tablet, Rfl: 3  •  aspirin 81 MG EC tablet, Take 81 mg by mouth Daily., Disp: , Rfl:   •  aspirin-acetaminophen-caffeine (EXCEDRIN MIGRAINE) 250-250-65 MG per tablet, Take 1 tablet by mouth Every 6 (Six) Hours As Needed for Headache., Disp: , Rfl:   •  cetirizine (zyrTEC) 10 MG tablet, Take 10 mg by mouth As Needed., Disp: , Rfl:   •  Cholecalciferol (VITAMIN D3) 5000 units capsule capsule, Take 5,000 Units by mouth 2 (Two) Times a Week., Disp: , Rfl:   •  fluconazole (Diflucan) 100 MG tablet, Take 1  tab now and then 1 tab in 3 days if needed, Disp: 2 tablet, Rfl: 0  •  lisinopril (PRINIVIL,ZESTRIL) 20 MG tablet, Take 20 mg by mouth Daily., Disp: , Rfl:   •  methylPREDNISolone (MEDROL) 4 MG dose pack, Take as directed on package instructions., Disp: 21 tablet, Rfl: 0  •  Multiple Vitamin (MULTI VITAMIN DAILY PO), Take 1 tablet by mouth Daily., Disp: , Rfl:   •  nystatin (MYCOSTATIN) 316743 UNIT/GM cream, Apply  topically to the appropriate area as directed 2 (Two) Times a Day As Needed (yeast infection/itching)., Disp: 30 g, Rfl: 0  •  Omega 3 1200 MG capsule, Take 1,200 mg by mouth Daily., Disp: , Rfl:   •  rosuvastatin (CRESTOR) 10 MG tablet, Take 10 mg by mouth Daily., Disp: , Rfl:   •  saccharomyces boulardii (Florastor) 250 MG capsule, Take 1 capsule by mouth 2 (Two) Times a Day., Disp: 60 capsule, Rfl: 2  •  Synthroid 75 MCG tablet, TAKE 1 TABLET DAILY FOR    THYROID, Disp: 90 tablet, Rfl: 1    Past Medical History:   Diagnosis Date   • Autoimmune hepatitis (CMS/HCC)    • Cholelithiasis 04-    Ultrasound   • Coronary artery disease     Dr Luis Rick   • Diverticulitis of colon 2005,2018   • Hemangioma of liver    • Hernia 2017    Hiatal hernia-Prabhjot test   • Hyperlipidemia     Dr Luis Rick-atorvastatin   • Hypertension    • Hyperthyroidism    • Hypothyroidism    • Laceration of finger of right hand 05/15/2018    lac with knife and had 3 stitches   • Osteopenia after menopause    • Skin cancer     face   • Ulcerative colitis (CMS/HCC) 2014    Colitis/[facility]       Past Surgical History:   Procedure Laterality Date   • BUNIONECTOMY     • COLONOSCOPY  04/11/2014    Diverticulosis in the sigmoid colon, in the descending colon, in the transverse colon and in the ascending colon (Pathology: Collagenous colitis)   • COLONOSCOPY  12/09/2005    Hemorrhoids, pandiverticulosis, status post diverticulitis   • COLONOSCOPY N/A 11/2/2018    fair prep, tics   • LIVER BIOPSY     • LIVER BIOPSY     •  THYROIDECTOMY  2008       Social History     Occupational History   • Not on file   Tobacco Use   • Smoking status: Former Smoker     Packs/day: 2.00     Years: 12.00     Pack years: 24.00     Start date: 1963     Quit date: 1976     Years since quittin.5   • Smokeless tobacco: Never Used   • Tobacco comment: Chrinic bronchitis...smoke allergy   Vaping Use   • Vaping Use: Never used   Substance and Sexual Activity   • Alcohol use: Not Currently     Comment: none in 3 years   • Drug use: No   • Sexual activity: Not Currently      Social History     Social History Narrative   • Not on file       Family History   Problem Relation Age of Onset   • Heart disease Mother    • Hypertension Mother    • Lung cancer Sister    • Thyroid disease Sister    • Lupus Sister    • Thyroid disease Brother    • Alcohol abuse Brother    • Glaucoma Maternal Grandmother    • Stomach cancer Maternal Grandmother          ’s   • Cirrhosis Father             • Liver disease Father        Review of Systems:      Constitutional: Denies fever, shaking or chills   Eyes: Denies change in visual acuity   HEENT: Denies nasal congestion or sore throat   Respiratory: Denies cough or shortness of breath   Cardiovascular: Denies chest pain or edema  Endocrine: Denies tremors, palpitations, intolerance of heat or cold, polyuria, polydipsia.  GI: Denies abdominal pain, nausea, vomiting, bloody stools or diarrhea  : Denies frequency, urgency, incontinence, retention, or nocturia.  Musculoskeletal: Denies numbness, tingling or loss of motor function except as above  Integument: Denies rash, lesion or ulceration   Neurologic: Denies headache or focal weakness, deficits  Heme: Denies spontaneous or excessive bleeding, epistaxis, hematuria, melena, fatigue, enlarged or tender lymph nodes.      All other pertinent positives and negatives as noted above in HPI.    Physical Exam:   76 y.o. female    Vitals:    21 1459   Temp:  "98 °F (36.7 °C)   Weight: 65.8 kg (145 lb)   Height: 160 cm (63\")     General:  Patient is awake and alert.  Appears in no acute distress or discomfort.    Psych:  Affect and demeanor are appropriate.    Eyes:  Conjunctiva and sclera appear grossly normal.  Eyes track well and EOM seem to be intact.    Ears:  No gross abnormalities.  Hearing adequate for the exam.    Cardiovascular:  Regular rate and rhythm.    Lungs:  Good chest expansion.  Breathing unlabored.    Lymph:  No palpable adenopathy about neck or axilla.    Neck:  Supple.  Normal ROM.  Negative Spurling's for shoulder or arm pain.    Right upper extremity:  Skin is benign.  No gross abnormalities on inspection including any atrophy, swellings, or masses.  No palpable masses or adenopathy.  No focal areas of significant tenderness.  Her active and passive motion are slightly limited relative the contralateral side, approximately 5 to 10 degrees in all planes.  No evident instability or apprehension.  Positive Brenda Freeman.  Weakness with forward elevation in the scapular plane, 4+ out of 5, but her exam is guarded due to pain.  She has better strength with internal and external rotation but, again, both are uncomfortable.  Good strength in her biceps, triceps, and .  Intact sensation throughout the arm.  Brisk cap refill.  Palpable radial pulse.  Good skin turgor.         Radiology:   AP, scapular Y, and axillary views of the right shoulder are ordered by myself and reviewed to evaluate the patient's complaint.  No comparison films are immediately available.  The x-rays show moderate acromioclavicular and mild glenohumeral osteoarthritis with subchondral sclerosis and osteophyte formation in the acromioclavicular joint and early osteophyte formation on the caudal humeral head.  No obvious acute abnormalities, lesions, masses, or other concerning findings.  The acromiohumeral interval is normal.  Glenoid version appears normal as " well.    Assessment/Plan:   Right shoulder pain and motion loss, suspected rotator cuff tear and early adhesive capsulitis    We thoroughly discussed options in detail including a trial of conservative treatment versus further work-up and potential surgical options.  She saw little improvement with the therapy and she is frustrated by her persistent symptoms.  She wants to pursue further work-up and potential surgical options.  I have recommended further work-up with an MRI.  I instructed her that I will call with the MRI results and we will come up with a plan pending review of that study.    Chencho Craft MD    08/23/2021    CC to Pradip Rosenberg MD

## 2021-08-31 ENCOUNTER — HOSPITAL ENCOUNTER (OUTPATIENT)
Dept: MRI IMAGING | Facility: HOSPITAL | Age: 76
Discharge: HOME | End: 2021-08-31
Admitting: ORTHOPAEDIC SURGERY

## 2021-08-31 DIAGNOSIS — M25.511 RIGHT SHOULDER PAIN, UNSPECIFIED CHRONICITY: ICD-10-CM

## 2021-08-31 PROCEDURE — 73221 MRI JOINT UPR EXTREM W/O DYE: CPT

## 2021-09-01 ENCOUNTER — TELEPHONE (OUTPATIENT)
Dept: ORTHOPEDIC SURGERY | Facility: CLINIC | Age: 76
End: 2021-09-01

## 2021-09-01 NOTE — TELEPHONE ENCOUNTER
Spoke with her.  We discussed her MRI results.  We discussed surgical and nonsurgical treatment options.  She is undecided about how to proceed.  She wants to talk it over with her kids.  I am going to have my office set up an appointment with me so that we can discuss this face-to-face as well.

## 2021-09-03 ENCOUNTER — TELEPHONE (OUTPATIENT)
Dept: FAMILY MEDICINE CLINIC | Facility: CLINIC | Age: 76
End: 2021-09-03

## 2021-09-03 NOTE — TELEPHONE ENCOUNTER
Caller: Mami Srivastava    Relationship: Self    Best call back number: [phone]    What is the best time to reach you: ANYTIME    What was the call regarding: PATIENT CALLED WANTING TO KNOW IF DR. HOLCOMB HAS READ THE MRI RESULTS OF HER SHOULDER, AND WOULD LIKE TO KNOW WHAT HIS OPINION IS. ALSO, THE PATIENT WOULD LIKE TO DISCUSS THE FLU VACCINE AND COVID BOOSTER, AND IF SHE SHOULD GET THEM.    Do you require a callback: YES

## 2021-09-08 ENCOUNTER — OFFICE VISIT (OUTPATIENT)
Dept: ORTHOPEDIC SURGERY | Facility: CLINIC | Age: 76
End: 2021-09-08

## 2021-09-08 VITALS — BODY MASS INDEX: 25.69 KG/M2 | TEMPERATURE: 97.8 F | HEIGHT: 63 IN | WEIGHT: 145 LBS

## 2021-09-08 DIAGNOSIS — M25.511 RIGHT SHOULDER PAIN, UNSPECIFIED CHRONICITY: Primary | ICD-10-CM

## 2021-09-08 PROCEDURE — 99214 OFFICE O/P EST MOD 30 MIN: CPT | Performed by: ORTHOPAEDIC SURGERY

## 2021-09-08 RX ORDER — VANCOMYCIN HYDROCHLORIDE 1 G/200ML
15 INJECTION, SOLUTION INTRAVENOUS ONCE
Status: CANCELLED | OUTPATIENT
Start: 2021-11-09 | End: 2021-09-08

## 2021-09-08 NOTE — PROGRESS NOTES
Medical Record Number: [id number]    Chief Complaints: Follow-up regarding right shoulder pain    History of Present Illness:     76 y.o. female patient who presents for follow-up of the right shoulder.  She reports persistent pain and dysfunction.  She recently got an MRI and presents today to review that study and discuss further options.  We had discussed the results over the phone and she was leaning towards nonsurgical management.  She says that she has talked it over with her kids and she is now leaning more towards surgical treatment.  Of note, she has recently been diagnosed with a UTI.  She has just been started on nitrofurantoin.    Allergies:   Allergies   Allergen Reactions   • Imuran [Azathioprine] GI Intolerance   • Bactrim [Sulfamethoxazole-Trimethoprim] Other (See Comments)     fatigue   • Clindamycin/Lincomycin Diarrhea   • Augmentin [Amoxicillin-Pot Clavulanate] Hives   • Keflex [Cephalexin] Hives       Home Medications:    Current Outpatient Medications:   •  amLODIPine (NORVASC) 5 MG tablet, TAKE 1 TABLET BY MOUTH EVERY DAY, Disp: 90 tablet, Rfl: 3  •  aspirin 81 MG EC tablet, Take 81 mg by mouth Daily., Disp: , Rfl:   •  aspirin-acetaminophen-caffeine (EXCEDRIN MIGRAINE) 250-250-65 MG per tablet, Take 1 tablet by mouth Every 6 (Six) Hours As Needed for Headache., Disp: , Rfl:   •  cetirizine (zyrTEC) 10 MG tablet, Take 10 mg by mouth As Needed., Disp: , Rfl:   •  Cholecalciferol (VITAMIN D3) 5000 units capsule capsule, Take 5,000 Units by mouth 2 (Two) Times a Week., Disp: , Rfl:   •  fluconazole (Diflucan) 100 MG tablet, Take 1 tab now and then 1 tab in 3 days if needed, Disp: 2 tablet, Rfl: 0  •  lisinopril (PRINIVIL,ZESTRIL) 20 MG tablet, Take 20 mg by mouth Daily., Disp: , Rfl:   •  methylPREDNISolone (MEDROL) 4 MG dose pack, Take as directed on package instructions., Disp: 21 tablet, Rfl: 0  •  Multiple Vitamin (MULTI VITAMIN DAILY PO), Take 1 tablet by mouth Daily., Disp: , Rfl:   •   nystatin (MYCOSTATIN) 223657 UNIT/GM cream, Apply  topically to the appropriate area as directed 2 (Two) Times a Day As Needed (yeast infection/itching)., Disp: 30 g, Rfl: 0  •  Omega 3 1200 MG capsule, Take 1,200 mg by mouth Daily., Disp: , Rfl:   •  rosuvastatin (CRESTOR) 10 MG tablet, Take 10 mg by mouth Daily., Disp: , Rfl:   •  saccharomyces boulardii (Florastor) 250 MG capsule, Take 1 capsule by mouth 2 (Two) Times a Day., Disp: 60 capsule, Rfl: 2  •  Synthroid 75 MCG tablet, TAKE 1 TABLET DAILY FOR    THYROID, Disp: 90 tablet, Rfl: 1  •  nitrofurantoin, macrocrystal-monohydrate, (Macrobid) 100 MG capsule, Take 1 capsule by mouth 2 (Two) Times a Day., Disp: 14 capsule, Rfl: 0    Past Medical History:   Diagnosis Date   • Autoimmune hepatitis (CMS/HCC)    • Cholelithiasis 04-    Ultrasound   • Coronary artery disease     Dr Luis Rick   • Diverticulitis of colon 2005,2018   • Hemangioma of liver    • Hernia 2017    Hiatal hernia-Prabhjot test   • Hyperlipidemia     Dr Luis Rick-atorvastatin   • Hypertension    • Hyperthyroidism    • Hypothyroidism    • Laceration of finger of right hand 05/15/2018    lac with knife and had 3 stitches   • Osteopenia after menopause    • Skin cancer     face   • Ulcerative colitis (CMS/HCC) 2014    Colitis/[facility]       Past Surgical History:   Procedure Laterality Date   • BUNIONECTOMY     • COLONOSCOPY  04/11/2014    Diverticulosis in the sigmoid colon, in the descending colon, in the transverse colon and in the ascending colon (Pathology: Collagenous colitis)   • COLONOSCOPY  12/09/2005    Hemorrhoids, pandiverticulosis, status post diverticulitis   • COLONOSCOPY N/A 11/2/2018    fair prep, tics   • LIVER BIOPSY     • LIVER BIOPSY     • THYROIDECTOMY  2008       Social History     Occupational History   • Not on file   Tobacco Use   • Smoking status: Former Smoker     Packs/day: 2.00     Years: 12.00     Pack years: 24.00     Start date: 5/31/1963     Quit date:  "1976     Years since quittin.6   • Smokeless tobacco: Never Used   • Tobacco comment: Chrinic bronchitis...smoke allergy   Vaping Use   • Vaping Use: Never used   Substance and Sexual Activity   • Alcohol use: Not Currently     Comment: none in 3 years   • Drug use: No   • Sexual activity: Not Currently      Social History     Social History Narrative   • Not on file       Family History   Problem Relation Age of Onset   • Heart disease Mother    • Hypertension Mother    • Lung cancer Sister    • Thyroid disease Sister    • Lupus Sister    • Thyroid disease Brother    • Alcohol abuse Brother    • Glaucoma Maternal Grandmother    • Stomach cancer Maternal Grandmother          ’s   • Cirrhosis Father             • Liver disease Father        Review of Systems:      Constitutional: Denies fever, shaking or chills   Eyes: Denies change in visual acuity   HEENT: Denies nasal congestion or sore throat   Respiratory: Denies cough or shortness of breath   Cardiovascular: Denies chest pain or edema  Endocrine: Denies tremors, palpitations, intolerance of heat or cold, polyuria, polydipsia.  GI: Denies abdominal pain, nausea, vomiting, bloody stools or diarrhea  : Denies frequency, urgency, incontinence, retention, or nocturia.  Musculoskeletal: Denies numbness, tingling or loss of motor function except as above  Integument: Denies rash, lesion or ulceration   Neurologic: Denies headache or focal weakness, deficits  Heme: Denies spontaneous or excessive bleeding, epistaxis, hematuria, melena, fatigue, enlarged or tender lymph nodes.      All other pertinent positives and negatives as noted above in HPI.    Physical Exam:   76 y.o. female  Vitals:    21 0821   Temp: 97.8 °F (36.6 °C)   Weight: 65.8 kg (145 lb)   Height: 160 cm (63\")     General:  Patient is awake and alert.  Appears in no acute distress or discomfort.    Psych:  Affect and demeanor are appropriate.    Eyes:  Conjunctiva and " sclera appear grossly normal.  Eyes track well and EOM seem to be intact.    Ears:  No gross abnormalities.  Hearing adequate for the exam.    Cardiovascular:  Regular rate and rhythm.    Lungs:  Good chest expansion.  Breathing unlabored.    Extremities: Right shoulder is examined.  Skin is benign.  Her active motion is limited and painful.  Passive motion is better but she is still lacking maybe 5 to 10 degrees in all planes relative to the contralateral side.  She has pain and weakness with resisted elevation in the scapular plane.  Normal motor and sensory function in the lower arm and hand.  Palpable radial pulse.  Brisk capillary refill.    Imaging: MRI of the right shoulder is reviewed along with the associated report.  Findings are listed below:     IMPRESSION:  1. Abnormal signal at the supraspinatus tendon as described. There is a  15 mm wide full-thickness or near full-thickness tendon tear of this  tendon with adjacent tendon edema, but no muscle retraction or atrophy.  Additionally, there is a joint and bursal effusion with synovial  thickening or debris in both spaces.   2. There is some deep soft tissue edema medial to the glenohumeral  joint. The appearance is not typical for adhesive capsulitis nor is  there any erosive change. However, the possibility of some superimposed  inflammatory arthropathy might be considered    Assessment/Plan:  1.  Right rotator cuff tear  2.  Mild right shoulder adhesive capsulitis    We discussed the natural history of this condition and all available treatment options, both surgical and nonsurgical.  We had discussed some of this information over the phone so she had a good understanding.  She asked a lot of questions which were answered in detail.  I explained that I believe her adhesive capsulitis is relatively mild.  I told her that typically, I will treat an adhesive capsulitis and get that issue resolved before attempting the repair.  In her case, as long as  things are about the same by the time of her surgery, I think we could manipulate her and then go ahead and fix the tear.  I do not think her motion loss is so bad that it would preclude a repair at this point.  She wants to move forward with that.    We had a thorough discussion regarding the risks, benefits and alternatives to an arthroscopic rotator cuff repair.  I explained that surgical risks include infection, hematoma, anchor related complications including failure of fixation, loosening, cutout of the anchors, chondrolysis, rotator cuff re-tear necessitating revision surgery, persistent pain and/or loss of motion, iatrogenic nerve and/or blood vessel injury resulting in permanent weakness, numbness or dysfunction, RSD, DVT, PE, positioning related neuropraxia, and anesthesia related complications resulting in death.  We further discussed the possible need to address the biceps with a possible tenotomy versus tenodesis.  We discussed the fact that if the biceps demonstrates significant pathology in the groove then I would plan to perform a tenotomy which could result in lizette deformity or persistent pain, weakness or cramping.  We also discussed possible risks with a tenodesis as well including screw related complications including cutout, chondrolysis, failure of fixation with re-tear of the biceps, fracture and possible iatrogenic nerve injury.  The patient voiced understanding of the risks, benefits, and alternative forms of treatment that were discussed and the patient consents to proceed.    Chencho Craft MD    09/08/2021

## 2021-09-13 DIAGNOSIS — N39.0 BACTERIAL UTI: Primary | ICD-10-CM

## 2021-09-13 DIAGNOSIS — R35.0 URINARY FREQUENCY: ICD-10-CM

## 2021-09-13 DIAGNOSIS — R30.0 DYSURIA: ICD-10-CM

## 2021-09-13 DIAGNOSIS — A49.9 BACTERIAL UTI: Primary | ICD-10-CM

## 2021-09-15 LAB
BACTERIA UR CULT: NORMAL
BACTERIA UR CULT: NORMAL

## 2021-09-17 ENCOUNTER — TELEPHONE (OUTPATIENT)
Dept: ORTHOPEDIC SURGERY | Facility: CLINIC | Age: 76
End: 2021-09-17

## 2021-09-17 DIAGNOSIS — M25.511 RIGHT SHOULDER PAIN, UNSPECIFIED CHRONICITY: Primary | ICD-10-CM

## 2021-09-17 NOTE — TELEPHONE ENCOUNTER
"----- Message from Chencho Craft MD sent at 9/17/2021 12:59 PM EDT -----  Regarding: FW: Referral Request  Contact: [phone]  Please talk to Aliza and see if we are working on the prior approval.  Once you find out, please give her a call and let her know.  Also, please go ahead and fax the PT referral as requested.  Thanks.  ----- Message -----  From: Anita Plasencia MA  Sent: 9/17/2021  12:00 PM EDT  To: Chencho Craft MD  Subject: FW: Referral Request                               ----- Message -----  From: Mami Srivastava  Sent: 9/17/2021  10:41 AM EDT  To: Charlene Cabral j [facility]  Subject: Referral Request                                 Good morning,  I met with Dr Craft on Sept 8th to discuss results of MRI and options relating to torn rotator cuff.  He said he would have scheduling call with surgery date and check with insurance companies (prior approval) as my sick days with [employer] are running out.    I called scheduling a week later (Sept 15) and left v/m.  They returned my call the following day stating surgery was scheduled for Oct 18 at [facility].  Please make sure the \"prior approval\" process with [employer] (primary) and [employer] has begun.  I will be on medical leave prior to surgery and want to ensure coverage.    Dr Craft also wants me to continue physical therapy.   The office on [facility] is requesting a referral.    Their fax number is [phone]    Thank you for your help  Mami"

## 2021-09-21 PROBLEM — M25.511 RIGHT SHOULDER PAIN: Status: ACTIVE | Noted: 2021-09-21

## 2021-09-22 ENCOUNTER — TELEPHONE (OUTPATIENT)
Dept: ORTHOPEDIC SURGERY | Facility: CLINIC | Age: 76
End: 2021-09-22

## 2021-09-22 NOTE — TELEPHONE ENCOUNTER
Her what she wants us to do.  I will do what ever.  It is not her fault that the surgery was rescheduled.  I just want to make sure she has enough personal leave that taking her out now is not going to create problems for her.

## 2021-09-23 ENCOUNTER — TREATMENT (OUTPATIENT)
Dept: PHYSICAL THERAPY | Facility: CLINIC | Age: 76
End: 2021-09-23

## 2021-09-23 DIAGNOSIS — M25.511 RIGHT SHOULDER PAIN, UNSPECIFIED CHRONICITY: ICD-10-CM

## 2021-09-23 DIAGNOSIS — M75.121 COMPLETE TEAR OF RIGHT ROTATOR CUFF, UNSPECIFIED WHETHER TRAUMATIC: Primary | ICD-10-CM

## 2021-09-23 PROCEDURE — 97162 PT EVAL MOD COMPLEX 30 MIN: CPT | Performed by: PHYSICAL THERAPIST

## 2021-09-23 PROCEDURE — 97535 SELF CARE MNGMENT TRAINING: CPT | Performed by: PHYSICAL THERAPIST

## 2021-09-23 PROCEDURE — 97140 MANUAL THERAPY 1/> REGIONS: CPT | Performed by: PHYSICAL THERAPIST

## 2021-09-23 PROCEDURE — 97110 THERAPEUTIC EXERCISES: CPT | Performed by: PHYSICAL THERAPIST

## 2021-09-23 NOTE — PROGRESS NOTES
Physical Therapy Initial Evaluation and Plan of Care    Patient Name: Mami Srivastava          :  1945  Referring Physician: Chencho Craft MD  Diagnosis: Tear of right rotator cuff, unspecified tear extent, unspecified whether traumatic [M75.101]    Date of Evaluation: 2021  ______________________________________________________________________    Subjective Evaluation     History of Present Illness  Onset date: One month -   Mechanism of injury: Insidious vs twisted while moving items in basement vs sleeping on couch wrong -   Monday morning - couldn't get out of bed / move (R) arm -   prednizone issued - helped shoulder some, but alleviated thigh pain, but returned once stopped - bout of PT no help - referred to Dr. rCaft - got MRI - (+) for RTC tear (R) shoulder -       Patient Occupation:  at [employer] - Active lifestyle -   Pain  Current pain ratin  At worst pain rating: 10  Location: Anterior/Posterior, ant/lat shoulder (R) and at times posterior shoulder (R); Denies Popping/catching;   Quality: Soreness / Ache; Pulling pain ; Stabbing.  Alleviating factors: Steroids; Ibuprofen;   Exacerbated by: Reaching across body ; FE, IRB, lifting items of wt out in front - (R) sidelying; Taking jacket on/off -   Symptom course: No better / worse ;       Hand dominance: right    Diagnostic Tests  MRI - (+) Tear (R) RTC / Supraspinatus, etc.      Treatments  Current treatment: ADVIL -   Patient Goals  Patient/family treatment goals: Decreased pain ; Improved mobility prior to RTC repair surgery in early November (per Pt)         ___________________________________________________  Objective      Postural Observations   Rounded shoulder / Fwd head posture; Atrophy of supraspinatus fossa and deltoid (R);   ABD scap R>L -      Tenderness   Tender anterior shoulder / LH Biceps into proximal mm (R)  Tender ant / lateral shoulder / subacromial region (R)      Active Range of Motion   (R)  shoulder: FE: 40-deg;  ERB UA;  IRB UA due to pain / weakness;   PROM (R) Shoulder:  FE to 110-deg;  ER 30-deg;  IR 10-deg; ABD 90-deg;      Strength/Myotome Testing   (R) Shoulder: Painful weak: ABDuction 3-/5; Empty Can 3-/5; IRS4/5;        Pain w/ Supination;       Tests   (R) shoulder:  (+) Drop Arm; (+) Empty Can;; (+) Speeds; (+) Duval's        See Treatment Flow sheet for Exercises, Manual therapy, and modalities.   Self Care X 10 min  · TAPING / BRACING: NA  · Jt protection, ADL modification; Posture/positioining instruction for sleeping, etc     ___________________________________________________     Assessment   77 yo female w/ (R) Shoulder RTC tear - (to have RTC repair early November)   PROBLEMS: Pain; Limited mobility; Weakness; Intolerance to ADLs incolving RUE -   PROGNOSIS: Fair    GOALS:   SHORT TERM GOALS: 2 weeks:  1) HEP Initiated; 2) Pain decreased 25%:   3) AROM (R) shoulder FE increased 10-de) PROM FE increased to 120-deg;  5)Improved ability to sleep -      LONG TERM GOALS: 4 weeks (or up to time of Surgery) 1) (I) HEP; 2) PROM WFL (R) shoulder FE, ER, IR;  3) Pain decreased 50% overall (R) Shoulder-      Plan  Planned therapy interventions: flexibility, home exercise program, manual therapy, postural training, soft tissue mobilization, strengthening, stretching and therapeutic activities (Modalities prn; Taping prn; )    Frequency: 2x week  Duration in weeks: 4  Treatment plan discussed with: patient      ___________________________________________________  Manual Therapy:            20     mins  18783;  Therapeutic Exercise:    18     mins  49817;     Neuromuscular Sana:        mins  46203;    Therapeutic Activity:           mins  03150;   Self Care:                      10     mins  24277  Ultrasound:                          mins  84990;  Iontophoresis:                     mins  27027;    Electrical Stimulation:         mins  02055 ( );  Mechanical Traction:          mins   59465  CP                 15      mins NC     Eval:   20   mins     Timed Treatment:   48   mins                  Total Treatment:     95   mins     PT SIGNATURE:   Tor Chase, PT  DATE TREATMENT INITIATED: 8/4/2021  ___________________________________________________  Initial Certification                Certification Period: 11/2/2021  I certify that the therapy services are furnished while this patient is under my care.  The services outlined above are required by this patient, and will be reviewed every 90 days.                PHYSICIAN: ________________________________  DATE: ______  Chencho Craft MD                                          Please sign and return via fax to [phone].. Thank you, [facility].  ______________________________________________________________________  [address] [place] [address]  Phone: [phone]                 Fax: [phone]

## 2021-09-27 ENCOUNTER — TREATMENT (OUTPATIENT)
Dept: PHYSICAL THERAPY | Facility: CLINIC | Age: 76
End: 2021-09-27

## 2021-09-27 DIAGNOSIS — M75.121 COMPLETE TEAR OF RIGHT ROTATOR CUFF, UNSPECIFIED WHETHER TRAUMATIC: Primary | ICD-10-CM

## 2021-09-27 DIAGNOSIS — M25.511 RIGHT SHOULDER PAIN, UNSPECIFIED CHRONICITY: ICD-10-CM

## 2021-09-27 PROCEDURE — 97110 THERAPEUTIC EXERCISES: CPT | Performed by: PHYSICAL THERAPIST

## 2021-09-27 PROCEDURE — 97140 MANUAL THERAPY 1/> REGIONS: CPT | Performed by: PHYSICAL THERAPIST

## 2021-09-27 NOTE — PROGRESS NOTES
Physical Therapy Daily Progress Note        VISIT#: 2      Mami Srivastava reports: she can't get situated at night to sleep. She can't do much of anything with her R shoulder and her L shoulder is starting to bother her secondary to having to use it so much.    Current Pain Level:    5-6/10; Worst:   10/10; Best:  0/10  Location Of Pain: R shoulder anterior, lateral and R UT  Response to Previous Session: Good. No issues  Functional Deficits/Irritating Factors: reaching, lifting, pushing, pulling, opening bottles, cutting, carrying  Progression: No significant improvements  Compliance with HEP Reported: Yes    Objective   Presents:  Rounded shoulder / Fwd head posture; Atrophy of supraspinatus fossa and deltoid (R); ABD scap R>L   Increased sets/reps of:  none   Increased resistance on:  none  Added to Program: D[place]lo        See Exercise, Manual, and Modality Logs for complete treatment.     Patient Education: Pt was educated on exercise biomechanical correctness, intensity, and speed.     Assessment:  No noted improvements but today is patient's second visit. She is motivated and performing her HEP. She was able to perform all of the exercise interventions w/moderate challenge. Cues needed for correct form throughout the session. Pt will continue to benefit from skilled PT interventions to address current functional deficits and impairments.       Plan: Progress to/Continue with current program. Return to evaluating therapist. Assess response to today's session.         Manual Therapy:    15     mins  15057;  Ultrasound:   0 mins 34373  Electrical Stimulation: __0____ mins 05509/  Iontophoresis: 0 mins 95837  Traction: 0 mins  19410  Work Conditionin mins 89252  Therapeutic Exercise:    15/45     mins  52517;     Neuromuscular Sana:    0    mins  88870;    Therapeutic Activity:     0     mins  50855;     Timed Treatment:   30   mins   Total Treatment:     60   mins    Cristina Ta PTA  [place] License #  [id number]  Physical Therapist Assistant

## 2021-09-29 ENCOUNTER — TREATMENT (OUTPATIENT)
Dept: PHYSICAL THERAPY | Facility: CLINIC | Age: 76
End: 2021-09-29

## 2021-09-29 DIAGNOSIS — M75.121 COMPLETE TEAR OF RIGHT ROTATOR CUFF, UNSPECIFIED WHETHER TRAUMATIC: Primary | ICD-10-CM

## 2021-09-29 DIAGNOSIS — M25.511 RIGHT SHOULDER PAIN, UNSPECIFIED CHRONICITY: ICD-10-CM

## 2021-09-29 PROCEDURE — 97014 ELECTRIC STIMULATION THERAPY: CPT | Performed by: PHYSICAL THERAPIST

## 2021-09-29 PROCEDURE — 97110 THERAPEUTIC EXERCISES: CPT | Performed by: PHYSICAL THERAPIST

## 2021-09-29 NOTE — PROGRESS NOTES
Physical Therapy Daily Progress Note    Patient Name: Mami Srivastava         :  1945  Referring Physician: Chencho Craft MD      Subjective   Mami Srivastava reports: (B) shoulders sore - can't sleep because can't get comfortable - pain into ut and neck - did exercises 3 x day   Pt reports being in constant pain in both shoulder -   Only taking tylenol - says she wasn't given any type of anti-inflammatories from MD -       Objective   Very painful ABDuction, ER (POS) and ERA -   Pain greatly limits tolerance to PT  Discussed w/ Pt about contacting MD (PCP or Luana) about options for anti-inflammatories due to her constant pain and pain w/ PT -      See Exercise, Manual, and Modality Logs for complete treatment.     Functional / Therapeutic Activities:  05 min  · TAPING / BRACING: NA  · Discussed with Pt about contacting her MD about possibly getting on some form of anti-inflammatory to help with constant pain and other options of positioning to reduce pain -   · Jt protection, ADL modification; Posture and      Assessment/Plan  77 yo female w/ (R) Shoulder RTC tear - (to have RTC repair early November)   Pt may benefit from talking to her MD about anti-inflammatory options due to constant pain and pain w/ PT   Poor tolerance to MT / PROM due to pain / guarding - Pt does better doing the exercises her self -     Progress per Plan of Care       _________________________________________________    Manual Therapy:                 mins  11487;  Therapeutic Exercise:    25    mins  60143;     Neuromuscular Sana:        mins  24529;    Therapeutic Activity:     05      mins  06279;     Ultrasound:                          mins  27110;  Iontophoresis:                     mins  05999;    Electrical Stimulation:     15    mins  29618 ( );  Mechanical Traction:          mins  85590  Dry Needling                       mins self-pay     Timed Treatment:   30    mins                  Total  Treatment:     55    mins    Tor Chase, PT  Physical Therapist

## 2021-10-04 ENCOUNTER — TELEPHONE (OUTPATIENT)
Dept: ORTHOPEDIC SURGERY | Facility: CLINIC | Age: 76
End: 2021-10-04

## 2021-10-04 ENCOUNTER — TREATMENT (OUTPATIENT)
Dept: PHYSICAL THERAPY | Facility: CLINIC | Age: 76
End: 2021-10-04

## 2021-10-04 DIAGNOSIS — M25.511 RIGHT SHOULDER PAIN, UNSPECIFIED CHRONICITY: Primary | ICD-10-CM

## 2021-10-04 DIAGNOSIS — M75.121 COMPLETE TEAR OF RIGHT ROTATOR CUFF, UNSPECIFIED WHETHER TRAUMATIC: Primary | ICD-10-CM

## 2021-10-04 DIAGNOSIS — M25.511 RIGHT SHOULDER PAIN, UNSPECIFIED CHRONICITY: ICD-10-CM

## 2021-10-04 PROCEDURE — 97140 MANUAL THERAPY 1/> REGIONS: CPT | Performed by: PHYSICAL THERAPIST

## 2021-10-04 PROCEDURE — 97110 THERAPEUTIC EXERCISES: CPT | Performed by: PHYSICAL THERAPIST

## 2021-10-04 PROCEDURE — G0283 ELEC STIM OTHER THAN WOUND: HCPCS | Performed by: PHYSICAL THERAPIST

## 2021-10-04 RX ORDER — TRAMADOL HYDROCHLORIDE 50 MG/1
50 TABLET ORAL EVERY 8 HOURS PRN
Qty: 30 TABLET | Refills: 0 | Status: SHIPPED | OUTPATIENT
Start: 2021-10-04 | End: 2022-05-18

## 2021-10-04 NOTE — PROGRESS NOTES
Physical Therapy Daily Progress Note        VISIT#: 4      Mami Srivastava reports: She had a real good day on Saturday and was able to do her HEP without any trouble but  was terrible. No matter what she did she could not get any pain relief. Her L arm is doing ok but there are times where she can't do much with it.   Current Pain Level, R shoulder:    5/10; Worst:   7-8/10; Best:  0/10  Location Of Pain: R shoulder  Response to Previous Session: Good. No issues. Felt better  Functional Deficits/Irritating Factors: Reaching, lifting, pushing, pulling, carrying  Progression: Good days and bad days  Compliance with HEP Reported: Yes    Objective   Presents: Normal arm swing, Rounded shoulder / Fwd head posture; Atrophy of supraspinatus fossa and deltoid (R); ABD scap R>L   Increased sets/reps of:  none   Increased resistance on:  none  Added to Program: none        See Exercise, Manual, and Modality Logs for complete treatment.     Patient Education: Pt was educated on exercise biomechanical correctness, intensity, and speed.     Assessment:  In spite of a bad day yesterday, pt was doing better today and was able to tolerate some manual stretching. She was very limited in her flexion but did fairly well with rotation movements. Minimal cuing was needed for exercise technique  Pt will continue to benefit from skilled PT interventions to address current functional deficits and impairments.       Plan: Progress to/Continue with current program. Continue with RC stabilization        Manual Therapy:    10     mins  62873;  Ultrasound:   0 mins 69448  Electrical Stimulation: __15____ mins 06103/  Iontophoresis: 0 mins 93850  Traction: 0 mins  54948  Work Conditionin mins 97853  Therapeutic Exercise:    30/40     mins  94051;     Neuromuscular Sana:    0    mins  81406;    Therapeutic Activity:     0     mins  63829;     Timed Treatment:   40   mins   Total Treatment:     65   mins    Cristina Ta  YEE  [place] License # [id number]  Physical Therapist Assistant

## 2021-10-04 NOTE — TELEPHONE ENCOUNTER
Please let patient know that Dr. Craft is out of the office, but I have sent a prescription for tramadol to her pharmacy.  She will need to follow up with Dr. Craft for subsequent refills.  Thanks

## 2021-10-04 NOTE — TELEPHONE ENCOUNTER
"----- Message from Diana Maria MA sent at 10/4/2021  7:39 AM EDT -----  Regarding: RE: Prescription Question  Contact: [phone]  Please review    ----- Message -----  From: Mami Srivastava  Sent: 10/1/2021   6:31 PM EDT  To: [facility]  Subject: RE: Prescription Question                        Dr Hein responded today and approved tramadol.   Please order the prescription from [facility] ([id number]). Thanks for your help    ----- Message -----  From: Chencho Craft MD  Sent: 9/29/21, 12:04 PM  To: Mami Srivastava  Subject: RE: Prescription Question    Dale Bolaños,    I would recommend that you talk to Dr. Hein to see if he has any suggestions.  I suggest putting you on a medicine called tramadol which is a nonnarcotic pain medicine.  I believe this would be safe with your autoimmune hepatitis but you will have to confirm with Dr. Hein.  If he says it is okay, let me know and I will be happy to prescribe that for you.    Luis Carlos Craft        ----- Message -----       From:Mami Srivastava       Sent:9/29/2021 11:54 AM EDT         To:Chencho Craft MD    Subject:Prescription Question    Dr Craft  I'm requesting a prescription for \"inflammation\" as I am in pain 24/7  and it's making progress in physical therapy very limited, as well as, sleeping and life in general.  Surgery is not until Nov 9    I'm only taking Advil now which offers little relief.    I would greatly appreciate your help with this but keep in mind, I have autoimmune hepatitis.  If you prefer, I could contact Dr Shiva Hein for his recommendation.    Thank you,  Mami"

## 2021-10-06 ENCOUNTER — TREATMENT (OUTPATIENT)
Dept: PHYSICAL THERAPY | Facility: CLINIC | Age: 76
End: 2021-10-06

## 2021-10-06 DIAGNOSIS — M75.121 COMPLETE TEAR OF RIGHT ROTATOR CUFF, UNSPECIFIED WHETHER TRAUMATIC: Primary | ICD-10-CM

## 2021-10-06 DIAGNOSIS — M25.511 RIGHT SHOULDER PAIN, UNSPECIFIED CHRONICITY: ICD-10-CM

## 2021-10-06 PROCEDURE — G0283 ELEC STIM OTHER THAN WOUND: HCPCS | Performed by: PHYSICAL THERAPIST

## 2021-10-06 PROCEDURE — 97110 THERAPEUTIC EXERCISES: CPT | Performed by: PHYSICAL THERAPIST

## 2021-10-06 NOTE — PROGRESS NOTES
Physical Therapy Daily Progress Note    Patient Name: Mami Srivastava         :  1945  Referring Physician: Chencho Craft MD      Subjective   Mami Srivastava reports: felt great Saturday - minimal pain and improved mobility and function - Pain returned next day - pain in (B) shoulders R>L -     Objective   Pt demonstrating improved postural awareness -     See Exercise, Manual, and Modality Logs for complete treatment.     Functional / Therapeutic Activities:   min  · TAPING / BRACING: NA  · Jt protection, ADL modification; Posture and      Assessment/Plan  75 yo female w/ (R) Shoulder RTC tear - (to have RTC repair early November)   Pt may benefit from talking to her MD about anti-inflammatory options due to constant pain and pain w/ PT   Poor tolerance to MT / PROM due to pain / guarding - Pt does better doing the exercises her self -        Progress per Plan of Care       _________________________________________________    Manual Therapy:                 mins  81340;  Therapeutic Exercise:    30    mins  92302;     Neuromuscular Sana:        mins  32929;    Therapeutic Activity:           mins  92323;     Ultrasound:                          mins  51756;  Iontophoresis:                     mins  29408;    Electrical Stimulation:     15    mins  47190 ( );  Mechanical Traction:          mins  64078  Dry Needling                       mins self-pay     Timed Treatment:   30    mins                  Total Treatment:     55    mins    Tor Chase PT  Physical Therapist

## 2021-10-13 ENCOUNTER — TREATMENT (OUTPATIENT)
Dept: PHYSICAL THERAPY | Facility: CLINIC | Age: 76
End: 2021-10-13

## 2021-10-13 DIAGNOSIS — M75.121 COMPLETE TEAR OF RIGHT ROTATOR CUFF, UNSPECIFIED WHETHER TRAUMATIC: Primary | ICD-10-CM

## 2021-10-13 DIAGNOSIS — M25.511 RIGHT SHOULDER PAIN, UNSPECIFIED CHRONICITY: ICD-10-CM

## 2021-10-13 PROCEDURE — 97110 THERAPEUTIC EXERCISES: CPT | Performed by: PHYSICAL THERAPIST

## 2021-10-13 PROCEDURE — 97112 NEUROMUSCULAR REEDUCATION: CPT | Performed by: PHYSICAL THERAPIST

## 2021-10-13 PROCEDURE — G0283 ELEC STIM OTHER THAN WOUND: HCPCS | Performed by: PHYSICAL THERAPIST

## 2021-10-13 PROCEDURE — 97140 MANUAL THERAPY 1/> REGIONS: CPT | Performed by: PHYSICAL THERAPIST

## 2021-10-13 NOTE — PROGRESS NOTES
Physical Therapy Daily Progress Note    Visit Diagnoses:    ICD-10-CM ICD-9-CM   1. Complete tear of right rotator cuff, unspecified whether traumatic  M75.121 727.61   2. Right shoulder pain, unspecified chronicity  M25.511 719.41       VISIT#: 6      Mami Srivastava reports: She is not having any pain in the shoulder joint itself, but in her anterior upper arm. She woke up  Last night with her posterior L knee hurting. It feels stiff when she walks. She does not have any idea what made it hurt. She is taking Tramadol now.   Current Pain Level:    3-4/10; Worst:   8-9/10; Best:  3-4/10  Location Of Pain: Anterior R shoulder/bicep  Response to Previous Session: Good. No issues  Functional Deficits/Irritating Factors: Reaching, lifting, pushing, pulling, carrying  Progression: Good and bad days  Compliance with HEP Reported: Yes    Objective   Presents: Normal arm swing, Rounded shoulder / Fwd head posture  Increased sets/reps of:  Ball Push Up, Scap Rows   Increased resistance on:  none  Added to Program: Ball On Wall        See Exercise, Manual, and Modality Logs for complete treatment.     Patient Education: Pt was educated on exercise biomechanical correctness, intensity, and speed.     Assessment:  During PROM, pt continues to have pain in all planes of motion at end ranges. She is less guarded than she used to be. Most limited in ER and flexion. Pt will continue to benefit from skilled PT interventions to address current functional deficits and impairments.       Plan: Progress to/Continue with current program. Continue with Scapular stabilization and RC strengthening        Manual Therapy:    10     mins  91658;  Ultrasound:   0 mins 32605  Electrical Stimulation: __15____ mins 43382/  Iontophoresis: 0 mins 63779  Traction: 0 mins  96597  Work Conditionin mins 00872  Therapeutic Exercise:    15/20     mins  30804;     Neuromuscular Sana:    10    mins  33839;    Therapeutic Activity:     0     mins   60916;     Timed Treatment:   25   mins   Total Treatment:     70   mins    Cristina Ta, YEE  [place] License # [id number]  Physical Therapist Assistant

## 2021-10-15 ENCOUNTER — TREATMENT (OUTPATIENT)
Dept: PHYSICAL THERAPY | Facility: CLINIC | Age: 76
End: 2021-10-15

## 2021-10-15 DIAGNOSIS — M25.511 RIGHT SHOULDER PAIN, UNSPECIFIED CHRONICITY: ICD-10-CM

## 2021-10-15 DIAGNOSIS — M75.121 COMPLETE TEAR OF RIGHT ROTATOR CUFF, UNSPECIFIED WHETHER TRAUMATIC: Primary | ICD-10-CM

## 2021-10-15 PROCEDURE — 97110 THERAPEUTIC EXERCISES: CPT | Performed by: PHYSICAL THERAPIST

## 2021-10-15 PROCEDURE — 97140 MANUAL THERAPY 1/> REGIONS: CPT | Performed by: PHYSICAL THERAPIST

## 2021-10-15 PROCEDURE — G0283 ELEC STIM OTHER THAN WOUND: HCPCS | Performed by: PHYSICAL THERAPIST

## 2021-10-15 NOTE — PROGRESS NOTES
Physical Therapy Daily Progress Note    Visit Diagnoses:    ICD-10-CM ICD-9-CM   1. Complete tear of right rotator cuff, unspecified whether traumatic  M75.121 727.61   2. Right shoulder pain, unspecified chronicity  M25.511 719.41       VISIT#: 7      Mami Srivastava reports: She is stiff and sore in both of her shoulders today.   Current Pain Level:    4-5/10; Worst:   4-5/10; Best:  0/10  Location Of Pain: Top of B shoulders, B knees  Response to Previous Session: Good. No issues  Functional Deficits/Irritating Factors: reaching, lifting, pushing, pulling, carrying, lifting  Progression: no significant changes  Compliance with HEP Reported: Yes, BID    Objective   Presents: Ambulates with stiff arms, lacking arm swing  Increased sets/reps of:  none   Increased resistance on:  Chest Press  Added to Program: SciFit for knees        See Exercise, Manual, and Modality Logs for complete treatment.     Patient Education: Pt was educated on exercise biomechanical correctness, intensity, and speed.     Assessment:  Pt continues to have a lot of pain in her R shoulder. L shoulder pain is increasing secondary to compensation. Pt plans to call her MD about her L shoulder. Pt continues with pain and limited ranged in all planes of motion during PROM. She is able to complete her exercise interventions w/o increased symptoms. Tolerating manual therapy well while staying in limited pain/pain free motions.  Pt will continue to benefit from skilled PT interventions to address current functional deficits and impairments.       Plan: Progress to/Continue with current program. Therabar        Manual Therapy:    10     mins  78857;  Ultrasound:   0 mins 50760  Electrical Stimulation: __15____ mins 26522/  Iontophoresis: 0 mins 87270  Traction: 0 mins  37234  Work Conditionin mins 95324  Therapeutic Exercise:    20/30     mins  05260;     Neuromuscular Sana:    0    mins  45617;    Therapeutic Activity:     0     mins   55676;     Timed Treatment:   30   mins   Total Treatment:     55   mins    Cristina Ta, YEE  [place] License # [id number]  Physical Therapist Assistant

## 2021-10-16 ENCOUNTER — IMMUNIZATION (OUTPATIENT)
Dept: VACCINE CLINIC | Facility: HOSPITAL | Age: 76
End: 2021-10-16

## 2021-10-16 PROCEDURE — 91300 HC SARSCOV02 VAC 30MCG/0.3ML IM: CPT | Performed by: INTERNAL MEDICINE

## 2021-10-16 PROCEDURE — 0004A ADM SARSCOV2 30MCG/0.3ML BOOSTER: CPT | Performed by: INTERNAL MEDICINE

## 2021-10-18 ENCOUNTER — TREATMENT (OUTPATIENT)
Dept: PHYSICAL THERAPY | Facility: CLINIC | Age: 76
End: 2021-10-18

## 2021-10-18 DIAGNOSIS — M75.121 COMPLETE TEAR OF RIGHT ROTATOR CUFF, UNSPECIFIED WHETHER TRAUMATIC: Primary | ICD-10-CM

## 2021-10-18 DIAGNOSIS — M25.511 RIGHT SHOULDER PAIN, UNSPECIFIED CHRONICITY: ICD-10-CM

## 2021-10-18 PROCEDURE — 97110 THERAPEUTIC EXERCISES: CPT | Performed by: PHYSICAL THERAPIST

## 2021-10-18 PROCEDURE — G0283 ELEC STIM OTHER THAN WOUND: HCPCS | Performed by: PHYSICAL THERAPIST

## 2021-10-18 PROCEDURE — 97140 MANUAL THERAPY 1/> REGIONS: CPT | Performed by: PHYSICAL THERAPIST

## 2021-10-18 NOTE — PROGRESS NOTES
Physical Therapy Daily Progress Note    Visit Diagnoses:    ICD-10-CM ICD-9-CM   1. Complete tear of right rotator cuff, unspecified whether traumatic  M75.121 727.61   2. Right shoulder pain, unspecified chronicity  M25.511 719.41       VISIT#: 8      Mami Srivastava reports: she got her Covid shot on Saturday and she woke up  very sore and hurting a lot in both arms.   Current Pain Level:    4/10; Worst:   6-7/10; Best:  0/10  Location Of Pain: top of B shoulders and anterior  Response to Previous Session: Good. No issues  Functional Deficits/Irritating Factors: Reaching, lifting, carrying, pushing, pulling  Progression: no significant changes  Compliance with HEP Reported: BID, Yes    Objective   Presents: Lacks arm swing  Increased sets/reps of:  none   Increased resistance on:  none  Added to Program: none        See Exercise, Manual, and Modality Logs for complete treatment.     Patient Education: Pt was educated on exercise biomechanical correctness, intensity, and speed.     Assessment:  Pt in more pain today secondary to getting Covid booster shot but she was able to complete all of her exercise interventions. Tolerance to manual therapy was fair today.  Pt will continue to benefit from skilled PT interventions to address current functional deficits and impairments.       Plan: Progress to/Continue with current program. Therabar        Manual Therapy:    10     mins  29550;  Ultrasound:   0 mins 48688  Electrical Stimulation: __15____ mins 93951/  Iontophoresis: 0 mins 64231  Traction: 0 mins  77858  Work Conditionin mins 20848  Therapeutic Exercise:    20/35     mins  93920;     Neuromuscular Sana:    0    mins  82273;    Therapeutic Activity:     0     mins  26544;     Timed Treatment:   30   mins   Total Treatment:     60   mins    Cristina Ta PTA  [place] License # [id number]  Physical Therapist Assistant

## 2021-10-20 ENCOUNTER — TELEPHONE (OUTPATIENT)
Dept: ORTHOPEDIC SURGERY | Facility: CLINIC | Age: 76
End: 2021-10-20

## 2021-10-20 NOTE — TELEPHONE ENCOUNTER
Please make her an appointment with myself of [facility] for possible injection.  She can talk with Yuli that the time of that viist to discuss cost.

## 2021-10-20 NOTE — TELEPHONE ENCOUNTER
Provider: SKYLAR BRONSON  Caller: DARREN ESTEVEZ  Relationship to Patient: SELF  Pharmacy:   Phone Number: [phone]  Reason for Call: PATIENT REQ TO SPEAK WITH SOMEONE REGARDING INJECTIONS FOR PAIN    ALSO REQ INFO REGARDING OUT OF POCKET COST FOR UPCOMING SURGERY

## 2021-11-01 ENCOUNTER — OFFICE VISIT (OUTPATIENT)
Dept: ORTHOPEDIC SURGERY | Facility: CLINIC | Age: 76
End: 2021-11-01

## 2021-11-01 VITALS — WEIGHT: 145 LBS | BODY MASS INDEX: 25.69 KG/M2 | TEMPERATURE: 98.2 F | HEIGHT: 63 IN

## 2021-11-01 DIAGNOSIS — M25.512 ACUTE PAIN OF LEFT SHOULDER: Primary | ICD-10-CM

## 2021-11-01 PROCEDURE — 99213 OFFICE O/P EST LOW 20 MIN: CPT | Performed by: NURSE PRACTITIONER

## 2021-11-01 PROCEDURE — 20610 DRAIN/INJ JOINT/BURSA W/O US: CPT | Performed by: NURSE PRACTITIONER

## 2021-11-01 PROCEDURE — 73030 X-RAY EXAM OF SHOULDER: CPT | Performed by: NURSE PRACTITIONER

## 2021-11-01 RX ORDER — LIDOCAINE HYDROCHLORIDE 20 MG/ML
2 INJECTION, SOLUTION EPIDURAL; INFILTRATION; INTRACAUDAL; PERINEURAL
Status: COMPLETED | OUTPATIENT
Start: 2021-11-01 | End: 2021-11-01

## 2021-11-01 RX ORDER — METHYLPREDNISOLONE ACETATE 80 MG/ML
80 INJECTION, SUSPENSION INTRA-ARTICULAR; INTRALESIONAL; INTRAMUSCULAR; SOFT TISSUE
Status: COMPLETED | OUTPATIENT
Start: 2021-11-01 | End: 2021-11-01

## 2021-11-01 RX ADMIN — LIDOCAINE HYDROCHLORIDE 2 ML: 20 INJECTION, SOLUTION EPIDURAL; INFILTRATION; INTRACAUDAL; PERINEURAL at 09:22

## 2021-11-01 RX ADMIN — METHYLPREDNISOLONE ACETATE 80 MG: 80 INJECTION, SUSPENSION INTRA-ARTICULAR; INTRALESIONAL; INTRAMUSCULAR; SOFT TISSUE at 09:22

## 2021-11-01 NOTE — PROGRESS NOTES
Patient: Mami Srivastava    YOB: 1945    Medical Record Number: [id number]    Chief Complaints:  Left shoulder pain    History of Present Illness:     76 y.o. female patient who presents with a complaint of left shoulder pain and weakness.  She reports that the symptoms first started in September.  Denies injury.  Reports she feels her pain may be due to overuse as her right shoulder use is very limited.  She has an upcoming right shoulder rotator cuff repair surgery with Dr. Craft next week.  She would like to see what options are available for her left shoulder pain.  Describes her current pain as moderate, constant and aching.  Reports shooting and burning pain radiates to the upper arm at times.  The pain is worse with certain reaching and lifting movements, driving, and walking.  She reports tramadol and ice help somewhat.  She denies any distal weakness, numbness, or paresthesias.  Patient is right hand dominant.      Allergies:   Allergies   Allergen Reactions   • Imuran [Azathioprine] GI Intolerance   • Bactrim [Sulfamethoxazole-Trimethoprim] Other (See Comments)     fatigue   • Clindamycin/Lincomycin Diarrhea   • Augmentin [Amoxicillin-Pot Clavulanate] Hives   • Keflex [Cephalexin] Hives       Home Medications:    Current Outpatient Medications:   •  amLODIPine (NORVASC) 5 MG tablet, TAKE 1 TABLET BY MOUTH EVERY DAY, Disp: 90 tablet, Rfl: 3  •  aspirin 81 MG EC tablet, Take 81 mg by mouth Daily., Disp: , Rfl:   •  cetirizine (zyrTEC) 10 MG tablet, Take 10 mg by mouth As Needed., Disp: , Rfl:   •  Cholecalciferol (VITAMIN D3) 5000 units capsule capsule, Take 5,000 Units by mouth 2 (Two) Times a Week., Disp: , Rfl:   •  lisinopril (PRINIVIL,ZESTRIL) 20 MG tablet, Take 20 mg by mouth Daily., Disp: , Rfl:   •  rosuvastatin (CRESTOR) 10 MG tablet, Take 10 mg by mouth Daily., Disp: , Rfl:   •  Synthroid 75 MCG tablet, TAKE 1 TABLET DAILY FOR    THYROID, Disp: 90 tablet, Rfl: 1  •  traMADol  (ULTRAM) 50 MG tablet, Take 1 tablet by mouth Every 8 (Eight) Hours As Needed for Moderate Pain ., Disp: 30 tablet, Rfl: 0  •  aspirin-acetaminophen-caffeine (EXCEDRIN MIGRAINE) 250-250-65 MG per tablet, Take 1 tablet by mouth Every 6 (Six) Hours As Needed for Headache., Disp: , Rfl:   •  fluconazole (Diflucan) 100 MG tablet, Take 1 tab now and then 1 tab in 3 days if needed, Disp: 2 tablet, Rfl: 0  •  methylPREDNISolone (MEDROL) 4 MG dose pack, Take as directed on package instructions., Disp: 21 tablet, Rfl: 0  •  Multiple Vitamin (MULTI VITAMIN DAILY PO), Take 1 tablet by mouth Daily., Disp: , Rfl:   •  nitrofurantoin, macrocrystal-monohydrate, (Macrobid) 100 MG capsule, Take 1 capsule by mouth 2 (Two) Times a Day., Disp: 14 capsule, Rfl: 0  •  nystatin (MYCOSTATIN) 536620 UNIT/GM cream, Apply  topically to the appropriate area as directed 2 (Two) Times a Day As Needed (yeast infection/itching)., Disp: 30 g, Rfl: 0  •  Omega 3 1200 MG capsule, Take 1,200 mg by mouth Daily., Disp: , Rfl:   •  saccharomyces boulardii (Florastor) 250 MG capsule, Take 1 capsule by mouth 2 (Two) Times a Day., Disp: 60 capsule, Rfl: 2    Past Medical History:   Diagnosis Date   • Autoimmune hepatitis (HCC)    • Cholelithiasis 04-    Ultrasound   • Coronary artery disease     Dr Luis Rick   • Diverticulitis of colon 2005,2018   • Hemangioma of liver    • Hernia 2017    Hiatal hernia-Prabhjot test   • Hyperlipidemia     Dr Luis Rick-atorvastatin   • Hypertension    • Hyperthyroidism    • Hypothyroidism    • Laceration of finger of right hand 05/15/2018    lac with knife and had 3 stitches   • Osteopenia after menopause    • Skin cancer     face   • Ulcerative colitis (HCC) 2014    Colitis/Arben       Past Surgical History:   Procedure Laterality Date   • BUNIONECTOMY     • COLONOSCOPY  04/11/2014    Diverticulosis in the sigmoid colon, in the descending colon, in the transverse colon and in the ascending colon (Pathology:  Collagenous colitis)   • COLONOSCOPY  2005    Hemorrhoids, pandiverticulosis, status post diverticulitis   • COLONOSCOPY N/A 2018    fair prep, tics   • LIVER BIOPSY     • LIVER BIOPSY     • THYROIDECTOMY         Social History     Occupational History   • Not on file   Tobacco Use   • Smoking status: Former Smoker     Packs/day: 2.00     Years: 12.00     Pack years: 24.00     Start date: 1963     Quit date: 1976     Years since quittin.7   • Smokeless tobacco: Never Used   • Tobacco comment: Chrinic bronchitis...smoke allergy   Vaping Use   • Vaping Use: Never used   Substance and Sexual Activity   • Alcohol use: Not Currently     Comment: none in 3 years   • Drug use: No   • Sexual activity: Not Currently      Social History     Social History Narrative   • Not on file       Family History   Problem Relation Age of Onset   • Heart disease Mother    • Hypertension Mother    • Lung cancer Sister    • Thyroid disease Sister    • Lupus Sister    • Thyroid disease Brother    • Alcohol abuse Brother    • Glaucoma Maternal Grandmother    • Stomach cancer Maternal Grandmother          ’s   • Cirrhosis Father             • Liver disease Father        Review of Systems:      Constitutional: Denies fever, shaking or chills   Eyes: Denies change in visual acuity   HEENT: Denies nasal congestion or sore throat   Respiratory: Denies cough or shortness of breath   Cardiovascular: Denies chest pain or edema  Endocrine: Denies tremors, palpitations, intolerance of heat or cold, polyuria, polydipsia.  GI: Denies abdominal pain, nausea, vomiting, bloody stools or diarrhea  : Denies frequency, urgency, incontinence, retention, or nocturia.  Musculoskeletal: Denies numbness, tingling or loss of motor function except as above  Integument: Denies rash, lesion or ulceration   Neurologic: Denies headache or focal weakness, deficits  Heme: Denies spontaneous or excessive bleeding, epistaxis,  "hematuria, melena, fatigue, enlarged or tender lymph nodes.      All other pertinent positives and negatives as noted above in HPI.    Physical Exam:   76 y.o. female    Vitals:    11/01/21 0921   Temp: 98.2 °F (36.8 °C)   Weight: 65.8 kg (145 lb)   Height: 160 cm (62.99\")     General:  Patient is awake and alert.  Appears in no acute distress or discomfort.    Psych:  Affect and demeanor are appropriate.    Eyes:  Conjunctiva and sclera appear grossly normal.  Eyes track well and EOM seem to be intact.    Ears:  No gross abnormalities.  Hearing adequate for the exam.    Cardiovascular:  Regular rate and rhythm.    Lungs:  Good chest expansion.  Breathing unlabored.    Lymph:  No palpable adenopathy about neck or axilla.    Neck:  Supple.  Normal ROM.  Negative Spurling's for shoulder or arm pain.    Left upper extremity:  Skin is benign.  No gross abnormalities on inspection including any atrophy, swellings, or masses.  No palpable masses or adenopathy.  No focal areas of significant tenderness.  Full shoulder motion albeit with discomfort.  No evident instability or apprehension.  Mildly positive Neer, Gutierrez.  Good strength with internal and external rotation.  Good strength in the rotator cuff, deltoid, biceps, triceps, and .  Intact sensation throughout the arm.  Brisk cap refill.  Palpable radial pulse.  Good skin turgor.         Radiology:   AP, scapular Y, and axillary views of the left shoulder are ordered by myself and reviewed to evaluate the patient's complaint.  No comparison films are immediately available.  The x-rays show moderate acromioclavicular joint arthritis subchondral sclerosis and osteophyte formation.  She has mild glenohumeral joint arthritis with osteophyte formation.  Otherwise, no obvious acute abnormalities, lesions, masses, significant degenerative changes, or other concerning findings.  The acromiohumeral interval is normal.  Glenoid version appears normal as " well.    Assessment/Plan:   Acute left shoulder pain, suspect left rotator cuff tendinitis    We discussed rotator cuff tendinitis versus rotator cuff tears.  She has a full understanding of rotator cuff tears and the potential for progression.  Since she has an upcoming right shoulder surgery with Dr. Craft, I recommended she avoid anti-inflammatories for now.  We discussed other conservative treatments including Tylenol, ice, and steroid injections.  She has acknowledged understanding of this information.  The patient has opted to try an injection.  The risk, benefits and alternatives to an injection were thoroughly discussed.  She consented to proceed. The injection was performed as described below.  She will follow-up with me as needed going forward.    Vicki Garcia, RYAN    11/01/2021    CC to Pradip Rosenberg MD      Large Joint Arthrocentesis: L subacromial bursa  Date/Time: 11/1/2021 9:22 AM  Consent given by: patient  Site marked: site marked  Timeout: Immediately prior to procedure a time out was called to verify the correct patient, procedure, equipment, support staff and site/side marked as required   Supporting Documentation  Indications: pain and joint swelling   Procedure Details  Location: shoulder - L subacromial bursa  Preparation: Patient was prepped and draped in the usual sterile fashion  Needle gauge: 21g.  Approach: posterior  Medications administered: 80 mg methylPREDNISolone acetate 80 MG/ML; 2 mL lidocaine PF 2% 2 %  Patient tolerance: patient tolerated the procedure well with no immediate complications

## 2021-11-02 ENCOUNTER — TELEPHONE (OUTPATIENT)
Dept: ORTHOPEDIC SURGERY | Facility: CLINIC | Age: 76
End: 2021-11-02

## 2021-11-02 NOTE — TELEPHONE ENCOUNTER
Caller: Mami Srivastava    Relationship: Self      Requested Prescriptions:   Requested Prescriptions     Pending Prescriptions Disp Refills   • lisinopril (PRINIVIL,ZESTRIL) 20 MG tablet       Sig: Take 1 tablet by mouth Daily.   • amLODIPine (NORVASC) 5 MG tablet 90 tablet 3     Sig: Take 1 tablet by mouth Daily.   • rosuvastatin (CRESTOR) 10 MG tablet       Sig: Take 1 tablet by mouth Daily.        Pharmacy where request should be sent:  [facility] - [address] - [phone]  - [phone]        Best call back number: [phone]     Does the patient have less than a 3 day supply:  [x] Yes  [] No    Taj Mcqueen Rep   11/02/21 09:33 EDT

## 2021-11-02 NOTE — TELEPHONE ENCOUNTER
Caller: DARREN ESTEVEZ    Relationship: SELF    Best call back number:       Who are you requesting to speak with (clinical staff, provider,  specific staff member): DR BRONSON OR NURSE    What was the call regarding: GETTING PREPARED FOR SURGERY    Do you require a callback: YES

## 2021-11-03 ENCOUNTER — PRE-ADMISSION TESTING (OUTPATIENT)
Dept: PREADMISSION TESTING | Facility: HOSPITAL | Age: 76
End: 2021-11-03

## 2021-11-03 VITALS
WEIGHT: 139 LBS | DIASTOLIC BLOOD PRESSURE: 74 MMHG | TEMPERATURE: 98 F | HEIGHT: 64 IN | HEART RATE: 79 BPM | OXYGEN SATURATION: 98 % | RESPIRATION RATE: 18 BRPM | SYSTOLIC BLOOD PRESSURE: 147 MMHG | BODY MASS INDEX: 23.73 KG/M2

## 2021-11-03 LAB
ANION GAP SERPL CALCULATED.3IONS-SCNC: 11 MMOL/L (ref 5–15)
BUN SERPL-MCNC: 15 MG/DL (ref 8–23)
BUN/CREAT SERPL: 21.7 (ref 7–25)
CALCIUM SPEC-SCNC: 9.8 MG/DL (ref 8.6–10.5)
CHLORIDE SERPL-SCNC: 102 MMOL/L (ref 98–107)
CO2 SERPL-SCNC: 27 MMOL/L (ref 22–29)
CREAT SERPL-MCNC: 0.69 MG/DL (ref 0.57–1)
DEPRECATED RDW RBC AUTO: 42.6 FL (ref 37–54)
ERYTHROCYTE [DISTWIDTH] IN BLOOD BY AUTOMATED COUNT: 13.9 % (ref 12.3–15.4)
GFR SERPL CREATININE-BSD FRML MDRD: 83 ML/MIN/1.73
GLUCOSE SERPL-MCNC: 123 MG/DL (ref 65–99)
HCT VFR BLD AUTO: 37.5 % (ref 34–46.6)
HGB BLD-MCNC: 12.3 G/DL (ref 12–15.9)
MCH RBC QN AUTO: 27.4 PG (ref 26.6–33)
MCHC RBC AUTO-ENTMCNC: 32.8 G/DL (ref 31.5–35.7)
MCV RBC AUTO: 83.5 FL (ref 79–97)
PLATELET # BLD AUTO: 257 10*3/MM3 (ref 140–450)
PMV BLD AUTO: 9.5 FL (ref 6–12)
POTASSIUM SERPL-SCNC: 4.6 MMOL/L (ref 3.5–5.2)
QT INTERVAL: 410 MS
RBC # BLD AUTO: 4.49 10*6/MM3 (ref 3.77–5.28)
SODIUM SERPL-SCNC: 140 MMOL/L (ref 136–145)
WBC # BLD AUTO: 7.18 10*3/MM3 (ref 3.4–10.8)

## 2021-11-03 PROCEDURE — 85027 COMPLETE CBC AUTOMATED: CPT

## 2021-11-03 PROCEDURE — 93010 ELECTROCARDIOGRAM REPORT: CPT | Performed by: INTERNAL MEDICINE

## 2021-11-03 PROCEDURE — 80048 BASIC METABOLIC PNL TOTAL CA: CPT

## 2021-11-03 PROCEDURE — 93005 ELECTROCARDIOGRAM TRACING: CPT

## 2021-11-03 PROCEDURE — 36415 COLL VENOUS BLD VENIPUNCTURE: CPT

## 2021-11-03 RX ORDER — LISINOPRIL 20 MG/1
20 TABLET ORAL DAILY
Qty: 30 TABLET | Refills: 11 | Status: SHIPPED | OUTPATIENT
Start: 2021-11-03 | End: 2022-11-21 | Stop reason: SDUPTHER

## 2021-11-03 RX ORDER — ROSUVASTATIN CALCIUM 10 MG/1
10 TABLET, COATED ORAL DAILY
Qty: 30 TABLET | Refills: 11 | Status: SHIPPED | OUTPATIENT
Start: 2021-11-03 | End: 2022-11-21 | Stop reason: SDUPTHER

## 2021-11-03 RX ORDER — AMLODIPINE BESYLATE 5 MG/1
5 TABLET ORAL DAILY
Qty: 90 TABLET | Refills: 3 | Status: SHIPPED | OUTPATIENT
Start: 2021-11-03 | End: 2022-11-21 | Stop reason: SDUPTHER

## 2021-11-03 NOTE — DISCHARGE INSTRUCTIONS
Arrive to hospital on your day of surgery at (WILL CALL DAY BEFORE WITH ARRIVAL TIME)    Take the following medications the morning of surgery:  SYNTHROID AND AMLODIPINE      If you are on prescription narcotic pain medication to control your pain you may also take that medication the morning of surgery.    General Instructions:  • Do not eat solid food after midnight the night before surgery.  • You may drink clear liquids day of surgery but must stop at least one hour before your hospital arrival time.  • It is beneficial for you to have a clear drink that contains carbohydrates the day of surgery.  We suggest a 12 to 20 ounce bottle of Gatorade or Powerade for non-diabetic patients or a 12 to 20 ounce bottle of G2 or Powerade Zero for diabetic patients. (Pediatric patients, are not advised to drink a 12 to 20 ounce carbohydrate drink)    Clear liquids are liquids you can see through.  Nothing red in color.     Plain water                               Sports drinks  Sodas                                   Gelatin (Jell-O)  Fruit juices without pulp such as white grape juice and apple juice  Popsicles that contain no fruit or yogurt  Tea or coffee (no cream or milk added)  Gatorade / Powerade  G2 / Powerade Zero    • Infants may have breast milk up to four hours before surgery.  • Infants drinking formula may drink formula up to six hours before surgery.   • Patients who avoid smoking, chewing tobacco and alcohol for 4 weeks prior to surgery have a reduced risk of post-operative complications.  Quit smoking as many days before surgery as you can.  • Do not smoke, use chewing tobacco or drink alcohol the day of surgery.   • If applicable bring your C-PAP/ BI-PAP machine.  • Bring any papers given to you in the doctor’s office.  • Wear clean comfortable clothes.  • Do not wear contact lenses, false eyelashes or make-up.  Bring a case for your glasses.   • Bring crutches or walker if applicable.  • Remove all  piercings.  Leave jewelry and any other valuables at home.  • Hair extensions with metal clips must be removed prior to surgery.  • The Pre-Admission Testing nurse will instruct you to bring medications if unable to obtain an accurate list in Pre-Admission Testing.        If you were given a blood bank ID arm band remember to bring it with you the day of surgery.    Preventing a Surgical Site Infection:  • For 2 to 3 days before surgery, avoid shaving with a razor because the razor can irritate skin and make it easier to develop an infection.    • Any areas of open skin can increase the risk of a post-operative wound infection by allowing bacteria to enter and travel throughout the body.  Notify your surgeon if you have any skin wounds / rashes even if it is not near the expected surgical site.  The area will need assessed to determine if surgery should be delayed until it is healed.  • The night prior to surgery shower using a fresh bar of anti-bacterial soap (such as Dial) and clean washcloth.  Sleep in a clean bed with clean clothing.  Do not allow pets to sleep with you.  • Shower on the morning of surgery using a fresh bar of anti-bacterial soap (such as Dial) and clean washcloth.  Dry with a clean towel and dress in clean clothing.  • Ask your surgeon if you will be receiving antibiotics prior to surgery.  • Make sure you, your family, and all healthcare providers clean their hands with soap and water or an alcohol based hand  before caring for you or your wound.    Day of surgery:  Your arrival time is approximately two hours before your scheduled surgery time.  Upon arrival, a Pre-op nurse and Anesthesiologist will review your health history, obtain vital signs, and answer questions you may have.  The only belongings needed at this time will be a list of your home medications and if applicable your C-PAP/BI-PAP machine.  A Pre-op nurse will start an IV and you may receive medication in preparation  for surgery, including something to help you relax.     Please be aware that surgery does come with discomfort.  We want to make every effort to control your discomfort so please discuss any uncontrolled symptoms with your nurse.   Your doctor will most likely have prescribed pain medications.      If you are going home after surgery you will receive individualized written care instructions before being discharged.  A responsible adult must drive you to and from the hospital on the day of your surgery and stay with you for 24 hours.  Discharge prescriptions can be filled by the hospital pharmacy during regular pharmacy hours.  If you are having surgery late in the day/evening your prescription may be e-prescribed to your pharmacy.  Please verify your pharmacy hours or chose a 24 hour pharmacy to avoid not having access to your prescription because your pharmacy has closed for the day.    If you are staying overnight following surgery, you will be transported to your hospital room following the recovery period.  [facility] has all private rooms.    If you have any questions please call Pre-Admission Testing at [phone].  Deductibles and co-payments are collected on the day of service. Please be prepared to pay the required co-pay, deductible or deposit on the day of service as defined by your plan.    Patient Education for Self-Quarantine Process    • Following your COVID testing, we strongly recommend that you wear a mask when you are with other people and practice social distancing.   • Limit your activities to only required outings.  • Wash your hands with soap and water frequently for at least 20 seconds.   • Avoid touching your eyes, nose and mouth with unwashed hands.  • Do not share anything - utensils, drinking glasses, food from the same bowl.   • Sanitize household surfaces daily. Include all high touch areas (door handles, light switches, phones, countertops, etc.)    Call your surgeon  immediately if you experience any of the following symptoms:  • Sore Throat  • Shortness of Breath or difficulty breathing  • Cough  • Chills  • Body soreness or muscle pain  • Headache  • Fever  • New loss of taste or smell  • Do not arrive for your surgery ill.  Your procedure will need to be rescheduled to another time.  You will need to call your physician before the day of surgery to avoid any unnecessary exposure to hospital staff as well as other patients.

## 2021-11-03 NOTE — TELEPHONE ENCOUNTER
Rx Refill Note  Requested Prescriptions     Pending Prescriptions Disp Refills   • lisinopril (PRINIVIL,ZESTRIL) 20 MG tablet       Sig: Take 1 tablet by mouth Daily.   • amLODIPine (NORVASC) 5 MG tablet 90 tablet 3     Sig: Take 1 tablet by mouth Daily.   • rosuvastatin (CRESTOR) 10 MG tablet       Sig: Take 1 tablet by mouth Daily.      Last office visit with prescribing clinician: 7/21/2021      Next office visit with prescribing clinician: Visit date not found            Kathy Rivera MA  11/03/21, 08:05 EDT

## 2021-11-05 ENCOUNTER — TELEPHONE (OUTPATIENT)
Dept: ORTHOPEDIC SURGERY | Facility: CLINIC | Age: 76
End: 2021-11-05

## 2021-11-05 NOTE — TELEPHONE ENCOUNTER
I spoke to Ms. Srivastava.  I answered all questions to her satisfaction.  She appreciated the call.

## 2021-11-05 NOTE — TELEPHONE ENCOUNTER
Caller: DARREN ESTEVEZ    Relationship: SELF    Best call back number:     What is the best time to reach you: ANYTIME    What was the call regarding: THE PATIENTWOULD LIKE TO KNOW WHAT SUPPLIES SHE WILL NEED TO CLEAN HER INCISION AFTER HER SURGERY ON 11/9/21 SO SHE CAN GET EVERYTHING BEFORE HER SURGERY.    Do you require a callback: YES

## 2021-11-06 ENCOUNTER — LAB (OUTPATIENT)
Dept: LAB | Facility: HOSPITAL | Age: 76
End: 2021-11-06

## 2021-11-06 LAB — SARS-COV-2 ORF1AB RESP QL NAA+PROBE: NOT DETECTED

## 2021-11-06 PROCEDURE — U0004 COV-19 TEST NON-CDC HGH THRU: HCPCS

## 2021-11-06 PROCEDURE — C9803 HOPD COVID-19 SPEC COLLECT: HCPCS

## 2021-11-09 ENCOUNTER — HOSPITAL ENCOUNTER (OUTPATIENT)
Facility: HOSPITAL | Age: 76
Setting detail: HOSPITAL OUTPATIENT SURGERY
Discharge: HOME | End: 2021-11-09
Attending: ORTHOPAEDIC SURGERY | Admitting: ORTHOPAEDIC SURGERY

## 2021-11-09 ENCOUNTER — ANESTHESIA EVENT (OUTPATIENT)
Dept: PERIOP | Facility: HOSPITAL | Age: 76
End: 2021-11-09

## 2021-11-09 ENCOUNTER — TELEPHONE (OUTPATIENT)
Dept: ORTHOPEDIC SURGERY | Facility: CLINIC | Age: 76
End: 2021-11-09

## 2021-11-09 ENCOUNTER — ANESTHESIA (OUTPATIENT)
Dept: PERIOP | Facility: HOSPITAL | Age: 76
End: 2021-11-09

## 2021-11-09 VITALS
OXYGEN SATURATION: 96 % | TEMPERATURE: 97.7 F | RESPIRATION RATE: 16 BRPM | SYSTOLIC BLOOD PRESSURE: 130 MMHG | HEART RATE: 63 BPM | DIASTOLIC BLOOD PRESSURE: 54 MMHG

## 2021-11-09 DIAGNOSIS — M25.511 RIGHT SHOULDER PAIN, UNSPECIFIED CHRONICITY: ICD-10-CM

## 2021-11-09 DIAGNOSIS — Z09 SURGERY FOLLOW-UP: Primary | ICD-10-CM

## 2021-11-09 PROCEDURE — 76942 ECHO GUIDE FOR BIOPSY: CPT | Performed by: ORTHOPAEDIC SURGERY

## 2021-11-09 PROCEDURE — 25010000002 MIDAZOLAM PER 1 MG: Performed by: ANESTHESIOLOGY

## 2021-11-09 PROCEDURE — 25010000002 PROPOFOL 10 MG/ML EMULSION: Performed by: NURSE ANESTHETIST, CERTIFIED REGISTERED

## 2021-11-09 PROCEDURE — 29823 SHO ARTHRS SRG XTNSV DBRDMT: CPT | Performed by: ORTHOPAEDIC SURGERY

## 2021-11-09 PROCEDURE — C9290 INJ, BUPIVACAINE LIPOSOME: HCPCS | Performed by: ANESTHESIOLOGY

## 2021-11-09 PROCEDURE — 25010000002 NEOSTIGMINE 10 MG/10ML SOLUTION: Performed by: NURSE ANESTHETIST, CERTIFIED REGISTERED

## 2021-11-09 PROCEDURE — 25010000002 EPINEPHRINE PER 0.1 MG: Performed by: ORTHOPAEDIC SURGERY

## 2021-11-09 PROCEDURE — L3670 SO ACRO/CLAV CAN WEB PRE OTS: HCPCS | Performed by: ORTHOPAEDIC SURGERY

## 2021-11-09 PROCEDURE — 25010000002 VANCOMYCIN PER 500 MG: Performed by: ORTHOPAEDIC SURGERY

## 2021-11-09 PROCEDURE — 25010000002 DEXAMETHASONE PER 1 MG: Performed by: NURSE ANESTHETIST, CERTIFIED REGISTERED

## 2021-11-09 PROCEDURE — 29826 SHO ARTHRS SRG DECOMPRESSION: CPT | Performed by: ORTHOPAEDIC SURGERY

## 2021-11-09 PROCEDURE — 0 BUPIVACAINE LIPOSOME 1.3 % SUSPENSION: Performed by: ANESTHESIOLOGY

## 2021-11-09 PROCEDURE — 25010000002 FENTANYL CITRATE (PF) 50 MCG/ML SOLUTION: Performed by: ANESTHESIOLOGY

## 2021-11-09 RX ORDER — SODIUM CHLORIDE 0.9 % (FLUSH) 0.9 %
3-10 SYRINGE (ML) INJECTION AS NEEDED
Status: DISCONTINUED | OUTPATIENT
Start: 2021-11-09 | End: 2021-11-09 | Stop reason: HOSPADM

## 2021-11-09 RX ORDER — NEOSTIGMINE METHYLSULFATE 1 MG/ML
INJECTION, SOLUTION INTRAVENOUS AS NEEDED
Status: DISCONTINUED | OUTPATIENT
Start: 2021-11-09 | End: 2021-11-09 | Stop reason: SURG

## 2021-11-09 RX ORDER — FENTANYL CITRATE 50 UG/ML
100 INJECTION, SOLUTION INTRAMUSCULAR; INTRAVENOUS
Status: DISCONTINUED | OUTPATIENT
Start: 2021-11-09 | End: 2021-11-09 | Stop reason: HOSPADM

## 2021-11-09 RX ORDER — HYDROCODONE BITARTRATE AND ACETAMINOPHEN 5; 325 MG/1; MG/1
1 TABLET ORAL ONCE AS NEEDED
Status: DISCONTINUED | OUTPATIENT
Start: 2021-11-09 | End: 2021-11-09 | Stop reason: HOSPADM

## 2021-11-09 RX ORDER — MIDAZOLAM HYDROCHLORIDE 1 MG/ML
2 INJECTION INTRAMUSCULAR; INTRAVENOUS ONCE
Status: COMPLETED | OUTPATIENT
Start: 2021-11-09 | End: 2021-11-09

## 2021-11-09 RX ORDER — FLUMAZENIL 0.1 MG/ML
0.2 INJECTION INTRAVENOUS AS NEEDED
Status: DISCONTINUED | OUTPATIENT
Start: 2021-11-09 | End: 2021-11-09 | Stop reason: HOSPADM

## 2021-11-09 RX ORDER — ACETAMINOPHEN 650 MG
TABLET, EXTENDED RELEASE ORAL AS NEEDED
Status: DISCONTINUED | OUTPATIENT
Start: 2021-11-09 | End: 2021-11-09 | Stop reason: HOSPADM

## 2021-11-09 RX ORDER — SODIUM CHLORIDE 0.9 % (FLUSH) 0.9 %
3 SYRINGE (ML) INJECTION EVERY 12 HOURS SCHEDULED
Status: DISCONTINUED | OUTPATIENT
Start: 2021-11-09 | End: 2021-11-09 | Stop reason: HOSPADM

## 2021-11-09 RX ORDER — ROCURONIUM BROMIDE 10 MG/ML
INJECTION, SOLUTION INTRAVENOUS AS NEEDED
Status: DISCONTINUED | OUTPATIENT
Start: 2021-11-09 | End: 2021-11-09 | Stop reason: SURG

## 2021-11-09 RX ORDER — HYDROCODONE BITARTRATE AND ACETAMINOPHEN 7.5; 325 MG/1; MG/1
1 TABLET ORAL EVERY 4 HOURS PRN
Status: DISCONTINUED | OUTPATIENT
Start: 2021-11-09 | End: 2021-11-09 | Stop reason: HOSPADM

## 2021-11-09 RX ORDER — PROPOFOL 10 MG/ML
VIAL (ML) INTRAVENOUS AS NEEDED
Status: DISCONTINUED | OUTPATIENT
Start: 2021-11-09 | End: 2021-11-09 | Stop reason: SURG

## 2021-11-09 RX ORDER — DEXAMETHASONE SODIUM PHOSPHATE 10 MG/ML
INJECTION INTRAMUSCULAR; INTRAVENOUS AS NEEDED
Status: DISCONTINUED | OUTPATIENT
Start: 2021-11-09 | End: 2021-11-09 | Stop reason: SURG

## 2021-11-09 RX ORDER — EPHEDRINE SULFATE 50 MG/ML
5 INJECTION, SOLUTION INTRAVENOUS ONCE AS NEEDED
Status: DISCONTINUED | OUTPATIENT
Start: 2021-11-09 | End: 2021-11-09 | Stop reason: HOSPADM

## 2021-11-09 RX ORDER — SODIUM CHLORIDE, SODIUM LACTATE, POTASSIUM CHLORIDE, CALCIUM CHLORIDE 600; 310; 30; 20 MG/100ML; MG/100ML; MG/100ML; MG/100ML
9 INJECTION, SOLUTION INTRAVENOUS CONTINUOUS
Status: DISCONTINUED | OUTPATIENT
Start: 2021-11-09 | End: 2021-11-09 | Stop reason: HOSPADM

## 2021-11-09 RX ORDER — HYDROCODONE BITARTRATE AND ACETAMINOPHEN 7.5; 325 MG/1; MG/1
1-2 TABLET ORAL EVERY 4 HOURS PRN
Qty: 42 TABLET | Refills: 0 | Status: SHIPPED | OUTPATIENT
Start: 2021-11-09 | End: 2021-11-15

## 2021-11-09 RX ORDER — FENTANYL CITRATE 50 UG/ML
50 INJECTION, SOLUTION INTRAMUSCULAR; INTRAVENOUS ONCE
Status: COMPLETED | OUTPATIENT
Start: 2021-11-09 | End: 2021-11-09

## 2021-11-09 RX ORDER — BUPIVACAINE HYDROCHLORIDE 2.5 MG/ML
INJECTION, SOLUTION EPIDURAL; INFILTRATION; INTRACAUDAL
Status: COMPLETED | OUTPATIENT
Start: 2021-11-09 | End: 2021-11-09

## 2021-11-09 RX ORDER — HYDROMORPHONE HYDROCHLORIDE 1 MG/ML
0.5 INJECTION, SOLUTION INTRAMUSCULAR; INTRAVENOUS; SUBCUTANEOUS
Status: DISCONTINUED | OUTPATIENT
Start: 2021-11-09 | End: 2021-11-09 | Stop reason: HOSPADM

## 2021-11-09 RX ORDER — FENTANYL CITRATE 50 UG/ML
50 INJECTION, SOLUTION INTRAMUSCULAR; INTRAVENOUS
Status: DISCONTINUED | OUTPATIENT
Start: 2021-11-09 | End: 2021-11-09 | Stop reason: HOSPADM

## 2021-11-09 RX ORDER — ISOPROPYL ALCOHOL 70 ML/100ML
LIQUID TOPICAL AS NEEDED
Status: DISCONTINUED | OUTPATIENT
Start: 2021-11-09 | End: 2021-11-09 | Stop reason: HOSPADM

## 2021-11-09 RX ORDER — ONDANSETRON 2 MG/ML
4 INJECTION INTRAMUSCULAR; INTRAVENOUS ONCE AS NEEDED
Status: DISCONTINUED | OUTPATIENT
Start: 2021-11-09 | End: 2021-11-09 | Stop reason: HOSPADM

## 2021-11-09 RX ORDER — MIDAZOLAM HYDROCHLORIDE 1 MG/ML
0.5 INJECTION INTRAMUSCULAR; INTRAVENOUS
Status: DISCONTINUED | OUTPATIENT
Start: 2021-11-09 | End: 2021-11-09 | Stop reason: HOSPADM

## 2021-11-09 RX ORDER — LIDOCAINE HYDROCHLORIDE 20 MG/ML
INJECTION, SOLUTION INFILTRATION; PERINEURAL AS NEEDED
Status: DISCONTINUED | OUTPATIENT
Start: 2021-11-09 | End: 2021-11-09 | Stop reason: SURG

## 2021-11-09 RX ORDER — GLYCOPYRROLATE 0.2 MG/ML
INJECTION INTRAMUSCULAR; INTRAVENOUS AS NEEDED
Status: DISCONTINUED | OUTPATIENT
Start: 2021-11-09 | End: 2021-11-09 | Stop reason: SURG

## 2021-11-09 RX ORDER — LIDOCAINE HYDROCHLORIDE 10 MG/ML
0.5 INJECTION, SOLUTION EPIDURAL; INFILTRATION; INTRACAUDAL; PERINEURAL ONCE AS NEEDED
Status: DISCONTINUED | OUTPATIENT
Start: 2021-11-09 | End: 2021-11-09 | Stop reason: HOSPADM

## 2021-11-09 RX ORDER — DOCUSATE SODIUM 100 MG/1
100 CAPSULE, LIQUID FILLED ORAL 2 TIMES DAILY
Qty: 60 CAPSULE | Refills: 0 | Status: SHIPPED | OUTPATIENT
Start: 2021-11-09 | End: 2022-05-18

## 2021-11-09 RX ORDER — ONDANSETRON 4 MG/1
4 TABLET, FILM COATED ORAL EVERY 8 HOURS PRN
Qty: 30 TABLET | Refills: 0 | Status: SHIPPED | OUTPATIENT
Start: 2021-11-09 | End: 2022-05-18

## 2021-11-09 RX ORDER — BUPIVACAINE HYDROCHLORIDE 5 MG/ML
INJECTION, SOLUTION EPIDURAL; INTRACAUDAL
Status: COMPLETED | OUTPATIENT
Start: 2021-11-09 | End: 2021-11-09

## 2021-11-09 RX ORDER — VANCOMYCIN HYDROCHLORIDE 1 G/200ML
15 INJECTION, SOLUTION INTRAVENOUS ONCE
Status: COMPLETED | OUTPATIENT
Start: 2021-11-09 | End: 2021-11-09

## 2021-11-09 RX ADMIN — MIDAZOLAM 2 MG: 1 INJECTION INTRAMUSCULAR; INTRAVENOUS at 07:26

## 2021-11-09 RX ADMIN — GLYCOPYRROLATE 0.4 MG: 0.2 INJECTION INTRAMUSCULAR; INTRAVENOUS at 08:57

## 2021-11-09 RX ADMIN — BUPIVACAINE HYDROCHLORIDE 10 ML: 2.5 INJECTION, SOLUTION EPIDURAL; INFILTRATION; INTRACAUDAL; PERINEURAL at 07:32

## 2021-11-09 RX ADMIN — BUPIVACAINE 10 ML: 13.3 INJECTION, SUSPENSION, LIPOSOMAL INFILTRATION at 07:32

## 2021-11-09 RX ADMIN — PROPOFOL 50 MG: 10 INJECTION, EMULSION INTRAVENOUS at 08:56

## 2021-11-09 RX ADMIN — NEOSTIGMINE METHYLSULFATE 2 MG: 1 INJECTION INTRAVENOUS at 08:57

## 2021-11-09 RX ADMIN — ROCURONIUM BROMIDE 30 MG: 50 INJECTION INTRAVENOUS at 07:57

## 2021-11-09 RX ADMIN — DEXAMETHASONE SODIUM PHOSPHATE 8 MG: 10 INJECTION INTRAMUSCULAR; INTRAVENOUS at 08:19

## 2021-11-09 RX ADMIN — BUPIVACAINE HYDROCHLORIDE 10 ML: 5 INJECTION, SOLUTION EPIDURAL; INTRACAUDAL; PERINEURAL at 07:32

## 2021-11-09 RX ADMIN — FENTANYL CITRATE 50 MCG: 50 INJECTION INTRAMUSCULAR; INTRAVENOUS at 07:26

## 2021-11-09 RX ADMIN — VANCOMYCIN HYDROCHLORIDE 1000 MG: 1 INJECTION, SOLUTION INTRAVENOUS at 07:40

## 2021-11-09 RX ADMIN — PROPOFOL 150 MG: 10 INJECTION, EMULSION INTRAVENOUS at 07:57

## 2021-11-09 RX ADMIN — SODIUM CHLORIDE, POTASSIUM CHLORIDE, SODIUM LACTATE AND CALCIUM CHLORIDE 9 ML/HR: 600; 310; 30; 20 INJECTION, SOLUTION INTRAVENOUS at 07:21

## 2021-11-09 RX ADMIN — LIDOCAINE HYDROCHLORIDE 80 MG: 20 INJECTION, SOLUTION INFILTRATION; PERINEURAL at 07:57

## 2021-11-09 NOTE — ANESTHESIA PREPROCEDURE EVALUATION
Anesthesia Evaluation     Patient summary reviewed and Nursing notes reviewed   history of anesthetic complications: PONV  NPO Solid Status: > 8 hours             Airway   Mallampati: II  Dental      Pulmonary - normal exam    breath sounds clear to auscultation  Cardiovascular - normal exam    (+) hypertension, CAD,       Neuro/Psych  GI/Hepatic/Renal/Endo    (+)   hepatitis, liver disease, thyroid problem hypothyroidism and hyperthyroidism    Musculoskeletal     Abdominal    Substance History      OB/GYN          Other      history of cancer                      Anesthesia Plan    ASA 3     general with block     intravenous induction     Anesthetic plan, all risks, benefits, and alternatives have been provided, discussed and informed consent has been obtained with: patient.

## 2021-11-09 NOTE — TELEPHONE ENCOUNTER
SX 11/9/21   SHOULDER ARTHROSCOPY WITH ROTATOR CUFF DEBRIDEMENT; SUBACROMIAL DECOMPRESSION; SYNOVECTOMY            When should patient start PT? After their 2 week PO visit?    Epidermal Autograft Text: The defect edges were debeveled with a #15 scalpel blade.  Given the location of the defect, shape of the defect and the proximity to free margins an epidermal autograft was deemed most appropriate.  Using a sterile surgical marker, the primary defect shape was transferred to the donor site. The epidermal graft was then harvested.  The skin graft was then placed in the primary defect and oriented appropriately.

## 2021-11-09 NOTE — TELEPHONE ENCOUNTER
[facility] is asking when patient is to start physical therapy?     [facility] with scheduling [phone]     Patient stated they are to start PT this week, [facility] is concerned due to patient had schedule today.     Please advise.

## 2021-11-09 NOTE — ANESTHESIA PROCEDURE NOTES
Airway  Urgency: elective    Date/Time: 11/9/2021 8:04 AM  Airway not difficult    General Information and Staff    Patient location during procedure: OR  CRNA: Yuli Ward CRNA    Indications and Patient Condition  Indications for airway management: airway protection    Preoxygenated: yes  Mask difficulty assessment: 2 - vent by mask + OA or adjuvant +/- NMBA    Final Airway Details  Final airway type: endotracheal airway      Successful airway: ETT  Cuffed: yes   Successful intubation technique: direct laryngoscopy  Facilitating devices/methods: Bougie  Endotracheal tube insertion site: oral  Blade: Conway  Blade size: 2  Cormack-Lehane Classification: grade IIb - view of arytenoids or posterior of glottis only  Placement verified by: chest auscultation and capnometry   Cuff volume (mL): 8  Number of attempts at approach: 2  Assessment: lips, teeth, and gum same as pre-op and atraumatic intubation    Additional Comments  PreO2 with 100% O2;  FeO2 >85%;  sniff position; easy mask ventilation;  Intubated with no difficultly after eyes taped; Appears atraumatic;  Lips and teeth intact as preop condition;  Airway secured. Connected to ventilator.

## 2021-11-09 NOTE — TELEPHONE ENCOUNTER
Yes.  I gave her instructions to start PT as soon as possible.  Would definitely like her to start this week.

## 2021-11-09 NOTE — ANESTHESIA POSTPROCEDURE EVALUATION
Patient: Mami Srivastava    Procedure Summary     Date: 11/09/21 Room / [facility]:  [facility]    Anesthesia Start: 0751 Anesthesia Stop: 0910    Procedure: SHOULDER ARTHROSCOPY WITH ROTATOR CUFF DEBRIDEMENT; SUBACROMIAL DECOMPRESSION; SYNOVECTOMY (Right Shoulder) Diagnosis:       Right shoulder pain, unspecified chronicity      (Right shoulder pain, unspecified chronicity [M25.511])    Surgeons: Chencho Craft MD Provider: Camacho Mccarthy MD    Anesthesia Type: general with block ASA Status: 3          Anesthesia Type: general with block    Vitals  Vitals Value Taken Time   /55 11/09/21 0931   Temp 36.5 °C (97.7 °F) 11/09/21 0910   Pulse 64 11/09/21 0937   Resp 16 11/09/21 0915   SpO2 95 % 11/09/21 0937   Vitals shown include unvalidated device data.        Post Anesthesia Care and Evaluation    Patient location during evaluation: bedside  Patient participation: complete - patient participated  Level of consciousness: awake  Pain management: adequate  Airway patency: patent  Anesthetic complications: No anesthetic complications  PONV Status: controlled  Cardiovascular status: acceptable  Respiratory status: acceptable  Hydration status: acceptable    Comments: --------------------            11/09/21 0941     --------------------   BP:       130/54     Pulse:      63       Resp:       16       Temp:                SpO2:      96%      --------------------

## 2021-11-09 NOTE — ANESTHESIA PROCEDURE NOTES
Peripheral Block    Pre-sedation assessment completed: 11/9/2021 7:27 AM    Patient reassessed immediately prior to procedure    Patient location during procedure: pre-op  Start time: 11/9/2021 7:27 AM  Stop time: 11/9/2021 7:32 AM  Reason for block: at surgeon's request and post-op pain management  Performed by  Anesthesiologist: Camacho Mccarthy MD  Preanesthetic Checklist  Completed: patient identified, IV checked, site marked, risks and benefits discussed, surgical consent, monitors and equipment checked, pre-op evaluation and timeout performed  Prep:  Pt Position: supine  Sterile barriers:cap, gloves, mask and sterile barriers  Prep: ChloraPrep  Patient monitoring: blood pressure monitoring, continuous pulse oximetry and EKG  Procedure    Sedation: yes  Performed under: local infiltration  Guidance:ultrasound guided    ULTRASOUND INTERPRETATION.  Using ultrasound guidance a 22 G gauge needle was placed in close proximity to the brachial plexus nerve, at which point, under ultrasound guidance anesthetic was injected in the area of the nerve and spread of the anesthesia was seen on ultrasound in close proximity thereto.  There were no abnormalities seen on ultrasound; a digital image was taken; and the patient tolerated the procedure with no complications. Images:still images obtained    Laterality:right  Block Type:interscalene  Injection Technique:single-shot  Needle Type:echogenic  Needle Gauge:22 G  Resistance on Injection: less than 15 psi    Medications Used: bupivacaine liposome (EXPAREL) 1.3 % injection, 10 mL  bupivacaine PF (MARCAINE) 0.25 % injection, 10 mL  bupivacaine (PF) (MARCAINE) 0.5 % injection, 10 mL  Med administered at 11/9/2021 7:32 AM      Post Assessment  Injection Assessment: negative aspiration for heme, no paresthesia on injection and incremental injection  Patient Tolerance:comfortable throughout block  Complications:no

## 2021-11-10 NOTE — TELEPHONE ENCOUNTER
Caller: DARREN ESTEVEZ    Relationship to patient: SELF     Best call back number: [phone]    Patient is needing: ATTEMPTED TO WARM TRANSFER- PATIENT STATED SHE HAS A PHYSICAL THERAPY APPOINTMENT ON Friday. PATIENT STATED PHYSICAL THERAPY HAS NOT RECEIVED ORDER- PATIENT IS GOING TO  PHYSICAL THERAPY OFF [facility]

## 2021-11-11 RX ORDER — TRAMADOL HYDROCHLORIDE 50 MG/1
50 TABLET ORAL EVERY 4 HOURS PRN
Qty: 60 TABLET | Refills: 0 | Status: SHIPPED | OUTPATIENT
Start: 2021-11-11 | End: 2023-01-31

## 2021-11-12 ENCOUNTER — TREATMENT (OUTPATIENT)
Dept: PHYSICAL THERAPY | Facility: CLINIC | Age: 76
End: 2021-11-12

## 2021-11-12 DIAGNOSIS — Z98.890 S/P SHOULDER SURGERY: ICD-10-CM

## 2021-11-12 DIAGNOSIS — Z98.890 S/P ARTHROSCOPY OF RIGHT SHOULDER: Primary | ICD-10-CM

## 2021-11-12 PROCEDURE — 97110 THERAPEUTIC EXERCISES: CPT | Performed by: PHYSICAL THERAPIST

## 2021-11-12 PROCEDURE — 97162 PT EVAL MOD COMPLEX 30 MIN: CPT | Performed by: PHYSICAL THERAPIST

## 2021-11-12 PROCEDURE — 97530 THERAPEUTIC ACTIVITIES: CPT | Performed by: PHYSICAL THERAPIST

## 2021-11-12 PROCEDURE — 97140 MANUAL THERAPY 1/> REGIONS: CPT | Performed by: PHYSICAL THERAPIST

## 2021-11-12 NOTE — PROGRESS NOTES
Physical Therapy Initial Evaluation and Plan of Care    Patient Name: Mami Srivastava          :  1945  Referring Physician: Vicki Garcia APRN  Diagnosis:   S/P Shoulder Surgery Right  Date of Evaluation: 2021  ______________________________________________________________________    Subjective Evaluation    History of Present Illness  Mechanism of injury: Insidious vs twisted while moving items in basement vs sleeping on couch wrong -   Monday morning - couldn't get out of bed / move (R) arm -   prednizone issued - helped shoulder some, but alleviated thigh pain, but returned once stopped - bout of PT no help - referred to Dr. Craft - got MRI - (+) for RTC tear (R) shoulder -   PT again - no help - Then had surgery -     Since surgery - Numb for while - Minimal pain at rest - doing pendulums - swelling / bruising in    Pre-Operative Diagnosis: Full-thickness right rotator cuff tendon tear     Post-Operative Diagnosis: Low-grade partial-thickness articular sided right rotator cuff tendon tear, extensive glenohumeral synovitis and adhesive capsulitis, subacromial/subdeltoid bursitis     Procedure Performed:  2021  1.  Arthroscopic rotator cuff debridement  2.  Arthroscopic synovectomy and capsular release  3.  Arthroscopic biceps tenotomy   4.  Chondroplasty of glenoid   5.  Subacromial decompression and extensive bursectomy    Pain  Current pain rating: 3  At worst pain ratin  Location: Anterior shoulder (R) > posterior  Quality: Ache / soreness.  Alleviating factors: Rest; Ice.  Exacerbated by: Movement; (R) sidelying;   Progression: improved    Hand dominance: right    Diagnostic Tests  MRI studies: abnormal (Prior to sx)    Treatments  Previous treatment: physical therapy  Current treatment: medication  Patient Goals  Patient/family treatment goals: Pain alleviation; Mobility, strength to allow ADLs and normal job           ___________________________________________________  Objective          Postural Observations    Additional Postural Observation Details  Band-aids over scope sites (R) shoulder - Bruising / swelling in biceps mm (R)       Tenderness     Additional Tenderness Details  Mild tenderness (R) Scope sites and biceps -     Active Range of Motion     Additional Active Range of Motion Details  (R) Shoulder: FE 30-deg painful ; ERB/IRB UA    Passive Range of Motion     Additional Passive Range of Motion Details  PROM (R) shoulder:  -deg ;  ER (POS) 30-deg; IR (POS)30deg; -deg; (ALL IMPROVED AFTER GENTLE STRETCHING)    Strength/Myotome Testing     Additional Strength Details  NA at this time due to recent surgery including RTC debridement - (R) shoulder -     Tests     Additional Tests Details  NA at this time due to recent surgery (R) shoulder        See Treatment Flow sheet for Exercises, Manual therapy, and modalities.   FUNCTIONAL ACTIVITIES: X 10 min  · TAPING / BRACING: NA  · Precautions / anatomy / surgery education -   · Sleeping postures for comfort -   · Jt protection, ADL modification; Posture and     ___________________________________________________  Assessment & Plan     Assessment  Assessment details: 75 yo female; S/P 2021 (R) Shoulder  1.  Arthroscopic rotator cuff debridement  2.  Arthroscopic synovectomy and capsular release  3.  Arthroscopic biceps tenotomy   4.  Chondroplasty of glenoid   5.  Subacromial decompression and extensive bursectomy    PROBLEMS: Pain; Limited ROM; Weakness; Intolerance to ADLs and normal job duties requiring use of her RUE -   PROGNOSIS: Fair/Good    GOALS:   SHORT TERM GOALS: 4 weeks:  1) HEP Initiated; 2) Pain decreased 50%:   3) PROM  Increased to WFL; 4) Active FE >90-de) Improved functional use of (R) shoulder up to shoulder level -     LONG TERM GOALS: 8 weeks (or at time of DISCHARGE): 1) (I) HEP; 2) AROM WFL and pain free; 3)  Strength / mobility to be able to perform all ADL's and job-related activities w/o restrictions;       Plan  Planned therapy interventions: flexibility, home exercise program, manual therapy, neuromuscular re-education, postural training, soft tissue mobilization, strengthening, stretching and therapeutic activities (Modalities prn; )  Duration in visits: 3  Duration in weeks: 4  Treatment plan discussed with: patient  Plan details: P/AAROM FE, ER all in plane of scapula initially - Gentle scap stabilization exercises;         ___________________________________________________  Manual Therapy:    20     mins  66471;  Therapeutic Exercise:    20     mins  78200;     Neuromuscular Sana:        mins  78969;    Therapeutic Activity:     10     mins  45925;   Self Care:                           mins  86390  Ultrasound:          mins  88039;  Iontophoresis:          mins  49445;    Electrical Stimulation:    15     mins  10905 ( );  Mechanical Traction:          mins  79435  Dry Needling          mins self-pay    Eval:   20   mins    Timed Treatment:   50   mins                  Total Treatment:     90   mins    PT SIGNATURE:   Tor Chase, PT  DATE TREATMENT INITIATED: 11/12/2021  ___________________________________________________  Initial Certification  Certification Period: 2/10/2022  I certify that the therapy services are furnished while this patient is under my care.  The services outlined above are required by this patient, and will be reviewed every 90 days.     PHYSICIAN: ________________________________  DATE: ______  Vicki Garcia APRN        Please sign and return via fax to [phone].. Thank you, [facility].  ______________________________________________________________________  [address] [place] [address]  Phone: [phone] Fax: [phone]

## 2021-11-15 ENCOUNTER — OFFICE VISIT (OUTPATIENT)
Dept: FAMILY MEDICINE CLINIC | Facility: CLINIC | Age: 76
End: 2021-11-15

## 2021-11-15 VITALS
WEIGHT: 138.2 LBS | RESPIRATION RATE: 18 BRPM | BODY MASS INDEX: 23.6 KG/M2 | DIASTOLIC BLOOD PRESSURE: 62 MMHG | HEIGHT: 64 IN | OXYGEN SATURATION: 97 % | HEART RATE: 71 BPM | SYSTOLIC BLOOD PRESSURE: 130 MMHG | TEMPERATURE: 98 F

## 2021-11-15 DIAGNOSIS — Z98.890 STATUS POST ROTATOR CUFF REPAIR: ICD-10-CM

## 2021-11-15 DIAGNOSIS — Z09 HOSPITAL DISCHARGE FOLLOW-UP: Primary | ICD-10-CM

## 2021-11-15 PROCEDURE — 99213 OFFICE O/P EST LOW 20 MIN: CPT | Performed by: STUDENT IN AN ORGANIZED HEALTH CARE EDUCATION/TRAINING PROGRAM

## 2021-11-15 NOTE — PROGRESS NOTES
"Chief Complaint  Hospital Follow Up Visit ([facility] 11/9/21)    Subjective          Mami Srivastava presents to [facility]  Hospital follow-up.  Patient reported she recently undergo right shoulder arthroscopy and is following physical therapy and her surgeon.  Patient denies having any pain, she does have bruise on the right upper arm which is gradually disappearing but other than that she denies having any other complaint.  Patient reported she is taking all the medication prescribed to her with no changes.  Review of system is negative for fever, headache, chest pain, shortness of breath, palpitation, nausea, vomiting, any recent change in bladder habits.        Objective   Vital Signs:   /62 (BP Location: Left arm, Patient Position: Sitting)   Pulse 71   Temp 98 °F (36.7 °C) (Oral)   Resp 18   Ht 162.6 cm (64.02\")   Wt 62.7 kg (138 lb 3.2 oz)   SpO2 97%   BMI 23.71 kg/m²     Physical Exam  HENT:      Head: Normocephalic and atraumatic.      Mouth/Throat:      Mouth: Mucous membranes are moist.      Pharynx: Oropharynx is clear.   Eyes:      Extraocular Movements: Extraocular movements intact.      Conjunctiva/sclera: Conjunctivae normal.      Pupils: Pupils are equal, round, and reactive to light.   Cardiovascular:      Rate and Rhythm: Normal rate and regular rhythm.   Pulmonary:      Effort: Pulmonary effort is normal.      Breath sounds: Normal breath sounds.   Abdominal:      General: Bowel sounds are normal.      Palpations: Abdomen is soft.   Musculoskeletal:         General: Normal range of motion.      Cervical back: Neck supple.   Skin:     General: Skin is warm.      Capillary Refill: Capillary refill takes less than 2 seconds.   Neurological:      General: No focal deficit present.      Mental Status: She is alert and oriented to person, place, and time. Mental status is at baseline.   Psychiatric:         Mood and Affect: Mood normal.        Result Review : "     CMP    CMP 12/8/20 4/26/21 11/3/21   Glucose 100 (A) 114 (A) 123 (A)   BUN 14 17 15   Creatinine 0.78 0.83 0.69   eGFR Non  Am 72 67 83   eGFR African Am  81    Sodium 138 142 140   Potassium 3.9 4.7 4.6   Chloride 101 104 102   Calcium 9.8 9.9 9.8   Total Protein  6.4    Albumin 4.80 4.60    Globulin  1.8    Total Bilirubin 1.1 1.2    Alkaline Phosphatase 74 65    AST (SGOT) 32 26    ALT (SGPT) 25 27    (A) Abnormal value       Comments are available for some flowsheets but are not being displayed.           CBC    CBC 4/26/21 11/3/21   WBC 5.62 7.18   RBC 4.41 4.49   Hemoglobin 13.2 12.3   Hematocrit 38.5 37.5   MCV 87.3 83.5   MCH 29.9 27.4   MCHC 34.3 32.8   RDW 12.8 13.9   Platelets 181 257           Data reviewed: Patient has recently undergone right shoulder arthroscopic repair of rotator cuff tear.  Patient is currently following physical therapy and has surgeon follow-up appointment in 2 days          Assessment and Plan    Diagnoses and all orders for this visit:    1. Hospital discharge follow-up (Primary)  Comments:  Medication reviewed with the patient, last labs reviewed no need to repeat any labs at this point.  Continue same medication, has surgeon appointment in 2 days        Follow Up   No follow-ups on file.  Patient was given instructions and counseling regarding her condition or for health maintenance advice. Please see specific information pulled into the AVS if appropriate.

## 2021-11-17 ENCOUNTER — TREATMENT (OUTPATIENT)
Dept: PHYSICAL THERAPY | Facility: CLINIC | Age: 76
End: 2021-11-17

## 2021-11-17 ENCOUNTER — OFFICE VISIT (OUTPATIENT)
Dept: ORTHOPEDIC SURGERY | Facility: CLINIC | Age: 76
End: 2021-11-17

## 2021-11-17 VITALS — WEIGHT: 138 LBS | HEIGHT: 64 IN | BODY MASS INDEX: 23.56 KG/M2 | TEMPERATURE: 98.2 F

## 2021-11-17 DIAGNOSIS — Z98.890 S/P SHOULDER SURGERY: Primary | ICD-10-CM

## 2021-11-17 DIAGNOSIS — Z09 SURGERY FOLLOW-UP: Primary | ICD-10-CM

## 2021-11-17 DIAGNOSIS — Z98.890 S/P ARTHROSCOPY OF RIGHT SHOULDER: ICD-10-CM

## 2021-11-17 PROCEDURE — 99024 POSTOP FOLLOW-UP VISIT: CPT | Performed by: ORTHOPAEDIC SURGERY

## 2021-11-17 PROCEDURE — 97110 THERAPEUTIC EXERCISES: CPT | Performed by: PHYSICAL THERAPIST

## 2021-11-17 PROCEDURE — 97530 THERAPEUTIC ACTIVITIES: CPT | Performed by: PHYSICAL THERAPIST

## 2021-11-17 PROCEDURE — 97140 MANUAL THERAPY 1/> REGIONS: CPT | Performed by: PHYSICAL THERAPIST

## 2021-11-17 NOTE — PROGRESS NOTES
Mami Srivastava : 1945 MRN: [id number] DATE: 2021    CC: 1 week s/p right shoulder arthroscopy    HPI: Patient returns to clinic today for follow up.  Reports pain is well controlled.  Denies fevers, drainage, redness or other concerning symptoms.  Reports compliance with the therapy.    Vitals:    21 1544   Temp: 98.2 °F (36.8 °C)     Exam:  Wounds appear well-approximated.  Arm and forearm soft.  Shoulder moves fluidly with pendulums.  Good motor and sensory function in the hand and wrist.  Palpable radial pulse with brisk capillary refill    Impression:  1 week s/p right shoulder capsular release, synvectomy, SAD, biceps tenotomy    Plan:    1.  Continue PT for ROM, shoulder strengthening  2.  Follow up in 4 weeks   4.  Counseled the patient about appropriate activity modifications and restrictions    Chencho Craft MD  `

## 2021-11-17 NOTE — PROGRESS NOTES
Physical Therapy Daily Progress Note    Patient Name: Mami Srivastava         :  1945  Referring Physician: Vicki Garcia APRN      Subjective   Mami Srivastava reports: sore this morning - not been too bad - unable to raise her arm herself due to pain (R) shoulder -     Objective   Persistent bruising into upper arm / bicep to elbow (R) -   AFE 30-deg w/ pain  PROM: FE up to 150+-deg;  ABDuction (in plane of scapula) 150-deg after stretching   ER (plane of scapula) 35-40-deg w/ significant posterior shoulder impingement limiting progression  ERA Very limited due to severe posterior impingement pain   KRISTA 45-deg;     See Exercise, Manual, and Modality Logs for complete treatment.     Functional / Therapeutic Activities:  15 min  · TAPING / BRACING: NA  · FUNCTIONAL ASSESSMENT -  · Emphasis on more con  · Jt protection, ADL modification; Posture and      Assessment/Plan  77 yo female; S/P 2021 (R) Shoulder  1.  Arthroscopic rotator cuff debridement2.  Arthroscopic synovectomy and capsular release    3.  Arthroscopic biceps tenotomy   4.  Chondroplasty of glenoid   5.  Subacromial decompression and extensive bursectomy    Severe posterior shoulder pain / Posterior impingement limiting ER/ERA > FE/ABDuction PROM  Improved PROM in all planes, but most limited with ER/ERA due to posterior pain -   Very poor ACTIVE FE -     Mami would benefit from continued Physical Therapy - 3x/wk x 4 weeks -      _________________________________________________    Manual Therapy:            25     mins  71923;  Therapeutic Exercise:    20    mins  29748;     Neuromuscular Sana:        mins  10880;    Therapeutic Activity:     15      mins  38170;     Ultrasound:                          mins  23048;  Iontophoresis:                     mins  54168;    Electrical Stimulation:         mins  32294 ( );  Mechanical Traction:          mins  86072  Dry Needling                       mins self-pay      Timed Treatment:   60    mins                  Total Treatment:     75    mins    Tor Chase, PT  Physical Therapist

## 2021-11-19 ENCOUNTER — TREATMENT (OUTPATIENT)
Dept: PHYSICAL THERAPY | Facility: CLINIC | Age: 76
End: 2021-11-19

## 2021-11-19 DIAGNOSIS — Z98.890 S/P ARTHROSCOPY OF RIGHT SHOULDER: ICD-10-CM

## 2021-11-19 DIAGNOSIS — Z98.890 S/P SHOULDER SURGERY: Primary | ICD-10-CM

## 2021-11-19 PROCEDURE — G0283 ELEC STIM OTHER THAN WOUND: HCPCS | Performed by: PHYSICAL THERAPIST

## 2021-11-19 PROCEDURE — 97110 THERAPEUTIC EXERCISES: CPT | Performed by: PHYSICAL THERAPIST

## 2021-11-19 PROCEDURE — 97140 MANUAL THERAPY 1/> REGIONS: CPT | Performed by: PHYSICAL THERAPIST

## 2021-11-19 NOTE — PROGRESS NOTES
Physical Therapy Daily Progress Note    Patient Name: Mami Srivastava         :  1945  Referring Physician: Vicki Garcia APRN      Subjective   Mami Srivastava reports: working her arm a lot - able to raise her arm more - using arm more - washed windows -     Objective   AFE 80-deg w/ compensation w/ shoulder hike - PROM +-deg;  Improved ER/ERA w/ post impingment - reduced w/ AP pressure HH - KRISTA 30-40-deg    See Exercise, Manual, and Modality Logs for complete treatment.     Functional / Therapeutic Activities:   min  · TAPING / BRACING: NA  · Jt protection, ADL modification; Posture and      Assessment/Plan  77 yo female; S/P 2021 (R) Shoulder  1.  Arthroscopic rotator cuff debridement2.  Arthroscopic synovectomy and capsular release    3.  Arthroscopic biceps tenotomy   4.  Chondroplasty of glenoid   5.  Subacromial decompression and extensive bursectomy  Improving mobility and function    Progress strengthening /stabilization /functional activity       _________________________________________________    Manual Therapy:            25     mins  56049;  Therapeutic Exercise:    15    mins  42791;     Neuromuscular Sana:        mins  84282;    Therapeutic Activity:           mins  64073;     Ultrasound:                          mins  70978;  Iontophoresis:                     mins  12821;    Electrical Stimulation:     15    mins  81777 ( );  Mechanical Traction:          mins  42474  Dry Needling                       mins self-pay     Timed Treatment:   55    mins                  Total Treatment:     65    mins    Tor Chase PT  Physical Therapist

## 2021-11-23 ENCOUNTER — TREATMENT (OUTPATIENT)
Dept: PHYSICAL THERAPY | Facility: CLINIC | Age: 76
End: 2021-11-23

## 2021-11-23 DIAGNOSIS — M75.01 ADHESIVE CAPSULITIS OF RIGHT SHOULDER: ICD-10-CM

## 2021-11-23 DIAGNOSIS — Z98.890 S/P SHOULDER SURGERY: Primary | ICD-10-CM

## 2021-11-23 DIAGNOSIS — Z98.890 S/P ARTHROSCOPY OF RIGHT SHOULDER: ICD-10-CM

## 2021-11-23 PROCEDURE — 97530 THERAPEUTIC ACTIVITIES: CPT | Performed by: PHYSICAL THERAPIST

## 2021-11-23 PROCEDURE — 97140 MANUAL THERAPY 1/> REGIONS: CPT | Performed by: PHYSICAL THERAPIST

## 2021-11-23 PROCEDURE — 97110 THERAPEUTIC EXERCISES: CPT | Performed by: PHYSICAL THERAPIST

## 2021-11-23 NOTE — PROGRESS NOTES
Physical Therapy Daily Progress Note    Patient Name: Mami Srivastava         :  1945  Referring Physician: Vicki Garcia APRN      Subjective   Mami Srivastava reports: doing pulleys, ball and counter top stretches about 4 times per day- Tried to to IRB, but severely painful anterior shoulder and then pain stays for some time after - Pain with stretching    Objective   AFE (R) 75-deg;    Painful PROM - FE/ABD in plane of scap 140-deg;   Significant pain, especially posterior impingement w/ ER and ABDuction > FE   Pt unable to do wall slide w/ RUE due to pain -       See Exercise, Manual, and Modality Logs for complete treatment.     Functional / Therapeutic Activities:  10 min  · TAPING / BRACING: NA  · SEE EXERCISE FLOW SHEET -   · Jt protection, ADL modification; Posture and      Assessment/Plan  75 yo female; S/P 2021 (R) Shoulder  1.  Arthroscopic rotator cuff debridement2.  Arthroscopic synovectomy and capsular release    3.  Arthroscopic biceps tenotomy   4.  Chondroplasty of glenoid   5.  Subacromial decompression and extensive bursectomy  Improving mobility and function  Very painful and stiff - posterior impingement pain limiting ROM -      Progress strengthening /stabilization /functional activity     _________________________________________________    Manual Therapy:            20     mins  18578;  Therapeutic Exercise:    23    mins  08189;     Neuromuscular Sana:        mins  13150;    Therapeutic Activity:     10      mins  28270;     Ultrasound:                          mins  92229;  Iontophoresis:                     mins  77774;    Electrical Stimulation:     15    mins  30801 ( );  Mechanical Traction:          mins  56896  Dry Needling                       mins self-pay     Timed Treatment:   53   mins                  Total Treatment:     75    mins    Tor Chase PT  Physical Therapist      Access Code: [email or web address]  URL: [email or web address]  Date:  11/23/2021  Prepared by: Raz Chase    Exercises  Seated Shoulder Flexion AAROM with Pulley Behind - 2 x daily - 7 x weekly - 20 reps - 1 sets - 10 hold  Standing 'L' Stretch at Counter - 3-5 x daily - 7 x weekly - 1 sets - 10 reps - 10 sec hold  Standing Shoulder Flexion AAROM with Swiss Ball - 3 x daily - 7 x weekly - 1 sets - 20 reps - 5-10s hold  Sleeper Stretch - 2-3 x daily - 7 x weekly - 5-10 reps - 1 sets - 30s hold  Standing Bilateral Shoulder Internal Rotation AAROM with Dowel - 3-5 x daily - 7 x weekly - 1 sets - 5 reps - 30s hold  Supine Shoulder Flexion with Dowel - 1 x daily - 7 x weekly - 2-3 sets - 10-15 reps - 3-5 hold

## 2021-11-30 ENCOUNTER — TREATMENT (OUTPATIENT)
Dept: PHYSICAL THERAPY | Facility: CLINIC | Age: 76
End: 2021-11-30

## 2021-11-30 DIAGNOSIS — M75.01 ADHESIVE CAPSULITIS OF RIGHT SHOULDER: ICD-10-CM

## 2021-11-30 DIAGNOSIS — Z98.890 S/P SHOULDER SURGERY: Primary | ICD-10-CM

## 2021-11-30 DIAGNOSIS — Z98.890 S/P ARTHROSCOPY OF RIGHT SHOULDER: ICD-10-CM

## 2021-11-30 PROCEDURE — 97530 THERAPEUTIC ACTIVITIES: CPT | Performed by: PHYSICAL THERAPIST

## 2021-11-30 PROCEDURE — G0283 ELEC STIM OTHER THAN WOUND: HCPCS | Performed by: PHYSICAL THERAPIST

## 2021-11-30 PROCEDURE — 97110 THERAPEUTIC EXERCISES: CPT | Performed by: PHYSICAL THERAPIST

## 2021-11-30 NOTE — PROGRESS NOTES
Physical Therapy Daily Progress Note    Patient Name: Mami Srivastava         :  1945  Referring Physician: Vicki Garcia APRN      Subjective   Mami Srivastava reports: decreasing pain and improving mobility - using her arm more - still limited reaching up / OH and weak -    Objective   Pt demonstrated FE up to about 90-deg w/ some compensation - Grossly improved passive FE -    See Exercise, Manual, and Modality Logs for complete treatment.     Functional / Therapeutic Activities:  15 min  · TAPING / BRACING: NA  · SEE EXERCISE FLOW SHEET -   · Jt protection, ADL modification; Posture and       Assessment/Plan  77 yo female; S/P 2021 (R) Shoulder  1.  Arthroscopic rotator cuff debridement2.  Arthroscopic synovectomy and capsular release    3.  Arthroscopic biceps tenotomy   4.  Chondroplasty of glenoid   5.  Subacromial decompression and extensive bursectomy  Improving mobility and function  Very painful and stiff - posterior impingement pain limiting ROM -      Progress strengthening /stabilization /functional activity  _________________________________________________     Manual Therapy:            05     mins  06012;  Therapeutic Exercise:    23    mins  74254;     Neuromuscular Sana:        mins  32674;    Therapeutic Activity:     15      mins  13251;     Ultrasound:                          mins  22131;  Iontophoresis:                     mins  98524;    Electrical Stimulation:     15    mins  13899 ( );  Mechanical Traction:          mins  68718  Dry Needling                       mins self-pay     Timed Treatment:   43   mins                  Total Treatment:     70    mins    Tor Chase PT  Physical Therapist

## 2021-12-01 ENCOUNTER — APPOINTMENT (OUTPATIENT)
Dept: ULTRASOUND IMAGING | Facility: HOSPITAL | Age: 76
End: 2021-12-01

## 2021-12-02 ENCOUNTER — TREATMENT (OUTPATIENT)
Dept: PHYSICAL THERAPY | Facility: CLINIC | Age: 76
End: 2021-12-02

## 2021-12-02 DIAGNOSIS — Z98.890 S/P ARTHROSCOPY OF RIGHT SHOULDER: Primary | ICD-10-CM

## 2021-12-02 DIAGNOSIS — Z98.890 S/P SHOULDER SURGERY: ICD-10-CM

## 2021-12-02 DIAGNOSIS — M75.01 ADHESIVE CAPSULITIS OF RIGHT SHOULDER: ICD-10-CM

## 2021-12-02 PROCEDURE — G0283 ELEC STIM OTHER THAN WOUND: HCPCS | Performed by: PHYSICAL THERAPIST

## 2021-12-02 PROCEDURE — 97110 THERAPEUTIC EXERCISES: CPT | Performed by: PHYSICAL THERAPIST

## 2021-12-02 PROCEDURE — 97530 THERAPEUTIC ACTIVITIES: CPT | Performed by: PHYSICAL THERAPIST

## 2021-12-02 NOTE — PROGRESS NOTES
Physical Therapy Daily Progress Note    Patient Name: Mami Srivastava         :  1945  Referring Physician: Vicki Garcia APRN      Subjective   Mami Srivastava reports: feeling less stiff in AM -     Objective   Pt demonstrating improved active reaching in PT -     See Exercise, Manual, and Modality Logs for complete treatment.     Functional / Therapeutic Activities:  15 min  · TAPING / BRACING: NA  · SEE EXERCISE FLOW SHEET -   · Jt protection, ADL modification; Posture and       Assessment/Plan  77 yo female; S/P 2021 (R) Shoulder  1.  Arthroscopic rotator cuff debridement2.  Arthroscopic synovectomy and capsular release    3.  Arthroscopic biceps tenotomy   4.  Chondroplasty of glenoid   5.  Subacromial decompression and extensive bursectomy  Improving mobility and function  Very painful and stiff - posterior impingement pain limiting ROM -      Progress strengthening /stabilization /functional activity  _________________________________________________     Manual Therapy:            05     mins  13036;  Therapeutic Exercise:    25    mins  26027;     Neuromuscular Sana:        mins  98954;    Therapeutic Activity:     15      mins  22297;     Ultrasound:                          mins  60812;  Iontophoresis:                     mins  18304;    Electrical Stimulation:     15    mins  66374 ( );  Mechanical Traction:          mins  31410  Dry Needling                       mins self-pay     Timed Treatment:   45   mins                  Total Treatment:     70    mins     Tor Chase PT  Physical Therapist

## 2021-12-03 ENCOUNTER — TREATMENT (OUTPATIENT)
Dept: PHYSICAL THERAPY | Facility: CLINIC | Age: 76
End: 2021-12-03

## 2021-12-03 DIAGNOSIS — Z98.890 S/P ARTHROSCOPY OF RIGHT SHOULDER: Primary | ICD-10-CM

## 2021-12-03 DIAGNOSIS — Z98.890 S/P SHOULDER SURGERY: ICD-10-CM

## 2021-12-03 DIAGNOSIS — M75.01 ADHESIVE CAPSULITIS OF RIGHT SHOULDER: ICD-10-CM

## 2021-12-03 PROCEDURE — 97110 THERAPEUTIC EXERCISES: CPT | Performed by: PHYSICAL THERAPIST

## 2021-12-03 PROCEDURE — 97530 THERAPEUTIC ACTIVITIES: CPT | Performed by: PHYSICAL THERAPIST

## 2021-12-03 NOTE — PROGRESS NOTES
Physical Therapy Daily Progress Note    Patient Name: Mami Srivastava         :  1945  Referring Physician: Vicki Garcia APRN      Subjective   Mami Srivastava reports: Woke up with her shoulder sore this morning - Pain down lateral shoulder - more painful w/ wall slides    Objective   Pt demonstrating limited mobility w/ pain w/ wall slides today -     See Exercise, Manual, and Modality Logs for complete treatment.     Functional / Therapeutic Activities:  15 min  · TAPING / BRACING: NA  · SEE EXERCISE FLOW SHEET -   · Jt protection, ADL modification; Posture and       Assessment/Plan  75 yo female; S/P 2021 (R) Shoulder  1.  Arthroscopic rotator cuff debridement2.  Arthroscopic synovectomy and capsular release    3.  Arthroscopic biceps tenotomy   4.  Chondroplasty of glenoid   5.  Subacromial decompression and extensive bursectomy  Improving mobility and function  Very painful and stiff - posterior impingement pain limiting ROM -      Progress strengthening /stabilization /functional activity  _________________________________________________     Manual Therapy:            05     mins  56110;  Therapeutic Exercise:    25    mins  57751;     Neuromuscular Sana:        mins  21263;    Therapeutic Activity:     15      mins  93244;     Ultrasound:                          mins  83405;  Iontophoresis:                     mins  13165;    Electrical Stimulation:     15    mins  74862 ( );  Mechanical Traction:          mins  93222  Dry Needling                       mins self-pay     Timed Treatment:   45   mins                  Total Treatment:     70    mins    Tor Chase PT  Physical Therapist

## 2021-12-07 ENCOUNTER — TREATMENT (OUTPATIENT)
Dept: PHYSICAL THERAPY | Facility: CLINIC | Age: 76
End: 2021-12-07

## 2021-12-07 DIAGNOSIS — M75.01 ADHESIVE CAPSULITIS OF RIGHT SHOULDER: ICD-10-CM

## 2021-12-07 DIAGNOSIS — Z98.890 S/P SHOULDER SURGERY: ICD-10-CM

## 2021-12-07 DIAGNOSIS — Z98.890 S/P ARTHROSCOPY OF RIGHT SHOULDER: Primary | ICD-10-CM

## 2021-12-07 PROCEDURE — G0283 ELEC STIM OTHER THAN WOUND: HCPCS | Performed by: PHYSICAL THERAPIST

## 2021-12-07 PROCEDURE — 97530 THERAPEUTIC ACTIVITIES: CPT | Performed by: PHYSICAL THERAPIST

## 2021-12-07 PROCEDURE — 97110 THERAPEUTIC EXERCISES: CPT | Performed by: PHYSICAL THERAPIST

## 2021-12-07 NOTE — PROGRESS NOTES
Physical Therapy Daily Progress Note  RE-ASSESSMENT     Patient Name: Mami Srivastava         :  1945  Referring Physician: Vicki Garcia APRN      Subjective   Mami Srivastava reports: doing good - sore this AM - due to arm on arm of couch for prolonged period this AM -   Pain: At rest: 2-3/10;  Worst; 5-6/10  Increased with : Reaching out to side > up; Difficult reaching up above shoulder level -  Lifting items of wt above shoulder level- reaching OH  ; Diffiucult pulling car door open -   Able to sleep in bed; reaching up better (to shoulder level), dressing, washing hair, able to take out 1/2 gallon of milk; 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     Functional / Therapeutic Activities:  23 min  · TAPING / BRACING: NA  · SEE EXERCISE FLOW SHEET -   · FUNCTIONAL ASSESSMENT -   · Jt protection, ADL modification; Posture and       Assessment/Plan  77 yo female; S/P 2021 (R) Shoulder  1.  Arthroscopic rotator cuff debridement2.  Arthroscopic synovectomy and capsular release    3.  Arthroscopic biceps tenotomy   4.  Chondroplasty of glenoid   5.  Subacromial decompression and extensive bursectomy  Improving mobility and function  Very painful and stiff - posterior impingement pain limiting ROM -  GOAL STATUS:   STGs: Most/All Met  LTGs; Progressing towards mobility, strength / function goals   Mami would benefit from continued Physical Therapy -       Progress strengthening /stabilization /functional activity- addressing remaining LTGs -   _________________________________________________     Manual Therapy:            05     mins  41481;  Therapeutic Exercise:    25    mins  94124;     Neuromuscular Sana:        mins   22441;    Therapeutic Activity:      23      mins  28375;     Ultrasound:                          mins  61790;  Iontophoresis:                     mins  58920;    Electrical Stimulation:     15    mins  76454 ( );  Mechanical Traction:          mins  91710  Dry Needling                       mins self-pay     Timed Treatment:   53   mins                  Total Treatment:     75    mins    Tor Chase PT  Physical Therapist

## 2021-12-09 ENCOUNTER — TREATMENT (OUTPATIENT)
Dept: PHYSICAL THERAPY | Facility: CLINIC | Age: 76
End: 2021-12-09

## 2021-12-09 DIAGNOSIS — Z98.890 S/P ARTHROSCOPY OF RIGHT SHOULDER: Primary | ICD-10-CM

## 2021-12-09 PROCEDURE — 97530 THERAPEUTIC ACTIVITIES: CPT | Performed by: PHYSICAL THERAPIST

## 2021-12-09 PROCEDURE — 97110 THERAPEUTIC EXERCISES: CPT | Performed by: PHYSICAL THERAPIST

## 2021-12-09 PROCEDURE — G0283 ELEC STIM OTHER THAN WOUND: HCPCS | Performed by: PHYSICAL THERAPIST

## 2021-12-09 NOTE — PROGRESS NOTES
Physical Therapy Daily Progress Note    Patient Name: Mami Srivastava         :  1945  Referring Physician: Vicki Garcia APRN      Subjective   Mami Srivastava reports: continued overall improvement - still painful and stiff at times -     Objective   Improved functional movements w/ PT activities, but limited w/ reaching OH -     See Exercise, Manual, and Modality Logs for complete treatment.     Functional / Therapeutic Activities:  20 min  · TAPING / BRACING: NA  · SEE EXERCISE FLOW SHEET -   · Jt protection, ADL modification; Posture and      Assessment/Plan  77 yo female; S/P 2021 (R) Shoulder  1.  Arthroscopic rotator cuff debridement2.  Arthroscopic synovectomy and capsular release    3.  Arthroscopic biceps tenotomy   4.  Chondroplasty of glenoid   5.  Subacromial decompression and extensive bursectomy  Improving mobility and function  Very painful and stiff - posterior impingement pain limiting ROM -    Progress per Plan of Care       _________________________________________________    Manual Therapy:                 mins  81206;  Therapeutic Exercise:    28    mins  72537;     Neuromuscular Sana:        mins  73665;    Therapeutic Activity:     20      mins  59388;     Ultrasound:                          mins  18497;  Iontophoresis:                    mins  59344;    Electrical Stimulation:     15    mins  25836 ( );  Mechanical Traction:          mins  87755  Dry Needling                       mins self-pay     Timed Treatment:   48    mins                  Total Treatment:     75    mins    Tor Chase PT  Physical Therapist

## 2021-12-10 ENCOUNTER — TREATMENT (OUTPATIENT)
Dept: PHYSICAL THERAPY | Facility: CLINIC | Age: 76
End: 2021-12-10

## 2021-12-10 DIAGNOSIS — M75.01 ADHESIVE CAPSULITIS OF RIGHT SHOULDER: ICD-10-CM

## 2021-12-10 DIAGNOSIS — Z98.890 S/P SHOULDER SURGERY: ICD-10-CM

## 2021-12-10 DIAGNOSIS — Z98.890 S/P ARTHROSCOPY OF RIGHT SHOULDER: Primary | ICD-10-CM

## 2021-12-10 PROCEDURE — 97530 THERAPEUTIC ACTIVITIES: CPT | Performed by: PHYSICAL THERAPIST

## 2021-12-10 PROCEDURE — 97140 MANUAL THERAPY 1/> REGIONS: CPT | Performed by: PHYSICAL THERAPIST

## 2021-12-10 PROCEDURE — G0283 ELEC STIM OTHER THAN WOUND: HCPCS | Performed by: PHYSICAL THERAPIST

## 2021-12-10 PROCEDURE — 97110 THERAPEUTIC EXERCISES: CPT | Performed by: PHYSICAL THERAPIST

## 2021-12-10 NOTE — PROGRESS NOTES
Physical Therapy Daily Progress Note    Patient Name: Mami Srivastava         :  1945  Referring Physician: Vicki Garcia APRN      Subjective   Mami Srivastava reports: continued overall improvement w/ deceasing pain and improving mobility and function - pain at times w/ movement, lifting, reaching, but less often -     Objective   Pt demonstrating improved P/AA/AROM with activities in PT -     See Exercise, Manual, and Modality Logs for complete treatment.     Functional / Therapeutic Activities:  20 min  · TAPING / BRACING: NA  · SEE EXERCISE FLOW SHEET -   · Jt protection, ADL modification; Posture and       Assessment/Plan  77 yo female; S/P 2021 (R) Shoulder  1.  Arthroscopic rotator cuff debridement2.  Arthroscopic synovectomy and capsular release    3.  Arthroscopic biceps tenotomy   4.  Chondroplasty of glenoid   5.  Subacromial decompression and extensive bursectomy  Improving mobility and function  Very painful and stiff - posterior impingement pain limiting ROM -     Progress per Plan of Care     _________________________________________________     Manual Therapy:           05     mins  35547;  Therapeutic Exercise:   28    mins  40114;     Neuromuscular Sana:        mins  81026;    Therapeutic Activity:      20    mins  76187;     Ultrasound:                          mins  11234;  Iontophoresis:                    mins  37409;    Electrical Stimulation:   15    mins  63353 ( );  Mechanical Traction:          mins  07207  Dry Needling                       mins self-pay     Timed Treatment:   53    mins                  Total Treatment:     75    mins      Tor Chase PT  Physical Therapist

## 2021-12-14 ENCOUNTER — TREATMENT (OUTPATIENT)
Dept: PHYSICAL THERAPY | Facility: CLINIC | Age: 76
End: 2021-12-14

## 2021-12-14 DIAGNOSIS — Z98.890 S/P SHOULDER SURGERY: ICD-10-CM

## 2021-12-14 DIAGNOSIS — M75.01 ADHESIVE CAPSULITIS OF RIGHT SHOULDER: ICD-10-CM

## 2021-12-14 DIAGNOSIS — Z98.890 S/P ARTHROSCOPY OF RIGHT SHOULDER: Primary | ICD-10-CM

## 2021-12-14 PROCEDURE — 97110 THERAPEUTIC EXERCISES: CPT | Performed by: PHYSICAL THERAPIST

## 2021-12-14 PROCEDURE — 97530 THERAPEUTIC ACTIVITIES: CPT | Performed by: PHYSICAL THERAPIST

## 2021-12-14 PROCEDURE — G0283 ELEC STIM OTHER THAN WOUND: HCPCS | Performed by: PHYSICAL THERAPIST

## 2021-12-14 NOTE — PROGRESS NOTES
Physical Therapy Daily Progress Note    Patient Name: Mami Srivastava         :  1945  Referring Physician: Vicki Garcia APRN Subjective   Mami Srivastava reports: no new problems - able to sleep on (R) side, but stiff/sore when she gets up -     Objective   Pt demonstrating improved functional reaching w/ PT activities, but most limited w/ FE, etc.  IRB up to L4-5 w/ strap assist    See Exercise, Manual, and Modality Logs for complete treatment.     Functional / Therapeutic Activities:  20 min  · TAPING / BRACING: NA  · SEE EXERCISE FLOW SHEET -   · Jt protection, ADL modification; Posture and       Assessment/Plan  77 yo female; S/P 2021 (R) Shoulder  1.  Arthroscopic rotator cuff debridement2.  Arthroscopic synovectomy and capsular release    3.  Arthroscopic biceps tenotomy   4.  Chondroplasty of glenoid   5.  Subacromial decompression and extensive bursectomy  Improving mobility and function  Very painful and stiff - posterior impingement pain limiting ROM -     Progress per Plan of Care     _________________________________________________     Manual Therapy:                mins  63131;  Therapeutic Exercise:   28    mins  99781;     Neuromuscular Sana:        mins  53314;    Therapeutic Activity:      20    mins  66292;     Ultrasound:                          mins  66093;  Iontophoresis:                    mins  07015;    Electrical Stimulation:   15    mins  73490 ( );  Mechanical Traction:          mins  02083  Dry Needling                       mins self-pay     Timed Treatment:   48    mins                  Total Treatment:     75    mins    Tor Chase PT  Physical Therapist

## 2021-12-16 ENCOUNTER — TREATMENT (OUTPATIENT)
Dept: PHYSICAL THERAPY | Facility: CLINIC | Age: 76
End: 2021-12-16

## 2021-12-16 DIAGNOSIS — Z98.890 S/P SHOULDER SURGERY: ICD-10-CM

## 2021-12-16 DIAGNOSIS — M75.01 ADHESIVE CAPSULITIS OF RIGHT SHOULDER: ICD-10-CM

## 2021-12-16 DIAGNOSIS — Z98.890 S/P ARTHROSCOPY OF RIGHT SHOULDER: Primary | ICD-10-CM

## 2021-12-16 PROCEDURE — 97530 THERAPEUTIC ACTIVITIES: CPT | Performed by: PHYSICAL THERAPIST

## 2021-12-16 PROCEDURE — 97140 MANUAL THERAPY 1/> REGIONS: CPT | Performed by: PHYSICAL THERAPIST

## 2021-12-16 PROCEDURE — 97110 THERAPEUTIC EXERCISES: CPT | Performed by: PHYSICAL THERAPIST

## 2021-12-16 NOTE — PROGRESS NOTES
Physical Therapy Daily Progress Note    Patient Name: Mami Srivastava         :  1945  Referring Physician: Vicki Garcia APRN      Subjective   Mami Srivastava reports: unable to lay on R/L side due to (R) shoulder pain - Pain w/ reaching OH and out to side -     Objective   Grossly stiff into FE w/ PT activities   Manual stretching painful into FE and ABDuction - Able to achieve 150+-deg FE and 140-deg ABdcution -   IRB to L4-5 w/ belt stretch     See Exercise, Manual, and Modality Logs for complete treatment.     Functional / Therapeutic Activities:  28 min  · TAPING / BRACING: NA  · SEE EXERCISE FLOW SHEET -   · FUNCTIONAL ASSESSMENT -   · Jt protection, ADL modification; Posture and      Assessment/Plan  77 yo female; S/P 2021 (R) Shoulder  1.  Arthroscopic rotator cuff debridement  2.  Arthroscopic synovectomy and capsular release    3.  Arthroscopic biceps tenotomy   4.  Chondroplasty of glenoid   5.  Subacromial decompression and extensive bursectomy  Improving mobility and function overall, but persistent pain, stiffness and weakness limiting functional ability -       Progress per Plan of Care       _________________________________________________    Manual Therapy:            12     mins  10627;  Therapeutic Exercise:    28    mins  62195;     Neuromuscular Sana:        mins  16838;    Therapeutic Activity:     28      mins  11224;     Ultrasound:                          mins  68287;  Iontophoresis:                     mins  99139;    Electrical Stimulation:        mins  09531 ( );  Mechanical Traction:          mins  35112  Dry Needling                       mins self-pay     Timed Treatment:   68    mins                  Total Treatment:     80    mins    Tor Chase PT  Physical Therapist

## 2021-12-21 ENCOUNTER — TREATMENT (OUTPATIENT)
Dept: PHYSICAL THERAPY | Facility: CLINIC | Age: 76
End: 2021-12-21

## 2021-12-21 DIAGNOSIS — Z98.890 S/P ARTHROSCOPY OF RIGHT SHOULDER: Primary | ICD-10-CM

## 2021-12-21 DIAGNOSIS — M75.01 ADHESIVE CAPSULITIS OF RIGHT SHOULDER: ICD-10-CM

## 2021-12-21 DIAGNOSIS — Z98.890 S/P SHOULDER SURGERY: ICD-10-CM

## 2021-12-21 PROCEDURE — 97110 THERAPEUTIC EXERCISES: CPT | Performed by: PHYSICAL THERAPIST

## 2021-12-21 PROCEDURE — 97530 THERAPEUTIC ACTIVITIES: CPT | Performed by: PHYSICAL THERAPIST

## 2021-12-21 NOTE — PROGRESS NOTES
------------------------------------------------------------------------------------------------------   MD PROGRESS NOTE    Patient: Mami Srivastava        : 1945  Diagnosis/ICD-10 Code:  S/P arthroscopy of right shoulder [Z98.890]  Referring practitioner: RYAN Vizcarra  Date of Initial Visit: 2021                  Today's Date: 2021  _________________________________________________________________    Thank you for the referral of Ms. Srivastava to [facility].  Ms. Srivastava has attended 13 PT sessions and their treatment has consisted of: modalities prn, manual therapy, therapeutic exercise, patient education, and HEP.     Subjective   Mami Srivastava reports: improving mobility and function and lessening pain, but persistent pain and limited mobility / strength limiting functional ability - Pain anterior, lateral, posterior shoulder and into arm w/ reaching, lifting items of wt and sleeping on (R) side - Limited and painful IRB -   Said she was able to reach all the way up w/ her (R) arm yesterday -   ___________________________________________________________________  Objective              OBSERVATION: Pt presents w/ improved postural awareness RUE -               PALPATION: Tender anterior, an/lat shouder (R)         AROM: FE to 120+ w/some compensation;  ERB to T1-2;  IRB to L3-4 w/ stretch    STRENGTH: Weak / painful ABDuction and Empty can (R);  ER/IR strong    DTR's/SENSATION: Grossly intact UEs -    SPECIAL TESTS: Pain w/ Empty Can (R);   ACTIVITY TOLERANCE: Improved tolerance to ADLs at and below shoulder level - Limited w/ reaching / lifting OH and IRB -      See Exercise, Manual, and Modality Logs for complete treatment.      Functional / Therapeutic Activities:  X 20 min  · TAPING / BRACING: NA  · SEE EXERCISE FLOW SHEET -   · FUNCTIONAL ASSESSMENT -    · Jt protection, ADL modification; Posture and     ___________________________________________________________________   Assessment/Plan  75 yo female; S/P 11/9/2021 (R) Shoulder  1.  Arthroscopic rotator cuff debridement 2.  Arthroscopic synovectomy and capsular release  3.  Arthroscopic biceps tenotomy  4.  Chondroplasty of glenoid  5.  Subacromial decompression and extensive bursectomy  Improving mobility and function overall, but persistent pain, stiffness and weakness limiting functional ability -   Ms. Srivastava would benefit from continued Physical Therapy.     P: Recommend continued Physical Therapy to allow a full and safe return to ADL's and normal job duties  2  times/wk x 4 weeks.    Please advise after your exam.    Thank you again for this referral of Ms. Sirvastava to [facility].    PT Signature: ______________________________   Tor Chase, PT  ______________________________________________________  Based upon review of the patient's progress and continued therapy plan, it is my medical opinion that Mami Srivastava should continue physical therapy treatment per the recommendation above.     Signature: ____________________________   Date:     Vicki Garcia APRN  ______________________________________________________________________  [address] [place] [address]  Phone: [phone] Fax: [phone]

## 2021-12-21 NOTE — PROGRESS NOTES
Physical Therapy Daily Progress Note    Patient Name: Mami Srivastava         :  1945  Referring Physician: Vicki Garcia APRN Subjective   Mami Srivastava reports: being able to raise her arm all the way up - appt w/ Surgeon's office tomorrow -   -SEE MD PROGRESS NOTE-      Objective   -SEE MD PROGRESS NOTE-      See Exercise, Manual, and Modality Logs for complete treatment.     Functional / Therapeutic Activities:  25 min  · TAPING / BRACING: NA  · SEE EXERCISE FLOW SHEET -   · FUNCTIONAL ASSESSMENT -   · Jt protection, ADL modification; Posture and       Assessment/Plan  75 yo female; S/P 2021 (R) Shoulder  1.  Arthroscopic rotator cuff debridement  2.  Arthroscopic synovectomy and capsular release    3.  Arthroscopic biceps tenotomy   4.  Chondroplasty of glenoid   5.  Subacromial decompression and extensive bursectomy  Improving mobility and function overall, but persistent pain, stiffness and weakness limiting functional ability -         Progress per Plan of Care     _________________________________________________     Manual Therapy:            05     mins  96912;  Therapeutic Exercise:    25    mins  13758;     Neuromuscular Sana:        mins  77194;    Therapeutic Activity:     25      mins  80261;     Ultrasound:                          mins  26365;  Iontophoresis:                     mins  70707;    Electrical Stimulation:         mins  28709 ( );  Mechanical Traction:          mins  27765  Dry Needling                       mins self-pay     Timed Treatment:   55    mins                  Total Treatment:     80   mins    Tor Chase PT  Physical Therapist

## 2021-12-23 ENCOUNTER — TREATMENT (OUTPATIENT)
Dept: PHYSICAL THERAPY | Facility: CLINIC | Age: 76
End: 2021-12-23

## 2021-12-23 DIAGNOSIS — Z98.890 S/P ARTHROSCOPY OF RIGHT SHOULDER: Primary | ICD-10-CM

## 2021-12-23 DIAGNOSIS — Z98.890 S/P SHOULDER SURGERY: ICD-10-CM

## 2021-12-23 DIAGNOSIS — M75.01 ADHESIVE CAPSULITIS OF RIGHT SHOULDER: ICD-10-CM

## 2021-12-23 PROCEDURE — 97530 THERAPEUTIC ACTIVITIES: CPT | Performed by: PHYSICAL THERAPIST

## 2021-12-23 PROCEDURE — G0283 ELEC STIM OTHER THAN WOUND: HCPCS | Performed by: PHYSICAL THERAPIST

## 2021-12-23 PROCEDURE — 97110 THERAPEUTIC EXERCISES: CPT | Performed by: PHYSICAL THERAPIST

## 2021-12-23 NOTE — PROGRESS NOTES
Physical Therapy Daily Progress Note    Patient Name: Mami Srivastava         :  1945  Referring Physician: Vicki Garcia APRN      Subjective   Mami Srivastava reports: had provider appt rescheduled to Monday -     Objective   Difficulty w/ FE and IRB -     See Exercise, Manual, and Modality Logs for complete treatment.     Functional / Therapeutic Activities:  28 min  · TAPING / BRACING: NA  · SEE EXERCISE FLOW SHEET -   · FUNCTIONAL ASSESSMENT -   · Jt protection, ADL modification; Posture and       Assessment/Plan  77 yo female; S/P 2021 (R) Shoulder  1.  Arthroscopic rotator cuff debridement  2.  Arthroscopic synovectomy and capsular release    3.  Arthroscopic biceps tenotomy   4.  Chondroplasty of glenoid   5.  Subacromial decompression and extensive bursectomy  Improving mobility and function overall, but persistent pain, stiffness and weakness limiting functional ability -   P/AAFE > vs AFE due to pain / weakness - (R) shoulder -         Progress per Plan of Care -  To see Provider Monday - note sent earlier -     _________________________________________________     Manual Therapy:                 mins  23669;  Therapeutic Exercise:    25    mins  22822;     Neuromuscular Sana:        mins  38887;    Therapeutic Activity:     23      mins  18308;     Ultrasound:                          mins  19069;  Iontophoresis:                     mins  71860;    Electrical Stimulation:   15     mins  50005 ( );  Mechanical Traction:          mins  06353  Dry Needling                       mins self-pay     Timed Treatment:   48 mins                  Total Treatment:     85    mins    Tor Chase PT  Physical Therapist

## 2021-12-27 ENCOUNTER — TREATMENT (OUTPATIENT)
Dept: PHYSICAL THERAPY | Facility: CLINIC | Age: 76
End: 2021-12-27

## 2021-12-27 ENCOUNTER — OFFICE VISIT (OUTPATIENT)
Dept: ORTHOPEDIC SURGERY | Facility: CLINIC | Age: 76
End: 2021-12-27

## 2021-12-27 VITALS — WEIGHT: 138 LBS | TEMPERATURE: 98.2 F | BODY MASS INDEX: 23.56 KG/M2 | HEIGHT: 64 IN

## 2021-12-27 DIAGNOSIS — Z09 SURGERY FOLLOW-UP: Primary | ICD-10-CM

## 2021-12-27 DIAGNOSIS — Z98.890 S/P ARTHROSCOPY OF RIGHT SHOULDER: Primary | ICD-10-CM

## 2021-12-27 DIAGNOSIS — Z98.890 S/P SHOULDER SURGERY: ICD-10-CM

## 2021-12-27 PROCEDURE — 99024 POSTOP FOLLOW-UP VISIT: CPT | Performed by: NURSE PRACTITIONER

## 2021-12-27 PROCEDURE — 97140 MANUAL THERAPY 1/> REGIONS: CPT | Performed by: PHYSICAL THERAPIST

## 2021-12-27 PROCEDURE — 97110 THERAPEUTIC EXERCISES: CPT | Performed by: PHYSICAL THERAPIST

## 2021-12-27 NOTE — PROGRESS NOTES
Mami Srivastava : 1945 MRN: [id number] DATE: 2021    CC: 7 weeks s/p right shoulder arthroscopy    HPI: Patient returns to clinic today for follow up.  Reports pain is much better.  Reports good progress with therapy.      Vitals:    21 1055   Temp: 98.2 °F (36.8 °C)       Exam:  Wounds appear healed.  Shoulder moves fluidly.  Her motion is 160° FE, 40° external rotation, IR to L5.  Good motor and sensory function in the hand and wrist.  Palpable radial pulse with brisk capillary refill.    Impression:  7 weeks s/p right shoulder capsular release, synovectomy, SAD, biceps tenotomy    Plan:    1.  Continue PT per protocol--encouraged patient to progress motion as tolerated and demonstrated home exercise program.  2.  OK to D/C sling.  OK to begin driving.  3.  Follow up in 4 weeks with Dr. Craft.  4.  Counseled the patient about appropriate activity modifications and restrictions.  5.  She will remain off work until her follow up appointment.     Vicki Garcia, RYAN     2021

## 2021-12-27 NOTE — PROGRESS NOTES
Physical Therapy Daily Progress Note    Visit Diagnoses:    ICD-10-CM ICD-9-CM   1. S/P arthroscopy of right shoulder  Z98.890 V45.89   2. S/P shoulder surgery  Z98.890 V45.89       VISIT#: 21      Mami Srivastava reports: she can get her arm up pretty high but it is a great deal of effort.   Current Pain Level:    2-3/10; Worst:   5-6/10; Best:  0/10  Location Of Pain: R shoulder,lateral, shoulder   Quality of Pain: sore, achy  Response to Previous Session: Good. No issues  Functional Deficits/Irritating Factors:  reaching, lifting items of wt and sleeping on (R) side - Limited and painful IRB -   Progression: Improving  Compliance with HEP Reported: Yes    Objective  Presents: Normal arm swing  Increased sets/reps of:  none   Increased resistance on:  none  Added to Program: none        See Exercise, Manual, and Modality Logs for complete treatment.     Patient Education: Pt was educated on exercise biomechanical correctness, intensity, and speed.     Assessment:  Improving mobility and function overall, but persistent pain, stiffness and weakness limiting functional ability.  Pt will continue to benefit from skilled PT interventions to address current functional deficits and impairments.       Plan: Progress to/Continue with current program. Progress per protocol.         Manual Therapy:    10     mins  64913;  Ultrasound:   0 mins 60659  Electrical Stimulation: __0____ mins 89795/  Iontophoresis: 0 mins 44884  Traction: 0 mins  98140  Work Conditionin mins 67880  Therapeutic Exercise:    20/50     mins  80523;     Neuromuscular Sana:    0    mins  38275;    Therapeutic Activity:     0     mins  50183;     Timed Treatment:   30   mins   Total Treatment:     74   mins    Cristina Ta PTA  [place] License # [id number]  Physical Therapist Assistant

## 2021-12-29 ENCOUNTER — TREATMENT (OUTPATIENT)
Dept: PHYSICAL THERAPY | Facility: CLINIC | Age: 76
End: 2021-12-29

## 2021-12-29 DIAGNOSIS — Z98.890 S/P ARTHROSCOPY OF RIGHT SHOULDER: Primary | ICD-10-CM

## 2021-12-29 DIAGNOSIS — M75.01 ADHESIVE CAPSULITIS OF RIGHT SHOULDER: ICD-10-CM

## 2021-12-29 DIAGNOSIS — Z98.890 S/P SHOULDER SURGERY: ICD-10-CM

## 2021-12-29 PROCEDURE — G0283 ELEC STIM OTHER THAN WOUND: HCPCS | Performed by: PHYSICAL THERAPIST

## 2021-12-29 PROCEDURE — 97530 THERAPEUTIC ACTIVITIES: CPT | Performed by: PHYSICAL THERAPIST

## 2021-12-29 PROCEDURE — 97110 THERAPEUTIC EXERCISES: CPT | Performed by: PHYSICAL THERAPIST

## 2021-12-29 NOTE — PROGRESS NOTES
Physical Therapy Daily Progress Note    Patient Name: Mami Srivastava         :  1945  Referring Physician: Vicki Garcia APRN      Subjective   Mami Srivastava reports: increased soreness in shoulder today - not sure why - rain coming?    Objective   Pt demonstrating grossly limited FE due to pain - today     See Exercise, Manual, and Modality Logs for complete treatment.     Functional / Therapeutic Activities:    · TAPING / BRACING: NA  · SEE EXERCISE FLOW SHEET -   · FUNCTIONAL ASSESSMENT -   · Jt protection, ADL modification; Posture and       Assessment/Plan  77 yo female; S/P 2021 (R) Shoulder  1.  Arthroscopic rotator cuff debridement  2.  Arthroscopic synovectomy and capsular release    3.  Arthroscopic biceps tenotomy   4.  Chondroplasty of glenoid   5.  Subacromial decompression and extensive bursectomy  Improving mobility and function overall, but persistent pain, stiffness and weakness limiting functional ability -   P/AAFE > vs AFE due to pain / weakness - (R) shoulder -         Progress per Plan of Care -  To see Provider Monday - note sent earlier -     _________________________________________________     Manual Therapy:                 mins  36728;  Therapeutic Exercise:    25    mins  53516;     Neuromuscular Sana:        mins  06790;    Therapeutic Activity:     23      mins  15455;     Ultrasound:                          mins  44972;  Iontophoresis:                     mins  50849;    Electrical Stimulation:   15     mins  02145 ( );  Mechanical Traction:          mins  50075  Dry Needling                       mins self-pay     Timed Treatment:   48 mins                  Total Treatment:     85    mins    Tor Chase PT  Physical Therapist

## 2021-12-30 ENCOUNTER — HOSPITAL ENCOUNTER (OUTPATIENT)
Dept: ULTRASOUND IMAGING | Facility: HOSPITAL | Age: 76
Discharge: HOME | End: 2021-12-30
Admitting: INTERNAL MEDICINE

## 2021-12-30 DIAGNOSIS — K75.4 AUTOIMMUNE HEPATITIS: ICD-10-CM

## 2021-12-30 PROCEDURE — 76705 ECHO EXAM OF ABDOMEN: CPT

## 2022-01-04 ENCOUNTER — TREATMENT (OUTPATIENT)
Dept: PHYSICAL THERAPY | Facility: CLINIC | Age: 77
End: 2022-01-04

## 2022-01-04 ENCOUNTER — OFFICE VISIT (OUTPATIENT)
Dept: FAMILY MEDICINE CLINIC | Facility: CLINIC | Age: 77
End: 2022-01-04

## 2022-01-04 ENCOUNTER — OFFICE VISIT (OUTPATIENT)
Dept: GASTROENTEROLOGY | Facility: CLINIC | Age: 77
End: 2022-01-04

## 2022-01-04 VITALS
DIASTOLIC BLOOD PRESSURE: 67 MMHG | SYSTOLIC BLOOD PRESSURE: 139 MMHG | RESPIRATION RATE: 18 BRPM | HEIGHT: 64 IN | HEART RATE: 90 BPM | WEIGHT: 142.6 LBS | BODY MASS INDEX: 24.34 KG/M2 | TEMPERATURE: 96.8 F | OXYGEN SATURATION: 99 %

## 2022-01-04 VITALS — TEMPERATURE: 96.9 F | HEIGHT: 64 IN | BODY MASS INDEX: 24.24 KG/M2 | WEIGHT: 142 LBS

## 2022-01-04 DIAGNOSIS — K75.4 AUTOIMMUNE HEPATITIS: Primary | ICD-10-CM

## 2022-01-04 DIAGNOSIS — K86.2 PANCREATIC CYST: Primary | ICD-10-CM

## 2022-01-04 DIAGNOSIS — Z98.890 S/P SHOULDER SURGERY: ICD-10-CM

## 2022-01-04 DIAGNOSIS — K86.2 PANCREATIC CYST: ICD-10-CM

## 2022-01-04 DIAGNOSIS — N30.01 ACUTE CYSTITIS WITH HEMATURIA: ICD-10-CM

## 2022-01-04 DIAGNOSIS — C25.1 MALIGNANT NEOPLASM OF BODY OF PANCREAS: ICD-10-CM

## 2022-01-04 DIAGNOSIS — M75.01 ADHESIVE CAPSULITIS OF RIGHT SHOULDER: ICD-10-CM

## 2022-01-04 DIAGNOSIS — R30.0 DYSURIA: Primary | ICD-10-CM

## 2022-01-04 DIAGNOSIS — Z98.890 S/P ARTHROSCOPY OF RIGHT SHOULDER: Primary | ICD-10-CM

## 2022-01-04 LAB
BILIRUB BLD-MCNC: NEGATIVE MG/DL
CLARITY, POC: ABNORMAL
COLOR UR: YELLOW
EXPIRATION DATE: ABNORMAL
GLUCOSE UR STRIP-MCNC: NEGATIVE MG/DL
KETONES UR QL: NEGATIVE
LEUKOCYTE EST, POC: ABNORMAL
Lab: ABNORMAL
NITRITE UR-MCNC: NEGATIVE MG/ML
PH UR: 5 [PH] (ref 5–8)
PROT UR STRIP-MCNC: ABNORMAL MG/DL
RBC # UR STRIP: ABNORMAL /UL
SP GR UR: 1.02 (ref 1–1.03)
UROBILINOGEN UR QL: NORMAL

## 2022-01-04 PROCEDURE — 81003 URINALYSIS AUTO W/O SCOPE: CPT | Performed by: STUDENT IN AN ORGANIZED HEALTH CARE EDUCATION/TRAINING PROGRAM

## 2022-01-04 PROCEDURE — 97110 THERAPEUTIC EXERCISES: CPT | Performed by: PHYSICAL THERAPIST

## 2022-01-04 PROCEDURE — 99213 OFFICE O/P EST LOW 20 MIN: CPT | Performed by: STUDENT IN AN ORGANIZED HEALTH CARE EDUCATION/TRAINING PROGRAM

## 2022-01-04 PROCEDURE — 99213 OFFICE O/P EST LOW 20 MIN: CPT | Performed by: INTERNAL MEDICINE

## 2022-01-04 PROCEDURE — G0283 ELEC STIM OTHER THAN WOUND: HCPCS | Performed by: PHYSICAL THERAPIST

## 2022-01-04 PROCEDURE — 97530 THERAPEUTIC ACTIVITIES: CPT | Performed by: PHYSICAL THERAPIST

## 2022-01-04 PROCEDURE — 97140 MANUAL THERAPY 1/> REGIONS: CPT | Performed by: PHYSICAL THERAPIST

## 2022-01-04 RX ORDER — NITROFURANTOIN 25; 75 MG/1; MG/1
100 CAPSULE ORAL 2 TIMES DAILY
Qty: 14 CAPSULE | Refills: 0 | Status: SHIPPED | OUTPATIENT
Start: 2022-01-04 | End: 2022-01-11

## 2022-01-04 NOTE — PROGRESS NOTES
Chief Complaint   Patient presents with   • Autoimmune hepatitis       Mami Srivastava is a  76 y.o. female here for a follow up visit for autoimmune hepatitis and pancreatic cyst.    HPI     Patient 76-year-old female with history of hypothyroid, hypertension, hyperlipidemia as well as autoimmune hepatitis.  Patient has been followed as well for pancreatic cyst in the body of the pancreas now here post ultrasound of the abdomen for further recommendations.  Patient tolerating diet well recovering from right shoulder surgery.    Past Medical History:   Diagnosis Date   • Arthritis    • Autoimmune hepatitis (HCC)    • C. difficile colitis 09/2020   • Cholelithiasis 04-    Ultrasound   • Coronary artery disease     Dr Luis Rick   • Diverticulitis of colon 2005,2018   • Hemangioma of liver    • Hernia 2017    Hiatal hernia-Prabhjot test   • Hyperlipidemia     Dr Luis Rick-atorvastatin   • Hypertension    • Hyperthyroidism    • Hypothyroidism    • Migraines    • Osteopenia after menopause    • PONV (postoperative nausea and vomiting)    • Right shoulder pain    • Seasonal allergies    • Skin cancer     face/ [facility]    • Ulcerative colitis (HCC) 2014    Colitis/DrKaplan   • UTI (urinary tract infection)     HISTORY/OF         Current Outpatient Medications:   •  amLODIPine (NORVASC) 5 MG tablet, Take 1 tablet by mouth Daily., Disp: 90 tablet, Rfl: 3  •  aspirin 81 MG EC tablet, Take 81 mg by mouth Daily. HOLDING FOR 7 DAYS PRIOR TO OR, Disp: , Rfl:   •  cetirizine (zyrTEC) 10 MG tablet, Take 10 mg by mouth As Needed., Disp: , Rfl:   •  Cholecalciferol (VITAMIN D3) 5000 units capsule capsule, Take 5,000 Units by mouth 3 (Three) Times a Week., Disp: , Rfl:   •  lisinopril (PRINIVIL,ZESTRIL) 20 MG tablet, Take 1 tablet by mouth Daily., Disp: 30 tablet, Rfl: 11  •  rosuvastatin (CRESTOR) 10 MG tablet, Take 1 tablet by mouth Daily., Disp: 30 tablet, Rfl: 11  •  Synthroid 75 MCG tablet, TAKE 1 TABLET DAILY FOR     THYROID (Patient taking differently: Take 75 mcg by mouth Daily.), Disp: 90 tablet, Rfl: 1  •  traMADol (ULTRAM) 50 MG tablet, Take 1 tablet by mouth Every 8 (Eight) Hours As Needed for Moderate Pain . (Patient taking differently: Take 50 mg by mouth As Needed for Moderate Pain .), Disp: 30 tablet, Rfl: 0  •  docusate sodium (COLACE) 100 MG capsule, Take 1 capsule by mouth 2 (Two) Times a Day., Disp: 60 capsule, Rfl: 0  •  ondansetron (Zofran) 4 MG tablet, Take 1 tablet by mouth Every 8 (Eight) Hours As Needed for Nausea or Vomiting., Disp: 30 tablet, Rfl: 0  •  traMADol (ULTRAM) 50 MG tablet, Take 1 tablet by mouth Every 4 (Four) Hours As Needed for Moderate Pain ., Disp: 60 tablet, Rfl: 0    Allergies   Allergen Reactions   • Imuran [Azathioprine] GI Intolerance   • Bactrim [Sulfamethoxazole-Trimethoprim] Other (See Comments)     fatigue   • Clindamycin/Lincomycin Diarrhea   • Augmentin [Amoxicillin-Pot Clavulanate] Hives   • Keflex [Cephalexin] Hives       Social History     Socioeconomic History   • Marital status:    Tobacco Use   • Smoking status: Former Smoker     Packs/day: 2.00     Years: 12.00     Pack years: 24.00     Start date: 1963     Quit date: 1976     Years since quittin.9   • Smokeless tobacco: Never Used   Vaping Use   • Vaping Use: Never used   Substance and Sexual Activity   • Alcohol use: Not Currently     Comment: none in 3 years   • Drug use: No   • Sexual activity: Not Currently       Family History   Problem Relation Age of Onset   • Heart disease Mother    • Hypertension Mother    • Lung cancer Sister    • Thyroid disease Sister    • Lupus Sister    • Thyroid disease Brother    • Alcohol abuse Brother    • Glaucoma Maternal Grandmother    • Stomach cancer Maternal Grandmother          ’s   • Cirrhosis Father             • Liver disease Father    • Malig Hyperthermia Neg Hx        Review of Systems   Constitutional: Negative.    Respiratory: Negative.     Cardiovascular: Negative.    Gastrointestinal: Negative.    Musculoskeletal: Negative.    Skin: Negative.    Hematological: Negative.        Vitals:    01/04/22 1021   Temp: 96.9 °F (36.1 °C)       Physical Exam  Vitals reviewed.   Constitutional:       Appearance: Normal appearance. She is well-developed and normal weight.   HENT:      Head: Normocephalic and atraumatic.   Eyes:      General: No scleral icterus.     Pupils: Pupils are equal, round, and reactive to light.   Pulmonary:      Effort: Pulmonary effort is normal. No respiratory distress.   Abdominal:      General: Bowel sounds are normal. There is no distension.      Palpations: Abdomen is soft. There is no mass.      Tenderness: There is no abdominal tenderness. There is no left CVA tenderness.      Hernia: No hernia is present.   Skin:     General: Skin is warm and dry.      Coloration: Skin is not jaundiced.      Findings: No rash.   Neurological:      General: No focal deficit present.      Mental Status: She is alert and oriented to person, place, and time.      Cranial Nerves: No cranial nerve deficit.   Psychiatric:         Behavior: Behavior normal.         Thought Content: Thought content normal.         Judgment: Judgment normal.         Lab on 11/06/2021   Component Date Value Ref Range Status   • COVID19 11/06/2021 Not Detected  Not Detected - Ref. Range Final       Diagnoses and all orders for this visit:    1. Autoimmune hepatitis (HCC) (Primary)  -     Comprehensive Metabolic Panel  -     Cancer Antigen 19-9  -     CBC (No Diff)  -     Protime-INR    2. Pancreatic cyst  -     Comprehensive Metabolic Panel  -     Cancer Antigen 19-9  -     CBC (No Diff)  -     Protime-INR    3. Malignant neoplasm of body of pancreas (HCC)   -     Cancer Antigen 19-9      Patient 76-year-old female with history of autoimmune hepatitis with pancreatic cyst being followed.  Patient medically doing well these days no having just had surgery on her shoulders  having physical therapy.  Patient status post recent ultrasound of the pancreas noted with an increase in size from 1.1 cm to 1.8 cm.  At this point will check labs including LFTs and CA 19-9 concerning for possible growth.  We will also ask Dr. Lau to evaluate for possible EUS.  We will follow-up clinically based on recommendations.

## 2022-01-04 NOTE — PROGRESS NOTES
Physical Therapy Daily Progress Note    Patient Name: Mami Srivastava         :  1945  Referring Physician: Vicki Garcia APRN      Subjective   Mami Srivastava reports: increasing (R) shoulder pain and loss of mobility over the last few days - Notes increased pain / stiffness if she sits still for any period of time and her HEP actually are causing increased pain including pulleys, IRB stretching, Cane stretching, etc.   Feels like she is getting worse -   Wondered about if she should rest -   Even hot shower painful -    Objective   Pt demonstrating grossly decreased AROM and more painful into FE and passively increased pain w/ even gentle FE and ER stretching - IR/KRISTA stretching not painful   More pain and less tolerance to PT -   Discussed resting - other than gentle pulleys -     See Exercise, Manual, and Modality Logs for complete treatment.     Functional / Therapeutic Activities:    · TAPING / BRACING: NA  · Discussed at length about modifying her HEP - more rest - discussed possibility her shoulder stiffness may need to be addressed w/ closed manipulation -   · Jt protection, ADL modification; - HEP modification -       Assessment/Plan  75 yo female; S/P 2021 (R) Shoulder  1.  Arthroscopic rotator cuff debridement  2.  Arthroscopic synovectomy and capsular release    3.  Arthroscopic biceps tenotomy   4.  Chondroplasty of glenoid   5.  Subacromial decompression and extensive bursectomy  Pt was demonstrating mproving mobility and function overall, but recently increasing  pain, stiffness and loss of functional ability -   Mami may benefit from rest, but may require further intervention (I.e. injection vs Closed manipulation to address worsening pain / stiffness / loss of function.-         Progress per Plan of Care -  Rest more - focus mostly on pulleys for now,  -  Gradually resume program as lucia'ed -      _________________________________________________     Manual  Therapy:           10      mins  09880;  Therapeutic Exercise:    15    mins  33874;     Neuromuscular Sana:        mins  74695;    Therapeutic Activity:     10      mins  30562;     Ultrasound:                          mins  33914;  Iontophoresis:                     mins  77877;    Electrical Stimulation:   15     mins  26223 ( );  Mechanical Traction:          mins  82657  Dry Needling                       mins self-pay     Timed Treatment:   35  mins                  Total Treatment:     60    mins    Tor Chase PT  Physical Therapist

## 2022-01-05 LAB
ALBUMIN SERPL-MCNC: 4.3 G/DL (ref 3.7–4.7)
ALBUMIN/GLOB SERPL: 2 {RATIO} (ref 1.2–2.2)
ALP SERPL-CCNC: 86 IU/L (ref 44–121)
ALT SERPL-CCNC: 14 IU/L (ref 0–32)
AST SERPL-CCNC: 19 IU/L (ref 0–40)
BILIRUB SERPL-MCNC: 0.8 MG/DL (ref 0–1.2)
BUN SERPL-MCNC: 11 MG/DL (ref 8–27)
BUN/CREAT SERPL: 13 (ref 12–28)
CALCIUM SERPL-MCNC: 9.3 MG/DL (ref 8.7–10.3)
CANCER AG19-9 SERPL-ACNC: <2 U/ML (ref 0–35)
CHLORIDE SERPL-SCNC: 100 MMOL/L (ref 96–106)
CO2 SERPL-SCNC: 24 MMOL/L (ref 20–29)
CREAT SERPL-MCNC: 0.86 MG/DL (ref 0.57–1)
ERYTHROCYTE [DISTWIDTH] IN BLOOD BY AUTOMATED COUNT: 14.6 % (ref 11.7–15.4)
GLOBULIN SER CALC-MCNC: 2.2 G/DL (ref 1.5–4.5)
GLUCOSE SERPL-MCNC: 106 MG/DL (ref 65–99)
HCT VFR BLD AUTO: 35.4 % (ref 34–46.6)
HGB BLD-MCNC: 12.2 G/DL (ref 11.1–15.9)
INR PPP: 0.9 (ref 0.9–1.2)
MCH RBC QN AUTO: 28.2 PG (ref 26.6–33)
MCHC RBC AUTO-ENTMCNC: 34.5 G/DL (ref 31.5–35.7)
MCV RBC AUTO: 82 FL (ref 79–97)
PLATELET # BLD AUTO: 226 X10E3/UL (ref 150–450)
POTASSIUM SERPL-SCNC: 4.2 MMOL/L (ref 3.5–5.2)
PROT SERPL-MCNC: 6.5 G/DL (ref 6–8.5)
PROTHROMBIN TIME: 10.3 SEC (ref 9.1–12)
RBC # BLD AUTO: 4.32 X10E6/UL (ref 3.77–5.28)
SODIUM SERPL-SCNC: 140 MMOL/L (ref 134–144)
WBC # BLD AUTO: 8.1 X10E3/UL (ref 3.4–10.8)

## 2022-01-05 NOTE — PROGRESS NOTES
"Chief Complaint  Dysuria (POSSIBLE UTI)    Subjective          Mami Srivastava presents to [facility]  For pain during urination and increased frequency for couple of days.  Patient denies having any abdominal pain or back pain or seeing any blood in the urine.  Review of system is negative for fever, headache, chest pain, shortness of breath, palpitation, nausea, vomiting, any recent change in bladder habits.        Objective   Vital Signs:   /67 (BP Location: Left arm, Patient Position: Sitting)   Pulse 90   Temp 96.8 °F (36 °C) (Oral)   Resp 18   Ht 162.6 cm (64.02\")   Wt 64.7 kg (142 lb 9.6 oz)   SpO2 99%   BMI 24.47 kg/m²     Physical Exam  HENT:      Head: Normocephalic and atraumatic.      Mouth/Throat:      Mouth: Mucous membranes are moist.      Pharynx: Oropharynx is clear.   Eyes:      Extraocular Movements: Extraocular movements intact.      Conjunctiva/sclera: Conjunctivae normal.      Pupils: Pupils are equal, round, and reactive to light.   Cardiovascular:      Rate and Rhythm: Normal rate and regular rhythm.   Pulmonary:      Effort: Pulmonary effort is normal.      Breath sounds: Normal breath sounds.   Abdominal:      General: Bowel sounds are normal.      Palpations: Abdomen is soft.   Musculoskeletal:         General: Normal range of motion.      Cervical back: Neck supple.   Skin:     General: Skin is warm.      Capillary Refill: Capillary refill takes less than 2 seconds.   Neurological:      General: No focal deficit present.      Mental Status: She is alert and oriented to person, place, and time. Mental status is at baseline.   Psychiatric:         Mood and Affect: Mood normal.        Result Review :              Recent Labs     01/04/22  1440   CLARITYU Slightly Cloudy*   GLUCOSEUR Negative   BILIRUBINUR Negative   KETONESU Negative   SPECGRAV 1.025   RBCUR Trace*   PHUR 5.0   PROTEINPOCUA Trace*   UROBILINOGEN Normal   LEUKOCYTESUR Small (1+)* "   NITRITE Negative           Assessment and Plan    Diagnoses and all orders for this visit:    1. Dysuria (Primary)  -     POCT urinalysis dipstick, automated    2. Acute cystitis with hematuria  Comments:  Prescribed antibiotics and encouraged to drink a lot of water.  RTC or ER if symptoms worsen  Orders:  -     nitrofurantoin, macrocrystal-monohydrate, (Macrobid) 100 MG capsule; Take 1 capsule by mouth 2 (Two) Times a Day for 7 days.  Dispense: 14 capsule; Refill: 0        Follow Up   No follow-ups on file.  Patient was given instructions and counseling regarding her condition or for health maintenance advice. Please see specific information pulled into the AVS if appropriate.

## 2022-01-11 ENCOUNTER — TREATMENT (OUTPATIENT)
Dept: PHYSICAL THERAPY | Facility: CLINIC | Age: 77
End: 2022-01-11

## 2022-01-11 ENCOUNTER — TELEPHONE (OUTPATIENT)
Dept: GASTROENTEROLOGY | Facility: CLINIC | Age: 77
End: 2022-01-11

## 2022-01-11 DIAGNOSIS — Z98.890 S/P ARTHROSCOPY OF RIGHT SHOULDER: Primary | ICD-10-CM

## 2022-01-11 DIAGNOSIS — M75.01 ADHESIVE CAPSULITIS OF RIGHT SHOULDER: ICD-10-CM

## 2022-01-11 DIAGNOSIS — Z98.890 S/P SHOULDER SURGERY: ICD-10-CM

## 2022-01-11 PROBLEM — K86.2 PANCREATIC CYST: Status: ACTIVE | Noted: 2022-01-11

## 2022-01-11 PROCEDURE — 97110 THERAPEUTIC EXERCISES: CPT | Performed by: PHYSICAL THERAPIST

## 2022-01-11 PROCEDURE — G0283 ELEC STIM OTHER THAN WOUND: HCPCS | Performed by: PHYSICAL THERAPIST

## 2022-01-11 PROCEDURE — 97530 THERAPEUTIC ACTIVITIES: CPT | Performed by: PHYSICAL THERAPIST

## 2022-01-11 NOTE — TELEPHONE ENCOUNTER
spoke with pt scheduled at [facility] on feb 3 arrive at 0700 am linda perera mailed instructional packet mailed to pt verified instructional packet

## 2022-01-11 NOTE — PROGRESS NOTES
Physical Therapy Daily Progress Note    Patient Name: Mami Srivastava         :  1945  Referring Physician: Vicki Garcia APRN      Subjective   Mami Srivastava reports: feeling better with more rest - felt great  and able to raise her arm way up over her head and felt so good that she took down her Graciela decorations and noted increased pain since -     Objective   Adjusted TE, etc reducing amount, tension and frequency -   Heat prior to PT to facilitate soft tissue pliability -     See Exercise, Manual, and Modality Logs for complete treatment.     Functional / Therapeutic Activities:  10 min  · TAPING / BRACING: NA  · SEE EXERCISE FLOW SHEET -   · Jt protection, ADL modification; Posture and      Assessment/Plan  75 yo female; S/P 2021 (R) Shoulder  1.  Arthroscopic rotator cuff debridement  2.  Arthroscopic synovectomy and capsular release    3.  Arthroscopic biceps tenotomy   4.  Chondroplasty of glenoid   5.  Subacromial decompression and extensive bursectomy  Pt was demonstrating mproving mobility and function overall, but recently increasing  pain, stiffness and loss of functional ability -   Mami may benefit from rest, but may require further intervention (I.e. injection vs Closed manipulation to address worsening pain / stiffness / loss of function.-         Progress per Plan of Care -  Rest more - focus mostly on pulleys for now,  -  Gradually resume program as lucia'ed -      _________________________________________________    Manual Therapy:                 mins  51365;  Therapeutic Exercise:    25    mins  34191;     Neuromuscular Sana:        mins  38491;    Therapeutic Activity:     10      mins  53975;     Ultrasound:                          mins  64109;  Iontophoresis:                     mins  45371;    Electrical Stimulation:    15     mins  33218 ( );  Mechanical Traction:          mins  11067  MHP (prior to Tx)          10      mins NC     Timed  Treatment:   35    mins                  Total Treatment:     70    mins    Tor Chase, PT  Physical Therapist

## 2022-01-13 ENCOUNTER — TREATMENT (OUTPATIENT)
Dept: PHYSICAL THERAPY | Facility: CLINIC | Age: 77
End: 2022-01-13

## 2022-01-13 DIAGNOSIS — Z98.890 S/P ARTHROSCOPY OF RIGHT SHOULDER: Primary | ICD-10-CM

## 2022-01-13 DIAGNOSIS — M75.01 ADHESIVE CAPSULITIS OF RIGHT SHOULDER: ICD-10-CM

## 2022-01-13 DIAGNOSIS — Z98.890 S/P SHOULDER SURGERY: ICD-10-CM

## 2022-01-13 PROCEDURE — 97110 THERAPEUTIC EXERCISES: CPT | Performed by: PHYSICAL THERAPIST

## 2022-01-13 PROCEDURE — 97530 THERAPEUTIC ACTIVITIES: CPT | Performed by: PHYSICAL THERAPIST

## 2022-01-13 PROCEDURE — G0283 ELEC STIM OTHER THAN WOUND: HCPCS | Performed by: PHYSICAL THERAPIST

## 2022-01-13 NOTE — PROGRESS NOTES
Physical Therapy Daily Progress Note    Patient Name: Mami Srivastava         :  1945  Referring Physician: Vicki Garcia APRN      Subjective   Mami Srivastava reports: tolerating modified treatment last time well - some soreness that night - Sore later in day yesterday - Feels better today - easier to raise her (R) arm - More periods of feeling better - periods of pain, but not as long lasting / often, but persistent and worse if using arm too much -     Objective   Pt demonstrated FE near 140-deg today -     See Exercise, Manual, and Modality Logs for complete treatment.     Functional / Therapeutic Activities:  10 min  · TAPING / BRACING: NA  · SEE EXERCISE FLOW SHEET -   · Jt protection, ADL modification; Posture and      Assessment/Plan  75 yo female; S/P 2021 (R) Shoulder  1.  Arthroscopic rotator cuff debridement  2.  Arthroscopic synovectomy and capsular release    3.  Arthroscopic biceps tenotomy   4.  Chondroplasty of glenoid   5.  Subacromial decompression and extensive bursectomy  Pt was demonstrating mproving mobility and function overall since modifying PT and HEP and avoiding IRB -     Mami is benefitting from more rest, and less aggressive PT and HEP, but may require further intervention (I.e. injection vs Closed manipulation to address worsening pain / stiffness / loss of function if these continue / worsen.-         Progress per Plan of Care -  with modified program   _________________________________________________  Manual Therapy:                 mins  77372;  Therapeutic Exercise:    23    mins  20993;     Neuromuscular Sana:        mins  82377;    Therapeutic Activity:     10      mins  77772;     Ultrasound:                          mins  96351;  Iontophoresis:                     mins  23028;    Electrical Stimulation:    15     mins  77722 ( );  Mechanical Traction:          mins  71984  MHP (prior to Tx)                mins NC     Timed  Treatment:   33    mins                  Total Treatment:     55   mins    Tor Chase, PT  Physical Therapist

## 2022-01-14 ENCOUNTER — TREATMENT (OUTPATIENT)
Dept: PHYSICAL THERAPY | Facility: CLINIC | Age: 77
End: 2022-01-14

## 2022-01-14 DIAGNOSIS — M75.01 ADHESIVE CAPSULITIS OF RIGHT SHOULDER: ICD-10-CM

## 2022-01-14 DIAGNOSIS — Z98.890 S/P ARTHROSCOPY OF RIGHT SHOULDER: Primary | ICD-10-CM

## 2022-01-14 DIAGNOSIS — Z98.890 S/P SHOULDER SURGERY: ICD-10-CM

## 2022-01-14 PROCEDURE — G0283 ELEC STIM OTHER THAN WOUND: HCPCS | Performed by: PHYSICAL THERAPIST

## 2022-01-14 PROCEDURE — 97530 THERAPEUTIC ACTIVITIES: CPT | Performed by: PHYSICAL THERAPIST

## 2022-01-14 PROCEDURE — 97110 THERAPEUTIC EXERCISES: CPT | Performed by: PHYSICAL THERAPIST

## 2022-01-14 NOTE — PROGRESS NOTES
------------------------------------------------------------------------------------------------------   MD PROGRESS NOTE / RE-Cert    Patient: Mami Srivastava        : 1945  Diagnosis/ICD-10 Code:  S/P arthroscopy of right shoulder [Z98.890]  Referring practitioner: RYAN Vizcarra /  Chencho Craft MD  Date of Initial Visit: 2021                  Today's Date: 2022  _________________________________________________________________    Thank you for the referral of Ms. Srivastava to [facility].  Ms. Srivastava has attended 18 PT sessions and their treatment has consisted of: modalities prn, manual therapy, therapeutic exercise,  patient education, and HEP.     Subjective   Mami Srivastava reports: recent increase in pain and loss of mobility and functional mobility of (R) shoulder - felt like she was doing too much exercise in PT and at home, noting increased pain after PT and after she would do her HEP noting she is even having pain at night / when sleeping -   Mami noted that when she took a break, that she had decreased pain and improved mobility and function, but was still having pain w/ use, etc.   Since modification of PT and HEP, she noted improved mobility and less pain overall. Felt good recently with improved mobility and less pain so she was more active including putting away Graciela decorations resulting in increased pain -    Improved ability to reach above shoulder level at time and easier to wash her hair, etc -   ___________________________________________________________________  Objective              OBSERVATION: Improved positioning (R) shoulder / Upper quarter              PALPATION: Tender anterior, ant/lat shoulder (R)        AROM: FE up to 140-deg (some days less) ;  ERB to UT, Cspine; IRB to lumbar spine, but very painful -    STRENGTH: ABDuction 3+to4- w/ pain; Empty Can 3+to4- w/ pain; ER/IRS WFL w/ min pn.   DTR's/SENSATION: Grossly  intact UEs -    SPECIAL TESTS: (+) Empty Can (R) -    ACTIVITY TOLERANCE: Improved tolerance with activities to and above shoulder level more often, but at times more difficult - Limited w/ lifting items of wt - Improved overall since modifying PT / HEP activities -    ___________________________________________________________________   Assessment/Plan  77 yo female; S/P 11/9/2021 (R) Shoulder 1.  Arthroscopic rotator cuff debridement 2.  Arthroscopic synovectomy and capsular release   3.  Arthroscopic biceps tenotomy  4.  Chondroplasty of glenoid   5.  Subacromial decompression and extensive bursectomy  Persistent pain and limited mobility, strength, and function, but improved overall -   Ms. Srivastava would benefit from continued Physical Therapy with progression to HEP.   Mami is benefitting from more rest, and less aggressive PT and HEP, but may require further intervention (I.e. injection vs Closed manipulation to address worsening pain / stiffness / loss of function if these continue / worsen.-   GOAL STATUS:   STG: Most / All met  LTG: Progressing towards mobility / strength / function goals -     P: Recommend continued Physical Therapy to allow a full and safe return to ADL's 2 times/wk x 4 weeks then anticipate DC to HEP. .   Thank you again for this referral of Ms. Srivastava to [facility].    PT Signature: ______________________________   Tor Chase, PT  ______________________________________________________  Based upon review of the patient's progress and continued therapy plan, it is my medical opinion that Mami Srivastava should continue physical therapy treatment per the recommendation above.     Signature: ____________________________   Date: ________    Vicki Garcia APRN / Chencho Craft MD    ______________________________________________________________________  [address] [facility]  [place] [address]  Phone: [phone] Fax: [phone]

## 2022-01-14 NOTE — PROGRESS NOTES
Physical Therapy Daily Progress Note    Patient Name: Mami Srivastava         :  1945  Referring Physician: Vicki Garcia APRN Subjective   Mami Srivastava reports:   .-SEE MD PROGRESS NOTE-      Objective   -SEE MD PROGRESS NOTE-    See Exercise, Manual, and Modality Logs for complete treatment.     Functional / Therapeutic Activities:  20 min  · TAPING / BRACING: NA  · SEE EXERCISE FLOW SHEET -   · FUNCTIONAL ASSESSMENT -  · Jt protection, ADL modification; Posture and       Assessment/Plan  -SEE MD PROGRESS NOTE-    _________________________________________________  Manual Therapy:                 mins  42488;  Therapeutic Exercise:    23    mins  93075;     Neuromuscular Sana:        mins  71070;    Therapeutic Activity:     15      mins  98436;     Ultrasound:                          mins  56735;  Iontophoresis:                     mins  10822;    Electrical Stimulation:    15     mins  12533 ( );  Mechanical Traction:          mins  36756  MHP (prior to Tx)                mins NC     Timed Treatment:   38   mins                  Total Treatment:     65   mins    Tor Chase PT  Physical Therapist

## 2022-01-18 ENCOUNTER — TREATMENT (OUTPATIENT)
Dept: PHYSICAL THERAPY | Facility: CLINIC | Age: 77
End: 2022-01-18

## 2022-01-18 DIAGNOSIS — Z98.890 S/P SHOULDER SURGERY: ICD-10-CM

## 2022-01-18 DIAGNOSIS — Z98.890 S/P ARTHROSCOPY OF RIGHT SHOULDER: Primary | ICD-10-CM

## 2022-01-18 DIAGNOSIS — M75.01 ADHESIVE CAPSULITIS OF RIGHT SHOULDER: ICD-10-CM

## 2022-01-18 PROCEDURE — 97530 THERAPEUTIC ACTIVITIES: CPT | Performed by: PHYSICAL THERAPIST

## 2022-01-18 PROCEDURE — 97110 THERAPEUTIC EXERCISES: CPT | Performed by: PHYSICAL THERAPIST

## 2022-01-18 PROCEDURE — G0283 ELEC STIM OTHER THAN WOUND: HCPCS | Performed by: PHYSICAL THERAPIST

## 2022-01-18 NOTE — PROGRESS NOTES
Physical Therapy Daily Progress Note    Patient Name: Mami Srivastava         :  1945  Referring Physician: Vicki Garcia APRN      Subjective   Mami Srivastava reports: doing better w/ decreased pain and improved mobility and function - Reached up for something up high w/o pain and she didn't even think about doing it, she just did it and then realized it didn't hurt -     Objective   Pt demonstrating improved functional mobility w/ PT activities today - w/ fewer c/o pain -     See Exercise, Manual, and Modality Logs for complete treatment.     Functional / Therapeutic Activities:    · TAPING / BRACING: NA  · SEE EXERCISE FLOW SHEET -   · Jt protection, ADL modification; Posture and      Assessment/Plan  75 yo female; S/P 2021 (R) Shoulder 1.  Arthroscopic rotator cuff debridement 2.  Arthroscopic synovectomy and capsular release   3.  Arthroscopic biceps tenotomy  4.  Chondroplasty of glenoid   5.  Subacromial decompression and extensive bursectomy  Persistent pain and limited mobility, strength, and function, but improved overall -   Ms. Srivastava would benefit from continued Physical Therapy with progression to HEP.   Mami is benefitting from more rest, and less aggressive PT and HEP, but may require further intervention (I.e. injection vs Closed manipulation to address worsening pain / stiffness / loss of function if these continue / worsen.-     Progress per Plan of Care       _________________________________________________    Manual Therapy:                 mins  41656;  Therapeutic Exercise:    25    mins  11835;     Neuromuscular Sana:        mins  69326;    Therapeutic Activity:     10      mins  92507;     Ultrasound:                          mins  79422;  Iontophoresis:                     mins  39945;    Electrical Stimulation:     15    mins  14733 ( );  Mechanical Traction:          mins  50863  Dry Needling                       mins self-pay     Timed  Treatment:   25    mins                  Total Treatment:     55    mins    Tor Chase, PT  Physical Therapist

## 2022-01-20 ENCOUNTER — TELEPHONE (OUTPATIENT)
Dept: GASTROENTEROLOGY | Facility: CLINIC | Age: 77
End: 2022-01-20

## 2022-01-20 ENCOUNTER — TREATMENT (OUTPATIENT)
Dept: PHYSICAL THERAPY | Facility: CLINIC | Age: 77
End: 2022-01-20

## 2022-01-20 DIAGNOSIS — M75.01 ADHESIVE CAPSULITIS OF RIGHT SHOULDER: ICD-10-CM

## 2022-01-20 DIAGNOSIS — Z98.890 S/P SHOULDER SURGERY: ICD-10-CM

## 2022-01-20 DIAGNOSIS — Z98.890 S/P ARTHROSCOPY OF RIGHT SHOULDER: Primary | ICD-10-CM

## 2022-01-20 PROCEDURE — 97530 THERAPEUTIC ACTIVITIES: CPT | Performed by: PHYSICAL THERAPIST

## 2022-01-20 PROCEDURE — 97110 THERAPEUTIC EXERCISES: CPT | Performed by: PHYSICAL THERAPIST

## 2022-01-20 PROCEDURE — G0283 ELEC STIM OTHER THAN WOUND: HCPCS | Performed by: PHYSICAL THERAPIST

## 2022-01-20 NOTE — TELEPHONE ENCOUNTER
Per My Chart message: When I visited with Dr Hein on Jan 4, I asked him to please make sure the EUS procedure was “pre approved” by [employer].   There is such a “mess” with the claims relating to my Nov shoulder surgery, I want to be proactive  and follow all steps before the Feb 3 procedure.    I checked with Christopher today (as I had not received their pre approval letter as I did with the shoulder surgery) and they have no record of such request from your office.    Please make sure this request is sent to [employer] asap….thank you

## 2022-01-20 NOTE — PROGRESS NOTES
Physical Therapy Daily Progress Note    Patient Name: Mami Srivastava         :  1945  Referring Physician: Chencho Craft MD      Subjective   Mami Srivastava reports: overall improvement with decreasing pain overall  - Improved mobility, but pain w/ lifting items of wt out in front/OH,etc.   Pulleys very helpful - Work up w/ soreness, but pulles alleviated this pain -     Objective   PFE w/ pulleys >140-degs;     See Exercise, Manual, and Modality Logs for complete treatment.     Functional / Therapeutic Activities:    · TAPING / BRACING: NA  · SEE EXERCISE FLOW SHEET -   · Jt protection, ADL modification; Posture and       Assessment/Plan  75 yo female; S/P 2021 (R) Shoulder 1.  Arthroscopic rotator cuff debridement 2.  Arthroscopic synovectomy and capsular release   3.  Arthroscopic biceps tenotomy  4.  Chondroplasty of glenoid   5.  Subacromial decompression and extensive bursectomy  Persistent pain and limited mobility, strength, and function, but improved overall -   Ms. Srivastava would benefit from continued Physical Therapy with progression to HEP.   Mami is benefitting from more rest, and less aggressive PT and HEP, but may require further intervention (I.e. injection vs Closed manipulation to address worsening pain / stiffness / loss of function if these continue / worsen.-      Progress per Plan of Care     _________________________________________________     Manual Therapy:                 mins  48944;  Therapeutic Exercise:    25    mins  62683;     Neuromuscular Sana:        mins  09963;    Therapeutic Activity:     10      mins  52351;     Ultrasound:                          mins  78453;  Iontophoresis:                     mins  43140;    Electrical Stimulation:     15    mins  35198 ( );  Mechanical Traction:          mins  62607  Dry Needling                       mins self-pay     Timed Treatment:   25    mins                  Total Treatment:     55     mins    Tor Chase, PT  Physical Therapist

## 2022-01-21 ENCOUNTER — TREATMENT (OUTPATIENT)
Dept: PHYSICAL THERAPY | Facility: CLINIC | Age: 77
End: 2022-01-21

## 2022-01-21 DIAGNOSIS — Z98.890 S/P ARTHROSCOPY OF RIGHT SHOULDER: Primary | ICD-10-CM

## 2022-01-21 DIAGNOSIS — M75.01 ADHESIVE CAPSULITIS OF RIGHT SHOULDER: ICD-10-CM

## 2022-01-21 DIAGNOSIS — Z98.890 S/P SHOULDER SURGERY: ICD-10-CM

## 2022-01-21 PROCEDURE — G0283 ELEC STIM OTHER THAN WOUND: HCPCS | Performed by: PHYSICAL THERAPIST

## 2022-01-21 PROCEDURE — 97530 THERAPEUTIC ACTIVITIES: CPT | Performed by: PHYSICAL THERAPIST

## 2022-01-21 PROCEDURE — 97110 THERAPEUTIC EXERCISES: CPT | Performed by: PHYSICAL THERAPIST

## 2022-01-21 NOTE — PROGRESS NOTES
Physical Therapy Daily Progress Note    Patient Name: Mami Srivastava         :  1945  Referring Physician: Chencho Craft MD      Subjective   Mami Srivastava reports: continued improvement - able to put a coat on this morning - able to put arm / elbow up on card talbe w/o pain for the christine time for each    Objective   Pt donned coat w/ some pain / difficulty, but was able to do so -   Improved FE A/P with PT activities -     See Exercise, Manual, and Modality Logs for complete treatment.     Functional / Therapeutic Activities:    · TAPING / BRACING: NA  · SEE EXERCISE FLOW SHEET -   · Jt protection, ADL modification; Posture and       Assessment/Plan  75 yo female; S/P 2021 (R) Shoulder 1.  Arthroscopic rotator cuff debridement 2.  Arthroscopic synovectomy and capsular release   3.  Arthroscopic biceps tenotomy  4.  Chondroplasty of glenoid   5.  Subacromial decompression and extensive bursectomy  Persistent pain and limited mobility, strength, and function, but improved overall -   Ms. Srivastava would benefit from continued Physical Therapy with progression to HEP.   Mami is benefitting from more rest, and less aggressive PT and HEP, but may require further intervention (I.e. injection vs Closed manipulation to address worsening pain / stiffness / loss of function if these continue / worsen.-      Progress per Plan of Care     _________________________________________________     Manual Therapy:                 mins  25331;  Therapeutic Exercise:    25    mins  25143;     Neuromuscular Sana:        mins  51844;    Therapeutic Activity:     10      mins  25613;     Ultrasound:                          mins  20784;  Iontophoresis:                     mins  91162;    Electrical Stimulation:     15    mins  45731 ( );  Mechanical Traction:          mins  31756  Dry Needling                       mins self-pay     Timed Treatment:   25    mins                  Total  Treatment:     55    mins    Tor Chase, PT  Physical Therapist

## 2022-01-24 ENCOUNTER — OFFICE VISIT (OUTPATIENT)
Dept: ORTHOPEDIC SURGERY | Facility: CLINIC | Age: 77
End: 2022-01-24

## 2022-01-24 VITALS — WEIGHT: 142 LBS | TEMPERATURE: 96.8 F | BODY MASS INDEX: 24.24 KG/M2 | HEIGHT: 64 IN

## 2022-01-24 DIAGNOSIS — Z09 SURGERY FOLLOW-UP: Primary | ICD-10-CM

## 2022-01-24 PROCEDURE — 99024 POSTOP FOLLOW-UP VISIT: CPT | Performed by: ORTHOPAEDIC SURGERY

## 2022-01-24 RX ORDER — MELOXICAM 15 MG/1
15 TABLET ORAL DAILY PRN
Qty: 30 TABLET | Refills: 2 | Status: SHIPPED | OUTPATIENT
Start: 2022-01-24 | End: 2022-06-21

## 2022-01-24 NOTE — PROGRESS NOTES
Ms. Srivastava is now almost 3 months out from her right shoulder debridement.  She says her shoulder is doing fine.  She has not quite recovered full motion but she says her pain is much better than before surgery and she is happy with her outcome at this point.    Her portals are healed.  Skin is benign.  No effusion.  Her motion is good but not quite symmetric to the contralateral side.  She is still lacking about 3 or 4 levels of internal rotation and maybe 10 degrees of external rotation.  Strength is also good but not quite normal.  I grade her as 5- out of 5 with elevation in the scapular plane and abduction.    Assessment: 3-month status post right shoulder arthroscopic debridement, synovectomy and subacromial decompression    Plan: From a pain standpoint, it sounds like the surgery has been a big success for her.  She has not quite recovered full motion or strength but I think this will come with time.  I encouraged her to continue her therapy efforts.  I would like to see her back in 2 months for a final recheck.    Chencho Craft MD

## 2022-01-25 ENCOUNTER — TRANSCRIBE ORDERS (OUTPATIENT)
Dept: ADMINISTRATIVE | Facility: HOSPITAL | Age: 77
End: 2022-01-25

## 2022-01-25 ENCOUNTER — TREATMENT (OUTPATIENT)
Dept: PHYSICAL THERAPY | Facility: CLINIC | Age: 77
End: 2022-01-25

## 2022-01-25 DIAGNOSIS — Z01.818 OTHER SPECIFIED PRE-OPERATIVE EXAMINATION: Primary | ICD-10-CM

## 2022-01-25 DIAGNOSIS — Z98.890 S/P SHOULDER SURGERY: ICD-10-CM

## 2022-01-25 DIAGNOSIS — M75.01 ADHESIVE CAPSULITIS OF RIGHT SHOULDER: ICD-10-CM

## 2022-01-25 DIAGNOSIS — Z98.890 S/P ARTHROSCOPY OF RIGHT SHOULDER: Primary | ICD-10-CM

## 2022-01-25 PROCEDURE — 97110 THERAPEUTIC EXERCISES: CPT | Performed by: PHYSICAL THERAPIST

## 2022-01-25 PROCEDURE — 97530 THERAPEUTIC ACTIVITIES: CPT | Performed by: PHYSICAL THERAPIST

## 2022-01-25 PROCEDURE — G0283 ELEC STIM OTHER THAN WOUND: HCPCS | Performed by: PHYSICAL THERAPIST

## 2022-01-25 NOTE — PROGRESS NOTES
Physical Therapy Daily Progress Note    Patient Name: Mami Srivastava         :  1945  Referring Physician: Chencho Craft MD      Subjective   Mami Srivastava reports: waking up  w/ sig pain and limited ROM (B) shoulder which lasted most of the day - Saturday was good - did not do much, but much better by Monday -   She notes seeing MD and was surprised by how high she could raise her arm - Feeling better today as well -     Objective   Pt demonstrating improved FE and AROM with PT activities, but anterior shoulder pain w/ FE -     See Exercise, Manual, and Modality Logs for complete treatment.     Functional / Therapeutic Activities:    · TAPING / BRACING: NA  · SEE EXERCISE FLOW SHEET -   · Jt protection, ADL modification; Posture and       Assessment/Plan  75 yo female; S/P 2021 (R) Shoulder 1.  Arthroscopic rotator cuff debridement 2.  Arthroscopic synovectomy and capsular release   3.  Arthroscopic biceps tenotomy  4.  Chondroplasty of glenoid   5.  Subacromial decompression and extensive bursectomy  Persistent pain and limited mobility, strength, and function, but improved overall -   Ms. Srivastava would benefit from continued Physical Therapy with progression to HEP.   Mami is benefitting from more rest, and less aggressive PT and HEP, but may require further intervention (I.e. injection vs Closed manipulation to address worsening pain / stiffness / loss of function if these continue / worsen.-      Progress per Plan of Care w/ modified TE/TA -      _________________________________________________     Manual Therapy:                 mins  59500;  Therapeutic Exercise:    25    mins  63911;     Neuromuscular Sana:        mins  38849;    Therapeutic Activity:     10      mins  41001;     Ultrasound:                          mins  54975;  Iontophoresis:                     mins  81106;    Electrical Stimulation:     15    mins  86323 ( );  Mechanical Traction:           mins  77859  Dry Needling                       mins self-pay     Timed Treatment:   25    mins                  Total Treatment:     55    mins  Tor Chase, PT  Physical Therapist

## 2022-01-27 ENCOUNTER — TREATMENT (OUTPATIENT)
Dept: PHYSICAL THERAPY | Facility: CLINIC | Age: 77
End: 2022-01-27

## 2022-01-27 ENCOUNTER — TELEPHONE (OUTPATIENT)
Dept: GASTROENTEROLOGY | Facility: CLINIC | Age: 77
End: 2022-01-27

## 2022-01-27 DIAGNOSIS — M75.01 ADHESIVE CAPSULITIS OF RIGHT SHOULDER: ICD-10-CM

## 2022-01-27 DIAGNOSIS — Z98.890 S/P SHOULDER SURGERY: ICD-10-CM

## 2022-01-27 DIAGNOSIS — Z98.890 S/P ARTHROSCOPY OF RIGHT SHOULDER: Primary | ICD-10-CM

## 2022-01-27 PROCEDURE — G0283 ELEC STIM OTHER THAN WOUND: HCPCS | Performed by: PHYSICAL THERAPIST

## 2022-01-27 PROCEDURE — 97110 THERAPEUTIC EXERCISES: CPT | Performed by: PHYSICAL THERAPIST

## 2022-01-27 PROCEDURE — 97530 THERAPEUTIC ACTIVITIES: CPT | Performed by: PHYSICAL THERAPIST

## 2022-01-27 NOTE — PROGRESS NOTES
Physical Therapy Daily Progress Note    Patient Name: Mami Srivastava         :  1945  Referring Physician: Chencho Craft MD      Subjective   Mami Srivastava reports: continued improvement - able to do more things w/ less pain - pain more isolated inside shoulder and no longer radiating down the arm - Quick movments hurt, but not at bad -      Objective   Good functional mobility with PT activities into FE, etc.     See Exercise, Manual, and Modality Logs for complete treatment.     Functional / Therapeutic Activities:   · TAPING / BRACING: NA  · SEE EXERCISE FLOW SHEET -   · Jt protection, ADL modification; Posture and      Assessment/Plan  75 yo female; S/P 2021 (R) Shoulder 1.  Arthroscopic rotator cuff debridement 2.  Arthroscopic synovectomy and capsular release   3.  Arthroscopic biceps tenotomy  4.  Chondroplasty of glenoid   5.  Subacromial decompression and extensive bursectomy  Persistent pain and limited mobility, strength, and functio, but improving with modification of PT / HEP with decreasing pain and improving mobility and functional use of RUE -    Progress per Plan of Care       _________________________________________________    Manual Therapy:                 mins  29172;  Therapeutic Exercise:    28    mins  21927;     Neuromuscular Sana:        mins  12735;    Therapeutic Activity:     10      mins  57840;     Ultrasound:                          mins  27439;  Iontophoresis:                     mins  31340;    Electrical Stimulation:    15     mins  64521 ( );  Mechanical Traction:          mins  63384  Dry Needling                       mins self-pay     Timed Treatment:   38    mins                  Total Treatment:     60    mins    Tor Chase PT  Physical Therapist

## 2022-01-27 NOTE — TELEPHONE ENCOUNTER
Made contact with the pt to advise [employer] requires no PA for the EUS. Ref# [id number]. Pt understood.

## 2022-01-28 ENCOUNTER — TELEPHONE (OUTPATIENT)
Dept: GASTROENTEROLOGY | Facility: CLINIC | Age: 77
End: 2022-01-28

## 2022-01-28 DIAGNOSIS — Z01.818 PRE-OP TESTING: Primary | ICD-10-CM

## 2022-01-28 RX ORDER — LEVOTHYROXINE SODIUM 0.07 MG/1
75 TABLET ORAL DAILY
Qty: 90 TABLET | Refills: 1 | OUTPATIENT
Start: 2022-01-28

## 2022-01-31 ENCOUNTER — OFFICE VISIT (OUTPATIENT)
Dept: FAMILY MEDICINE CLINIC | Facility: CLINIC | Age: 77
End: 2022-01-31

## 2022-01-31 VITALS
BODY MASS INDEX: 24.07 KG/M2 | WEIGHT: 141 LBS | OXYGEN SATURATION: 99 % | TEMPERATURE: 96.2 F | DIASTOLIC BLOOD PRESSURE: 68 MMHG | HEIGHT: 64 IN | HEART RATE: 97 BPM | RESPIRATION RATE: 18 BRPM | SYSTOLIC BLOOD PRESSURE: 130 MMHG

## 2022-01-31 DIAGNOSIS — Z86.79 HISTORY OF HEART DISEASE: ICD-10-CM

## 2022-01-31 DIAGNOSIS — L50.9 URTICARIA: Primary | ICD-10-CM

## 2022-01-31 PROCEDURE — 93000 ELECTROCARDIOGRAM COMPLETE: CPT | Performed by: INTERNAL MEDICINE

## 2022-01-31 PROCEDURE — 99214 OFFICE O/P EST MOD 30 MIN: CPT | Performed by: INTERNAL MEDICINE

## 2022-01-31 RX ORDER — METHYLPREDNISOLONE 4 MG/1
TABLET ORAL
Qty: 21 TABLET | Refills: 0 | Status: SHIPPED | OUTPATIENT
Start: 2022-01-31 | End: 2022-05-18

## 2022-01-31 NOTE — PROGRESS NOTES
Subjective   Mami Srivastava is a 76 y.o. female. Patient is here today for   Chief Complaint   Patient presents with   • Rash     rash on arms, legs, stomach, back           Vitals:    22 1032   BP: 130/68   Pulse: 97   Resp: 18   Temp: 96.2 °F (35.7 °C)   SpO2: 99%     Body mass index is 24.19 kg/m².      Past Medical History:   Diagnosis Date   • Arthritis    • Autoimmune hepatitis (HCC)    • C. difficile colitis 2020   • Cholelithiasis 2018    Ultrasound   • Coronary artery disease     Dr Luis Rick   • Diverticulitis of colon ,   • Hemangioma of liver    • Hernia 2017    Hiatal hernia-Prabhjot test   • Hyperlipidemia     Dr Luis Rick-atorvastatin   • Hypertension    • Hyperthyroidism    • Hypothyroidism    • Migraines    • Osteopenia after menopause    • PONV (postoperative nausea and vomiting)    • Right shoulder pain    • Seasonal allergies    • Skin cancer     face/ BASAL    • Ulcerative colitis (HCC)     Colitis/DrKaplan   • UTI (urinary tract infection)     HISTORY/OF      Allergies   Allergen Reactions   • Imuran [Azathioprine] GI Intolerance   • Bactrim [Sulfamethoxazole-Trimethoprim] Other (See Comments)     fatigue   • Clindamycin/Lincomycin Diarrhea   • Augmentin [Amoxicillin-Pot Clavulanate] Hives   • Keflex [Cephalexin] Hives      Social History     Socioeconomic History   • Marital status:    Tobacco Use   • Smoking status: Former Smoker     Packs/day: 2.00     Years: 12.00     Pack years: 24.00     Start date: 1963     Quit date: 1976     Years since quittin.0   • Smokeless tobacco: Never Used   Vaping Use   • Vaping Use: Never used   Substance and Sexual Activity   • Alcohol use: Not Currently     Comment: none in 3 years   • Drug use: No   • Sexual activity: Not Currently        Current Outpatient Medications:   •  amLODIPine (NORVASC) 5 MG tablet, Take 1 tablet by mouth Daily., Disp: 90 tablet, Rfl: 3  •  aspirin 81 MG EC tablet, Take 81 mg  by mouth Daily. HOLDING FOR 7 DAYS PRIOR TO OR, Disp: , Rfl:   •  cetirizine (zyrTEC) 10 MG tablet, Take 10 mg by mouth As Needed., Disp: , Rfl:   •  Cholecalciferol (VITAMIN D3) 5000 units capsule capsule, Take 5,000 Units by mouth 3 (Three) Times a Week., Disp: , Rfl:   •  docusate sodium (COLACE) 100 MG capsule, Take 1 capsule by mouth 2 (Two) Times a Day., Disp: 60 capsule, Rfl: 0  •  lisinopril (PRINIVIL,ZESTRIL) 20 MG tablet, Take 1 tablet by mouth Daily., Disp: 30 tablet, Rfl: 11  •  meloxicam (MOBIC) 15 MG tablet, Take 1 tablet by mouth Daily As Needed for Moderate Pain . Take as directed with food., Disp: 30 tablet, Rfl: 2  •  ondansetron (Zofran) 4 MG tablet, Take 1 tablet by mouth Every 8 (Eight) Hours As Needed for Nausea or Vomiting., Disp: 30 tablet, Rfl: 0  •  rosuvastatin (CRESTOR) 10 MG tablet, Take 1 tablet by mouth Daily., Disp: 30 tablet, Rfl: 11  •  Synthroid 75 MCG tablet, TAKE 1 TABLET DAILY FOR    THYROID (Patient taking differently: Take 75 mcg by mouth Daily.), Disp: 90 tablet, Rfl: 1  •  traMADol (ULTRAM) 50 MG tablet, Take 1 tablet by mouth Every 8 (Eight) Hours As Needed for Moderate Pain . (Patient taking differently: Take 50 mg by mouth As Needed for Moderate Pain .), Disp: 30 tablet, Rfl: 0  •  traMADol (ULTRAM) 50 MG tablet, Take 1 tablet by mouth Every 4 (Four) Hours As Needed for Moderate Pain ., Disp: 60 tablet, Rfl: 0  •  methylPREDNISolone (MEDROL) 4 MG dose pack, Take as directed on package instructions., Disp: 21 tablet, Rfl: 0     Objective     She has developed a rash which is pruritic on both of the extensor surfaces of both forearms, her abdomen and her thighs.  She has been taking Zyrtec and Benadryl simultaneously and she is really not had much of improvement.    This started on Saturday.    She has had no fevers or chills.  She did eat some shrimp and a bowl follows which are things that she has never experienced adverse reactions to, but she has not had these foods  very recently until then.    She did not develop any difficulty breathing, no swelling in the face or lips or mouth.  She denies dyspnea.       Review of Systems   Constitutional: Negative.    Respiratory: Negative.    Cardiovascular: Negative.    Musculoskeletal: Negative.    Skin:        She has a rash on her extensor surfaces of both forearms, abdomen, and thighs bilaterally   Psychiatric/Behavioral: Negative.        Physical Exam  Vitals and nursing note reviewed.   Constitutional:       Comments: Pleasant, neatly groomed, in no distress.   Skin:     Comments: She has pruritic macules on the extensor surfaces of both forearms, her abdomen, and both upper thighs.   Neurological:      Mental Status: She is oriented to person, place, and time.   Psychiatric:         Mood and Affect: Mood normal.         Behavior: Behavior normal.         Thought Content: Thought content normal.           Problems Addressed this Visit        Skin    Urticaria - Primary      Diagnoses       Codes Comments    Urticaria    -  Primary ICD-10-CM: L50.9  ICD-9-CM: 708.9             PLAN  I gave her a tapering Medrol Dosepak.  She may continue to take the antihistamines that she has been taking.    If she fails to improve or improves and has a recurrence of her urticaria, she will let me know.  No follow-ups on file.

## 2022-02-01 ENCOUNTER — LAB (OUTPATIENT)
Dept: LAB | Facility: HOSPITAL | Age: 77
End: 2022-02-01

## 2022-02-01 DIAGNOSIS — Z01.818 OTHER SPECIFIED PRE-OPERATIVE EXAMINATION: ICD-10-CM

## 2022-02-01 LAB — SARS-COV-2 ORF1AB RESP QL NAA+PROBE: NOT DETECTED

## 2022-02-01 PROCEDURE — U0004 COV-19 TEST NON-CDC HGH THRU: HCPCS

## 2022-02-01 PROCEDURE — C9803 HOPD COVID-19 SPEC COLLECT: HCPCS

## 2022-02-05 ENCOUNTER — LAB (OUTPATIENT)
Dept: LAB | Facility: HOSPITAL | Age: 77
End: 2022-02-05

## 2022-02-05 DIAGNOSIS — K86.2 PANCREATIC CYST: ICD-10-CM

## 2022-02-05 LAB — SARS-COV-2 ORF1AB RESP QL NAA+PROBE: NOT DETECTED

## 2022-02-05 PROCEDURE — U0004 COV-19 TEST NON-CDC HGH THRU: HCPCS

## 2022-02-05 PROCEDURE — C9803 HOPD COVID-19 SPEC COLLECT: HCPCS

## 2022-02-08 ENCOUNTER — ANESTHESIA (OUTPATIENT)
Dept: GASTROENTEROLOGY | Facility: HOSPITAL | Age: 77
End: 2022-02-08

## 2022-02-08 ENCOUNTER — ANESTHESIA EVENT (OUTPATIENT)
Dept: GASTROENTEROLOGY | Facility: HOSPITAL | Age: 77
End: 2022-02-08

## 2022-02-08 ENCOUNTER — HOSPITAL ENCOUNTER (OUTPATIENT)
Facility: HOSPITAL | Age: 77
Setting detail: HOSPITAL OUTPATIENT SURGERY
Discharge: HOME | End: 2022-02-08
Attending: INTERNAL MEDICINE | Admitting: INTERNAL MEDICINE

## 2022-02-08 VITALS
DIASTOLIC BLOOD PRESSURE: 63 MMHG | RESPIRATION RATE: 16 BRPM | HEART RATE: 59 BPM | WEIGHT: 141 LBS | SYSTOLIC BLOOD PRESSURE: 140 MMHG | HEIGHT: 64 IN | OXYGEN SATURATION: 96 % | BODY MASS INDEX: 24.07 KG/M2

## 2022-02-08 DIAGNOSIS — K86.2 PANCREATIC CYST: Primary | ICD-10-CM

## 2022-02-08 PROCEDURE — 25010000002 ONDANSETRON PER 1 MG: Performed by: ANESTHESIOLOGY

## 2022-02-08 PROCEDURE — 88300 SURGICAL PATH GROSS: CPT | Performed by: INTERNAL MEDICINE

## 2022-02-08 PROCEDURE — 25010000002 PROPOFOL 10 MG/ML EMULSION: Performed by: ANESTHESIOLOGY

## 2022-02-08 PROCEDURE — 25010000002 LEVOFLOXACIN PER 250 MG: Performed by: INTERNAL MEDICINE

## 2022-02-08 PROCEDURE — 43238 EGD US FINE NEEDLE BX/ASPIR: CPT | Performed by: INTERNAL MEDICINE

## 2022-02-08 RX ORDER — HYDRALAZINE HYDROCHLORIDE 20 MG/ML
10 INJECTION INTRAMUSCULAR; INTRAVENOUS
Status: DISCONTINUED | OUTPATIENT
Start: 2022-02-08 | End: 2022-02-08 | Stop reason: HOSPADM

## 2022-02-08 RX ORDER — LEVOFLOXACIN 5 MG/ML
500 INJECTION, SOLUTION INTRAVENOUS ONCE
Status: COMPLETED | OUTPATIENT
Start: 2022-02-08 | End: 2022-02-08

## 2022-02-08 RX ORDER — CIPROFLOXACIN 500 MG/1
500 TABLET, FILM COATED ORAL 2 TIMES DAILY
Qty: 6 TABLET | Refills: 0 | Status: SHIPPED | OUTPATIENT
Start: 2022-02-08 | End: 2022-02-11

## 2022-02-08 RX ORDER — PROPOFOL 10 MG/ML
VIAL (ML) INTRAVENOUS AS NEEDED
Status: DISCONTINUED | OUTPATIENT
Start: 2022-02-08 | End: 2022-02-08 | Stop reason: SURG

## 2022-02-08 RX ORDER — EPHEDRINE SULFATE 50 MG/ML
5 INJECTION, SOLUTION INTRAVENOUS ONCE AS NEEDED
Status: DISCONTINUED | OUTPATIENT
Start: 2022-02-08 | End: 2022-02-08 | Stop reason: HOSPADM

## 2022-02-08 RX ORDER — SODIUM CHLORIDE, SODIUM LACTATE, POTASSIUM CHLORIDE, CALCIUM CHLORIDE 600; 310; 30; 20 MG/100ML; MG/100ML; MG/100ML; MG/100ML
30 INJECTION, SOLUTION INTRAVENOUS CONTINUOUS PRN
Status: DISCONTINUED | OUTPATIENT
Start: 2022-02-08 | End: 2022-02-08 | Stop reason: HOSPADM

## 2022-02-08 RX ORDER — LABETALOL HYDROCHLORIDE 5 MG/ML
5 INJECTION, SOLUTION INTRAVENOUS
Status: DISCONTINUED | OUTPATIENT
Start: 2022-02-08 | End: 2022-02-08 | Stop reason: HOSPADM

## 2022-02-08 RX ORDER — ONDANSETRON 2 MG/ML
INJECTION INTRAMUSCULAR; INTRAVENOUS AS NEEDED
Status: DISCONTINUED | OUTPATIENT
Start: 2022-02-08 | End: 2022-02-08 | Stop reason: SURG

## 2022-02-08 RX ORDER — ONDANSETRON 2 MG/ML
4 INJECTION INTRAMUSCULAR; INTRAVENOUS ONCE AS NEEDED
Status: DISCONTINUED | OUTPATIENT
Start: 2022-02-08 | End: 2022-02-08 | Stop reason: HOSPADM

## 2022-02-08 RX ORDER — GLYCOPYRROLATE 0.2 MG/ML
INJECTION INTRAMUSCULAR; INTRAVENOUS AS NEEDED
Status: DISCONTINUED | OUTPATIENT
Start: 2022-02-08 | End: 2022-02-08 | Stop reason: SURG

## 2022-02-08 RX ORDER — DIPHENHYDRAMINE HYDROCHLORIDE 50 MG/ML
6.25 INJECTION INTRAMUSCULAR; INTRAVENOUS
Status: DISCONTINUED | OUTPATIENT
Start: 2022-02-08 | End: 2022-02-08 | Stop reason: HOSPADM

## 2022-02-08 RX ORDER — NALOXONE HCL 0.4 MG/ML
0.4 VIAL (ML) INJECTION AS NEEDED
Status: DISCONTINUED | OUTPATIENT
Start: 2022-02-08 | End: 2022-02-08 | Stop reason: HOSPADM

## 2022-02-08 RX ORDER — LIDOCAINE HYDROCHLORIDE 20 MG/ML
INJECTION, SOLUTION INFILTRATION; PERINEURAL AS NEEDED
Status: DISCONTINUED | OUTPATIENT
Start: 2022-02-08 | End: 2022-02-08 | Stop reason: SURG

## 2022-02-08 RX ORDER — FENTANYL CITRATE 50 UG/ML
25 INJECTION, SOLUTION INTRAMUSCULAR; INTRAVENOUS
Status: DISCONTINUED | OUTPATIENT
Start: 2022-02-08 | End: 2022-02-08 | Stop reason: HOSPADM

## 2022-02-08 RX ADMIN — ONDANSETRON 4 MG: 2 INJECTION INTRAMUSCULAR; INTRAVENOUS at 07:43

## 2022-02-08 RX ADMIN — GLYCOPYRROLATE 0.1 MG: 0.2 INJECTION INTRAMUSCULAR; INTRAVENOUS at 07:39

## 2022-02-08 RX ADMIN — METRONIDAZOLE 500 MG: 500 INJECTION, SOLUTION INTRAVENOUS at 08:52

## 2022-02-08 RX ADMIN — PROPOFOL 120 MG: 10 INJECTION, EMULSION INTRAVENOUS at 07:39

## 2022-02-08 RX ADMIN — PROPOFOL 160 MCG/KG/MIN: 10 INJECTION, EMULSION INTRAVENOUS at 07:39

## 2022-02-08 RX ADMIN — LEVOFLOXACIN 500 MG: 5 INJECTION, SOLUTION INTRAVENOUS at 07:59

## 2022-02-08 RX ADMIN — SODIUM CHLORIDE, POTASSIUM CHLORIDE, SODIUM LACTATE AND CALCIUM CHLORIDE: 600; 310; 30; 20 INJECTION, SOLUTION INTRAVENOUS at 07:33

## 2022-02-08 RX ADMIN — LIDOCAINE HYDROCHLORIDE 60 MG: 20 INJECTION, SOLUTION INFILTRATION; PERINEURAL at 07:39

## 2022-02-08 NOTE — H&P
[facility]  Pre Procedure History & Physical    Chief Complaint:   Pancreatic cyst    Subjective     HPI:   76 y.o. female here for EUS to evaluate enlarging pancreatic cyst in body of pancreas.     Past Medical History:   Past Medical History:   Diagnosis Date   • Arthritis    • Autoimmune hepatitis (HCC)    • C. difficile colitis 2020   • Cholelithiasis 2018    Ultrasound   • Coronary artery disease     Dr Luis Rick   • Diverticulitis of colon ,   • Hemangioma of liver    • Hernia     Hiatal hernia-Fredislo test   • Hyperlipidemia     Dr Luis Rick-atorvastatin   • Hypertension    • Hyperthyroidism    • Hypothyroidism    • Migraines    • Osteopenia after menopause    • PONV (postoperative nausea and vomiting)    • Right shoulder pain    • Seasonal allergies    • Skin cancer     face/ BASAL    • Ulcerative colitis (HCC)     Colitis/DrKaplan   • UTI (urinary tract infection)     HISTORY/OF       Family History:  Family History   Problem Relation Age of Onset   • Heart disease Mother    • Hypertension Mother    • Lung cancer Sister    • Thyroid disease Sister    • Lupus Sister    • Thyroid disease Brother    • Alcohol abuse Brother    • Glaucoma Maternal Grandmother    • Stomach cancer Maternal Grandmother             • Cirrhosis Father             • Liver disease Father    • Malig Hyperthermia Neg Hx        Social History:   reports that she quit smoking about 46 years ago. She started smoking about 58 years ago. She has a 24.00 pack-year smoking history. She has never used smokeless tobacco. She reports previous alcohol use. She reports that she does not use drugs.    Medications:   Medications Prior to Admission   Medication Sig Dispense Refill Last Dose   • amLODIPine (NORVASC) 5 MG tablet Take 1 tablet by mouth Daily. 90 tablet 3 2022 at Unknown time   • cetirizine (zyrTEC) 10 MG tablet Take 10 mg by mouth As Needed.   2022 at Unknown  "time   • Cholecalciferol (VITAMIN D3) 5000 units capsule capsule Take 5,000 Units by mouth 3 (Three) Times a Week.   2/7/2022 at Unknown time   • lisinopril (PRINIVIL,ZESTRIL) 20 MG tablet Take 1 tablet by mouth Daily. 30 tablet 11 2/7/2022 at Unknown time   • rosuvastatin (CRESTOR) 10 MG tablet Take 1 tablet by mouth Daily. 30 tablet 11 2/7/2022 at Unknown time   • Synthroid 75 MCG tablet TAKE 1 TABLET DAILY FOR    THYROID (Patient taking differently: Take 75 mcg by mouth Daily.) 90 tablet 1 2/8/2022 at 0430   • traMADol (ULTRAM) 50 MG tablet Take 1 tablet by mouth Every 8 (Eight) Hours As Needed for Moderate Pain . (Patient taking differently: Take 50 mg by mouth As Needed for Moderate Pain .) 30 tablet 0 Past Month at Unknown time   • traMADol (ULTRAM) 50 MG tablet Take 1 tablet by mouth Every 4 (Four) Hours As Needed for Moderate Pain . 60 tablet 0 Past Month at Unknown time   • aspirin 81 MG EC tablet Take 81 mg by mouth Daily. HOLDING FOR 7 DAYS PRIOR TO OR   2/7/2022   • docusate sodium (COLACE) 100 MG capsule Take 1 capsule by mouth 2 (Two) Times a Day. 60 capsule 0 Unknown at Unknown time   • meloxicam (MOBIC) 15 MG tablet Take 1 tablet by mouth Daily As Needed for Moderate Pain . Take as directed with food. 30 tablet 2 Unknown at Unknown time   • methylPREDNISolone (MEDROL) 4 MG dose pack Take as directed on package instructions. 21 tablet 0 2/6/2022   • ondansetron (Zofran) 4 MG tablet Take 1 tablet by mouth Every 8 (Eight) Hours As Needed for Nausea or Vomiting. 30 tablet 0 Unknown at Unknown time       Allergies:  Imuran [azathioprine], Bactrim [sulfamethoxazole-trimethoprim], Clindamycin/lincomycin, Augmentin [amoxicillin-pot clavulanate], and Keflex [cephalexin]    ROS:    Pertinent items are noted in HPI     Objective     Blood pressure 169/65, pulse 75, resp. rate 16, height 162.6 cm (64\"), weight 64 kg (141 lb), SpO2 99 %, not currently breastfeeding.    Physical Exam   Constitutional: Pt is " oriented to person, place, and time and well-developed, well-nourished, and in no distress.   Abdominal: Soft.   Psychiatric: Mood, memory, affect and judgment normal.     Assessment/Plan     Diagnosis:  Pancreatic cyst    Anticipated Surgical Procedure:  EGD/EUS    The risks, benefits, and alternatives of this procedure have been discussed with the patient or the responsible party- the patient understands and agrees to proceed.

## 2022-02-08 NOTE — ANESTHESIA POSTPROCEDURE EVALUATION
"Patient: Mami Srivastava    Procedure Summary     Date: 02/08/22 Room / Location: [facility] ENDOSCOPY 10 / [facility] ENDOSCOPY    Anesthesia Start: 0733 Anesthesia Stop: 0807    Procedure: ESOPHAGOGASTRODUODENOSCOPY; ENDOSCOPIC ULTRASOUND (UPPER) (N/A ) Diagnosis:       Pancreatic cyst      (Pancreatic cyst [K86.2])    Surgeons: Sacha Lau MD Provider: David Williamson MD    Anesthesia Type: MAC ASA Status: 3          Anesthesia Type: MAC    Vitals  Vitals Value Taken Time   BP 91/48 02/08/22 0810   Temp     Pulse 72 02/08/22 0810   Resp 16 02/08/22 0810   SpO2 98 % 02/08/22 0810           Post Anesthesia Care and Evaluation    Patient location during evaluation: bedside  Patient participation: complete - patient participated  Level of consciousness: sleepy but conscious  Pain management: adequate  Airway patency: patent  Anesthetic complications: No anesthetic complications    Cardiovascular status: acceptable  Respiratory status: acceptable  Hydration status: acceptable    Comments: *BP 91/48   Pulse 72   Resp 16   Ht 162.6 cm (64\")   Wt 64 kg (141 lb)   LMP  (LMP Unknown)   SpO2 98%   BMI 24.20 kg/m²         "

## 2022-02-08 NOTE — ANESTHESIA PREPROCEDURE EVALUATION
Anesthesia Evaluation     history of anesthetic complications: PONV  NPO Solid Status: > 8 hours  NPO Liquid Status: > 2 hours           Airway   Mallampati: II  no difficulty expected  Dental - normal exam     Pulmonary - normal exam   (+) a smoker Former,   (-) COPD, asthma, sleep apnea    ROS comment: Pulmonary nodule  PE comment: nonlabored  Cardiovascular - normal exam    Rhythm: regular  Rate: normal    (+) hypertension, CAD, hyperlipidemia,   (-) valvular problems/murmurs, past MI, dysrhythmias, angina    ROS comment: LVH    Neuro/Psych  (+) headaches,     (-) seizures, TIA, CVA  GI/Hepatic/Renal/Endo    (+)  hiatal hernia,  liver disease (hepatic hemangioma; autoimmune hepatitis ), thyroid problem hypothyroidism and hyperthyroidism  (-) GERD, no renal disease, diabetes    ROS Comment: Ulcerative colitis;  H/o C.Diff.;  Pancreatic cyst    Musculoskeletal     (+) arthralgias,   Abdominal    Substance History      OB/GYN          Other   arthritis,    history of cancer (skin cancer)                  Anesthesia Plan    ASA 3     MAC       Anesthetic plan, all risks, benefits, and alternatives have been provided, discussed and informed consent has been obtained with: patient.        CODE STATUS:

## 2022-02-10 ENCOUNTER — TREATMENT (OUTPATIENT)
Dept: PHYSICAL THERAPY | Facility: CLINIC | Age: 77
End: 2022-02-10

## 2022-02-10 DIAGNOSIS — Z98.890 S/P SHOULDER SURGERY: ICD-10-CM

## 2022-02-10 DIAGNOSIS — M75.01 ADHESIVE CAPSULITIS OF RIGHT SHOULDER: ICD-10-CM

## 2022-02-10 DIAGNOSIS — Z98.890 S/P ARTHROSCOPY OF RIGHT SHOULDER: Primary | ICD-10-CM

## 2022-02-10 PROCEDURE — 97530 THERAPEUTIC ACTIVITIES: CPT | Performed by: PHYSICAL THERAPIST

## 2022-02-10 PROCEDURE — 97110 THERAPEUTIC EXERCISES: CPT | Performed by: PHYSICAL THERAPIST

## 2022-02-10 PROCEDURE — G0283 ELEC STIM OTHER THAN WOUND: HCPCS | Performed by: PHYSICAL THERAPIST

## 2022-02-10 NOTE — PROGRESS NOTES
Physical Therapy Daily Progress Note     Patient Name: Mami Srivastava         :  1945  Referring Physician: Chencho Craft MD        Subjective   Mami Srivastava reports:   -SEE MD PROGRESS NOTE-       Objective   -SEE MD PROGRESS NOTE-       See Exercise, Manual, and Modality Logs for complete treatment.     Functional / Therapeutic Activities:   · TAPING / BRACING: NA  · SEE EXERCISE FLOW SHEET -   · FUNCTIONAL ASSESSMENT     Assessment/Plan  -SEE MD PROGRESS NOTE-     _________________________________________________     Manual Therapy:                 mins  95219;  Therapeutic Exercise:    28    mins  06655;     Neuromuscular Sana:        mins  79197;    Therapeutic Activity:     20      mins  26238;     Ultrasound:                          mins  21358;  Iontophoresis:                     mins  08037;    Electrical Stimulation:    15     mins  81847 ( );  Mechanical Traction:          mins  96416  Dry Needling                       mins self-pay     Timed Treatment:   48    mins                  Total Treatment:     70    mins     Tor Chase PT  Physical Therapist

## 2022-02-10 NOTE — PROGRESS NOTES
------------------------------------------------------------------------------------------------------   MD PROGRESS NOTE / RE-Cert     Patient: Mami Srivastava        : 1945  Diagnosis/ICD-10 Code:  S/P arthroscopy of right shoulder [Z98.890]  Referring practitioner: RYAN Vizcarra /  Chencho Craft MD  Date of Initial Visit: 2021                  Today's Date: 2/10/2022  _________________________________________________________________     Thank you for the referral of Ms. Srivastava to [facility].  Ms. Srivastava has attended 24 PT sessions and their treatment has consisted of: modalities prn, manual therapy, therapeutic exercise,  patient education, and HEP.      Subjective   Mami Srivastava reports: Since modification of PT and HEP, she noted improved mobility and less pain overall. -     Improved ability to reach above shoulder level at time and easier to wash her hair, and perform other reaching activities etc -  Persistent periods of sig pain and limited mobility, but less frequent -   ___________________________________________________________________  Objective              OBSERVATION: Improved positioning (R) shoulder / Upper quarter              PALPATION: Mildly Tender anterior, ant/lat shoulder (R)              AROM: FE up to 155-deg ;  ERB to C6-7; IRB to lumbar spine, but very painful -               STRENGTH: ABDuction 4/5 w/ pain; Empty Can 4 w/ pain; ERS 4 /5; IR 5/5              DTR's/SENSATION: Grossly intact UEs -               SPECIAL TESTS: (+) Empty Can (R) -               ACTIVITY TOLERANCE: Improved tolerance with activities to and above shoulder level more often, but at times more difficult - Limited w/ lifting items of wt - Improved overall since modifying PT / HEP activities -              ___________________________________________________________________   Assessment/Plan  77 yo female; S/P 2021 (R) Shoulder 1.  Arthroscopic rotator  cuff debridement 2.  Arthroscopic synovectomy and capsular release   3.  Arthroscopic biceps tenotomy  4.  Chondroplasty of glenoid   5.  Subacromial decompression and extensive bursectomy  Persistent pain and limited mobility, strength, and function, but improving w/ modified PT/HEP- Fewer instances of severe pain and improving mobility, strength and function - Persistent weakness, int pain and limitations w/ mobility especially up the back limiting function  -   Ms. Srivastvaa would benefit from continued Physical Therapy with progression to HEP.   GOAL STATUS:   STG: Most / All met  LTG: Progressing towards mobility / strength / function goals - now partially met -     P: Recommend continued Physical Therapy to allow a full and safe return to ADL's 2 times/wk x 2 weeks then anticipate DC to HEP end of this month - To see Dr. Craft 3/21/2021  Thank you again for this referral of Ms. Srivastava to [facility].     PT Signature: ______________________________   Tor Chase PT  ______________________________________________________  Based upon review of the patient's progress and continued therapy plan, it is my medical opinion that Mami Srivastava should continue physical therapy treatment per the recommendation above.      Signature: ____________________________   Date: ________     Chencho Craft MD / Vicki Garcia APRN      ______________________________________________________________________  [address] [place] [address]  Phone: [phone]                 Fax: [phone]

## 2022-02-15 ENCOUNTER — TREATMENT (OUTPATIENT)
Dept: PHYSICAL THERAPY | Facility: CLINIC | Age: 77
End: 2022-02-15

## 2022-02-15 DIAGNOSIS — M75.01 ADHESIVE CAPSULITIS OF RIGHT SHOULDER: ICD-10-CM

## 2022-02-15 DIAGNOSIS — Z98.890 S/P SHOULDER SURGERY: ICD-10-CM

## 2022-02-15 DIAGNOSIS — Z98.890 S/P ARTHROSCOPY OF RIGHT SHOULDER: Primary | ICD-10-CM

## 2022-02-15 PROCEDURE — 97530 THERAPEUTIC ACTIVITIES: CPT | Performed by: PHYSICAL THERAPIST

## 2022-02-15 PROCEDURE — 97110 THERAPEUTIC EXERCISES: CPT | Performed by: PHYSICAL THERAPIST

## 2022-02-15 NOTE — PROGRESS NOTES
Physical Therapy Daily Progress Note    Patient Name: Mami Srivastava         :  1945  Referring Physician: Chencho Craft MD      Subjective   Mami Srivastava reports: continued improvement - doing more things w/o thinking about it -     Objective   Improved mobility of (R) shoulder w/ PT activities -     See Exercise, Manual, and Modality Logs for complete treatment.     Functional / Therapeutic Activities:    · TAPING / BRACING:   · SEE EXERCISE FLOW SHEET -   · Jt protection, ADL modification; Posture and      Assessment/Plan  75 yo female; S/P 2021 (R) Shoulder 1.  Arthroscopic rotator cuff debridement 2.  Arthroscopic synovectomy and capsular release   3.  Arthroscopic biceps tenotomy  4.  Chondroplasty of glenoid   5.  Subacromial decompression and extensive bursectomy  Persistent pain and limited mobility, strength, and function, but improving w/ modified PT/HEP- Fewer instances of severe pain and improving mobility, strength and function - Persistent weakness, int pain and limitations w/ mobility especially up the back limiting function  -   Ms. Srivastava would benefit from continued Physical Therapy with progression to HEP.     Progress per Plan of Care       _________________________________________________    Manual Therapy:                 mins  75279;  Therapeutic Exercise:    28    mins  01722;     Neuromuscular Sana:        mins  66301;    Therapeutic Activity:     10      mins  88388;     Ultrasound:                          mins  19854;  Iontophoresis:                     mins  19519;    Electrical Stimulation:    15     mins  91170 ( );  Mechanical Traction:          mins  80527  Dry Needling                       mins self-pay     Timed Treatment:   38    mins                  Total Treatment:     60    mins      Tor Chase PT  Physical Therapist

## 2022-02-15 NOTE — TELEPHONE ENCOUNTER
Caller: ALICIA     Relationship: [facility]     Best call back number: [phone]    Requested Prescriptions:   Requested Prescriptions     Pending Prescriptions Disp Refills   • levothyroxine (Synthroid) 75 MCG tablet 90 tablet 1     Sig: Take 1 tablet by mouth Daily.        Pharmacy where request should be sent: [facility] - [address] - [phone]  - [phone] FX     Additional details provided by patient:    Does the patient have less than a 3 day supply:  [] Yes  [] No    Taj Adams Rep   02/15/22 09:34 EST

## 2022-02-16 RX ORDER — LEVOTHYROXINE SODIUM 0.07 MG/1
75 TABLET ORAL DAILY
Qty: 90 TABLET | Refills: 1 | Status: SHIPPED | OUTPATIENT
Start: 2022-02-16 | End: 2022-02-28 | Stop reason: SDUPTHER

## 2022-02-16 NOTE — TELEPHONE ENCOUNTER
Rx Refill Note  Requested Prescriptions     Pending Prescriptions Disp Refills   • levothyroxine (Synthroid) 75 MCG tablet 90 tablet 1     Sig: Take 1 tablet by mouth Daily.      Last office visit with prescribing clinician: 1/31/2022      Next office visit with prescribing clinician: Visit date not found            Kathy Rivera MA  02/16/22, 11:59 EST

## 2022-02-17 ENCOUNTER — TREATMENT (OUTPATIENT)
Dept: PHYSICAL THERAPY | Facility: CLINIC | Age: 77
End: 2022-02-17

## 2022-02-17 DIAGNOSIS — Z98.890 S/P ARTHROSCOPY OF RIGHT SHOULDER: Primary | ICD-10-CM

## 2022-02-17 DIAGNOSIS — Z98.890 S/P SHOULDER SURGERY: ICD-10-CM

## 2022-02-17 DIAGNOSIS — M75.01 ADHESIVE CAPSULITIS OF RIGHT SHOULDER: ICD-10-CM

## 2022-02-17 PROCEDURE — 97530 THERAPEUTIC ACTIVITIES: CPT | Performed by: PHYSICAL THERAPIST

## 2022-02-17 PROCEDURE — 97110 THERAPEUTIC EXERCISES: CPT | Performed by: PHYSICAL THERAPIST

## 2022-02-17 PROCEDURE — G0283 ELEC STIM OTHER THAN WOUND: HCPCS | Performed by: PHYSICAL THERAPIST

## 2022-02-17 NOTE — PROGRESS NOTES
Physical Therapy Daily Progress Note     Patient Name: Mami Srivastava         :  1945  Referring Physician: Chencho Craft MD        Subjective   Mami Srivastava reports: continued improvement - doing more things w/o thinking about it -      Objective   Improved mobility of (R) shoulder w/ PT activities -      See Exercise, Manual, and Modality Logs for complete treatment.     Functional / Therapeutic Activities:    · TAPING / BRACING:   · SEE EXERCISE FLOW SHEET -   · Jt protection, ADL modification; Posture and       Assessment/Plan  75 yo female; S/P 2021 (R) Shoulder 1.  Arthroscopic rotator cuff debridement 2.  Arthroscopic synovectomy and capsular release   3.  Arthroscopic biceps tenotomy  4.  Chondroplasty of glenoid   5.  Subacromial decompression and extensive bursectomy  Persistent pain and limited mobility, strength, and function, but improving w/ modified PT/HEP- Fewer instances of severe pain and improving mobility, strength and function - Persistent weakness, int pain and limitations w/ mobility especially up the back limiting function  -   Ms. Srivastava would benefit from continued Physical Therapy with progression to HEP.      Progress per Plan of Care     _________________________________________________     Manual Therapy:                 mins  85599;  Therapeutic Exercise:   30    mins  27735;     Neuromuscular Sana:        mins  49810;    Therapeutic Activity:     10      mins  48704;     Ultrasound:                          mins  09918;  Iontophoresis:                     mins  15601;    Electrical Stimulation:    15     mins  03021 ( );  Mechanical Traction:          mins  18188  Dry Needling                       mins self-pay     Timed Treatment:   40    mins                  Total Treatment:    65    mins       Tor Chase PT  Physical Therapist

## 2022-02-21 RX ORDER — LEVOTHYROXINE SODIUM 0.07 MG/1
75 TABLET ORAL DAILY
Qty: 90 TABLET | Refills: 1 | Status: CANCELLED | OUTPATIENT
Start: 2022-02-21

## 2022-02-21 NOTE — TELEPHONE ENCOUNTER
Caller: Mami Srivastava    Relationship: Self    Best call back number: [phone] (H)    Requested Prescriptions:   Requested Prescriptions     Pending Prescriptions Disp Refills   • levothyroxine (Synthroid) 75 MCG tablet 90 tablet 1     Sig: Take 1 tablet by mouth Daily.        Pharmacy where request should be sent: [facility] [address] [facility] [phone] [facility] [phone] FX     Additional details provided by patient: PATIENT JUST NEEDS 10 DAYS WORTH CALLED IN, SHE IS CURRENTLY WAITING ON THE MAIL DELIVERY FOR THIS AND DOES NOT HAVE ANY TABLETS LEFT CURRENTLY     Does the patient have less than a 3 day supply:  [x] Yes  [] No    Radha Berger, PACO   02/21/22 11:22 EST

## 2022-02-22 ENCOUNTER — TREATMENT (OUTPATIENT)
Dept: PHYSICAL THERAPY | Facility: CLINIC | Age: 77
End: 2022-02-22

## 2022-02-22 DIAGNOSIS — Z98.890 S/P SHOULDER SURGERY: ICD-10-CM

## 2022-02-22 DIAGNOSIS — M75.01 ADHESIVE CAPSULITIS OF RIGHT SHOULDER: ICD-10-CM

## 2022-02-22 DIAGNOSIS — Z98.890 S/P ARTHROSCOPY OF RIGHT SHOULDER: Primary | ICD-10-CM

## 2022-02-22 PROCEDURE — 97530 THERAPEUTIC ACTIVITIES: CPT | Performed by: PHYSICAL THERAPIST

## 2022-02-22 PROCEDURE — G0283 ELEC STIM OTHER THAN WOUND: HCPCS | Performed by: PHYSICAL THERAPIST

## 2022-02-22 PROCEDURE — 97110 THERAPEUTIC EXERCISES: CPT | Performed by: PHYSICAL THERAPIST

## 2022-02-22 NOTE — TELEPHONE ENCOUNTER
This medication has been called in to patient's [facility] pharmacy listed in her chart. Nothing further needed at this time. Patient is aware. Levothyroxine 75mcg 1 PO daily #14; 0 additional refills. This is enough to cover her until her mail order Rx come in.      [place]

## 2022-02-22 NOTE — TELEPHONE ENCOUNTER
PATIENT IS CALLING IN TO CHECK ON GETTING THIS REFILLED.  SHE IS OUT  OF THE MEDICATION,  SHE IS ASKING FOR A 10 DAY SUPPLY WHILE SHE IS WAITING ON THE MAIL ORDER TO ARRIVE    PLEASE ADVISE  [phone]

## 2022-02-22 NOTE — PROGRESS NOTES
Physical Therapy Daily Progress Note    Patient Name: Mami Srivastava         :  1945  Referring Physician: Chencho Craft MD      Subjective   Mami Srivastava reports: doing well - both shoulders painful w/ IRB - doing HEP 2x/day - more pain if she only does it 1x/day -     Objective   Pt demonstrated good ROM w/ PT activities - Pain w/ UP of 30# w/ Shoulder wheel - alleviated w/ 20# instead -     See Exercise, Manual, and Modality Logs for complete treatment.     Functional / Therapeutic Activities:    · TAPING / BRACING:   · SEE EXERCISE FLOW SHEET -   · Jt protection, ADL modification; Posture and       Assessment/Plan  75 yo female; S/P 2021 (R) Shoulder 1.  Arthroscopic rotator cuff debridement 2.  Arthroscopic synovectomy and capsular release   3.  Arthroscopic biceps tenotomy  4.  Chondroplasty of glenoid   5.  Subacromial decompression and extensive bursectomy  Persistent pain and limited mobility, strength, and function, but improving w/ modified PT/HEP- Fewer instances of severe pain and improving mobility, strength and function - Persistent weakness, int pain and limitations w/ mobility especially up the back limiting function  -   Ms. Srivastava would benefit from continued Physical Therapy with progression to HEP.      Progress per Plan of Care     _________________________________________________     Manual Therapy:                 mins  28084;  Therapeutic Exercise:   30    mins  20695;     Neuromuscular Sana:        mins  94524;    Therapeutic Activity:     10      mins  23576;     Ultrasound:                          mins  82748;  Iontophoresis:                     mins  08980;    Electrical Stimulation:    15     mins  22775 ( );  Mechanical Traction:          mins  04621  Dry Needling                       mins self-pay     Timed Treatment:   40    mins                  Total Treatment:    65    mins      Tor Chase PT  Physical Therapist

## 2022-02-23 LAB
LAB AP CASE REPORT: NORMAL
LAB AP DIAGNOSIS COMMENT: NORMAL
LAB AP NON-GYN SPECIMEN ADEQUACY: NORMAL
Lab: NORMAL
Lab: NORMAL
PATH REPORT.ADDENDUM SPEC: NORMAL
PATH REPORT.FINAL DX SPEC: NORMAL
PATH REPORT.GROSS SPEC: NORMAL

## 2022-02-24 ENCOUNTER — TREATMENT (OUTPATIENT)
Dept: PHYSICAL THERAPY | Facility: CLINIC | Age: 77
End: 2022-02-24

## 2022-02-24 DIAGNOSIS — M75.01 ADHESIVE CAPSULITIS OF RIGHT SHOULDER: ICD-10-CM

## 2022-02-24 DIAGNOSIS — Z98.890 S/P SHOULDER SURGERY: ICD-10-CM

## 2022-02-24 DIAGNOSIS — Z98.890 S/P ARTHROSCOPY OF RIGHT SHOULDER: Primary | ICD-10-CM

## 2022-02-24 PROCEDURE — 97110 THERAPEUTIC EXERCISES: CPT | Performed by: PHYSICAL THERAPIST

## 2022-02-24 PROCEDURE — 97530 THERAPEUTIC ACTIVITIES: CPT | Performed by: PHYSICAL THERAPIST

## 2022-02-24 PROCEDURE — G0283 ELEC STIM OTHER THAN WOUND: HCPCS | Performed by: PHYSICAL THERAPIST

## 2022-02-24 NOTE — PROGRESS NOTES
Physical Therapy Daily Progress Note    Patient Name: Mami Srivastava         :  1945  Referring Physician: Chencho Craft MD      Subjective   Mami Srivastava reports: (B) shoulder soreness since last session    Objective   Pain / tender at anterior delto-pec region (B) / Pec tendon region (B)   Functonal ROM w/ PT activities -     See Exercise, Manual, and Modality Logs for complete treatment.     Functional / Therapeutic Activities:    · TAPING / BRACING:   · SEE EXERCISE FLOW SHEET -   · Jt protection, ADL modification; Posture and       Assessment/Plan  77 yo female; S/P 2021 (R) Shoulder 1.  Arthroscopic rotator cuff debridement 2.  Arthroscopic synovectomy and capsular release   3.  Arthroscopic biceps tenotomy  4.  Chondroplasty of glenoid   5.  Subacromial decompression and extensive bursectomy  Persistent pain and limited mobility, strength, and function, but improving w/ modified PT/HEP- Fewer instances of severe pain and improving mobility, strength and function - Persistent weakness, int pain and limitations w/ mobility especially up the back limiting function  -   Ms. Srivastava would benefit from continued Physical Therapy with progression to HEP.     Soreness most likely from Chest press last session / Pec tendon soreness -      Progress per Plan of Care     _________________________________________________     Manual Therapy:                 mins  10127;  Therapeutic Exercise:   28    mins  70372;     Neuromuscular Sana:        mins  79519;    Therapeutic Activity:     10      mins  26988;     Ultrasound:                          mins  91605;  Iontophoresis:                     mins  40479;    Electrical Stimulation:   15     mins  39753 ( );  Mechanical Traction:          mins  48536  Dry Needling                       mins self-pay     Timed Treatment:   38    mins                  Total Treatment:    65    mins      Tor Chase PT  Physical Therapist

## 2022-02-25 ENCOUNTER — TELEPHONE (OUTPATIENT)
Dept: GASTROENTEROLOGY | Facility: CLINIC | Age: 77
End: 2022-02-25

## 2022-02-25 NOTE — TELEPHONE ENCOUNTER
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  This means there is a 97% probability of benign disease over the next 3 years.  Please have her follow-up in 3 months we will discuss how we will continue to monitor the pancreatic cyst but overall these results are reassuring

## 2022-02-25 NOTE — TELEPHONE ENCOUNTER
Patient notified of results and recommendations and verbalized understanding    Scheduled 3month fu with Dr Lau

## 2022-03-01 ENCOUNTER — TELEPHONE (OUTPATIENT)
Dept: GASTROENTEROLOGY | Facility: CLINIC | Age: 77
End: 2022-03-01

## 2022-03-01 RX ORDER — LEVOTHYROXINE SODIUM 0.07 MG/1
75 TABLET ORAL DAILY
Qty: 90 TABLET | Refills: 1 | Status: SHIPPED | OUTPATIENT
Start: 2022-03-01 | End: 2022-08-25

## 2022-03-01 NOTE — TELEPHONE ENCOUNTER
Returned Evelyn's phone call.   After receiving the patient's report from [facility], medicare is requesting what the plan of care will be for the patient. Advised as per Dr. Lau's recommendations from 02/24/22. She verb understanding and will update Medicare for payment.

## 2022-03-02 ENCOUNTER — TELEPHONE (OUTPATIENT)
Dept: FAMILY MEDICINE CLINIC | Facility: CLINIC | Age: 77
End: 2022-03-02

## 2022-03-02 NOTE — TELEPHONE ENCOUNTER
Caller: Mami Srivastava    Relationship to patient: Self    Best call back number: [phone]    Patient is needing: PATIENT IS CALLING BACK TO [facility] DID RECEIVE PRESCRIPTION FOR levothyroxine (Synthroid) 75 MCG tablet

## 2022-03-03 ENCOUNTER — TREATMENT (OUTPATIENT)
Dept: PHYSICAL THERAPY | Facility: CLINIC | Age: 77
End: 2022-03-03

## 2022-03-03 DIAGNOSIS — M75.01 ADHESIVE CAPSULITIS OF RIGHT SHOULDER: ICD-10-CM

## 2022-03-03 DIAGNOSIS — Z98.890 S/P SHOULDER SURGERY: ICD-10-CM

## 2022-03-03 DIAGNOSIS — Z98.890 S/P ARTHROSCOPY OF RIGHT SHOULDER: Primary | ICD-10-CM

## 2022-03-03 PROCEDURE — 97110 THERAPEUTIC EXERCISES: CPT | Performed by: PHYSICAL THERAPIST

## 2022-03-03 PROCEDURE — 97530 THERAPEUTIC ACTIVITIES: CPT | Performed by: PHYSICAL THERAPIST

## 2022-03-03 PROCEDURE — G0283 ELEC STIM OTHER THAN WOUND: HCPCS | Performed by: PHYSICAL THERAPIST

## 2022-03-08 ENCOUNTER — TREATMENT (OUTPATIENT)
Dept: PHYSICAL THERAPY | Facility: CLINIC | Age: 77
End: 2022-03-08

## 2022-03-08 DIAGNOSIS — Z98.890 S/P ARTHROSCOPY OF RIGHT SHOULDER: Primary | ICD-10-CM

## 2022-03-08 DIAGNOSIS — Z98.890 S/P SHOULDER SURGERY: ICD-10-CM

## 2022-03-08 DIAGNOSIS — M75.01 ADHESIVE CAPSULITIS OF RIGHT SHOULDER: ICD-10-CM

## 2022-03-08 PROCEDURE — 97530 THERAPEUTIC ACTIVITIES: CPT | Performed by: PHYSICAL THERAPIST

## 2022-03-08 PROCEDURE — 97110 THERAPEUTIC EXERCISES: CPT | Performed by: PHYSICAL THERAPIST

## 2022-03-08 NOTE — PROGRESS NOTES
Physical Therapy Daily Progress Note  DISCHARGE    Patient Name: Mami Srivastava         :  1945  Referring Physician: Chencho Craft MD      Subjective   Mami Srivastava reports: anterior shoulder / delto-pec pain alleviated since last session - woke up sleeping on couch on (L) and her (R) shoulder felt great for some time -   Much improved function - limited w/ IRB and lifting items of wt and sleeping on (R) side -   Doing HEP regularly -     Objective   -deg; ERB WNL; IRB to L4-5 w/ pain  Strength ; Residual weakness/ pain w/ resisted ABDuction and Empty Can; Strong ER/IRS  (+) Empty can, but stronger -       See Exercise, Manual, and Modality Logs for complete treatment.     Functional / Therapeutic Activities:    · TAPING / BRACING: NA  · SEE EXERCISE FLOW SHEET -   · FUNCTIONAL ASSESSMENT   · Jt protection, ADL modification; Posture and      Assessment/Plan  77 yo female; S/P 2021 (R) Shoulder 1.  Arthroscopic rotator cuff debridement 2.  Arthroscopic synovectomy and capsular release   3.  Arthroscopic biceps tenotomy  4.  Chondroplasty of glenoid   5.  Subacromial decompression and extensive bursectomy  Persistent pain and limited mobility, strength, and function, but improved w/ modified PT/HEP- Fewer instances of severe pain and improving mobility, strength and function until a mild increase in resistance in TE/TA apparently resulted in increased anterior shoulder / delto-pec region - increased stretching into IRB also results in increased pain as well -   - Persistent weakness, int pain and limitations w/ mobility especially up the back limiting function  - Doing well w/ HEP and she would benefit from continuing w/ her HEP DC formal PT at her next PT appt as she has apparently reached a plateau - and any increase in stretching or resistance/ different exercises appear to increase her pain and reduce her function -   Mami would benefit from continuing her HEP and  discontinue formal Physical Therapy -     DC PT TO HEP - STGs Met;  LTGs Partially met - Pt to contact MD if symptoms worsen -        _________________________________________________    Manual Therapy:                 mins  13661;  Therapeutic Exercise:   25    mins  98343;     Neuromuscular Sana:        mins  55797;    Therapeutic Activity:     20      mins  44968;     Ultrasound:                          mins  20294;  Iontophoresis:                     mins  03338;    Electrical Stimulation:        mins  93182 ( );  Mechanical Traction:          mins  58565  Dry Needling                       mins self-pay     Timed Treatment:   45    mins                  Total Treatment:   60    mins      Tor Chase, PT  Physical Therapist

## 2022-03-08 NOTE — PROGRESS NOTES
Physical Therapy Daily Progress Note    Patient Name: Mami Srivastava         :  1945  Referring Physician: Chencho Craft MD      Subjective   Mami Srivastava reports: continued pain anterior shoulder/delto-pec region (B) for the last week or so - due to increased activities in PT? - Wants to no do the seated rows and press exercises - noted increased pain with BTE with slightly increased resistance, but no pain when resistance lowered to previous levels - IRB limited, but too painful to stretch even gently -  Doing HEP regularly -     Objective   Pt demonstrated good AROM w/ PT activities and did better w/ lighter resistance -   Discussed w/ Mami the she would benefit from continuing w/ her HEP after her next appt and DC formal PT at that time as she has apparently reached a plateau - and any increase in stretching or resistance/ different exercises appear to increase her pain and reduce her function -     See Exercise, Manual, and Modality Logs for complete treatment.     Functional / Therapeutic Activities:    · TAPING / BRACING: NA  · SEE EXERCISE FLOW SHEET -   · Discussed w/ Mami the she would benefit from continuing w/ her HEP after her next appt and DC formal PT at that time as she has apparently reached a plateau - and any increase in stretching or resistance/ different exercises appear to increase her pain and reduce her function -   · Jt protection, ADL modification; Posture and      Assessment/Plan  75 yo female; S/P 2021 (R) Shoulder 1.  Arthroscopic rotator cuff debridement 2.  Arthroscopic synovectomy and capsular release   3.  Arthroscopic biceps tenotomy  4.  Chondroplasty of glenoid   5.  Subacromial decompression and extensive bursectomy  Persistent pain and limited mobility, strength, and function, but improved w/ modified PT/HEP- Fewer instances of severe pain and improving mobility, strength and function until a mild increase in resistance in TE/TA  apparently resulted in increased anterior shoulder / delto-pec region - increased stretching into IRB also results in increased pain as well -   - Persistent weakness, int pain and limitations w/ mobility especially up the back limiting function  - Doing well w/ HEP and she would benefit from continuing w/ her HEP DC formal PT at her next PT appt as she has apparently reached a plateau - and any increase in stretching or resistance/ different exercises appear to increase her pain and reduce her function -       Progress per Plan of Care and Anticipate DC next Visit       _________________________________________________    Manual Therapy:                 mins  55830;  Therapeutic Exercise:   28    mins  93989;     Neuromuscular Sana:        mins  74118;    Therapeutic Activity:     15      mins  12318;     Ultrasound:                          mins  59589;  Iontophoresis:                     mins  33145;    Electrical Stimulation:   15     mins  67478 ( );  Mechanical Traction:          mins  85871  Dry Needling                       mins self-pay     Timed Treatment:   43    mins                  Total Treatment:    70    mins      Tor Chase, PT  Physical Therapist

## 2022-03-21 ENCOUNTER — OFFICE VISIT (OUTPATIENT)
Dept: ORTHOPEDIC SURGERY | Facility: CLINIC | Age: 77
End: 2022-03-21

## 2022-03-21 VITALS — WEIGHT: 140 LBS | TEMPERATURE: 97.4 F | BODY MASS INDEX: 23.9 KG/M2 | HEIGHT: 64 IN

## 2022-03-21 DIAGNOSIS — M19.019 ARTHRITIS OF SHOULDER: Primary | ICD-10-CM

## 2022-03-21 PROCEDURE — 99213 OFFICE O/P EST LOW 20 MIN: CPT | Performed by: ORTHOPAEDIC SURGERY

## 2022-03-21 NOTE — PROGRESS NOTES
CC:  Right shoulder pain    Ms. Srivastava follows up for her right shoulder.  It is about the same as last visit.  It is better than before surgery but not 100%.  She reports functional but not full motion.  She has a mild constant pain in the shoulder which varies but is typically around 2 out of 10 or so.    I showed her the arthroscopy photos and we had a long discussion about her options.  We talked about the fact that she is likely headed towards an arthroplasty in the future.  We discussed the rehab and expected recovery from that.  We also discussed the option of an intra-articular injection.  She is not interested in getting an injection today and wants to wait.  I told her to notify me whenever she feels like she needs the injection and I will be happy to work her in.  For now, she will follow up with me as needed.    Chencho Craft MD

## 2022-05-15 ENCOUNTER — APPOINTMENT (OUTPATIENT)
Dept: CT IMAGING | Facility: HOSPITAL | Age: 77
End: 2022-05-15

## 2022-05-15 ENCOUNTER — HOSPITAL ENCOUNTER (EMERGENCY)
Facility: HOSPITAL | Age: 77
Discharge: HOME | End: 2022-05-15
Attending: EMERGENCY MEDICINE | Admitting: EMERGENCY MEDICINE

## 2022-05-15 VITALS
HEART RATE: 72 BPM | SYSTOLIC BLOOD PRESSURE: 147 MMHG | TEMPERATURE: 97.1 F | RESPIRATION RATE: 16 BRPM | DIASTOLIC BLOOD PRESSURE: 68 MMHG | WEIGHT: 142 LBS | HEIGHT: 64 IN | BODY MASS INDEX: 24.24 KG/M2 | OXYGEN SATURATION: 98 %

## 2022-05-15 DIAGNOSIS — S01.81XA LACERATION OF FOREHEAD, INITIAL ENCOUNTER: ICD-10-CM

## 2022-05-15 DIAGNOSIS — S09.90XA CLOSED HEAD INJURY, INITIAL ENCOUNTER: Primary | ICD-10-CM

## 2022-05-15 PROCEDURE — 99282 EMERGENCY DEPT VISIT SF MDM: CPT

## 2022-05-15 PROCEDURE — 25010000002 TETANUS-DIPHTH-ACELL PERTUSSIS 5-2.5-18.5 LF-MCG/0.5 SUSPENSION PREFILLED SYRINGE: Performed by: EMERGENCY MEDICINE

## 2022-05-15 PROCEDURE — 70450 CT HEAD/BRAIN W/O DYE: CPT

## 2022-05-15 PROCEDURE — 90471 IMMUNIZATION ADMIN: CPT | Performed by: EMERGENCY MEDICINE

## 2022-05-15 PROCEDURE — 90715 TDAP VACCINE 7 YRS/> IM: CPT | Performed by: EMERGENCY MEDICINE

## 2022-05-15 RX ORDER — LIDOCAINE HYDROCHLORIDE AND EPINEPHRINE 10; 10 MG/ML; UG/ML
10 INJECTION, SOLUTION INFILTRATION; PERINEURAL ONCE
Status: COMPLETED | OUTPATIENT
Start: 2022-05-15 | End: 2022-05-15

## 2022-05-15 RX ADMIN — LIDOCAINE HYDROCHLORIDE,EPINEPHRINE BITARTRATE 10 ML: 10; .01 INJECTION, SOLUTION INFILTRATION; PERINEURAL at 11:03

## 2022-05-15 RX ADMIN — TETANUS TOXOID, REDUCED DIPHTHERIA TOXOID AND ACELLULAR PERTUSSIS VACCINE, ADSORBED 0.5 ML: 5; 2.5; 8; 8; 2.5 SUSPENSION INTRAMUSCULAR at 11:02

## 2022-05-15 NOTE — DISCHARGE INSTRUCTIONS
Suture removal in 7 to 10 days by your PCP.  Local wound care every day with topical antibiotic ointment

## 2022-05-15 NOTE — ED PROVIDER NOTES
EMERGENCY DEPARTMENT ENCOUNTER    Room Number:  40/40  Date of encounter:  5/15/2022  PCP: Pradip Rosenberg MD  Historian: Patient      HPI:  Chief Complaint: Head injury  A complete HPI/ROS/PMH/PSH/SH/FH are unobtainable due to: Nothing    Context: Mami Srivastava is a 76 y.o. female who presents to the ED c/o head injury that was sustained when she fell out of bed this morning.  Patient remembers that she was having a bad dream when she tossed out of bed and hit her head on the nightstand.  She has a laceration to her forehead and a moderate diffuse headache.  There is no neck pain, no dizziness, no nausea vomiting and she is not anticoagulated.  She is not sure if she lost consciousness afterwards because she was asleep when it happened.      PAST MEDICAL HISTORY  Active Ambulatory Problems     Diagnosis Date Noted   • S/P thyroidectomy 10/27/2017   • Mixed hyperlipidemia 10/27/2017   • Vitamin D deficiency 10/27/2017   • Hypothyroidism 10/27/2017   • Osteopenia of multiple sites 10/27/2017   • Hypertensive left ventricular hypertrophy, without heart failure 10/27/2017   • Chronic seasonal allergic rhinitis 10/27/2017   • History of heart disease 10/27/2017   • Hx of abnormal mammogram 10/27/2017   • Nodule of left lung 10/27/2017   • Hypertension 04/27/2018   • Hyperglycemia 08/05/2018   • Current chronic use of systemic steroids 08/05/2018   • Autoimmune hepatitis (HCC) 09/13/2018   • Diverticulitis 10/26/2018   • Cholelithiasis 10/26/2018   • Irritable bowel syndrome with diarrhea 11/06/2018   • Urinary frequency 11/20/2018   • Pedal edema 08/22/2019   • Hiatal hernia 11/06/2019   • Liver hemangioma 11/06/2019   • Cystitis 07/12/2020   • C. difficile colitis 09/19/2020   • Bacterial UTI 05/12/2021   • Calcific coronary arteriosclerosis 07/28/2021   • Diastolic dysfunction 10/05/2015   • Dyslipidemia 10/05/2015   • Right shoulder pain 09/21/2021   • Pancreatic cyst 01/11/2022   • Urticaria 01/31/2022      Resolved Ambulatory Problems     Diagnosis Date Noted   • Impaired fasting glucose 10/27/2017   • History of diverticulosis 10/27/2017   • Diverticulitis of large intestine without perforation or abscess without bleeding 2018   • KEVIN (acute kidney injury) (HCC) 2018   • Diarrhea 10/26/2018   • Acute bronchitis 2019     Past Medical History:   Diagnosis Date   • Arthritis    • Coronary artery disease    • Diverticulitis of colon ,   • Hemangioma of liver    • Hernia    • Hyperlipidemia    • Hyperthyroidism    • Migraines    • Osteopenia after menopause    • PONV (postoperative nausea and vomiting)    • Seasonal allergies    • Skin cancer    • Ulcerative colitis (HCC)    • UTI (urinary tract infection)          PAST SURGICAL HISTORY  Past Surgical History:   Procedure Laterality Date   • BUNIONECTOMY     • COLONOSCOPY  2014    Diverticulosis in the sigmoid colon, in the descending colon, in the transverse colon and in the ascending colon (Pathology: Collagenous colitis)   • COLONOSCOPY  2005    Hemorrhoids, pandiverticulosis, status post diverticulitis   • COLONOSCOPY N/A 2018    fair prep, tics   • LIVER BIOPSY     • SHOULDER ARTHROSCOPY W/ ROTATOR CUFF REPAIR Right 2021    Procedure: SHOULDER ARTHROSCOPY WITH ROTATOR CUFF DEBRIDEMENT; SUBACROMIAL DECOMPRESSION; SYNOVECTOMY;  Surgeon: Chencho Craft MD;  Location: [facility] OR [facility] [place];  Service: Orthopedics;  Laterality: Right;   • THYROIDECTOMY           FAMILY HISTORY  Family History   Problem Relation Age of Onset   • Heart disease Mother    • Hypertension Mother    • Lung cancer Sister    • Thyroid disease Sister    • Lupus Sister    • Thyroid disease Brother    • Alcohol abuse Brother    • Glaucoma Maternal Grandmother    • Stomach cancer Maternal Grandmother          ’s   • Cirrhosis Father             • Liver disease Father    • Malig Hyperthermia Neg Hx          SOCIAL HISTORY  Social  History     Socioeconomic History   • Marital status:    Tobacco Use   • Smoking status: Former Smoker     Packs/day: 2.00     Years: 12.00     Pack years: 24.00     Start date: 1963     Quit date: 1976     Years since quittin.3   • Smokeless tobacco: Never Used   Vaping Use   • Vaping Use: Never used   Substance and Sexual Activity   • Alcohol use: Not Currently     Comment: none in 3 years   • Drug use: No   • Sexual activity: Not Currently         ALLERGIES  Imuran [azathioprine], Bactrim [sulfamethoxazole-trimethoprim], Clindamycin/lincomycin, Augmentin [amoxicillin-pot clavulanate], and Keflex [cephalexin]        REVIEW OF SYSTEMS  Review of Systems     All systems reviewed and negative except for those discussed in HPI.       PHYSICAL EXAM    I have reviewed the triage vital signs and nursing notes.    ED Triage Vitals [05/15/22 0744]   Temp Heart Rate Resp BP SpO2   97.1 °F (36.2 °C) 98 16 -- 98 %      Temp src Heart Rate Source Patient Position BP Location FiO2 (%)   -- -- -- -- --       Physical Exam  GENERAL: Awake alert and oriented  HENT: nares patent, approximately 3 cm laceration to the left side of forehead.  Neck nontender to palpation, PERRL, EOMI  EYES: no scleral icterus  CV: regular rhythm, regular rate  RESPIRATORY: normal effort  ABDOMEN: soft  MUSCULOSKELETAL: no deformity  NEURO: alert, moves all extremities, follows commands, GCS 15 with no focal deficits  SKIN: warm, dry        LAB RESULTS  No results found for this or any previous visit (from the past 24 hour(s)).    Ordered the above labs and independently reviewed the results.        RADIOLOGY  CT Head Without Contrast    Result Date: 5/15/2022  CT HEAD WO CONTRAST-  CLINICAL HISTORY: Patient fell. Head trauma. Left-sided head laceration.  TECHNIQUE: Multiple axial 3 mm thick images were obtained from the base of the skull to the vertex without IV contrast.  Radiation dose reduction techniques were utilized,  including automated exposure control and exposure modulation based on body size.  COMPARISON: None  FINDINGS: The brain and ventricular system appear structurally within normal limits for a patient of this age. There is no evidence of recent or old infarct or intracranial hemorrhage. There is no mass effect. Bone window images demonstrate no evidence of skull fracture. Moderate lobular mucosal thickening and/or fluid is partially imaged in the posterior aspect of the right maxillary sinus. This contains calcification indicating chronicity. The visualized paranasal sinuses are otherwise well aerated.      Negative CT scan of the head except for maxillary sinus disease as noted.  This report was finalized on 5/15/2022 10:19 AM by Dr. Cruz West M.D.        I ordered the above noted radiological studies. Reviewed by me and discussed with radiologist.  See dictation for official radiology interpretation.      PROCEDURES    Laceration Repair    Date/Time: 5/15/2022 9:11 AM  Performed by: Paulino Han MD  Authorized by: Paulino Han MD     Consent:     Consent obtained:  Verbal    Consent given by:  Patient    Risks, benefits, and alternatives were discussed: yes    Universal protocol:     Patient identity confirmed:  Verbally with patient  Anesthesia:     Anesthesia method:  Topical application and local infiltration    Local anesthetic:  Lidocaine 1% WITH epi  Laceration details:     Location:  Face    Face location:  Forehead    Length (cm):  3  Pre-procedure details:     Preparation:  Patient was prepped and draped in usual sterile fashion and imaging obtained to evaluate for foreign bodies  Exploration:     Limited defect created (wound extended): no      Hemostasis achieved with:  Epinephrine and direct pressure    Imaging outcome: foreign body not noted      Wound exploration: wound explored through full range of motion      Wound extent: areolar tissue violated and muscle damage      Contaminated: no     Treatment:     Area cleansed with:  Chlorhexidine and saline    Amount of cleaning:  Standard    Irrigation solution:  Sterile water    Irrigation method:  Syringe    Visualized foreign bodies/material removed: no      Debridement:  Minimal    Undermining:  Minimal    Scar revision: no      Layers/structures repaired:  Muscle fascia  Muscle fascia:     Suture size:  5-0    Suture material:  Vicryl    Suture technique:  Simple interrupted    Number of sutures:  2  Skin repair:     Repair method:  Sutures    Suture size:  6-0    Suture material:  Nylon    Suture technique:  Simple interrupted    Number of sutures:  5  Approximation:     Approximation:  Close  Repair type:     Repair type:  Complex  Post-procedure details:     Dressing:  Antibiotic ointment    Procedure completion:  Tolerated          MEDICATIONS GIVEN IN ER    Medications   lidocaine 1% - EPINEPHrine 1:600584 (XYLOCAINE W/EPI) 1 %-1:786407 injection 10 mL (10 mL Injection Given by Other 5/15/22 1103)   Tetanus-Diphth-Acell Pertussis (BOOSTRIX) injection 0.5 mL (0.5 mL Intramuscular Given 5/15/22 1102)         PROGRESS, DATA ANALYSIS, CONSULTS, AND MEDICAL DECISION MAKING    All labs have been independently reviewed by me.  All radiology studies have been reviewed by me and discussed with radiologist dictating the report.   EKG's independently viewed and interpreted by me.  Discussion below represents my analysis of pertinent findings related to patient's condition, differential diagnosis, treatment plan and final disposition.        ED Course as of 05/15/22 1500   Sun May 15, 2022   1459 CT scan negative acute [DP]   1459 2 layer closure on the forehead, patient safe for discharge home with head injury precautions [DP]      ED Course User Index  [DP] Paulino Han MD           PPE: The patient wore a surgical mask throughout the entire patient encounter. I wore an N95.    AS OF 15:00 EDT VITALS:    BP - 147/68  HR - 72  TEMP - 97.1 °F (36.2  °C)  O2 SATS - 98%        DIAGNOSIS  Final diagnoses:   Closed head injury, initial encounter   Laceration of forehead, initial encounter         DISPOSITION  Discharge           Paulino Han MD  05/15/22 1500

## 2022-05-15 NOTE — ED NOTES
Patient from home via PV; patient states she had a nightmare, fell off the side of her bed and hit her head on a nightstand. Patient has laceration to left side of her forehead. No blood thinners. A&Ox4 ambulatory without assistance.

## 2022-05-17 ENCOUNTER — PATIENT OUTREACH (OUTPATIENT)
Dept: CASE MANAGEMENT | Facility: OTHER | Age: 77
End: 2022-05-17

## 2022-05-18 ENCOUNTER — OFFICE VISIT (OUTPATIENT)
Dept: GASTROENTEROLOGY | Facility: CLINIC | Age: 77
End: 2022-05-18

## 2022-05-18 VITALS
HEIGHT: 64 IN | TEMPERATURE: 97.3 F | BODY MASS INDEX: 24.43 KG/M2 | SYSTOLIC BLOOD PRESSURE: 126 MMHG | WEIGHT: 143.1 LBS | DIASTOLIC BLOOD PRESSURE: 71 MMHG

## 2022-05-18 DIAGNOSIS — K86.2 PANCREATIC CYST: Primary | ICD-10-CM

## 2022-05-18 PROCEDURE — 99213 OFFICE O/P EST LOW 20 MIN: CPT | Performed by: INTERNAL MEDICINE

## 2022-05-18 NOTE — PROGRESS NOTES
Chief Complaint   Patient presents with   • autoimmune hepatitis     Subjective     HPI  Mami Srivastava is a 76 y.o. female who presents today for office follow up. She has hx of cystic lesion of pancreas which has some some interval increase in size on ultrasoudn imaging.  This prompted recent EUS with FNA.      EUS 2/2022 - 92d72sp unilocular cyst in pancreatic body with apparent communication with main PD.  Cyst fluid analysis as follows:  CEA: 17  Amylase: 1264  Cytology hypocellular with no mucin  KRAS high clonality mutation  GNAS no mutation  No MICHELLE    Final PancraGEN report - Statistically indolent (97% probability of benign disease over next 3 years)    She remains asymptomatic.      Objective   Vitals:    05/18/22 0958   BP: 126/71   Temp: 97.3 °F (36.3 °C)       Physical Exam  Vitals reviewed.   Constitutional:       Appearance: She is well-developed.   HENT:      Head: Normocephalic and atraumatic.   Neurological:      Mental Status: She is alert and oriented to person, place, and time.   Psychiatric:         Behavior: Behavior normal.         Thought Content: Thought content normal.         Judgment: Judgment normal.       The following data was reviewed by: Sacha Lau MD on 05/18/2022:  Common labs    Common Labsle 11/3/21 11/3/21 1/4/22 1/4/22    1001 1002 1140 1140   Glucose 123 (A)  106 (A)    BUN 15  11    Creatinine 0.69  0.86    eGFR Non  Am 83  66    eGFR African Am   76    Sodium 140  140    Potassium 4.6  4.2    Chloride 102  100    Calcium 9.8  9.3    Total Protein   6.5    Albumin   4.3    Total Bilirubin   0.8    Alkaline Phosphatase   86    AST (SGOT)   19    ALT (SGPT)   14    WBC  7.18  8.1   Hemoglobin  12.3  12.2   Hematocrit  37.5  35.4   Platelets  257  226   (A) Abnormal value       Comments are available for some flowsheets but are not being displayed.           Data reviewed: Radiologic studies US liver from 2021   EUS and path report as summarized above      Assessment & Plan   Assessment:     1. Pancreatic cyst      Plan:   76-year-old lady with cystic lesion of the pancreatic body with recent EUS FNA demonstrating cytologic and molecular features suggesting this is a statistically indolent lesion.  Given that there has been some persistent growth of the cyst over the last year we will continue with close radiographic surveillance however and would recommend a MRI MRCP in August of this year.            Sacha Lau M.D.  [facility]  [address] [place] [address]  Office: [phone]

## 2022-05-23 ENCOUNTER — OFFICE VISIT (OUTPATIENT)
Dept: FAMILY MEDICINE CLINIC | Facility: CLINIC | Age: 77
End: 2022-05-23

## 2022-05-23 VITALS
RESPIRATION RATE: 18 BRPM | OXYGEN SATURATION: 98 % | BODY MASS INDEX: 24.41 KG/M2 | TEMPERATURE: 96.4 F | DIASTOLIC BLOOD PRESSURE: 65 MMHG | WEIGHT: 143 LBS | HEIGHT: 64 IN | SYSTOLIC BLOOD PRESSURE: 122 MMHG | HEART RATE: 88 BPM

## 2022-05-23 DIAGNOSIS — R73.09 ABNORMAL GLUCOSE: Primary | ICD-10-CM

## 2022-05-23 DIAGNOSIS — M62.89 MUSCLE STIFFNESS: ICD-10-CM

## 2022-05-23 DIAGNOSIS — Z48.02 VISIT FOR SUTURE REMOVAL: ICD-10-CM

## 2022-05-23 DIAGNOSIS — E78.2 MIXED HYPERLIPIDEMIA: ICD-10-CM

## 2022-05-23 DIAGNOSIS — I10 ESSENTIAL HYPERTENSION: ICD-10-CM

## 2022-05-23 PROCEDURE — 99213 OFFICE O/P EST LOW 20 MIN: CPT | Performed by: STUDENT IN AN ORGANIZED HEALTH CARE EDUCATION/TRAINING PROGRAM

## 2022-05-23 NOTE — PROGRESS NOTES
"Chief Complaint  Hospital Follow Up Visit (ER BHE HAD CT SCAN)    Subjective          Mami Srivastava presents to [facility]  For suture removal (5 ) on left side of the forehead.  Patient also complaining of muscle tightening and is on Crestor 10 mg p.o. daily.  Also complaining of headache but CT scan was done in the ER 7 days ago and negative for any acute pathology.  Review of system is negative for fever,chest pain, shortness of breath, palpitation, nausea, vomiting, any recent change in bladder habits.        Objective   Vital Signs:  /65 (BP Location: Left arm, Patient Position: Sitting)   Pulse 88   Temp 96.4 °F (35.8 °C) (Oral)   Resp 18   Ht 162.6 cm (64.02\")   Wt 64.9 kg (143 lb)   SpO2 98%   BMI 24.53 kg/m²   BMI is within normal parameters. No other follow-up for BMI required.      Physical Exam  HENT:      Mouth/Throat:      Mouth: Mucous membranes are moist.      Pharynx: Oropharynx is clear.   Eyes:      Extraocular Movements: Extraocular movements intact.      Conjunctiva/sclera: Conjunctivae normal.      Pupils: Pupils are equal, round, and reactive to light.   Cardiovascular:      Rate and Rhythm: Normal rate and regular rhythm.   Pulmonary:      Effort: Pulmonary effort is normal.      Breath sounds: Normal breath sounds.   Abdominal:      General: Bowel sounds are normal.      Palpations: Abdomen is soft.   Musculoskeletal:         General: Normal range of motion.      Cervical back: Neck supple.   Skin:     General: Skin is warm.      Capillary Refill: Capillary refill takes less than 2 seconds.      Comments: Redness on the skin on the forehead is present, five sutures present   Neurological:      General: No focal deficit present.      Mental Status: She is alert and oriented to person, place, and time. Mental status is at baseline.   Psychiatric:         Mood and Affect: Mood normal.        Result Review :                 Assessment and Plan  "   Diagnoses and all orders for this visit:    1. Abnormal glucose (Primary)  -     Hemoglobin A1c    2. Mixed hyperlipidemia  Comments:  Decreased dose of Crestor 5 mg p.o. daily, ordered LFTs, electrolytes to follow-up  Orders:  -     Basic Metabolic Panel  -     Lipid Panel With / Chol / HDL Ratio  -     Hepatic Function Panel    3. Essential hypertension  -     Basic Metabolic Panel    4. Visit for suture removal  Comments:  Have sutures removed and apply topical antibacterial cream.  Advised patient to keep it clean and apply mupirocin cream over it    5. Muscle stiffness  Comments:  Most probably due to electrolyte imbalance or patient is on statins will check electrolytes and liver function test  Orders:  -     CK             Follow Up   Return if symptoms worsen or fail to improve.  Patient was given instructions and counseling regarding her condition or for health maintenance advice. Please see specific information pulled into the AVS if appropriate.

## 2022-05-25 LAB
ALBUMIN SERPL-MCNC: 4.3 G/DL (ref 3.7–4.7)
ALP SERPL-CCNC: 86 IU/L (ref 44–121)
ALT SERPL-CCNC: 11 IU/L (ref 0–32)
AST SERPL-CCNC: 20 IU/L (ref 0–40)
BILIRUB DIRECT SERPL-MCNC: 0.23 MG/DL (ref 0–0.4)
BILIRUB SERPL-MCNC: 0.9 MG/DL (ref 0–1.2)
BUN SERPL-MCNC: 18 MG/DL (ref 8–27)
BUN/CREAT SERPL: 21 (ref 12–28)
CALCIUM SERPL-MCNC: 9.7 MG/DL (ref 8.7–10.3)
CHLORIDE SERPL-SCNC: 102 MMOL/L (ref 96–106)
CHOLEST SERPL-MCNC: 129 MG/DL (ref 100–199)
CHOLEST/HDLC SERPL: 3.2 RATIO (ref 0–4.4)
CK SERPL-CCNC: 46 U/L (ref 32–182)
CO2 SERPL-SCNC: 24 MMOL/L (ref 20–29)
CREAT SERPL-MCNC: 0.87 MG/DL (ref 0.57–1)
EGFRCR SERPLBLD CKD-EPI 2021: 69 ML/MIN/1.73
GLUCOSE SERPL-MCNC: 118 MG/DL (ref 65–99)
HBA1C MFR BLD: 6.6 % (ref 4.8–5.6)
HDLC SERPL-MCNC: 40 MG/DL
LDLC SERPL CALC-MCNC: 64 MG/DL (ref 0–99)
POTASSIUM SERPL-SCNC: 4.8 MMOL/L (ref 3.5–5.2)
PROT SERPL-MCNC: 6.7 G/DL (ref 6–8.5)
SODIUM SERPL-SCNC: 140 MMOL/L (ref 134–144)
TRIGL SERPL-MCNC: 146 MG/DL (ref 0–149)
VLDLC SERPL CALC-MCNC: 25 MG/DL (ref 5–40)

## 2022-05-27 ENCOUNTER — APPOINTMENT (OUTPATIENT)
Dept: WOMENS IMAGING | Facility: HOSPITAL | Age: 77
End: 2022-05-27

## 2022-05-27 PROCEDURE — 77067 SCR MAMMO BI INCL CAD: CPT | Performed by: RADIOLOGY

## 2022-05-27 PROCEDURE — 77063 BREAST TOMOSYNTHESIS BI: CPT | Performed by: RADIOLOGY

## 2022-06-09 ENCOUNTER — PATIENT OUTREACH (OUTPATIENT)
Dept: CASE MANAGEMENT | Facility: OTHER | Age: 77
End: 2022-06-09

## 2022-06-09 NOTE — OUTREACH NOTE
Patient Outreach    MSW sent patient message via [employer] due to unable to reach via telephone on three attempts.    CHUYITA MARTIN -   Ambulatory Case Management    6/9/2022, 13:51 EDT

## 2022-06-20 ENCOUNTER — OFFICE VISIT (OUTPATIENT)
Dept: FAMILY MEDICINE CLINIC | Facility: CLINIC | Age: 77
End: 2022-06-20

## 2022-06-20 VITALS
SYSTOLIC BLOOD PRESSURE: 132 MMHG | HEART RATE: 103 BPM | OXYGEN SATURATION: 96 % | HEIGHT: 64 IN | RESPIRATION RATE: 18 BRPM | BODY MASS INDEX: 24.52 KG/M2 | DIASTOLIC BLOOD PRESSURE: 69 MMHG | WEIGHT: 143.6 LBS | TEMPERATURE: 98 F

## 2022-06-20 DIAGNOSIS — G44.329 CHRONIC POST-TRAUMATIC HEADACHE, NOT INTRACTABLE: ICD-10-CM

## 2022-06-20 DIAGNOSIS — I10 PRIMARY HYPERTENSION: Primary | ICD-10-CM

## 2022-06-20 PROCEDURE — 99214 OFFICE O/P EST MOD 30 MIN: CPT | Performed by: INTERNAL MEDICINE

## 2022-06-21 PROBLEM — G44.309 POSTTRAUMATIC HEADACHE: Status: ACTIVE | Noted: 2022-06-21

## 2022-06-21 NOTE — PROGRESS NOTES
Subjective   Mami Srivastava is a 76 y.o. female. Patient is here today for   Chief Complaint   Patient presents with   • Fall     Pressure from past fall          Vitals:    22 1554   BP: 132/69   Pulse: 103   Resp: 18   Temp: 98 °F (36.7 °C)   SpO2: 96%     Body mass index is 24.64 kg/m².      Past Medical History:   Diagnosis Date   • Arthritis    • Autoimmune hepatitis (HCC)    • C. difficile colitis 2020   • Cholelithiasis 2018    Ultrasound   • Coronary artery disease     Dr Luis Rick   • Diverticulitis of colon ,   • Hemangioma of liver    • Hernia     Hiatal hernia-Prabhjot test   • Hyperlipidemia     Dr uLis Rick-atorvastatin   • Hypertension    • Hyperthyroidism    • Hypothyroidism    • Migraines    • Osteopenia after menopause    • PONV (postoperative nausea and vomiting)    • Right shoulder pain    • Seasonal allergies    • Skin cancer     face/ BASAL    • Ulcerative colitis (HCC)     Colitis/DrKaplan   • UTI (urinary tract infection)     HISTORY/OF      Allergies   Allergen Reactions   • Imuran [Azathioprine] GI Intolerance   • Bactrim [Sulfamethoxazole-Trimethoprim] Other (See Comments)     fatigue   • Clindamycin/Lincomycin Diarrhea   • Augmentin [Amoxicillin-Pot Clavulanate] Hives   • Keflex [Cephalexin] Hives      Social History     Socioeconomic History   • Marital status:    Tobacco Use   • Smoking status: Former Smoker     Packs/day: 2.00     Years: 12.00     Pack years: 24.00     Start date: 1963     Quit date: 1976     Years since quittin.4   • Smokeless tobacco: Never Used   Vaping Use   • Vaping Use: Never used   Substance and Sexual Activity   • Alcohol use: Not Currently     Comment: none in 3 years   • Drug use: No   • Sexual activity: Not Currently        Current Outpatient Medications:   •  amLODIPine (NORVASC) 5 MG tablet, Take 1 tablet by mouth Daily., Disp: 90 tablet, Rfl: 3  •  aspirin 81 MG EC tablet, Take 81 mg by mouth  Daily. HOLDING FOR 7 DAYS PRIOR TO OR, Disp: , Rfl:   •  cetirizine (zyrTEC) 10 MG tablet, Take 10 mg by mouth As Needed., Disp: , Rfl:   •  Cholecalciferol (VITAMIN D3) 5000 units capsule capsule, Take 5,000 Units by mouth 3 (Three) Times a Week., Disp: , Rfl:   •  levothyroxine (Synthroid) 75 MCG tablet, Take 1 tablet by mouth Daily., Disp: 90 tablet, Rfl: 1  •  lisinopril (PRINIVIL,ZESTRIL) 20 MG tablet, Take 1 tablet by mouth Daily., Disp: 30 tablet, Rfl: 11  •  meloxicam (MOBIC) 15 MG tablet, Take 1 tablet by mouth Daily As Needed for Moderate Pain . Take as directed with food., Disp: 30 tablet, Rfl: 2  •  rosuvastatin (CRESTOR) 10 MG tablet, Take 1 tablet by mouth Daily., Disp: 30 tablet, Rfl: 11  •  traMADol (ULTRAM) 50 MG tablet, Take 1 tablet by mouth Every 4 (Four) Hours As Needed for Moderate Pain ., Disp: 60 tablet, Rfl: 0     Objective     She fell at home and had a laceration on her left frontal scalp.  This is healed well.  Occasionally hurts for a few seconds at a time before it fades away.  She has a little bit of pain in this area for a minute or less several times a day since she fell at home and suffered this laceration.    She went to the emergency room CT scan of her head was unremarkable.  She presented to my colleague Dr. Rigoberto noel about a week later.        Fall  Associated symptoms include headaches.        Review of Systems   Constitutional: Negative.    Respiratory: Negative.    Cardiovascular: Negative.    Neurological: Positive for headaches.       Physical Exam  Vitals and nursing note reviewed.   Constitutional:       Appearance: Normal appearance.      Comments: Pleasant, neatly groomed, no distress.   HENT:      Head:      Comments: She has a healed laceration on her left forehead.  There is a little bit of pain on palpation.    There is no induration, no erythema.      Cardiovascular:      Rate and Rhythm: Regular rhythm.      Heart sounds: Normal heart sounds. No murmur  heard.    No gallop.   Neurological:      Mental Status: She is alert and oriented to person, place, and time.   Psychiatric:         Mood and Affect: Mood normal.         Behavior: Behavior normal.         Thought Content: Thought content normal.           Problems Addressed this Visit        Cardiac and Vasculature    Hypertension - Primary       Neuro    Posttraumatic headache      Diagnoses       Codes Comments    Primary hypertension    -  Primary ICD-10-CM: I10  ICD-9-CM: 401.9     Chronic post-traumatic headache, not intractable     ICD-10-CM: G44.329  ICD-9-CM: 339.22             PLAN  Posttraumatic headache    I asked her to try ibuprofen as needed for her headache.    I think the etiology is posttraumatic.    Evaluation in the emergency room last month was thorough and included a CT head which was normal.    If her headaches fail to resolve or get worse, she will let me know.  In that case, I would send her to neurology.    Of asked her to follow-up and annual wellness visit in about 4 months.  Fasting labs prior to that visit should include: Lipid profile, comprehensive metabolic panel, CBC, urinalysis, vitamin D screen for vitamin D deficiency.  No follow-ups on file.  Answers for HPI/ROS submitted by the patient on 6/16/2022  Please describe your symptoms.: On May 15, I was treated at [facility] ER for laceration on my forehead and CT performed.  , I am still having some discomfort on that side of my head…away from the laceration.  Have you had these symptoms before?: Yes  How long have you been having these symptoms?: Greater than 2 weeks  Please describe any probable cause for these symptoms. : Not sure….nerve damage?  What is the primary reason for your visit?: Other

## 2022-06-24 ENCOUNTER — PATIENT OUTREACH (OUTPATIENT)
Dept: CASE MANAGEMENT | Facility: OTHER | Age: 77
End: 2022-06-24

## 2022-06-24 NOTE — OUTREACH NOTE
Care Coordination    MSW unable to reach patient on three phone call attempts and my chart message. MSW to sign off, but available if needed in the future.     CHUYITA MARTIN -   Ambulatory Case Management    6/24/2022, 11:51 EDT

## 2022-08-08 ENCOUNTER — HOSPITAL ENCOUNTER (OUTPATIENT)
Dept: MRI IMAGING | Facility: HOSPITAL | Age: 77
Discharge: HOME | End: 2022-08-08
Admitting: INTERNAL MEDICINE

## 2022-08-08 DIAGNOSIS — K86.2 PANCREATIC CYST: ICD-10-CM

## 2022-08-08 PROCEDURE — 74183 MRI ABD W/O CNTR FLWD CNTR: CPT

## 2022-08-08 PROCEDURE — 82565 ASSAY OF CREATININE: CPT

## 2022-08-08 PROCEDURE — A9577 INJ MULTIHANCE: HCPCS | Performed by: INTERNAL MEDICINE

## 2022-08-08 PROCEDURE — 0 GADOBENATE DIMEGLUMINE 529 MG/ML SOLUTION: Performed by: INTERNAL MEDICINE

## 2022-08-08 RX ADMIN — GADOBENATE DIMEGLUMINE 13 ML: 529 INJECTION, SOLUTION INTRAVENOUS at 09:28

## 2022-08-09 ENCOUNTER — OFFICE VISIT (OUTPATIENT)
Dept: FAMILY MEDICINE CLINIC | Facility: CLINIC | Age: 77
End: 2022-08-09

## 2022-08-09 VITALS
BODY MASS INDEX: 24.24 KG/M2 | WEIGHT: 142 LBS | OXYGEN SATURATION: 97 % | RESPIRATION RATE: 18 BRPM | HEIGHT: 64 IN | HEART RATE: 78 BPM | TEMPERATURE: 96.2 F | DIASTOLIC BLOOD PRESSURE: 70 MMHG | SYSTOLIC BLOOD PRESSURE: 126 MMHG

## 2022-08-09 DIAGNOSIS — M25.542 ARTHRALGIA OF BOTH HANDS: Primary | ICD-10-CM

## 2022-08-09 DIAGNOSIS — M25.541 ARTHRALGIA OF BOTH HANDS: Primary | ICD-10-CM

## 2022-08-09 LAB — CREAT BLDA-MCNC: 0.7 MG/DL (ref 0.6–1.3)

## 2022-08-09 PROCEDURE — 99214 OFFICE O/P EST MOD 30 MIN: CPT | Performed by: INTERNAL MEDICINE

## 2022-08-09 NOTE — PROGRESS NOTES
Subjective   Mami Srivastava is a 77 y.o. female. Patient is here today for   Chief Complaint   Patient presents with   • Pain     Pain, weakness and tingling in hands           Vitals:    22 0915   BP: 126/70   Pulse: 78   Resp: 18   Temp: 96.2 °F (35.7 °C)   SpO2: 97%     Body mass index is 24.36 kg/m².      Past Medical History:   Diagnosis Date   • Arthritis    • Autoimmune hepatitis (HCC)    • C. difficile colitis 2020   • Cholelithiasis 2018    Ultrasound   • Coronary artery disease     Dr Luis Rick   • Diverticulitis of colon ,   • Hemangioma of liver    • Hernia 2017    Hiatal hernia-Prabhjot test   • Hyperlipidemia     Dr Luis Rick-atorvastatin   • Hypertension    • Hyperthyroidism    • Hypothyroidism    • Migraines    • Osteopenia after menopause    • PONV (postoperative nausea and vomiting)    • Right shoulder pain    • Seasonal allergies    • Skin cancer     face/ BASAL    • Ulcerative colitis (HCC)     Colitis/DrKaplan   • UTI (urinary tract infection)     HISTORY/OF      Allergies   Allergen Reactions   • Imuran [Azathioprine] GI Intolerance   • Bactrim [Sulfamethoxazole-Trimethoprim] Other (See Comments)     fatigue   • Clindamycin/Lincomycin Diarrhea   • Augmentin [Amoxicillin-Pot Clavulanate] Hives   • Keflex [Cephalexin] Hives      Social History     Socioeconomic History   • Marital status:    Tobacco Use   • Smoking status: Former Smoker     Packs/day: 2.00     Years: 12.00     Pack years: 24.00     Start date: 1963     Quit date: 1976     Years since quittin.5   • Smokeless tobacco: Never Used   Vaping Use   • Vaping Use: Never used   Substance and Sexual Activity   • Alcohol use: Not Currently     Comment: none in 3 years   • Drug use: No   • Sexual activity: Not Currently        Current Outpatient Medications:   •  amLODIPine (NORVASC) 5 MG tablet, Take 1 tablet by mouth Daily., Disp: 90 tablet, Rfl: 3  •  aspirin 81 MG EC tablet, Take 81  mg by mouth Daily. HOLDING FOR 7 DAYS PRIOR TO OR, Disp: , Rfl:   •  cetirizine (zyrTEC) 10 MG tablet, Take 10 mg by mouth As Needed., Disp: , Rfl:   •  Cholecalciferol (VITAMIN D3) 5000 units capsule capsule, Take 5,000 Units by mouth 3 (Three) Times a Week., Disp: , Rfl:   •  levothyroxine (Synthroid) 75 MCG tablet, Take 1 tablet by mouth Daily., Disp: 90 tablet, Rfl: 1  •  lisinopril (PRINIVIL,ZESTRIL) 20 MG tablet, Take 1 tablet by mouth Daily., Disp: 30 tablet, Rfl: 11  •  rosuvastatin (CRESTOR) 10 MG tablet, Take 1 tablet by mouth Daily., Disp: 30 tablet, Rfl: 11  •  traMADol (ULTRAM) 50 MG tablet, Take 1 tablet by mouth Every 4 (Four) Hours As Needed for Moderate Pain ., Disp: 60 tablet, Rfl: 0     Objective     She has noticed pain in both of her hands only for the last few months.    She really does not note much in the way of swelling of her hands.    Her symptoms are symmetrical and include metacarpal phalangeal joints and distal interphalangeal joint and DIP joints bilaterally.  She complains of early morning stiffness.    There is no family history of rheumatoid disease.       Review of Systems   Constitutional: Negative.    HENT: Negative.    Respiratory: Negative.    Cardiovascular: Negative.    Musculoskeletal: Positive for arthralgias.        She complains of pain in both of her hands.  She denies any edema in either hand.   Psychiatric/Behavioral: Negative.        Physical Exam  Vitals and nursing note reviewed.   Constitutional:       Appearance: Normal appearance.      Comments: Pleasant, neatly groomed, no distress.   Cardiovascular:      Heart sounds: Normal heart sounds. No murmur heard.    No gallop.   Musculoskeletal:      Comments: On examination of her hands she has some enlargement of the DIP and MCP joints of both hands.  She does not have any synovial bogginess.    There is no edema.    She appears to have full range of motion.    She tells me that her early morning stiffness is  substantial over the last couple months especially.   Neurological:      Mental Status: She is alert and oriented to person, place, and time.   Psychiatric:         Mood and Affect: Mood normal.         Behavior: Behavior normal.         Thought Content: Thought content normal.           Problems Addressed this Visit        Musculoskeletal and Injuries    Arthralgia of both hands - Primary    Relevant Orders    Rheumatoid Factor    Sedimentation rate, automated    C-reactive protein    CBC No Differential    MALIKA    Cyclic Citrul Peptide Antibody, IgG / IgA      Diagnoses       Codes Comments    Arthralgia of both hands    -  Primary ICD-10-CM: M25.541, M25.542  ICD-9-CM: 719.44             PLAN  I am going to do some screening labs on her.  I think that her problem here is just degenerative osteoarthritis but I am going to rule out other connective tissue disorder for example rheumatoid arthritis.    She has an appointment to follow-up with me in October.  No follow-ups on file.  Answers for HPI/ROS submitted by the patient on 8/9/2022  Please describe your symptoms.: Stiffness in both hands and legs  Have you had these symptoms before?: Yes  How long have you been having these symptoms?: Greater than 2 weeks  Please list any medications you are currently taking for this condition.: Tylenol/advil occasionally  Please describe any probable cause for these symptoms. : Issue with legs may be due to immobility; hands possibly dupiters contracture (spelling?)  What is the primary reason for your visit?: Other

## 2022-08-11 LAB
ANA SER QL: NEGATIVE
CCP IGA+IGG SERPL IA-ACNC: 6 UNITS (ref 0–19)
CRP SERPL-MCNC: 12 MG/L (ref 0–10)
ERYTHROCYTE [DISTWIDTH] IN BLOOD BY AUTOMATED COUNT: 13.8 % (ref 11.7–15.4)
ERYTHROCYTE [SEDIMENTATION RATE] IN BLOOD BY WESTERGREN METHOD: 4 MM/HR (ref 0–40)
HCT VFR BLD AUTO: 36.3 % (ref 34–46.6)
HGB BLD-MCNC: 12 G/DL (ref 11.1–15.9)
MCH RBC QN AUTO: 27.5 PG (ref 26.6–33)
MCHC RBC AUTO-ENTMCNC: 33.1 G/DL (ref 31.5–35.7)
MCV RBC AUTO: 83 FL (ref 79–97)
PLATELET # BLD AUTO: 239 X10E3/UL (ref 150–450)
RBC # BLD AUTO: 4.36 X10E6/UL (ref 3.77–5.28)
RHEUMATOID FACT SERPL-ACNC: <10 IU/ML
WBC # BLD AUTO: 7.5 X10E3/UL (ref 3.4–10.8)

## 2022-08-12 ENCOUNTER — TELEPHONE (OUTPATIENT)
Dept: FAMILY MEDICINE CLINIC | Facility: CLINIC | Age: 77
End: 2022-08-12

## 2022-08-12 NOTE — TELEPHONE ENCOUNTER
Caller: Mami Srivastava    Relationship: Self    Best call back number:[phone]    What is the best time to reach you: ANY    Who are you requesting to speak with (clinical staff, provider,  specific staff member): CLINICAL     Do you know the name of the person who called: NONE    What was the call regarding: PATIENT IS CONCERNED WITH THE RESULTS OF THE:  C-reactive protein [[id number]] (Order [id number])  ALSO THE MRI RESULTS ARE IN.  PLEASE ADVISE    Do you require a callback: YES

## 2022-08-17 ENCOUNTER — OFFICE VISIT (OUTPATIENT)
Dept: FAMILY MEDICINE CLINIC | Facility: CLINIC | Age: 77
End: 2022-08-17

## 2022-08-17 VITALS
WEIGHT: 140 LBS | OXYGEN SATURATION: 98 % | HEART RATE: 79 BPM | DIASTOLIC BLOOD PRESSURE: 76 MMHG | TEMPERATURE: 97 F | SYSTOLIC BLOOD PRESSURE: 124 MMHG | RESPIRATION RATE: 18 BRPM | HEIGHT: 64 IN | BODY MASS INDEX: 23.9 KG/M2

## 2022-08-17 DIAGNOSIS — M25.542 ARTHRALGIA OF BOTH HANDS: ICD-10-CM

## 2022-08-17 DIAGNOSIS — K86.2 PANCREATIC CYST: ICD-10-CM

## 2022-08-17 DIAGNOSIS — M25.541 ARTHRALGIA OF BOTH HANDS: ICD-10-CM

## 2022-08-17 DIAGNOSIS — I10 PRIMARY HYPERTENSION: ICD-10-CM

## 2022-08-17 DIAGNOSIS — N30.90 CYSTITIS: Primary | ICD-10-CM

## 2022-08-17 LAB
BILIRUB BLD-MCNC: NEGATIVE MG/DL
CLARITY, POC: CLEAR
COLOR UR: YELLOW
EXPIRATION DATE: ABNORMAL
GLUCOSE UR STRIP-MCNC: NEGATIVE MG/DL
KETONES UR QL: NEGATIVE
LEUKOCYTE EST, POC: ABNORMAL
Lab: ABNORMAL
NITRITE UR-MCNC: NEGATIVE MG/ML
PH UR: 6.5 [PH] (ref 5–8)
PROT UR STRIP-MCNC: NEGATIVE MG/DL
RBC # UR STRIP: ABNORMAL /UL
SP GR UR: 1.02 (ref 1–1.03)
UROBILINOGEN UR QL: NORMAL

## 2022-08-17 PROCEDURE — 99214 OFFICE O/P EST MOD 30 MIN: CPT | Performed by: INTERNAL MEDICINE

## 2022-08-17 PROCEDURE — 81003 URINALYSIS AUTO W/O SCOPE: CPT | Performed by: INTERNAL MEDICINE

## 2022-08-17 RX ORDER — METHYLPREDNISOLONE 4 MG/1
TABLET ORAL
Qty: 21 TABLET | Refills: 0 | Status: SHIPPED | OUTPATIENT
Start: 2022-08-17 | End: 2023-01-31

## 2022-08-17 RX ORDER — NITROFURANTOIN 25; 75 MG/1; MG/1
100 CAPSULE ORAL 2 TIMES DAILY
Qty: 10 CAPSULE | Refills: 0 | Status: SHIPPED | OUTPATIENT
Start: 2022-08-17 | End: 2023-01-31

## 2022-08-17 NOTE — PROGRESS NOTES
Subjective   Mami Srivastava is a 77 y.o. female. Patient is here today for   Chief Complaint   Patient presents with   • Pain     FOLLOW UP PAIN LEGS AND HANDS, LAB REVIEW           Vitals:    22 0825   BP: 124/76   Pulse: 79   Resp: 18   Temp: 97 °F (36.1 °C)   SpO2: 98%     Body mass index is 24.02 kg/m².      Past Medical History:   Diagnosis Date   • Arthritis    • Autoimmune hepatitis (HCC)    • C. difficile colitis 2020   • Cholelithiasis 2018    Ultrasound   • Coronary artery disease     Dr Luis Rick   • Diverticulitis of colon ,   • Hemangioma of liver    • Hernia     Hiatal hernia-Prabhjot test   • Hyperlipidemia     Dr Luis Rick-atorvastatin   • Hypertension    • Hyperthyroidism    • Hypothyroidism    • Migraines    • Osteopenia after menopause    • PONV (postoperative nausea and vomiting)    • Right shoulder pain    • Seasonal allergies    • Skin cancer     face/ BASAL    • Ulcerative colitis (HCC)     Colitis/DrKaplan   • UTI (urinary tract infection)     HISTORY/OF      Allergies   Allergen Reactions   • Imuran [Azathioprine] GI Intolerance   • Bactrim [Sulfamethoxazole-Trimethoprim] Other (See Comments)     fatigue   • Clindamycin/Lincomycin Diarrhea   • Augmentin [Amoxicillin-Pot Clavulanate] Hives   • Keflex [Cephalexin] Hives      Social History     Socioeconomic History   • Marital status:    Tobacco Use   • Smoking status: Former Smoker     Packs/day: 2.00     Years: 12.00     Pack years: 24.00     Start date: 1963     Quit date: 1976     Years since quittin.5   • Smokeless tobacco: Never Used   Vaping Use   • Vaping Use: Never used   Substance and Sexual Activity   • Alcohol use: Not Currently     Comment: none in 3 years   • Drug use: No   • Sexual activity: Not Currently        Current Outpatient Medications:   •  amLODIPine (NORVASC) 5 MG tablet, Take 1 tablet by mouth Daily., Disp: 90 tablet, Rfl: 3  •  aspirin 81 MG EC tablet, Take  81 mg by mouth Daily. HOLDING FOR 7 DAYS PRIOR TO OR, Disp: , Rfl:   •  cetirizine (zyrTEC) 10 MG tablet, Take 10 mg by mouth As Needed., Disp: , Rfl:   •  Cholecalciferol (VITAMIN D3) 5000 units capsule capsule, Take 5,000 Units by mouth 3 (Three) Times a Week., Disp: , Rfl:   •  levothyroxine (Synthroid) 75 MCG tablet, Take 1 tablet by mouth Daily., Disp: 90 tablet, Rfl: 1  •  lisinopril (PRINIVIL,ZESTRIL) 20 MG tablet, Take 1 tablet by mouth Daily., Disp: 30 tablet, Rfl: 11  •  rosuvastatin (CRESTOR) 10 MG tablet, Take 1 tablet by mouth Daily., Disp: 30 tablet, Rfl: 11  •  traMADol (ULTRAM) 50 MG tablet, Take 1 tablet by mouth Every 4 (Four) Hours As Needed for Moderate Pain ., Disp: 60 tablet, Rfl: 0  •  methylPREDNISolone (MEDROL) 4 MG dose pack, Take as directed on package instructions., Disp: 21 tablet, Rfl: 0  •  nitrofurantoin, macrocrystal-monohydrate, (Macrobid) 100 MG capsule, Take 1 capsule by mouth 2 (Two) Times a Day., Disp: 10 capsule, Rfl: 0     Objective     She continues to have pain in her wrists and hands bilaterally.  She complains of early morning stiffness in her hands.    She also has pain in her thighs in the hamstrings posteriorly and actually medially as well.  She tells me that when she was treated for urinary tract infection a little while ago, she actually felt better pain wise in her thighs wrists and hands.    We got some labs done a couple weeks ago to try to begin to figure out what this is coming from.  That lab work was pretty unrevealing.    She is being followed by Dr. Mcginnis of gastroenterology for a pancreatic lesion.    Pain         Review of Systems   Constitutional: Negative.    HENT: Negative.    Respiratory: Negative.    Cardiovascular: Negative.    Musculoskeletal:        She complains of pain in the muscles of her thighs, and actually her wrist and her hands bilaterally.  This been going on more than a couple months.   Psychiatric/Behavioral: Negative.         Physical Exam  Vitals and nursing note reviewed.   Constitutional:       Appearance: Normal appearance. She is normal weight. She is not ill-appearing or diaphoretic.      Comments: Pleasant, neatly groomed, in no distress.  BMI 24.   Cardiovascular:      Rate and Rhythm: Regular rhythm.      Heart sounds: No murmur heard.    No gallop.   Pulmonary:      Effort: No respiratory distress.      Breath sounds: Normal breath sounds. No wheezing or rales.   Neurological:      Mental Status: She is alert and oriented to person, place, and time.   Psychiatric:         Mood and Affect: Mood normal.         Behavior: Behavior normal.         Thought Content: Thought content normal.           Problems Addressed this Visit        Cardiac and Vasculature    Hypertension       Gastrointestinal Abdominal     Pancreatic cyst       Genitourinary and Reproductive     Cystitis - Primary    Relevant Orders    POCT urinalysis dipstick, automated (Completed)       Musculoskeletal and Injuries    Arthralgia of both hands      Diagnoses       Codes Comments    Cystitis    -  Primary ICD-10-CM: N30.90  ICD-9-CM: 595.9     Arthralgia of both hands     ICD-10-CM: M25.541, M25.542  ICD-9-CM: 719.44     Primary hypertension     ICD-10-CM: I10  ICD-9-CM: 401.9     Pancreatic cyst     ICD-10-CM: K86.2  ICD-9-CM: 577.2             PLAN  Her hypertension is well controlled.    She has arthralgias in her hands and myalgias in her thighs.  Labs done a couple weeks ago to try to sort this out were pretty unrevealing.  I did not check out creatinine phosphokinase but that was checked by my colleague Dr. Franklin a couple months ago and that was normal.    1.  To treat her cystitis I sent out a prescription for nitrofurantoin.    If her muscle aches and pains and wrist and hand pains do not resolve, have asked her to take a Medrol Dosepak.    She will be following up with her gastroenterologist regarding her pancreatic lesion.  No follow-ups on  file.

## 2022-08-18 NOTE — PROGRESS NOTES
Cyst measures similar to what I saw on the EUS from February.  Not much change since then but radiologist does mention overall increase in size when compared to prior studies from 2018/2019 (cyst was ~1.2cm then).  I am still reassured by the results of the fluid studies we did, but just to be on safe side I would like her to see Dr Seo or Dr Merritt with [facility] pancreatic surgery team just to have someone else following.  I don't think they will recommend surgery at this point but I value their opinion with these cysts.      She should f/u with Dr Hein as scheduled and if continued radiographic surveillance is recommended he can arrange this.

## 2022-08-25 ENCOUNTER — TELEPHONE (OUTPATIENT)
Dept: FAMILY MEDICINE CLINIC | Facility: CLINIC | Age: 77
End: 2022-08-25

## 2022-08-25 RX ORDER — LEVOTHYROXINE SODIUM 75 MCG
75 TABLET ORAL DAILY
Qty: 90 TABLET | Refills: 1 | Status: SHIPPED | OUTPATIENT
Start: 2022-08-25 | End: 2023-02-20

## 2022-08-25 NOTE — TELEPHONE ENCOUNTER
PATIENT CALLED IN TO CONFIRM THAT SHE DOES NEED THE PRESCRIPTION FOR SYNTHROID (NAME BRAND NOT GENERIC) 75 MCG AND IT NEEDS TO BE SENT TO [facility] [phone].    PLEASE CONTACT HER IF THERE ARE ANY ISSUES @ [phone]

## 2022-08-26 ENCOUNTER — TELEPHONE (OUTPATIENT)
Dept: GASTROENTEROLOGY | Facility: CLINIC | Age: 77
End: 2022-08-26

## 2022-08-26 DIAGNOSIS — K86.2 PANCREATIC CYST: Primary | ICD-10-CM

## 2022-08-26 DIAGNOSIS — R97.8 OTHER ABNORMAL TUMOR MARKERS: ICD-10-CM

## 2022-08-26 NOTE — TELEPHONE ENCOUNTER
Provider: ERVIN  Caller: DARREN ESTEVEZ  Relationship to Patient: SELF  Phone Number: [phone]  Reason for Call: PT WANTS TO KNOW DO SHE NEED TO HAVE BLOOD WORK DONE BEFORE HER 1/3/2023 APPOINTMENT.    PLEASE CALL AND ADVISE EITHER WAY.

## 2022-09-23 DIAGNOSIS — E78.2 MIXED HYPERLIPIDEMIA: Primary | ICD-10-CM

## 2022-09-23 DIAGNOSIS — E89.0 POSTOPERATIVE HYPOTHYROIDISM: ICD-10-CM

## 2022-09-26 DIAGNOSIS — R30.0 DYSURIA: Primary | ICD-10-CM

## 2022-09-26 LAB
ALBUMIN SERPL-MCNC: 4.5 G/DL (ref 3.5–5.2)
ALBUMIN/GLOB SERPL: 2.8 G/DL
ALP SERPL-CCNC: 79 U/L (ref 39–117)
ALT SERPL-CCNC: 14 U/L (ref 1–33)
AST SERPL-CCNC: 18 U/L (ref 1–32)
BASOPHILS # BLD AUTO: 0.03 10*3/MM3 (ref 0–0.2)
BASOPHILS NFR BLD AUTO: 0.5 % (ref 0–1.5)
BILIRUB SERPL-MCNC: 1.1 MG/DL (ref 0–1.2)
BUN SERPL-MCNC: 12 MG/DL (ref 8–23)
BUN/CREAT SERPL: 14.1 (ref 7–25)
CALCIUM SERPL-MCNC: 10 MG/DL (ref 8.6–10.5)
CHLORIDE SERPL-SCNC: 103 MMOL/L (ref 98–107)
CHOLEST SERPL-MCNC: 107 MG/DL (ref 0–200)
CO2 SERPL-SCNC: 29 MMOL/L (ref 22–29)
CREAT SERPL-MCNC: 0.85 MG/DL (ref 0.57–1)
EGFRCR SERPLBLD CKD-EPI 2021: 70.7 ML/MIN/1.73
EOSINOPHIL # BLD AUTO: 0.27 10*3/MM3 (ref 0–0.4)
EOSINOPHIL NFR BLD AUTO: 4.8 % (ref 0.3–6.2)
ERYTHROCYTE [DISTWIDTH] IN BLOOD BY AUTOMATED COUNT: 14.7 % (ref 12.3–15.4)
GLOBULIN SER CALC-MCNC: 1.6 GM/DL
GLUCOSE SERPL-MCNC: 126 MG/DL (ref 65–99)
HCT VFR BLD AUTO: 38.5 % (ref 34–46.6)
HDLC SERPL-MCNC: 39 MG/DL (ref 40–60)
HGB BLD-MCNC: 12.5 G/DL (ref 12–15.9)
IMM GRANULOCYTES # BLD AUTO: 0.01 10*3/MM3 (ref 0–0.05)
IMM GRANULOCYTES NFR BLD AUTO: 0.2 % (ref 0–0.5)
LDLC SERPL CALC-MCNC: 44 MG/DL (ref 0–100)
LDLC/HDLC SERPL: 1.03 {RATIO}
LYMPHOCYTES # BLD AUTO: 1.69 10*3/MM3 (ref 0.7–3.1)
LYMPHOCYTES NFR BLD AUTO: 29.9 % (ref 19.6–45.3)
MCH RBC QN AUTO: 27.6 PG (ref 26.6–33)
MCHC RBC AUTO-ENTMCNC: 32.5 G/DL (ref 31.5–35.7)
MCV RBC AUTO: 85 FL (ref 79–97)
MONOCYTES # BLD AUTO: 0.39 10*3/MM3 (ref 0.1–0.9)
MONOCYTES NFR BLD AUTO: 6.9 % (ref 5–12)
NEUTROPHILS # BLD AUTO: 3.26 10*3/MM3 (ref 1.7–7)
NEUTROPHILS NFR BLD AUTO: 57.7 % (ref 42.7–76)
NRBC BLD AUTO-RTO: 0 /100 WBC (ref 0–0.2)
PLATELET # BLD AUTO: 237 10*3/MM3 (ref 140–450)
POTASSIUM SERPL-SCNC: 4.6 MMOL/L (ref 3.5–5.2)
PROT SERPL-MCNC: 6.1 G/DL (ref 6–8.5)
RBC # BLD AUTO: 4.53 10*6/MM3 (ref 3.77–5.28)
SODIUM SERPL-SCNC: 141 MMOL/L (ref 136–145)
T4 FREE SERPL-MCNC: 1.36 NG/DL (ref 0.93–1.7)
TRIGL SERPL-MCNC: 139 MG/DL (ref 0–150)
TSH SERPL DL<=0.005 MIU/L-ACNC: 2.13 UIU/ML (ref 0.27–4.2)
UNABLE TO VOID: NORMAL
VLDLC SERPL CALC-MCNC: 24 MG/DL (ref 5–40)
WBC # BLD AUTO: 5.65 10*3/MM3 (ref 3.4–10.8)

## 2022-09-27 LAB
APPEARANCE UR: CLEAR
BACTERIA #/AREA URNS HPF: ABNORMAL /HPF
BILIRUB UR QL STRIP: NEGATIVE
COLOR UR: YELLOW
EPI CELLS #/AREA URNS HPF: ABNORMAL /HPF
GLUCOSE UR QL STRIP: NEGATIVE
HGB UR QL STRIP: NEGATIVE
KETONES UR QL STRIP: NEGATIVE
LEUKOCYTE ESTERASE UR QL STRIP: ABNORMAL
NITRITE UR QL STRIP: NEGATIVE
PH UR STRIP: 5.5 [PH] (ref 5–8)
PROT UR QL STRIP: NEGATIVE
RBC #/AREA URNS HPF: ABNORMAL /HPF
SP GR UR STRIP: 1.02 (ref 1–1.03)
UROBILINOGEN UR STRIP-MCNC: ABNORMAL MG/DL
WBC #/AREA URNS HPF: ABNORMAL /HPF

## 2022-10-03 ENCOUNTER — OFFICE VISIT (OUTPATIENT)
Dept: FAMILY MEDICINE CLINIC | Facility: CLINIC | Age: 77
End: 2022-10-03

## 2022-10-03 VITALS
HEART RATE: 72 BPM | OXYGEN SATURATION: 98 % | TEMPERATURE: 98.6 F | WEIGHT: 142 LBS | SYSTOLIC BLOOD PRESSURE: 122 MMHG | BODY MASS INDEX: 24.24 KG/M2 | DIASTOLIC BLOOD PRESSURE: 68 MMHG | HEIGHT: 64 IN | RESPIRATION RATE: 18 BRPM

## 2022-10-03 DIAGNOSIS — E78.2 MIXED HYPERLIPIDEMIA: ICD-10-CM

## 2022-10-03 DIAGNOSIS — I10 PRIMARY HYPERTENSION: Primary | ICD-10-CM

## 2022-10-03 DIAGNOSIS — E89.0 POSTOPERATIVE HYPOTHYROIDISM: ICD-10-CM

## 2022-10-03 DIAGNOSIS — R73.9 HYPERGLYCEMIA: ICD-10-CM

## 2022-10-03 PROCEDURE — 99214 OFFICE O/P EST MOD 30 MIN: CPT | Performed by: INTERNAL MEDICINE

## 2022-10-03 PROCEDURE — 90662 IIV NO PRSV INCREASED AG IM: CPT | Performed by: INTERNAL MEDICINE

## 2022-10-03 PROCEDURE — G0008 ADMIN INFLUENZA VIRUS VAC: HCPCS | Performed by: INTERNAL MEDICINE

## 2022-10-03 RX ORDER — VIT C/B6/B5/MAGNESIUM/HERB 173 50-5-6-5MG
CAPSULE ORAL
COMMUNITY

## 2022-10-03 NOTE — PROGRESS NOTES
Subjective   Mami Srivastava is a 77 y.o. female. Patient is here today for   Chief Complaint   Patient presents with   • Hypertension     3 MO LAB FOLLOW UP           Vitals:    10/03/22 0928   BP: 122/68   Pulse: 72   Resp: 18   Temp: 98.6 °F (37 °C)   SpO2: 98%     Body mass index is 24.36 kg/m².      Past Medical History:   Diagnosis Date   • Arthritis    • Autoimmune hepatitis (HCC)    • C. difficile colitis 2020   • Cholelithiasis 2018    Ultrasound   • Coronary artery disease     Dr Luis Rick   • Diverticulitis of colon ,   • Hemangioma of liver    • Hernia     Hiatal hernia-Prabhjot test   • Hyperlipidemia     Dr Luis Rick-atorvastatin   • Hypertension    • Hyperthyroidism    • Hypothyroidism    • Migraines    • Osteopenia after menopause    • PONV (postoperative nausea and vomiting)    • Right shoulder pain    • Seasonal allergies    • Skin cancer     face/ BASAL    • Ulcerative colitis (HCC)     Colitis/DrKaplan   • UTI (urinary tract infection)     HISTORY/OF      Allergies   Allergen Reactions   • Imuran [Azathioprine] GI Intolerance   • Bactrim [Sulfamethoxazole-Trimethoprim] Other (See Comments)     fatigue   • Clindamycin/Lincomycin Diarrhea   • Augmentin [Amoxicillin-Pot Clavulanate] Hives   • Keflex [Cephalexin] Hives      Social History     Socioeconomic History   • Marital status:    Tobacco Use   • Smoking status: Former Smoker     Packs/day: 2.00     Years: 12.00     Pack years: 24.00     Start date: 1963     Quit date: 1976     Years since quittin.7   • Smokeless tobacco: Never Used   Vaping Use   • Vaping Use: Never used   Substance and Sexual Activity   • Alcohol use: Not Currently     Comment: none in 3 years   • Drug use: No   • Sexual activity: Not Currently        Current Outpatient Medications:   •  amLODIPine (NORVASC) 5 MG tablet, Take 1 tablet by mouth Daily., Disp: 90 tablet, Rfl: 3  •  aspirin 81 MG EC tablet, Take 81 mg by mouth  Daily. HOLDING FOR 7 DAYS PRIOR TO OR, Disp: , Rfl:   •  cetirizine (zyrTEC) 10 MG tablet, Take 10 mg by mouth As Needed., Disp: , Rfl:   •  Cholecalciferol (VITAMIN D3) 5000 units capsule capsule, Take 5,000 Units by mouth 3 (Three) Times a Week., Disp: , Rfl:   •  lisinopril (PRINIVIL,ZESTRIL) 20 MG tablet, Take 1 tablet by mouth Daily., Disp: 30 tablet, Rfl: 11  •  methylPREDNISolone (MEDROL) 4 MG dose pack, Take as directed on package instructions., Disp: 21 tablet, Rfl: 0  •  nitrofurantoin, macrocrystal-monohydrate, (Macrobid) 100 MG capsule, Take 1 capsule by mouth 2 (Two) Times a Day., Disp: 10 capsule, Rfl: 0  •  rosuvastatin (CRESTOR) 10 MG tablet, Take 1 tablet by mouth Daily., Disp: 30 tablet, Rfl: 11  •  Synthroid 75 MCG tablet, Take 1 tablet by mouth Daily., Disp: 90 tablet, Rfl: 1  •  traMADol (ULTRAM) 50 MG tablet, Take 1 tablet by mouth Every 4 (Four) Hours As Needed for Moderate Pain ., Disp: 60 tablet, Rfl: 0  •  Turmeric 500 MG capsule, Take  by mouth., Disp: , Rfl:      Objective     History of Present Illness  She is here today to follow-up on labs from last week.      Hypertension         Review of Systems   Constitutional: Negative.    HENT: Negative.    Respiratory: Negative.    Cardiovascular: Negative.    Musculoskeletal: Negative.    Psychiatric/Behavioral: Negative.        Physical Exam  Vitals and nursing note reviewed.   Constitutional:       Appearance: Normal appearance.   Neck:      Vascular: No carotid bruit.   Cardiovascular:      Rate and Rhythm: Regular rhythm.      Heart sounds: Normal heart sounds. No murmur heard.    No gallop.   Pulmonary:      Effort: No respiratory distress.      Breath sounds: Normal breath sounds. No wheezing or rales.   Neurological:      Mental Status: She is alert and oriented to person, place, and time.   Psychiatric:         Mood and Affect: Mood normal.         Behavior: Behavior normal.         Thought Content: Thought content normal.            Problems Addressed this Visit        Cardiac and Vasculature    Mixed hyperlipidemia    Hypertension - Primary       Endocrine and Metabolic    Hypothyroidism    Hyperglycemia      Diagnoses       Codes Comments    Primary hypertension    -  Primary ICD-10-CM: I10  ICD-9-CM: 401.9     Postoperative hypothyroidism     ICD-10-CM: E89.0  ICD-9-CM: 244.0     Hyperglycemia     ICD-10-CM: R73.9  ICD-9-CM: 790.29     Mixed hyperlipidemia     ICD-10-CM: E78.2  ICD-9-CM: 272.2             PLAN  She and I reviewed her labs.    Her hemoglobin A1c was 6.3%.  This compares to 6.6% just prior to her last visit.  She is prediabetic.  We will just monitor this for now.    She has hypertension with good control.    She has a history of coronary artery disease and her target LDL cholesterol is less than 70.  Her LDL is 44.  Her HDL is 39.  Her ratio was excellent.    She has appropriate replacement of her thyroid hormone.    I asked her to follow-up for an annual wellness visit in 3 to 4 months.  Fasting labs prior to that visit should include: Lipid profile, comprehensive metabolic panel, CBC, TSH, free T4, hemoglobin A1c.  It would be useful to screen her for vitamin D deficiency as well.  No follow-ups on file.  Answers for HPI/ROS submitted by the patient on 9/27/2022  Please describe your symptoms.: Follow up….blood work/results of Sept 26, 2022  Have you had these symptoms before?: Yes  How long have you been having these symptoms?: Greater than 2 weeks  What is the primary reason for your visit?: Other

## 2022-11-04 DIAGNOSIS — B37.31 CANDIDA VAGINITIS: ICD-10-CM

## 2022-11-07 RX ORDER — NYSTATIN 100000 U/G
CREAM TOPICAL
Qty: 30 G | Refills: 0 | Status: SHIPPED | OUTPATIENT
Start: 2022-11-07

## 2022-11-21 RX ORDER — ROSUVASTATIN CALCIUM 10 MG/1
10 TABLET, COATED ORAL DAILY
Qty: 30 TABLET | Refills: 11 | Status: SHIPPED | OUTPATIENT
Start: 2022-11-21 | End: 2023-01-31 | Stop reason: SDUPTHER

## 2022-11-21 RX ORDER — AMLODIPINE BESYLATE 5 MG/1
5 TABLET ORAL DAILY
Qty: 90 TABLET | Refills: 3 | Status: SHIPPED | OUTPATIENT
Start: 2022-11-21 | End: 2023-01-31 | Stop reason: SDUPTHER

## 2022-11-21 RX ORDER — LISINOPRIL 20 MG/1
20 TABLET ORAL DAILY
Qty: 30 TABLET | Refills: 11 | Status: SHIPPED | OUTPATIENT
Start: 2022-11-21 | End: 2023-01-31 | Stop reason: SDUPTHER

## 2022-11-21 NOTE — TELEPHONE ENCOUNTER
Caller: Mami Srivastava    Relationship: Self    Best call back number: [phone]  Requested Prescriptions:   Requested Prescriptions     Pending Prescriptions Disp Refills   • lisinopril (PRINIVIL,ZESTRIL) 20 MG tablet 30 tablet 11     Sig: Take 1 tablet by mouth Daily.   • amLODIPine (NORVASC) 5 MG tablet 90 tablet 3     Sig: Take 1 tablet by mouth Daily.   • rosuvastatin (CRESTOR) 10 MG tablet 30 tablet 11     Sig: Take 1 tablet by mouth Daily.        Pharmacy where request should be sent: [facility] [id number] [place] [id number] AT [facility] - [phone]  - [phone] FX     Additional details provided by patient: PATIENT STATED THAT SHE HAS A WEEK LEFT OF THESE MEDICATIONS. PATIENT STATED SHE DOES NOT WANT THESE SENT TO HER MAIL ORDER PHARMACY ANYMORE. PLEASE ADVISE.    Does the patient have less than a 3 day supply:  [x] Yes  [] No    Taj Moreno Rep   11/21/22 15:05 EST

## 2022-11-22 ENCOUNTER — TELEPHONE (OUTPATIENT)
Dept: GASTROENTEROLOGY | Facility: CLINIC | Age: 77
End: 2022-11-22

## 2022-11-22 NOTE — TELEPHONE ENCOUNTER
Caller: Mami Srivastava    Relationship to patient: Self    Best call back number: [phone]    Patient is needing: PT HAS QUESTIONS ABOUT HER UPCOMING TESTS ON 12/21/22. PLEASE CALL PT BACK THANK YOU.

## 2022-11-22 NOTE — TELEPHONE ENCOUNTER
Pt got an alert ab her lab apt.  Had some questions.  Answered all her questions and she will be here as scheduled.

## 2022-12-01 ENCOUNTER — OFFICE VISIT (OUTPATIENT)
Dept: FAMILY MEDICINE CLINIC | Facility: CLINIC | Age: 77
End: 2022-12-01

## 2022-12-01 VITALS
HEART RATE: 86 BPM | RESPIRATION RATE: 18 BRPM | BODY MASS INDEX: 23.9 KG/M2 | DIASTOLIC BLOOD PRESSURE: 72 MMHG | OXYGEN SATURATION: 98 % | HEIGHT: 64 IN | TEMPERATURE: 98.7 F | SYSTOLIC BLOOD PRESSURE: 144 MMHG | WEIGHT: 140 LBS

## 2022-12-01 DIAGNOSIS — R50.9 FEVER, UNSPECIFIED FEVER CAUSE: Primary | ICD-10-CM

## 2022-12-01 DIAGNOSIS — J10.1 INFLUENZA A WITH RESPIRATORY MANIFESTATIONS: ICD-10-CM

## 2022-12-01 DIAGNOSIS — R05.9 COUGH, UNSPECIFIED TYPE: ICD-10-CM

## 2022-12-01 LAB
EXPIRATION DATE: ABNORMAL
FLUAV AG UPPER RESP QL IA.RAPID: DETECTED
FLUBV AG UPPER RESP QL IA.RAPID: NOT DETECTED
INTERNAL CONTROL: ABNORMAL
Lab: ABNORMAL
SARS-COV-2 RNA RESP QL NAA+PROBE: NOT DETECTED

## 2022-12-01 PROCEDURE — 99213 OFFICE O/P EST LOW 20 MIN: CPT | Performed by: INTERNAL MEDICINE

## 2022-12-01 PROCEDURE — 87428 SARSCOV & INF VIR A&B AG IA: CPT | Performed by: INTERNAL MEDICINE

## 2022-12-01 RX ORDER — AZITHROMYCIN 250 MG/1
TABLET, FILM COATED ORAL
Qty: 6 TABLET | Refills: 0 | Status: SHIPPED | OUTPATIENT
Start: 2022-12-01 | End: 2023-01-31

## 2022-12-11 PROBLEM — J10.1 INFLUENZA A WITH RESPIRATORY MANIFESTATIONS: Status: ACTIVE | Noted: 2022-12-11

## 2022-12-11 NOTE — PROGRESS NOTES
Subjective   Mami Srivastava is a 77 y.o. female. Patient is here today for   Chief Complaint   Patient presents with   • Cough   • Fever   • NASAL DRAINAGE          Vitals:    22 1500   BP: 144/72   Pulse: 86   Resp: 18   Temp: 98.7 °F (37.1 °C)   SpO2: 98%     Body mass index is 24.02 kg/m².      Past Medical History:   Diagnosis Date   • Arthritis    • Autoimmune hepatitis (HCC)    • C. difficile colitis 2020   • Cholelithiasis 2018    Ultrasound   • Coronary artery disease     Dr Luis Rick   • Diverticulitis of colon ,   • Hemangioma of liver    • Hernia     Hiatal hernia-Prabhjot test   • Hyperlipidemia     Dr Luis Rick-atorvastatin   • Hypertension    • Hyperthyroidism    • Hypothyroidism    • Migraines    • Osteopenia after menopause    • PONV (postoperative nausea and vomiting)    • Right shoulder pain    • Seasonal allergies    • Skin cancer     face/ BASAL    • Ulcerative colitis (HCC)     Colitis/[facility]   • UTI (urinary tract infection)     HISTORY/OF      Allergies   Allergen Reactions   • Imuran [Azathioprine] GI Intolerance   • Bactrim [Sulfamethoxazole-Trimethoprim] Other (See Comments)     fatigue   • Clindamycin/Lincomycin Diarrhea   • Augmentin [Amoxicillin-Pot Clavulanate] Hives   • Keflex [Cephalexin] Hives      Social History     Socioeconomic History   • Marital status:    Tobacco Use   • Smoking status: Former     Packs/day: 2.00     Years: 12.00     Pack years: 24.00     Types: Cigarettes     Start date: 1963     Quit date: 1976     Years since quittin.8   • Smokeless tobacco: Never   Vaping Use   • Vaping Use: Never used   Substance and Sexual Activity   • Alcohol use: Not Currently     Comment: none in 3 years   • Drug use: No   • Sexual activity: Not Currently        Current Outpatient Medications:   •  amLODIPine (NORVASC) 5 MG tablet, Take 1 tablet by mouth Daily., Disp: 90 tablet, Rfl: 3  •  aspirin 81 MG EC tablet, Take 81 mg  by mouth Daily. HOLDING FOR 7 DAYS PRIOR TO OR, Disp: , Rfl:   •  cetirizine (zyrTEC) 10 MG tablet, Take 10 mg by mouth As Needed., Disp: , Rfl:   •  Cholecalciferol (VITAMIN D3) 5000 units capsule capsule, Take 5,000 Units by mouth 3 (Three) Times a Week., Disp: , Rfl:   •  lisinopril (PRINIVIL,ZESTRIL) 20 MG tablet, Take 1 tablet by mouth Daily., Disp: 30 tablet, Rfl: 11  •  nystatin (MYCOSTATIN) 454468 UNIT/GM cream, APPLY TO AFFECTED AREA(S) TWO TIMES A DAY AS NEEDED FOR YEAST INFECTION OR ITCHING, Disp: 30 g, Rfl: 0  •  rosuvastatin (CRESTOR) 10 MG tablet, Take 1 tablet by mouth Daily., Disp: 30 tablet, Rfl: 11  •  Synthroid 75 MCG tablet, Take 1 tablet by mouth Daily., Disp: 90 tablet, Rfl: 1  •  traMADol (ULTRAM) 50 MG tablet, Take 1 tablet by mouth Every 4 (Four) Hours As Needed for Moderate Pain ., Disp: 60 tablet, Rfl: 0  •  Turmeric 500 MG capsule, Take  by mouth., Disp: , Rfl:   •  azithromycin (Zithromax Z-Jamie) 250 MG tablet, Take 2 tablets by mouth on day 1, then 1 tablet daily on days 2-5, Disp: 6 tablet, Rfl: 0  •  methylPREDNISolone (MEDROL) 4 MG dose pack, Take as directed on package instructions., Disp: 21 tablet, Rfl: 0  •  nitrofurantoin, macrocrystal-monohydrate, (Macrobid) 100 MG capsule, Take 1 capsule by mouth 2 (Two) Times a Day., Disp: 10 capsule, Rfl: 0     Objective     History of Present Illness  This patient has had a low-grade fever with temperatures less than 100, nonproductive cough and nasal drainage for greater than 7 days.  Her symptoms have recently started to get a little better.    She denies any dyspnea.       Review of Systems   Constitutional: Negative.    HENT: Positive for postnasal drip and rhinorrhea.    Respiratory: Positive for cough. Negative for shortness of breath.    Cardiovascular: Negative.    Musculoskeletal: Negative.        Physical Exam  Vitals and nursing note reviewed.   Constitutional:       Appearance: Normal appearance. She is ill-appearing. She is not  toxic-appearing.      Comments: Pleasant, neatly groomed, no distress.    She does appear to be ill.   Neck:      Vascular: No carotid bruit.   Cardiovascular:      Rate and Rhythm: Regular rhythm.      Heart sounds: Normal heart sounds. No murmur heard.    No gallop.   Pulmonary:      Effort: No respiratory distress.      Breath sounds: Normal breath sounds. No wheezing or rales.   Neurological:      Mental Status: She is alert and oriented to person, place, and time.   Psychiatric:         Mood and Affect: Mood normal.         Behavior: Behavior normal.         Thought Content: Thought content normal.           Problems Addressed this Visit        Infectious Diseases    Influenza A with respiratory manifestations   Other Visit Diagnoses     Fever, unspecified fever cause    -  Primary    Cough, unspecified type          Diagnoses       Codes Comments    Fever, unspecified fever cause    -  Primary ICD-10-CM: R50.9  ICD-9-CM: 780.60     Cough, unspecified type     ICD-10-CM: R05.9  ICD-9-CM: 786.2     Influenza A with respiratory manifestations     ICD-10-CM: J10.1  ICD-9-CM: 487.1         COVID test in the office today was negative.  But she is positive for influenza A.    PLAN  I suspect that she has developed a bacterial bronchitis on top of her influenza A.  Symptoms been going on for couple weeks.  I sent out a prescription for Zithromax Z-PAMELA.  I asked her to let me know if she fails to continue to improve.  No follow-ups on file.

## 2022-12-21 ENCOUNTER — LAB (OUTPATIENT)
Dept: GASTROENTEROLOGY | Facility: CLINIC | Age: 77
End: 2022-12-21

## 2023-01-10 ENCOUNTER — OFFICE VISIT (OUTPATIENT)
Dept: GASTROENTEROLOGY | Facility: CLINIC | Age: 78
End: 2023-01-10
Payer: MEDICARE

## 2023-01-10 VITALS
SYSTOLIC BLOOD PRESSURE: 122 MMHG | TEMPERATURE: 96.3 F | DIASTOLIC BLOOD PRESSURE: 65 MMHG | OXYGEN SATURATION: 97 % | WEIGHT: 143.2 LBS | HEIGHT: 64 IN | BODY MASS INDEX: 24.45 KG/M2 | HEART RATE: 80 BPM

## 2023-01-10 DIAGNOSIS — K86.2 PANCREATIC CYST: Primary | ICD-10-CM

## 2023-01-10 PROCEDURE — 99213 OFFICE O/P EST LOW 20 MIN: CPT | Performed by: INTERNAL MEDICINE

## 2023-01-10 RX ORDER — MULTIPLE VITAMINS W/ MINERALS TAB 9MG-400MCG
1 TAB ORAL DAILY
COMMUNITY

## 2023-01-10 NOTE — PROGRESS NOTES
Chief Complaint   Patient presents with   • Autoimmune hepatitis        Mami Srivastava is a  77 y.o. female here for a follow up visit for pancreatic cyst.    HPI     Patient 77-year-old female with history of pancreatic cyst here for follow-up.  Patient with history of autoimmune hepatitis doing well as well as hypertension noted with enlargement of the pancreatic cyst.  EUS with low likelihood of malignant transformation was referred to surgical oncology.  Patient seen by Dr. Merritt who recommended considering distal pancreatectomy versus watchful monitoring.  Patient elected for repeat imaging but on discussion with son said he could be available in March if she needed surgery.  Patient asking to have early imaging for consideration of surgery this year.  Patient currently asymptomatic.  Patient reports no change in diet or appetite.  Patient reports no change in stool consistency or frequency.    Past Medical History:   Diagnosis Date   • Arthritis    • Autoimmune hepatitis (HCC)    • C. difficile colitis 09/2020   • Cholelithiasis 04/25/2018    Ultrasound   • Coronary artery disease     Dr Luis Rick   • Diverticulitis of colon 2005,2018   • Hemangioma of liver    • Hernia 2017    Hiatal hernia-Prabhjot test   • Hyperlipidemia     Dr Luis Rick-atorvastatin   • Hypertension    • Hyperthyroidism    • Hypothyroidism    • Migraines    • Osteopenia after menopause    • Pancreatitis     Cyst in pancreas being observed since 2018…Dr Watson and Cece Merritt 2022   • PONV (postoperative nausea and vomiting)    • Right shoulder pain    • Seasonal allergies    • Skin cancer     face/ BASAL    • Ulcerative colitis (HCC) 2014    Colitis/[facility]   • UTI (urinary tract infection)     HISTORY/OF         Current Outpatient Medications:   •  amLODIPine (NORVASC) 5 MG tablet, Take 1 tablet by mouth Daily., Disp: 90 tablet, Rfl: 3  •  aspirin 81 MG EC tablet, Take 81 mg by mouth Daily. HOLDING FOR 7 DAYS PRIOR TO OR,  Disp: , Rfl:   •  cetirizine (zyrTEC) 10 MG tablet, Take 10 mg by mouth As Needed., Disp: , Rfl:   •  lisinopril (PRINIVIL,ZESTRIL) 20 MG tablet, Take 1 tablet by mouth Daily., Disp: 30 tablet, Rfl: 11  •  multivitamin with minerals (MULTIVITAMIN WOMEN 50+ PO), Take 1 tablet by mouth Daily., Disp: , Rfl:   •  nystatin (MYCOSTATIN) 523092 UNIT/GM cream, APPLY TO AFFECTED AREA(S) TWO TIMES A DAY AS NEEDED FOR YEAST INFECTION OR ITCHING, Disp: 30 g, Rfl: 0  •  rosuvastatin (CRESTOR) 10 MG tablet, Take 1 tablet by mouth Daily., Disp: 30 tablet, Rfl: 11  •  Synthroid 75 MCG tablet, Take 1 tablet by mouth Daily., Disp: 90 tablet, Rfl: 1  •  Turmeric 500 MG capsule, Take  by mouth., Disp: , Rfl:   •  azithromycin (Zithromax Z-Jamie) 250 MG tablet, Take 2 tablets by mouth on day 1, then 1 tablet daily on days 2-5, Disp: 6 tablet, Rfl: 0  •  Cholecalciferol (VITAMIN D3) 5000 units capsule capsule, Take 5,000 Units by mouth 3 (Three) Times a Week., Disp: , Rfl:   •  methylPREDNISolone (MEDROL) 4 MG dose pack, Take as directed on package instructions., Disp: 21 tablet, Rfl: 0  •  nitrofurantoin, macrocrystal-monohydrate, (Macrobid) 100 MG capsule, Take 1 capsule by mouth 2 (Two) Times a Day., Disp: 10 capsule, Rfl: 0  •  traMADol (ULTRAM) 50 MG tablet, Take 1 tablet by mouth Every 4 (Four) Hours As Needed for Moderate Pain ., Disp: 60 tablet, Rfl: 0    Allergies   Allergen Reactions   • Imuran [Azathioprine] GI Intolerance   • Bactrim [Sulfamethoxazole-Trimethoprim] Other (See Comments)     fatigue   • Clindamycin/Lincomycin Diarrhea   • Augmentin [Amoxicillin-Pot Clavulanate] Hives   • Keflex [Cephalexin] Hives       Social History     Socioeconomic History   • Marital status:    Tobacco Use   • Smoking status: Former     Packs/day: 2.00     Years: 12.00     Pack years: 24.00     Types: Cigarettes     Start date: 1963     Quit date: 1976     Years since quittin.9   • Smokeless tobacco: Never   Vaping Use    • Vaping Use: Never used   Substance and Sexual Activity   • Alcohol use: Not Currently     Comment: none in 3 years   • Drug use: Never   • Sexual activity: Not Currently       Family History   Problem Relation Age of Onset   • Heart disease Mother    • Hypertension Mother    • Lung cancer Sister    • Thyroid disease Sister    • Lupus Sister    • Thyroid disease Brother    • Alcohol abuse Brother    • Glaucoma Maternal Grandmother    • Stomach cancer Maternal Grandmother          ’s   • Cirrhosis Father             • Liver disease Father    • Malig Hyperthermia Neg Hx        Review of Systems   Constitutional: Negative.    Respiratory: Negative.    Cardiovascular: Negative.    Gastrointestinal: Negative.    Musculoskeletal: Negative.    Skin: Negative.    Hematological: Negative.        Vitals:    01/10/23 0839   BP: 122/65   Pulse: 80   Temp: 96.3 °F (35.7 °C)   SpO2: 97%       Physical Exam  Vitals reviewed.   Constitutional:       Appearance: Normal appearance. She is well-developed.   HENT:      Head: Normocephalic and atraumatic.   Eyes:      General: No scleral icterus.     Pupils: Pupils are equal, round, and reactive to light.   Cardiovascular:      Rate and Rhythm: Normal rate and regular rhythm.      Heart sounds: No murmur heard.    No friction rub. No gallop.   Pulmonary:      Effort: Pulmonary effort is normal. No respiratory distress.      Breath sounds: Normal breath sounds.   Abdominal:      General: Bowel sounds are normal. There is no distension.      Palpations: Abdomen is soft. There is no mass.      Tenderness: There is no abdominal tenderness.      Hernia: No hernia is present.   Skin:     General: Skin is warm and dry.      Coloration: Skin is not jaundiced.      Findings: No rash.   Neurological:      General: No focal deficit present.      Mental Status: She is alert and oriented to person, place, and time.      Cranial Nerves: No cranial nerve deficit.   Psychiatric:          Behavior: Behavior normal.         Thought Content: Thought content normal.         Judgment: Judgment normal.         Results Encounter on 12/01/2022   Component Date Value Ref Range Status   • Glucose 12/21/2022 149 (H)  65 - 99 mg/dL Final   • BUN 12/21/2022 14  8 - 23 mg/dL Final   • Creatinine 12/21/2022 0.94  0.57 - 1.00 mg/dL Final   • EGFR Result 12/21/2022 62.6  >60.0 mL/min/1.73 Final   • BUN/Creatinine Ratio 12/21/2022 14.9  7.0 - 25.0 Final   • Sodium 12/21/2022 141  136 - 145 mmol/L Final   • Potassium 12/21/2022 4.6  3.5 - 5.2 mmol/L Final   • Chloride 12/21/2022 103  98 - 107 mmol/L Final   • Total CO2 12/21/2022 28.7  22.0 - 29.0 mmol/L Final   • Calcium 12/21/2022 9.6  8.6 - 10.5 mg/dL Final   • Total Protein 12/21/2022 6.1  6.0 - 8.5 g/dL Final   • Albumin 12/21/2022 4.30  3.50 - 5.20 g/dL Final   • Globulin 12/21/2022 1.8  gm/dL Final   • A/G Ratio 12/21/2022 2.4  g/dL Final   • Total Bilirubin 12/21/2022 1.1  0.0 - 1.2 mg/dL Final   • Alkaline Phosphatase 12/21/2022 82  39 - 117 U/L Final   • AST (SGOT) 12/21/2022 21  1 - 32 U/L Final   • ALT (SGPT) 12/21/2022 11  1 - 33 U/L Final   • CA 19-9 12/21/2022 <2  0 - 35 U/mL Final   Office Visit on 12/01/2022   Component Date Value Ref Range Status   • COVID19 12/01/2022 Not Detected  Not Detected - Ref. Range Final   • Influenza A Antigen MANN 12/01/2022 Detected (A)  Not Detected Final   • Influenza B Antigen MANN 12/01/2022 Not Detected  Not Detected Final   • Internal Control 12/01/2022 Passed  Passed Final   • Lot Number 12/01/2022 [id number]   Final   • Expiration Date 12/01/2022 11/12/2023   Final       Diagnoses and all orders for this visit:    1. Pancreatic cyst (Primary)  -     MRI Abdomen With Contrast; Future      Patient 77-year-old female with history of thyroid disease, hypertension, autoimmune hepatitis diagnosed with cystic lesion in the pancreas.  Findings consistent with IPMN undergoing evaluation for possible distal pancreas  resection.  Patient had a change in the size of the lesion in June and followed up with surgical oncology.  Surgical oncology recommend considering repeat imaging versus immediate resection patient opted for repeat imaging.  Patient requesting doing the imaging now as son can be available if patient needs surgery in March.  We will proceed with ordering the repeat MRI and follow-up clinically.

## 2023-01-19 ENCOUNTER — TRANSCRIBE ORDERS (OUTPATIENT)
Dept: ADMINISTRATIVE | Facility: HOSPITAL | Age: 78
End: 2023-01-19
Payer: MEDICARE

## 2023-01-19 DIAGNOSIS — D13.6 BENIGN NEOPLASM OF PANCREAS, EXCEPT ISLETS OF LANGERHANS: Primary | ICD-10-CM

## 2023-01-25 DIAGNOSIS — R73.9 HYPERGLYCEMIA: ICD-10-CM

## 2023-01-25 DIAGNOSIS — I10 PRIMARY HYPERTENSION: Primary | ICD-10-CM

## 2023-01-25 DIAGNOSIS — E55.9 VITAMIN D DEFICIENCY: ICD-10-CM

## 2023-01-25 DIAGNOSIS — E78.2 MIXED HYPERLIPIDEMIA: ICD-10-CM

## 2023-01-28 LAB
25(OH)D3+25(OH)D2 SERPL-MCNC: 45.9 NG/ML (ref 30–100)
ALBUMIN SERPL-MCNC: 4.9 G/DL (ref 3.5–5.2)
ALBUMIN/GLOB SERPL: 3.1 G/DL
ALP SERPL-CCNC: 82 U/L (ref 39–117)
ALT SERPL-CCNC: 16 U/L (ref 1–33)
APPEARANCE UR: CLEAR
AST SERPL-CCNC: 19 U/L (ref 1–32)
BACTERIA #/AREA URNS HPF: ABNORMAL /HPF
BASOPHILS # BLD AUTO: 0.02 10*3/MM3 (ref 0–0.2)
BASOPHILS NFR BLD AUTO: 0.3 % (ref 0–1.5)
BILIRUB SERPL-MCNC: 0.9 MG/DL (ref 0–1.2)
BILIRUB UR QL STRIP: NEGATIVE
BUN SERPL-MCNC: 12 MG/DL (ref 8–23)
BUN/CREAT SERPL: 14 (ref 7–25)
CALCIUM SERPL-MCNC: 9.9 MG/DL (ref 8.6–10.5)
CASTS URNS MICRO: ABNORMAL
CHLORIDE SERPL-SCNC: 102 MMOL/L (ref 98–107)
CHOLEST SERPL-MCNC: 114 MG/DL (ref 0–200)
CHOLEST/HDLC SERPL: 2.24 {RATIO}
CO2 SERPL-SCNC: 29.8 MMOL/L (ref 22–29)
COLOR UR: YELLOW
CREAT SERPL-MCNC: 0.86 MG/DL (ref 0.57–1)
EGFRCR SERPLBLD CKD-EPI 2021: 69.7 ML/MIN/1.73
EOSINOPHIL # BLD AUTO: 0.34 10*3/MM3 (ref 0–0.4)
EOSINOPHIL NFR BLD AUTO: 5.4 % (ref 0.3–6.2)
EPI CELLS #/AREA URNS HPF: ABNORMAL /HPF
ERYTHROCYTE [DISTWIDTH] IN BLOOD BY AUTOMATED COUNT: 12.9 % (ref 12.3–15.4)
GLOBULIN SER CALC-MCNC: 1.6 GM/DL
GLUCOSE SERPL-MCNC: 124 MG/DL (ref 65–99)
GLUCOSE UR QL STRIP: NEGATIVE
HBA1C MFR BLD: 6.6 % (ref 4.8–5.6)
HCT VFR BLD AUTO: 38.3 % (ref 34–46.6)
HDLC SERPL-MCNC: 51 MG/DL (ref 40–60)
HGB BLD-MCNC: 12.6 G/DL (ref 12–15.9)
HGB UR QL STRIP: ABNORMAL
IMM GRANULOCYTES # BLD AUTO: 0.01 10*3/MM3 (ref 0–0.05)
IMM GRANULOCYTES NFR BLD AUTO: 0.2 % (ref 0–0.5)
KETONES UR QL STRIP: NEGATIVE
LDLC SERPL CALC-MCNC: 43 MG/DL (ref 0–100)
LEUKOCYTE ESTERASE UR QL STRIP: ABNORMAL
LYMPHOCYTES # BLD AUTO: 1.9 10*3/MM3 (ref 0.7–3.1)
LYMPHOCYTES NFR BLD AUTO: 30.3 % (ref 19.6–45.3)
MCH RBC QN AUTO: 27.8 PG (ref 26.6–33)
MCHC RBC AUTO-ENTMCNC: 32.9 G/DL (ref 31.5–35.7)
MCV RBC AUTO: 84.4 FL (ref 79–97)
MONOCYTES # BLD AUTO: 0.4 10*3/MM3 (ref 0.1–0.9)
MONOCYTES NFR BLD AUTO: 6.4 % (ref 5–12)
NEUTROPHILS # BLD AUTO: 3.61 10*3/MM3 (ref 1.7–7)
NEUTROPHILS NFR BLD AUTO: 57.4 % (ref 42.7–76)
NITRITE UR QL STRIP: NEGATIVE
NRBC BLD AUTO-RTO: 0 /100 WBC (ref 0–0.2)
PH UR STRIP: 6 [PH] (ref 5–8)
PLATELET # BLD AUTO: 189 10*3/MM3 (ref 140–450)
POTASSIUM SERPL-SCNC: 4.5 MMOL/L (ref 3.5–5.2)
PROT SERPL-MCNC: 6.5 G/DL (ref 6–8.5)
PROT UR QL STRIP: NEGATIVE
RBC # BLD AUTO: 4.54 10*6/MM3 (ref 3.77–5.28)
RBC #/AREA URNS HPF: ABNORMAL /HPF
SODIUM SERPL-SCNC: 141 MMOL/L (ref 136–145)
SP GR UR STRIP: 1.02 (ref 1–1.03)
T4 FREE SERPL-MCNC: 1.62 NG/DL (ref 0.93–1.7)
TRIGL SERPL-MCNC: 111 MG/DL (ref 0–150)
TSH SERPL DL<=0.005 MIU/L-ACNC: 2.52 UIU/ML (ref 0.27–4.2)
UROBILINOGEN UR STRIP-MCNC: ABNORMAL MG/DL
VLDLC SERPL CALC-MCNC: 20 MG/DL (ref 5–40)
WBC # BLD AUTO: 6.28 10*3/MM3 (ref 3.4–10.8)
WBC #/AREA URNS HPF: ABNORMAL /HPF

## 2023-01-31 ENCOUNTER — OFFICE VISIT (OUTPATIENT)
Dept: FAMILY MEDICINE CLINIC | Facility: CLINIC | Age: 78
End: 2023-01-31
Payer: MEDICARE

## 2023-01-31 VITALS
OXYGEN SATURATION: 97 % | TEMPERATURE: 98.7 F | BODY MASS INDEX: 24.75 KG/M2 | DIASTOLIC BLOOD PRESSURE: 72 MMHG | WEIGHT: 145 LBS | HEART RATE: 77 BPM | SYSTOLIC BLOOD PRESSURE: 128 MMHG | HEIGHT: 64 IN

## 2023-01-31 DIAGNOSIS — E78.2 MIXED HYPERLIPIDEMIA: Primary | ICD-10-CM

## 2023-01-31 DIAGNOSIS — K86.2 PANCREATIC CYST: ICD-10-CM

## 2023-01-31 DIAGNOSIS — N30.90 CYSTITIS: ICD-10-CM

## 2023-01-31 DIAGNOSIS — Z00.00 ENCOUNTER FOR SUBSEQUENT ANNUAL WELLNESS VISIT (AWV) IN MEDICARE PATIENT: ICD-10-CM

## 2023-01-31 DIAGNOSIS — I10 PRIMARY HYPERTENSION: ICD-10-CM

## 2023-01-31 PROBLEM — D49.0 INTRADUCTAL PAPILLARY MUCINOUS NEOPLASM OF PANCREAS: Status: ACTIVE | Noted: 2023-01-31

## 2023-01-31 PROCEDURE — 96160 PT-FOCUSED HLTH RISK ASSMT: CPT | Performed by: INTERNAL MEDICINE

## 2023-01-31 PROCEDURE — G0439 PPPS, SUBSEQ VISIT: HCPCS | Performed by: INTERNAL MEDICINE

## 2023-01-31 PROCEDURE — 99214 OFFICE O/P EST MOD 30 MIN: CPT | Performed by: INTERNAL MEDICINE

## 2023-01-31 PROCEDURE — 1170F FXNL STATUS ASSESSED: CPT | Performed by: INTERNAL MEDICINE

## 2023-01-31 PROCEDURE — 1159F MED LIST DOCD IN RCRD: CPT | Performed by: INTERNAL MEDICINE

## 2023-01-31 RX ORDER — LISINOPRIL 20 MG/1
20 TABLET ORAL DAILY
Qty: 30 TABLET | Refills: 11 | Status: SHIPPED | OUTPATIENT
Start: 2023-01-31

## 2023-01-31 RX ORDER — ROSUVASTATIN CALCIUM 10 MG/1
10 TABLET, COATED ORAL DAILY
Qty: 30 TABLET | Refills: 11 | Status: SHIPPED | OUTPATIENT
Start: 2023-01-31

## 2023-01-31 RX ORDER — AMLODIPINE BESYLATE 5 MG/1
5 TABLET ORAL DAILY
Qty: 90 TABLET | Refills: 3 | Status: SHIPPED | OUTPATIENT
Start: 2023-01-31

## 2023-01-31 RX ORDER — NITROFURANTOIN 25; 75 MG/1; MG/1
100 CAPSULE ORAL 2 TIMES DAILY
Qty: 14 CAPSULE | Refills: 1 | Status: SHIPPED | OUTPATIENT
Start: 2023-01-31

## 2023-01-31 NOTE — PROGRESS NOTES
The ABCs of the Annual Wellness Visit  Subsequent Medicare Wellness Visit    Subjective    Mami Srivastava is a 77 y.o. female who presents for a Subsequent Medicare Wellness Visit.    The following portions of the patient's history were reviewed and   updated as appropriate: allergies, current medications, past family history, past medical history, past social history, past surgical history and problem list.    Compared to one year ago, the patient feels her physical   health is worse.    Compared to one year ago, the patient feels her mental   health is worse.    Recent Hospitalizations:  She was not admitted to the hospital during the last year.       Current Medical Providers:  Patient Care Team:  Pradip Rosenberg MD as PCP - General (Internal Medicine)  Tressa Howard MD as Consulting Physician (Internal Medicine)  Pradip Wilson MD (Dermatology)  Luis Rick MD as Consulting Physician (Cardiology)  Shiva Hein MD as Consulting Physician (Gastroenterology)    Outpatient Medications Prior to Visit   Medication Sig Dispense Refill   • aspirin 81 MG EC tablet Take 81 mg by mouth Daily. HOLDING FOR 7 DAYS PRIOR TO OR     • cetirizine (zyrTEC) 10 MG tablet Take 10 mg by mouth As Needed.     • multivitamin with minerals tablet tablet Take 1 tablet by mouth Daily.     • nystatin (MYCOSTATIN) 220467 UNIT/GM cream APPLY TO AFFECTED AREA(S) TWO TIMES A DAY AS NEEDED FOR YEAST INFECTION OR ITCHING 30 g 0   • Synthroid 75 MCG tablet Take 1 tablet by mouth Daily. 90 tablet 1   • Turmeric 500 MG capsule Take  by mouth.     • amLODIPine (NORVASC) 5 MG tablet Take 1 tablet by mouth Daily. 90 tablet 3   • lisinopril (PRINIVIL,ZESTRIL) 20 MG tablet Take 1 tablet by mouth Daily. 30 tablet 11   • rosuvastatin (CRESTOR) 10 MG tablet Take 1 tablet by mouth Daily. 30 tablet 11   • azithromycin (Zithromax Z-Jamie) 250 MG tablet Take 2 tablets by mouth on day 1, then 1 tablet daily on days 2-5 6  tablet 0   • Cholecalciferol (VITAMIN D3) 5000 units capsule capsule Take 5,000 Units by mouth 3 (Three) Times a Week.     • methylPREDNISolone (MEDROL) 4 MG dose pack Take as directed on package instructions. 21 tablet 0   • nitrofurantoin, macrocrystal-monohydrate, (Macrobid) 100 MG capsule Take 1 capsule by mouth 2 (Two) Times a Day. 10 capsule 0   • traMADol (ULTRAM) 50 MG tablet Take 1 tablet by mouth Every 4 (Four) Hours As Needed for Moderate Pain . 60 tablet 0     No facility-administered medications prior to visit.       No opioid medication identified on active medication list. I have reviewed chart for other potential  high risk medication/s and harmful drug interactions in the elderly.          Aspirin is on active medication list. Aspirin use is indicated based on review of current medical condition/s. Pros and cons of this therapy have been discussed today. Benefits of this medication outweigh potential harm.  Patient has been encouraged to continue taking this medication.  .      Patient Active Problem List   Diagnosis   • S/P thyroidectomy   • Mixed hyperlipidemia   • Vitamin D deficiency   • Hypothyroidism   • Osteopenia of multiple sites   • Hypertensive left ventricular hypertrophy, without heart failure   • Chronic seasonal allergic rhinitis   • History of heart disease   • Hx of abnormal mammogram   • Nodule of left lung   • Hypertension   • Hyperglycemia   • Current chronic use of systemic steroids   • Autoimmune hepatitis (HCC)   • Diverticulitis   • Cholelithiasis   • Irritable bowel syndrome with diarrhea   • Urinary frequency   • Pedal edema   • Hiatal hernia   • Liver hemangioma   • Cystitis   • C. difficile colitis   • Bacterial UTI   • Calcific coronary arteriosclerosis   • Diastolic dysfunction   • Dyslipidemia   • Right shoulder pain   • Pancreatic cyst   • Urticaria   • Posttraumatic headache   • Arthralgia of both hands   • Influenza A with respiratory manifestations   • Intraductal  "papillary mucinous neoplasm of pancreas   • Encounter for subsequent annual wellness visit (AWV) in Medicare patient     Advance Care Planning  Advance Directive is on file.  ACP discussion was held with the patient during this visit. Patient has an advance directive in EMR which is still valid.      Objective    Vitals:    23 1047   BP: 128/72   BP Location: Right arm   Patient Position: Sitting   Pulse: 77   Temp: 98.7 °F (37.1 °C)   SpO2: 97%   Weight: 65.8 kg (145 lb)   Height: 162.6 cm (64\")     Estimated body mass index is 24.89 kg/m² as calculated from the following:    Height as of this encounter: 162.6 cm (64\").    Weight as of this encounter: 65.8 kg (145 lb).    BMI is within normal parameters. No other follow-up for BMI required.      Does the patient have evidence of cognitive impairment? No    Lab Results   Component Value Date    CHLPL 114 2023    TRIG 111 2023    HDL 51 2023    LDL 43 2023    VLDL 20 2023    HGBA1C 6.60 (H) 2023        HEALTH RISK ASSESSMENT    Smoking Status:  Social History     Tobacco Use   Smoking Status Former   • Packs/day: 2.00   • Years: 12.00   • Pack years: 24.00   • Types: Cigarettes   • Start date: 1963   • Quit date: 1976   • Years since quittin.0   Smokeless Tobacco Never     Alcohol Consumption:  Social History     Substance and Sexual Activity   Alcohol Use Not Currently    Comment: none in 3 years     Fall Risk Screen:    STEADI Fall Risk Assessment was completed, and patient is at LOW risk for falls.Assessment completed on:2023    Depression Screening:  PHQ-2/PHQ-9 Depression Screening 2023   Little Interest or Pleasure in Doing Things 1-->several days   Feeling Down, Depressed or Hopeless 1-->several days   Trouble Falling or Staying Asleep, or Sleeping Too Much 2-->more than half the days   Feeling Tired or Having Little Energy 2-->more than half the days   Poor Appetite or Overeating 0-->not at " all   Feeling Bad about Yourself - or that You are a Failure or Have Let Yourself or Your Family Down 0-->not at all   Trouble Concentrating on Things, Such as Reading the Newspaper or Watching Television 0-->not at all   Moving or Speaking So Slowly that Other People Could Have Noticed? Or the Opposite - Being So Fidgety 0-->not at all   Thoughts that You Would be Better Off Dead or of Hurting Yourself in Some Way 0-->not at all   PHQ-9: Brief Depression Severity Measure Score 6   If You Checked Off Any Problems, How Difficult Have These Problems Made It For You to Do Your Work, Take Care of Things at Home, or Get Along with Other People? not difficult at all       Health Habits and Functional and Cognitive Screening:  Functional & Cognitive Status 1/31/2023   Do you have difficulty preparing food and eating? No   Do you have difficulty bathing yourself, getting dressed or grooming yourself? No   Do you have difficulty using the toilet? No   Do you have difficulty moving around from place to place? No   Do you have trouble with steps or getting out of a bed or a chair? No   Current Diet Well Balanced Diet   Dental Exam Up to date   Eye Exam Up to date   Exercise (times per week) 1 times per week   Current Exercises Include No Regular Exercise   Do you need help using the phone?  No   Are you deaf or do you have serious difficulty hearing?  No   Do you need help with transportation? No   Do you need help shopping? No   Do you need help preparing meals?  No   Do you need help with housework?  No   Do you need help with laundry? No   Do you need help taking your medications? No   Do you need help managing money? No   Do you ever drive or ride in a car without wearing a seat belt? No   Have you felt unusual stress, anger or loneliness in the last month? No   Who do you live with? Alone   If you need help, do you have trouble finding someone available to you? (No Data)   Have you been bothered in the last four weeks by  sexual problems? No   Do you have difficulty concentrating, remembering or making decisions? No       Age-appropriate Screening Schedule:  Refer to the list below for future screening recommendations based on patient's age, sex and/or medical conditions. Orders for these recommended tests are listed in the plan section. The patient has been provided with a written plan.    Health Maintenance   Topic Date Due   • DXA SCAN  10/08/2020   • LIPID PANEL  01/27/2024   • MAMMOGRAM  05/27/2024   • TDAP/TD VACCINES (3 - Td or Tdap) 05/15/2032   • INFLUENZA VACCINE  Completed   • ZOSTER VACCINE  Completed                CMS Preventative Services Quick Reference  Risk Factors Identified During Encounter  Inactivity/Sedentary: Patient was advised to exercise at least 150 minutes a week per CDC recommendations.  The above risks/problems have been discussed with the patient.  Pertinent information has been shared with the patient in the After Visit Summary.  An After Visit Summary and PPPS were made available to the patient.    Follow Up:   Next Medicare Wellness visit to be scheduled in 1 year.       Additional E&M Note during same encounter follows:  Patient has multiple medical problems which are significant and separately identifiable that require additional work above and beyond the Medicare Wellness Visit.      Chief Complaint  Medicare Wellness-subsequent (Labs review )    Subjective        HPI  Mami Srivastava is also being seen today for hypertension, mixed hyperlipidemia, hypothyroidism status post thyroidectomy, vitamin D deficiency, Intraductal papillary mucinous neoplasm of the pancreas.  She is being followed by Dr. Merritt and Dr. Hein.    Review of Systems   Constitutional: Negative.    HENT: Negative.    Respiratory: Negative.    Cardiovascular: Negative.    Genitourinary:        She notes urinary urgency and frequency.   Musculoskeletal: Negative.    Psychiatric/Behavioral: Negative.        Objective  "  Vital Signs:  /72 (BP Location: Right arm, Patient Position: Sitting)   Pulse 77   Temp 98.7 °F (37.1 °C)   Ht 162.6 cm (64\")   Wt 65.8 kg (145 lb)   SpO2 97%   BMI 24.89 kg/m²     Physical Exam  Vitals and nursing note reviewed.   Constitutional:       Appearance: Normal appearance.      Comments: Pleasant, neatly groomed, no distress.   Neck:      Vascular: No carotid bruit.   Cardiovascular:      Rate and Rhythm: Regular rhythm.      Heart sounds: Normal heart sounds. No murmur heard.    No gallop.   Pulmonary:      Effort: No respiratory distress.      Breath sounds: Normal breath sounds. No wheezing or rales.   Neurological:      Mental Status: She is alert and oriented to person, place, and time.   Psychiatric:         Mood and Affect: Mood normal.         Behavior: Behavior normal.         Thought Content: Thought content normal.                   Assessment and Plan   Diagnoses and all orders for this visit:    1. Mixed hyperlipidemia (Primary)  -     rosuvastatin (CRESTOR) 10 MG tablet; Take 1 tablet by mouth Daily.  Dispense: 30 tablet; Refill: 11    2. Primary hypertension  -     amLODIPine (NORVASC) 5 MG tablet; Take 1 tablet by mouth Daily.  Dispense: 90 tablet; Refill: 3  -     lisinopril (PRINIVIL,ZESTRIL) 20 MG tablet; Take 1 tablet by mouth Daily.  Dispense: 30 tablet; Refill: 11    3. Cystitis    4. Pancreatic cyst    5. Encounter for subsequent annual wellness visit (AWV) in Medicare patient    Other orders  -     nitrofurantoin, macrocrystal-monohydrate, (Macrobid) 100 MG capsule; Take 1 capsule by mouth 2 (Two) Times a Day.  Dispense: 14 capsule; Refill: 1    Her hypertension is well controlled.    She has mixed hyperlipidemia which is well controlled on rosuvastatin 10 mg daily.    She is being followed by Dr. Merritt and Dr. Hein for cystic abnormality in her pancreas.  Radiologist read her last MRI showing intraductal cystic mucinous neoplasm of the pancreas.  She has an " appointment in a couple months to have another MRI of her abdomen done.    She has a cystitis.  I sent out a prescription for nitrofurantoin for her.  Glory send her urine for culture.    Would like to have her back in about 4 months.         Follow Up   No follow-ups on file.  Patient was given instructions and counseling regarding her condition or for health maintenance advice. Please see specific information pulled into the AVS if appropriate.

## 2023-02-06 ENCOUNTER — HOSPITAL ENCOUNTER (OUTPATIENT)
Dept: MRI IMAGING | Facility: HOSPITAL | Age: 78
Discharge: HOME | End: 2023-02-06
Admitting: SURGERY
Payer: MEDICARE

## 2023-02-06 DIAGNOSIS — D13.6 BENIGN NEOPLASM OF PANCREAS, EXCEPT ISLETS OF LANGERHANS: ICD-10-CM

## 2023-02-06 PROCEDURE — 74183 MRI ABD W/O CNTR FLWD CNTR: CPT

## 2023-02-06 PROCEDURE — A9577 INJ MULTIHANCE: HCPCS | Performed by: SURGERY

## 2023-02-06 PROCEDURE — 0 GADOBENATE DIMEGLUMINE 529 MG/ML SOLUTION: Performed by: SURGERY

## 2023-02-06 RX ADMIN — GADOBENATE DIMEGLUMINE 13 ML: 529 INJECTION, SOLUTION INTRAVENOUS at 12:24

## 2023-02-20 RX ORDER — LEVOTHYROXINE SODIUM 75 MCG
TABLET ORAL
Qty: 90 TABLET | Refills: 1 | Status: SHIPPED | OUTPATIENT
Start: 2023-02-20

## 2023-03-22 ENCOUNTER — EXTERNAL PBMM DATA (OUTPATIENT)
Dept: PHARMACY | Facility: OTHER | Age: 78
End: 2023-03-22
Payer: MEDICARE

## 2023-04-26 ENCOUNTER — EXTERNAL PBMM DATA (OUTPATIENT)
Dept: PHARMACY | Facility: OTHER | Age: 78
End: 2023-04-26
Payer: MEDICARE

## 2023-05-02 ENCOUNTER — POP HEALTH PHARMACY (OUTPATIENT)
Dept: PHARMACY | Facility: OTHER | Age: 78
End: 2023-05-02
Payer: MEDICARE

## 2023-05-02 NOTE — PROGRESS NOTES
[facility] Outreach      Mami Srivastava was called today to discuss medication adherence with LISINOPRIL (ACE INHIBITORS)  ROSUVASTATIN CALCIUM , as she was identified as having care opportunities.    Program Details    [facility]  Status: Enrolled  Effective Dates: 5/2/2023 - present  Responsible Staff: Keagan Rivera, [facility]          Opportunities Identified    Adherence- Cholesterol  Adherence- Hypertension       Adherence and Medication Management    Medication Adherence    What concerns does the patient have in regards to their medications: Insurance flagged lisinopril and rosuvastatin for adherence warnings  Demonstrates understanding of importance of adherence: yes  Informant: patient  Reliability of informant: reliable         General Medication Management    Type of medication management: targeted medication review  Referred by: insurance group  Recipient: beneficiary  Provider: pharmacist - other  Visit type: initial  Method of contact: by telephone           Medication Therapy Problems     Medication Therapy Recommendations  No medication therapy recommendations to display      Summary    Medication Management Summary    Topics discussed: adherence and missed doses discussed, reminder to refill or  medication discussed  Time spent: 46 - 60 min       Mami informed me that she picked up refills on all of her medications yesterday. She also requested that her pharmacy fill her medications for 90 days. She used to use [facility] mail order pharmacy, but recently switched to [facility] because she felt that she could no longer rely on the mail. No issues or concerns at this time.    Marry Rivera, PharmD 05/02/23, 12:56 PM EDT.

## 2023-05-18 ENCOUNTER — POP HEALTH PHARMACY (OUTPATIENT)
Dept: PHARMACY | Facility: OTHER | Age: 78
End: 2023-05-18
Payer: MEDICARE

## 2023-07-27 ENCOUNTER — EXTERNAL PBMM DATA (OUTPATIENT)
Dept: PHARMACY | Facility: OTHER | Age: 78
End: 2023-07-27
Payer: MEDICARE

## 2023-08-02 ENCOUNTER — POP HEALTH PHARMACY (OUTPATIENT)
Dept: PHARMACY | Facility: OTHER | Age: 78
End: 2023-08-02
Payer: MEDICARE

## 2023-08-25 ENCOUNTER — TELEPHONE (OUTPATIENT)
Dept: FAMILY MEDICINE CLINIC | Facility: CLINIC | Age: 78
End: 2023-08-25
Payer: MEDICARE

## 2023-08-25 DIAGNOSIS — I10 PRIMARY HYPERTENSION: ICD-10-CM

## 2023-08-25 DIAGNOSIS — E78.2 MIXED HYPERLIPIDEMIA: ICD-10-CM

## 2023-08-25 RX ORDER — LISINOPRIL 20 MG/1
20 TABLET ORAL DAILY
Qty: 90 TABLET | Refills: 1 | Status: SHIPPED | OUTPATIENT
Start: 2023-08-25

## 2023-08-25 RX ORDER — AMLODIPINE BESYLATE 5 MG/1
5 TABLET ORAL DAILY
Qty: 90 TABLET | Refills: 3 | Status: SHIPPED | OUTPATIENT
Start: 2023-08-25

## 2023-08-25 RX ORDER — ROSUVASTATIN CALCIUM 10 MG/1
10 TABLET, COATED ORAL DAILY
Qty: 90 TABLET | Refills: 1 | Status: SHIPPED | OUTPATIENT
Start: 2023-08-25

## 2023-08-25 NOTE — TELEPHONE ENCOUNTER
PATIENT WANTS A 90 DAY SUPPLY OF HER MEDICATION SENT IN. SHE DOESN'T KNOW WHY THEY WERE SWITCHED TO ONLY 30 DAY SUPPLIES. PLEASE ADVISE.

## 2023-08-28 RX ORDER — LEVOTHYROXINE SODIUM 75 MCG
TABLET ORAL
Qty: 90 TABLET | Refills: 1 | Status: SHIPPED | OUTPATIENT
Start: 2023-08-28

## 2023-12-28 ENCOUNTER — TRANSCRIBE ORDERS (OUTPATIENT)
Dept: ADMINISTRATIVE | Facility: HOSPITAL | Age: 78
End: 2023-12-28
Payer: MEDICARE

## 2023-12-28 DIAGNOSIS — D13.6: Primary | ICD-10-CM

## 2024-02-14 ENCOUNTER — TELEPHONE (OUTPATIENT)
Dept: FAMILY MEDICINE CLINIC | Facility: CLINIC | Age: 79
End: 2024-02-14

## 2024-02-14 NOTE — TELEPHONE ENCOUNTER
Caller: Mami Srivastava    Relationship: Self    Best call back number: [phone]    What is the best time to reach you: ANYTIME    Who are you requesting to speak with (clinical staff, provider,  specific staff member): DR HOLCOMB    What was the call regarding: PATIENT STATED THAT SHE HAS BEEN TAKING SYNTHROID BUT IT HAS BECOME MORE EXPENSIVE AND SHE IS WANTING O KNOW IF SHE COULD POTENTIALLY TRY THE GENERIC AND HOW DR HOLCOMB FEELS ABOUT THAT AND ALSO LABS SHE DID FROM ANOTHER PROVIDER. CALLBACK WITH UPDATES.    PREFERRED PHARMACY:[facility] [id number] - [facility] [address] AT [facility] - [phone] [facility] [phone]  [phone]

## 2024-02-21 ENCOUNTER — OFFICE VISIT (OUTPATIENT)
Dept: FAMILY MEDICINE CLINIC | Facility: CLINIC | Age: 79
End: 2024-02-21
Payer: MEDICARE

## 2024-02-21 VITALS
HEIGHT: 64 IN | BODY MASS INDEX: 24.11 KG/M2 | SYSTOLIC BLOOD PRESSURE: 120 MMHG | OXYGEN SATURATION: 97 % | DIASTOLIC BLOOD PRESSURE: 50 MMHG | TEMPERATURE: 97.8 F | HEART RATE: 63 BPM | WEIGHT: 141.2 LBS

## 2024-02-21 DIAGNOSIS — I25.10 CORONARY ARTERY DISEASE INVOLVING NATIVE CORONARY ARTERY OF NATIVE HEART WITHOUT ANGINA PECTORIS: Primary | ICD-10-CM

## 2024-02-21 DIAGNOSIS — E89.0 POSTOPERATIVE HYPOTHYROIDISM: ICD-10-CM

## 2024-02-21 PROCEDURE — 3078F DIAST BP <80 MM HG: CPT | Performed by: INTERNAL MEDICINE

## 2024-02-21 PROCEDURE — 3074F SYST BP LT 130 MM HG: CPT | Performed by: INTERNAL MEDICINE

## 2024-02-21 PROCEDURE — 99213 OFFICE O/P EST LOW 20 MIN: CPT | Performed by: INTERNAL MEDICINE

## 2024-02-21 RX ORDER — LEVOTHYROXINE SODIUM 0.07 MG/1
75 TABLET ORAL DAILY
Qty: 90 TABLET | Refills: 3 | Status: SHIPPED | OUTPATIENT
Start: 2024-02-21 | End: 2024-02-23 | Stop reason: SDUPTHER

## 2024-02-21 NOTE — PROGRESS NOTES
Subjective   Mami Srivastava is a 78 y.o. female. Patient is here today for   Chief Complaint   Patient presents with    Hypertension          Vitals:    24 1307   BP: 120/50   Pulse: 63   Temp: 97.8 °F (36.6 °C)   SpO2: 97%     Body mass index is 24.22 kg/m².      Past Medical History:   Diagnosis Date    Allergic     Arthritis     Autoimmune hepatitis     C. difficile colitis 2020    Cholelithiasis 2018    Ultrasound    Coronary artery disease     Dr Luis Rick    Diverticulitis of colon ,    Diverticulosis 2005    Hemangioma of liver     Hernia     Hiatal hernia-Prabhjot test    Hyperlipidemia     Dr Luis Rick-atorvastatin    Hypertension     Hyperthyroidism     Hypothyroidism     Migraines     Osteopenia     Osteopenia after menopause     Pancreatitis     Cyst in pancreas being observed since …Dr Watson and Cece Merritt     Pneumonia     As a child    PONV (postoperative nausea and vomiting)     Right shoulder pain     Seasonal allergies     Skin cancer     face/ BASAL     Ulcerative colitis     Colitis/DrKaplan    UTI (urinary tract infection)     HISTORY/OF      Allergies   Allergen Reactions    Imuran [Azathioprine] GI Intolerance    Bactrim [Sulfamethoxazole-Trimethoprim] Other (See Comments)     fatigue    Clindamycin/Lincomycin Diarrhea    Augmentin [Amoxicillin-Pot Clavulanate] Hives    Keflex [Cephalexin] Hives      Social History     Socioeconomic History    Marital status:    Tobacco Use    Smoking status: Former     Packs/day: 2.00     Years: 12.00     Additional pack years: 0.00     Total pack years: 24.00     Types: Cigarettes     Start date: 1963     Quit date: 1976     Years since quittin.0    Smokeless tobacco: Never   Vaping Use    Vaping Use: Never used   Substance and Sexual Activity    Alcohol use: Not Currently     Comment: none in 3 years    Drug use: Never    Sexual activity: Not Currently        Current Outpatient  Medications:     amLODIPine (NORVASC) 5 MG tablet, Take 1 tablet by mouth Daily., Disp: 90 tablet, Rfl: 3    aspirin 81 MG EC tablet, Take 1 tablet by mouth Daily. HOLDING FOR 7 DAYS PRIOR TO OR, Disp: , Rfl:     cetirizine (zyrTEC) 10 MG tablet, Take 1 tablet by mouth As Needed., Disp: , Rfl:     clobetasol (TEMOVATE) 0.05 % ointment, , Disp: , Rfl:     lisinopril (PRINIVIL,ZESTRIL) 20 MG tablet, Take 1 tablet by mouth Daily., Disp: 90 tablet, Rfl: 1    rosuvastatin (CRESTOR) 10 MG tablet, Take 1 tablet by mouth Daily., Disp: 90 tablet, Rfl: 1    vitamin D3 (Vitamin D) 125 MCG (5000 UT) capsule capsule, , Disp: , Rfl:     levothyroxine (Synthroid) 75 MCG tablet, Take 1 tablet by mouth Daily., Disp: 90 tablet, Rfl: 3     Objective     Hypertension         Review of Systems   Constitutional: Negative.    HENT: Negative.     Respiratory: Negative.     Cardiovascular: Negative.    Musculoskeletal: Negative.    Psychiatric/Behavioral: Negative.         Physical Exam  Vitals and nursing note reviewed.   Constitutional:       Appearance: Normal appearance.      Comments: Pleasant, neatly groomed, no distress.   Cardiovascular:      Rate and Rhythm: Regular rhythm.      Heart sounds: Normal heart sounds. No murmur heard.     No gallop.   Pulmonary:      Effort: No respiratory distress.      Breath sounds: Normal breath sounds. No wheezing or rales.   Neurological:      Mental Status: She is alert and oriented to person, place, and time.   Psychiatric:         Mood and Affect: Mood normal.         Behavior: Behavior normal.         Thought Content: Thought content normal.         Problems Addressed this Visit          Endocrine and Metabolic    Hypothyroidism    Relevant Medications    levothyroxine (Synthroid) 75 MCG tablet     Other Visit Diagnoses       Coronary artery disease involving native coronary artery of native heart without angina pectoris    -  Primary    Relevant Orders    Ambulatory Referral to Cardiology  (Completed)          Diagnoses         Codes Comments    Coronary artery disease involving native coronary artery of native heart without angina pectoris    -  Primary ICD-10-CM: I25.10  ICD-9-CM: 414.01     Postoperative hypothyroidism     ICD-10-CM: E89.0  ICD-9-CM: 244.0               PLAN  She has a history of left ventricular hypertrophy without heart failure as consequence of hypertension, calcific coronary arthrosclerosis.  It would be prudent for her to follow-up with cardiology once yearly.  I put a referral in for that today.  Her hypercholesterolemia is well-controlled with an LDL cholesterol 56 and HDL cholesterol 47.  The patient has been seen by a physician recently who told her that her cholesterol was too low so she was asked her to cut rosuvastatin down to 5 mg daily    Her hypertension is well-controlled.  I did ask her to discontinue amlodipine.    Her hypothyroidism is appropriately replaced unfortunately she cannot tolerate the expense of namebrand Synthroid.  I sent a prescription for generic levothyroxine 75 mcg once daily.  I would like to recheck her TSH and free T4 in April.  Preferably a week before her follow-up visit.    No follow-ups on file.  Answers submitted by the patient for this visit:  Other (Submitted on 2/19/2024)  Please describe your symptoms.: Review current meds/dosage  Have you had these symptoms before?: Yes  How long have you been having these symptoms?: Greater than 2 weeks  Please list any medications you are currently taking for this condition.: Possibly change from expensive Synthroid to generic and review dosage on cholesterol/blood oressure meds  Please describe any probable cause for these symptoms. : thyroid removed in 2008 and taking Synthroud since then  Primary Reason for Visit (Submitted on 2/19/2024)  What is the primary reason for your visit?: Other

## 2024-02-22 DIAGNOSIS — E89.0 POSTOPERATIVE HYPOTHYROIDISM: ICD-10-CM

## 2024-02-22 DIAGNOSIS — E78.2 MIXED HYPERLIPIDEMIA: Primary | ICD-10-CM

## 2024-02-23 DIAGNOSIS — E78.2 MIXED HYPERLIPIDEMIA: ICD-10-CM

## 2024-02-23 LAB
ALBUMIN SERPL-MCNC: 4.5 G/DL (ref 3.5–5.2)
ALBUMIN/GLOB SERPL: 2.1 G/DL
ALP SERPL-CCNC: 96 U/L (ref 39–117)
ALT SERPL-CCNC: 16 U/L (ref 1–33)
APPEARANCE UR: CLEAR
AST SERPL-CCNC: 20 U/L (ref 1–32)
BACTERIA #/AREA URNS HPF: NORMAL /HPF
BASOPHILS # BLD AUTO: 0.04 10*3/MM3 (ref 0–0.2)
BASOPHILS NFR BLD AUTO: 0.6 % (ref 0–1.5)
BILIRUB SERPL-MCNC: 0.9 MG/DL (ref 0–1.2)
BILIRUB UR QL STRIP: NEGATIVE
BUN SERPL-MCNC: 12 MG/DL (ref 8–23)
BUN/CREAT SERPL: 11.5 (ref 7–25)
CALCIUM SERPL-MCNC: 9.8 MG/DL (ref 8.6–10.5)
CASTS URNS MICRO: NORMAL
CHLORIDE SERPL-SCNC: 102 MMOL/L (ref 98–107)
CHOLEST SERPL-MCNC: 121 MG/DL (ref 0–200)
CO2 SERPL-SCNC: 27.1 MMOL/L (ref 22–29)
COLOR UR: YELLOW
CREAT SERPL-MCNC: 1.04 MG/DL (ref 0.57–1)
EGFRCR SERPLBLD CKD-EPI 2021: 55.1 ML/MIN/1.73
EOSINOPHIL # BLD AUTO: 0.21 10*3/MM3 (ref 0–0.4)
EOSINOPHIL NFR BLD AUTO: 3.4 % (ref 0.3–6.2)
EPI CELLS #/AREA URNS HPF: NORMAL /HPF
ERYTHROCYTE [DISTWIDTH] IN BLOOD BY AUTOMATED COUNT: 13.4 % (ref 12.3–15.4)
GLOBULIN SER CALC-MCNC: 2.1 GM/DL
GLUCOSE SERPL-MCNC: 110 MG/DL (ref 65–99)
GLUCOSE UR QL STRIP: NEGATIVE
HCT VFR BLD AUTO: 38 % (ref 34–46.6)
HDLC SERPL-MCNC: 50 MG/DL (ref 40–60)
HGB BLD-MCNC: 12.6 G/DL (ref 12–15.9)
HGB UR QL STRIP: NEGATIVE
IMM GRANULOCYTES # BLD AUTO: 0.02 10*3/MM3 (ref 0–0.05)
IMM GRANULOCYTES NFR BLD AUTO: 0.3 % (ref 0–0.5)
KETONES UR QL STRIP: NEGATIVE
LDLC SERPL CALC-MCNC: 54 MG/DL (ref 0–100)
LDLC/HDLC SERPL: 1.06 {RATIO}
LEUKOCYTE ESTERASE UR QL STRIP: ABNORMAL
LYMPHOCYTES # BLD AUTO: 1.46 10*3/MM3 (ref 0.7–3.1)
LYMPHOCYTES NFR BLD AUTO: 23.4 % (ref 19.6–45.3)
Lab: NORMAL
Lab: NORMAL
MCH RBC QN AUTO: 28.2 PG (ref 26.6–33)
MCHC RBC AUTO-ENTMCNC: 33.2 G/DL (ref 31.5–35.7)
MCV RBC AUTO: 85 FL (ref 79–97)
MONOCYTES # BLD AUTO: 0.37 10*3/MM3 (ref 0.1–0.9)
MONOCYTES NFR BLD AUTO: 5.9 % (ref 5–12)
NEUTROPHILS # BLD AUTO: 4.14 10*3/MM3 (ref 1.7–7)
NEUTROPHILS NFR BLD AUTO: 66.4 % (ref 42.7–76)
NITRITE UR QL STRIP: NEGATIVE
NRBC BLD AUTO-RTO: 0 /100 WBC (ref 0–0.2)
PH UR STRIP: 6.5 [PH] (ref 5–8)
PLATELET # BLD AUTO: 213 10*3/MM3 (ref 140–450)
POTASSIUM SERPL-SCNC: 4.7 MMOL/L (ref 3.5–5.2)
PROT SERPL-MCNC: 6.6 G/DL (ref 6–8.5)
PROT UR QL STRIP: NEGATIVE
RBC # BLD AUTO: 4.47 10*6/MM3 (ref 3.77–5.28)
RBC #/AREA URNS HPF: NORMAL /HPF
SODIUM SERPL-SCNC: 140 MMOL/L (ref 136–145)
SP GR UR STRIP: ABNORMAL (ref 1–1.03)
TRIGL SERPL-MCNC: 91 MG/DL (ref 0–150)
TSH SERPL DL<=0.005 MIU/L-ACNC: 2.04 UIU/ML (ref 0.27–4.2)
UROBILINOGEN UR STRIP-MCNC: ABNORMAL MG/DL
VLDLC SERPL CALC-MCNC: 17 MG/DL (ref 5–40)
WBC # BLD AUTO: 6.24 10*3/MM3 (ref 3.4–10.8)
WBC #/AREA URNS HPF: NORMAL /HPF

## 2024-02-23 RX ORDER — LEVOTHYROXINE SODIUM 0.07 MG/1
75 TABLET ORAL DAILY
Qty: 90 TABLET | Refills: 3 | Status: SHIPPED | OUTPATIENT
Start: 2024-02-23

## 2024-02-26 LAB
HBA1C MFR BLD: 6.5 % (ref 4.8–5.6)
WRITTEN AUTHORIZATION: NORMAL

## 2024-03-03 ENCOUNTER — HOSPITAL ENCOUNTER (OUTPATIENT)
Facility: HOSPITAL | Age: 79
Discharge: HOME | End: 2024-03-03
Attending: EMERGENCY MEDICINE | Admitting: EMERGENCY MEDICINE
Payer: MEDICARE

## 2024-03-03 VITALS
RESPIRATION RATE: 16 BRPM | DIASTOLIC BLOOD PRESSURE: 66 MMHG | HEART RATE: 83 BPM | OXYGEN SATURATION: 98 % | BODY MASS INDEX: 23.9 KG/M2 | SYSTOLIC BLOOD PRESSURE: 179 MMHG | TEMPERATURE: 97.5 F | HEIGHT: 64 IN | WEIGHT: 140 LBS

## 2024-03-03 DIAGNOSIS — I10 PRIMARY HYPERTENSION: ICD-10-CM

## 2024-03-03 DIAGNOSIS — R35.0 URINARY FREQUENCY: Primary | ICD-10-CM

## 2024-03-03 LAB
BACTERIA UR QL AUTO: NORMAL /HPF
BILIRUB UR QL STRIP: NEGATIVE
CLARITY UR: CLEAR
COLOR UR: YELLOW
GLUCOSE UR STRIP-MCNC: NEGATIVE MG/DL
HGB UR QL STRIP.AUTO: ABNORMAL
HOLD SPECIMEN: NORMAL
HYALINE CASTS UR QL AUTO: NORMAL /LPF
KETONES UR QL STRIP: NEGATIVE
LEUKOCYTE ESTERASE UR QL STRIP.AUTO: NEGATIVE
NITRITE UR QL STRIP: NEGATIVE
PH UR STRIP.AUTO: 5.5 [PH] (ref 5–8)
PROT UR QL STRIP: NEGATIVE
RBC # UR STRIP: NORMAL /HPF
REF LAB TEST METHOD: NORMAL
SP GR UR STRIP: 1.01 (ref 1–1.03)
SQUAMOUS #/AREA URNS HPF: NORMAL /HPF
UROBILINOGEN UR QL STRIP: ABNORMAL
WBC # UR STRIP: NORMAL /HPF

## 2024-03-03 PROCEDURE — G0463 HOSPITAL OUTPT CLINIC VISIT: HCPCS

## 2024-03-03 PROCEDURE — 81001 URINALYSIS AUTO W/SCOPE: CPT | Performed by: EMERGENCY MEDICINE

## 2024-03-03 PROCEDURE — 99214 OFFICE O/P EST MOD 30 MIN: CPT

## 2024-03-03 RX ORDER — FLUCONAZOLE 150 MG/1
150 TABLET ORAL ONCE
Qty: 1 TABLET | Refills: 0 | Status: SHIPPED | OUTPATIENT
Start: 2024-03-03 | End: 2024-03-03

## 2024-03-03 RX ORDER — NITROFURANTOIN 25; 75 MG/1; MG/1
100 CAPSULE ORAL 2 TIMES DAILY
Qty: 10 CAPSULE | Refills: 0 | Status: SHIPPED | OUTPATIENT
Start: 2024-03-03 | End: 2024-03-08

## 2024-03-03 NOTE — FSED PROVIDER NOTE
Subjective   History of Present Illness  Patient is a 78-year-old female who presents for urinary symptoms and high blood pressure x 3 to 4 days.  Patient reports urinary frequency, urgency, and foul odor.  Takes her blood pressure daily.  Reports typically very well-controlled near 100/60 when on lisinopril and amlodipine, so her PCP stopped amlodipine 2 weeks ago.  Now on lip lisinopril only.  Reports blood pressure has been 140-150/80s.  Took her old amlodipine today because it was elevated 154/80s.  No history of MI or CVA.  Takes few medications.        Review of Systems   Constitutional:  Negative for appetite change, chills, diaphoresis, fatigue and fever.   Respiratory:  Negative for cough and shortness of breath.    Cardiovascular:  Negative for chest pain.   Gastrointestinal:  Negative for abdominal pain, constipation, diarrhea, nausea and vomiting.   Genitourinary:  Positive for frequency and urgency. Negative for decreased urine volume, difficulty urinating, dysuria, flank pain, hematuria and pelvic pain.   Musculoskeletal:  Negative for back pain and myalgias.   Skin:  Negative for color change, pallor, rash and wound.   Neurological:  Negative for dizziness, tremors, seizures, syncope, facial asymmetry, speech difficulty, weakness, light-headedness, numbness and headaches.       Past Medical History:   Diagnosis Date    Allergic     Arthritis     Autoimmune hepatitis     C. difficile colitis 09/2020    Cholelithiasis 04/25/2018    Ultrasound    Coronary artery disease     Dr Luis Rick    Diverticulitis of colon 2005,2018    Diverticulosis 2005    Hemangioma of liver     Hernia 2017    Hiatal hernia-Prabhjot test    Hyperlipidemia     Dr Luis Rick-atorvastatin    Hypertension     Hyperthyroidism     Hypothyroidism     Migraines     Osteopenia     Osteopenia after menopause     Pancreatitis     Cyst in pancreas being observed since 2018…Dr Watson and Cece Merritt 2022    Pneumonia     As a child     PONV (postoperative nausea and vomiting)     Right shoulder pain     Seasonal allergies     Skin cancer     face/ BASAL     Ulcerative colitis     Colitis/DrKaplan    UTI (urinary tract infection)     HISTORY/OF       Allergies   Allergen Reactions    Imuran [Azathioprine] GI Intolerance    Bactrim [Sulfamethoxazole-Trimethoprim] Other (See Comments)     fatigue    Clindamycin/Lincomycin Diarrhea    Augmentin [Amoxicillin-Pot Clavulanate] Hives    Keflex [Cephalexin] Hives       Past Surgical History:   Procedure Laterality Date    BREAST SURGERY      Removal of cyst..rt breast    BUNIONECTOMY      COLONOSCOPY  2014    Diverticulosis in the sigmoid colon, in the descending colon, in the transverse colon and in the ascending colon (Pathology: Collagenous colitis)    COLONOSCOPY  2005    Hemorrhoids, pandiverticulosis, status post diverticulitis    COLONOSCOPY N/A 2018    fair prep, tics    FRACTURE SURGERY      Torn rt rotator cuff    LIVER BIOPSY      SHOULDER ARTHROSCOPY W/ ROTATOR CUFF REPAIR Right 2021    Procedure: SHOULDER ARTHROSCOPY WITH ROTATOR CUFF DEBRIDEMENT; SUBACROMIAL DECOMPRESSION; SYNOVECTOMY;  Surgeon: Chencho Craft MD;  Location[place] [facility] [place] [facility];  Service: Orthopedics;  Laterality: Right;    THYROIDECTOMY         Family History   Problem Relation Age of Onset    Heart disease Mother     Hypertension Mother     Lung cancer Sister     Thyroid disease Sister     Lupus Sister     Thyroid disease Brother     Alcohol abuse Brother     Glaucoma Maternal Grandmother     Stomach cancer Maternal Grandmother          ’s    Vision loss Maternal Grandmother         Glaucoma    Cirrhosis Father              Liver disease Father     Malig Hyperthermia Neg Hx        Social History     Socioeconomic History    Marital status:    Tobacco Use    Smoking status: Former     Current packs/day: 0.00     Average packs/day: 2.0 packs/day for 12.7 years  (25.3 ttl pk-yrs)     Types: Cigarettes     Start date: 1963     Quit date: 1976     Years since quittin.1    Smokeless tobacco: Never   Vaping Use    Vaping status: Never Used   Substance and Sexual Activity    Alcohol use: Not Currently     Comment: none in 3 years    Drug use: Never    Sexual activity: Not Currently           Objective   Physical Exam  Constitutional:       General: She is not in acute distress.     Appearance: Normal appearance. She is normal weight. She is not ill-appearing, toxic-appearing or diaphoretic.   HENT:      Head: Normocephalic and atraumatic.   Eyes:      Extraocular Movements: Extraocular movements intact.      Conjunctiva/sclera: Conjunctivae normal.      Pupils: Pupils are equal, round, and reactive to light.   Cardiovascular:      Rate and Rhythm: Normal rate and regular rhythm.   Pulmonary:      Effort: Pulmonary effort is normal. No respiratory distress.   Abdominal:      General: Abdomen is flat. There is no distension.      Palpations: Abdomen is soft.      Tenderness: There is no abdominal tenderness. There is no right CVA tenderness, left CVA tenderness or guarding.   Musculoskeletal:         General: Normal range of motion.   Skin:     General: Skin is warm and dry.   Neurological:      General: No focal deficit present.      Mental Status: She is alert and oriented to person, place, and time.   Psychiatric:         Behavior: Behavior normal.      Comments: Mildly anxious.         Procedures           ED Course                                           Medical Decision Making  Patient is 78-year-old female presents for urinary symptoms and high blood pressure.  Patient's urinary symptoms are consistent with UTI.  Urinalysis reveals trace hematuria only.  However, will treat symptomatically.  Awaiting microscopic and possible culture.  Macrobid prescribed.  Patient's most recent GFR x 10 days ago was 55.  Typically above 60.   Patient's high blood pressure  is nonconcerning at this time, especially presence of illness.  No associated symptoms.  Highest 150s/80s.  Recent blood pressure medication changes.  Continued close monitoring and follow-up with PCP.  No indication for further workup or immediate intervention at this time.  Discussed when to return to the emergency department.  Answered all questions.  Patient verbalized understanding and was agreeable to plan and discharge.  Differential diagnosis includes but is not limited to: UTI, cystitis, pyelonephritis, kidney stone, vulvovaginitis, urinary obstruction, hypertensive urgency, hypertensive emergency      Problems Addressed:  Primary hypertension: complicated acute illness or injury  Urinary frequency: complicated acute illness or injury    Risk  Prescription drug management.        Final diagnoses:   Urinary frequency   Primary hypertension       ED Disposition  ED Disposition       ED Disposition   Discharge    Condition   Stable    Comment   --               Pradip Rosenberg MD  [address] [id number] [phone]    Schedule an appointment as soon as possible for a visit            Medication List        New Prescriptions      fluconazole 150 MG tablet  Commonly known as: DIFLUCAN  Take 1 tablet by mouth 1 (One) Time for 1 dose.     nitrofurantoin (macrocrystal-monohydrate) 100 MG capsule  Commonly known as: MACROBID  Take 1 capsule by mouth 2 (Two) Times a Day for 5 days.               Where to Get Your Medications        These medications were sent to [facility] [id number] - [place], [place] - [address] AT [facility] - [phone]  - [phone]   [address], [facility] [address]      Phone: [phone]   fluconazole 150 MG tablet  nitrofurantoin (macrocrystal-monohydrate) 100 MG capsule

## 2024-03-03 NOTE — DISCHARGE INSTRUCTIONS
Take the prescribed antibiotic medicine you are given as directed until it is gone. Take it even if you feel better. It treats the infection and stops it from returning. Not taking all the medicine can make future infections hard to treat.  Continue monitoring blood pressure at home, and follow-up with primary care soon as possible for visit.  Take your blood pressure medications as prescribed.  Return to emergency department for worsening symptoms or other medical emergencies.    Refer to the attached instructions for further information.

## 2024-03-18 DIAGNOSIS — I10 PRIMARY HYPERTENSION: ICD-10-CM

## 2024-03-18 RX ORDER — LISINOPRIL 20 MG/1
20 TABLET ORAL DAILY
Qty: 90 TABLET | Refills: 1 | Status: SHIPPED | OUTPATIENT
Start: 2024-03-18

## 2024-04-03 ENCOUNTER — OFFICE VISIT (OUTPATIENT)
Dept: FAMILY MEDICINE CLINIC | Facility: CLINIC | Age: 79
End: 2024-04-03
Payer: MEDICARE

## 2024-04-03 VITALS
BODY MASS INDEX: 23.73 KG/M2 | WEIGHT: 139 LBS | HEART RATE: 83 BPM | HEIGHT: 64 IN | SYSTOLIC BLOOD PRESSURE: 140 MMHG | DIASTOLIC BLOOD PRESSURE: 80 MMHG | TEMPERATURE: 96 F | RESPIRATION RATE: 15 BRPM | OXYGEN SATURATION: 98 %

## 2024-04-03 DIAGNOSIS — R30.0 DYSURIA: ICD-10-CM

## 2024-04-03 DIAGNOSIS — I10 PRIMARY HYPERTENSION: ICD-10-CM

## 2024-04-03 DIAGNOSIS — N30.90 CYSTITIS: ICD-10-CM

## 2024-04-03 DIAGNOSIS — Z86.79 HISTORY OF HEART DISEASE: ICD-10-CM

## 2024-04-03 DIAGNOSIS — E78.2: Primary | ICD-10-CM

## 2024-04-03 DIAGNOSIS — E78.2 MIXED HYPERLIPIDEMIA: ICD-10-CM

## 2024-04-03 LAB
BILIRUB BLD-MCNC: NEGATIVE MG/DL
CLARITY, POC: CLEAR
COLOR UR: YELLOW
EXPIRATION DATE: NORMAL
GLUCOSE UR STRIP-MCNC: NEGATIVE MG/DL
KETONES UR QL: NEGATIVE
LEUKOCYTE EST, POC: NEGATIVE
Lab: NORMAL
NITRITE UR-MCNC: NEGATIVE MG/ML
PH UR: 7 [PH] (ref 5–8)
PROT UR STRIP-MCNC: NEGATIVE MG/DL
RBC # UR STRIP: NEGATIVE /UL
SP GR UR: 1 (ref 1–1.03)
UROBILINOGEN UR QL: NORMAL

## 2024-04-03 PROCEDURE — 99213 OFFICE O/P EST LOW 20 MIN: CPT | Performed by: INTERNAL MEDICINE

## 2024-04-03 PROCEDURE — 81003 URINALYSIS AUTO W/O SCOPE: CPT | Performed by: INTERNAL MEDICINE

## 2024-04-03 PROCEDURE — 3077F SYST BP >= 140 MM HG: CPT | Performed by: INTERNAL MEDICINE

## 2024-04-03 PROCEDURE — 3079F DIAST BP 80-89 MM HG: CPT | Performed by: INTERNAL MEDICINE

## 2024-04-03 RX ORDER — NITROFURANTOIN 25; 75 MG/1; MG/1
100 CAPSULE ORAL 2 TIMES DAILY
Qty: 14 CAPSULE | Refills: 0 | Status: SHIPPED | OUTPATIENT
Start: 2024-04-03

## 2024-04-03 RX ORDER — ROSUVASTATIN CALCIUM 5 MG/1
5 TABLET, COATED ORAL DAILY
Qty: 90 TABLET | Refills: 3 | Status: SHIPPED | OUTPATIENT
Start: 2024-04-03

## 2024-04-03 RX ORDER — LISINOPRIL AND HYDROCHLOROTHIAZIDE 20; 12.5 MG/1; MG/1
1 TABLET ORAL DAILY
Qty: 90 TABLET | Refills: 1 | Status: SHIPPED | OUTPATIENT
Start: 2024-04-03

## 2024-04-05 LAB
BACTERIA UR CULT: NORMAL
BACTERIA UR CULT: NORMAL

## 2024-04-09 NOTE — PROGRESS NOTES
Subjective   Mami Srivastava is a 78 y.o. female. Patient is here today for   Chief Complaint   Patient presents with    Coronary Artery Disease    Dysuria          Vitals:    24 1417   BP: 140/80   Pulse: 83   Resp: 15   Temp: 96 °F (35.6 °C)   SpO2: 98%     Body mass index is 23.85 kg/m².      Past Medical History:   Diagnosis Date    Allergic     Arthritis     Autoimmune hepatitis     C. difficile colitis 2020    Cholelithiasis 2018    Ultrasound    Coronary artery disease     Dr Luis Rick    Diverticulitis of colon ,2018    Diverticulosis 2005    Hemangioma of liver     Hernia     Hiatal hernia-Prabhjot test    Hyperlipidemia     Dr Luis Rick-atorvastatin    Hypertension     Hyperthyroidism     Hypothyroidism     Migraines     Osteopenia     Osteopenia after menopause     Pancreatitis     Cyst in pancreas being observed since …Dr Watson and Cece Merritt     Pneumonia     As a child    PONV (postoperative nausea and vomiting)     Right shoulder pain     Seasonal allergies     Skin cancer     face/ BASAL     Ulcerative colitis     Colitis/DrKaplan    UTI (urinary tract infection)     HISTORY/OF      Allergies   Allergen Reactions    Imuran [Azathioprine] GI Intolerance    Bactrim [Sulfamethoxazole-Trimethoprim] Other (See Comments)     fatigue    Clindamycin/Lincomycin Diarrhea    Augmentin [Amoxicillin-Pot Clavulanate] Hives    Keflex [Cephalexin] Hives      Social History     Socioeconomic History    Marital status:    Tobacco Use    Smoking status: Former     Current packs/day: 0.00     Average packs/day: 2.0 packs/day for 12.7 years (25.3 ttl pk-yrs)     Types: Cigarettes     Start date: 1963     Quit date: 1976     Years since quittin.2    Smokeless tobacco: Never   Vaping Use    Vaping status: Never Used   Substance and Sexual Activity    Alcohol use: Not Currently     Comment: none in 3 years    Drug use: Never    Sexual activity: Not  Currently        Current Outpatient Medications:     aspirin 81 MG EC tablet, Take 1 tablet by mouth Daily. HOLDING FOR 7 DAYS PRIOR TO OR, Disp: , Rfl:     cetirizine (zyrTEC) 10 MG tablet, Take 1 tablet by mouth As Needed., Disp: , Rfl:     clobetasol (TEMOVATE) 0.05 % ointment, , Disp: , Rfl:     levothyroxine (Synthroid) 75 MCG tablet, Take 1 tablet by mouth Daily., Disp: 90 tablet, Rfl: 3    vitamin D3 (Vitamin D) 125 MCG (5000 UT) capsule capsule, , Disp: , Rfl:     lisinopril-hydrochlorothiazide (Zestoretic) 20-12.5 MG per tablet, Take 1 tablet by mouth Daily., Disp: 90 tablet, Rfl: 1    nitrofurantoin, macrocrystal-monohydrate, (Macrobid) 100 MG capsule, Take 1 capsule by mouth 2 (Two) Times a Day., Disp: 14 capsule, Rfl: 0    rosuvastatin (Crestor) 5 MG tablet, Take 1 tablet by mouth Daily., Disp: 90 tablet, Rfl: 3     Objective     History of Present Illness  She is here today to follow-up on hypertension and she believes she may have a urinary tract infection.  She has some burning and dysuria.  Coronary Artery Disease    Dysuria          Review of Systems   Constitutional: Negative.    HENT: Negative.     Genitourinary:  Positive for dysuria.   Psychiatric/Behavioral: Negative.         Physical Exam  Vitals and nursing note reviewed.   Constitutional:       Appearance: Normal appearance.      Comments: Pleasant, neatly groomed, no distress.   Cardiovascular:      Rate and Rhythm: Regular rhythm.      Heart sounds: Normal heart sounds. No murmur heard.     No gallop.   Pulmonary:      Effort: No respiratory distress.      Breath sounds: Normal breath sounds. No wheezing or rales.   Neurological:      Mental Status: She is alert.   Psychiatric:         Mood and Affect: Mood normal.         Thought Content: Thought content normal.           Problems Addressed this Visit          Cardiac and Vasculature    Mixed hyperlipidemia    Relevant Medications    rosuvastatin (Crestor) 5 MG tablet    History of heart  disease    Hypertension    Relevant Medications    lisinopril-hydrochlorothiazide (Zestoretic) 20-12.5 MG per tablet       Genitourinary and Reproductive     Cystitis     Other Visit Diagnoses       Dysbetalipoproteinemia    -  Primary    Relevant Medications    rosuvastatin (Crestor) 5 MG tablet    Dysuria        Relevant Orders    POCT urinalysis dipstick, automated (Completed)    Urine Culture - Urine, Urine, Clean Catch (Completed)          Diagnoses         Codes Comments    Dysbetalipoproteinemia    -  Primary ICD-10-CM: E78.2  ICD-9-CM: 272.2     Dysuria     ICD-10-CM: R30.0  ICD-9-CM: 788.1     Mixed hyperlipidemia     ICD-10-CM: E78.2  ICD-9-CM: 272.2     Primary hypertension     ICD-10-CM: I10  ICD-9-CM: 401.9     History of heart disease     ICD-10-CM: Z86.79  ICD-9-CM: V12.50     Cystitis     ICD-10-CM: N30.90  ICD-9-CM: 595.9               PLAN  She and I reviewed her labs from March 28.  She is taking appropriate level of thyroid hormone replacement.    Review of labs from February 22 shows good control of hypercholesterolemia.    Her hypertension is well-controlled.    She has a cystitis.  I sent out a prescription for nitrofurantoin.  We sent her urine off for culture.    She has follow-up appointment to see me in June.  Fasting labs prior to that visit should include: Lipid profile, comprehensive metabolic panel, CBC, urinalysis, TSH and free T4  No follow-ups on file.  Answers submitted by the patient for this visit:  Other (Submitted on 3/29/2024)  Please describe your symptoms.: Follow up on TSH blood work of 3/28  Have you had these symptoms before?: Yes  How long have you been having these symptoms?: Greater than 2 weeks  Please list any medications you are currently taking for this condition.: Levothyroxin  Please describe any probable cause for these symptoms. : Transitioning from Synthroid to generic  Primary Reason for Visit (Submitted on 3/29/2024)  What is the primary reason for your  visit?: Other

## 2024-06-07 ENCOUNTER — OFFICE VISIT (OUTPATIENT)
Dept: FAMILY MEDICINE CLINIC | Facility: CLINIC | Age: 79
End: 2024-06-07
Payer: MEDICARE

## 2024-06-07 ENCOUNTER — APPOINTMENT (OUTPATIENT)
Dept: WOMENS IMAGING | Facility: HOSPITAL | Age: 79
End: 2024-06-07
Payer: MEDICARE

## 2024-06-07 VITALS
DIASTOLIC BLOOD PRESSURE: 70 MMHG | HEIGHT: 64 IN | SYSTOLIC BLOOD PRESSURE: 138 MMHG | TEMPERATURE: 97 F | HEART RATE: 76 BPM | BODY MASS INDEX: 23.47 KG/M2 | OXYGEN SATURATION: 98 % | RESPIRATION RATE: 15 BRPM | WEIGHT: 137.5 LBS

## 2024-06-07 DIAGNOSIS — J06.9 VIRAL URI WITH COUGH: ICD-10-CM

## 2024-06-07 DIAGNOSIS — R73.9 HYPERGLYCEMIA: ICD-10-CM

## 2024-06-07 DIAGNOSIS — E89.0 POSTOPERATIVE HYPOTHYROIDISM: ICD-10-CM

## 2024-06-07 DIAGNOSIS — R05.9 COUGH, UNSPECIFIED TYPE: Primary | ICD-10-CM

## 2024-06-07 DIAGNOSIS — I10 PRIMARY HYPERTENSION: ICD-10-CM

## 2024-06-07 DIAGNOSIS — E78.00 HYPERCHOLESTEROLEMIA: ICD-10-CM

## 2024-06-07 LAB
EXPIRATION DATE: NORMAL
FLUAV AG UPPER RESP QL IA.RAPID: NOT DETECTED
FLUBV AG UPPER RESP QL IA.RAPID: NOT DETECTED
INTERNAL CONTROL: NORMAL
Lab: NORMAL
SARS-COV-2 AG UPPER RESP QL IA.RAPID: NOT DETECTED

## 2024-06-07 PROCEDURE — 77063 BREAST TOMOSYNTHESIS BI: CPT | Performed by: RADIOLOGY

## 2024-06-07 PROCEDURE — 87428 SARSCOV & INF VIR A&B AG IA: CPT | Performed by: INTERNAL MEDICINE

## 2024-06-07 PROCEDURE — 1125F AMNT PAIN NOTED PAIN PRSNT: CPT | Performed by: INTERNAL MEDICINE

## 2024-06-07 PROCEDURE — 3078F DIAST BP <80 MM HG: CPT | Performed by: INTERNAL MEDICINE

## 2024-06-07 PROCEDURE — 99213 OFFICE O/P EST LOW 20 MIN: CPT | Performed by: INTERNAL MEDICINE

## 2024-06-07 PROCEDURE — 3075F SYST BP GE 130 - 139MM HG: CPT | Performed by: INTERNAL MEDICINE

## 2024-06-07 PROCEDURE — 77067 SCR MAMMO BI INCL CAD: CPT | Performed by: RADIOLOGY

## 2024-06-07 RX ORDER — AMLODIPINE BESYLATE 5 MG/1
2.5 TABLET ORAL
COMMUNITY
Start: 2024-05-26

## 2024-06-07 NOTE — PROGRESS NOTES
Subjective   Mami Srivastava is a 78 y.o. female. Patient is here today for   Chief Complaint   Patient presents with    Coronary Artery Disease    URI    Cough          Vitals:    24 1008   BP: 138/70   Pulse: 76   Resp: 15   Temp: 97 °F (36.1 °C)   SpO2: 98%     Body mass index is 23.59 kg/m².      Past Medical History:   Diagnosis Date    Allergic     Arthritis     Autoimmune hepatitis     C. difficile colitis 2020    Cholelithiasis 2018    Ultrasound    Coronary artery disease     Dr Luis Rick    Diverticulitis of colon ,2018    Diverticulosis 2005    Hemangioma of liver     Hernia 2017    Hiatal hernia-Prabhjot test    Hyperlipidemia     Dr Luis Rick-atorvastatin    Hypertension     Hyperthyroidism     Hypothyroidism     Migraines     Osteopenia     Osteopenia after menopause     Pancreatitis     Cyst in pancreas being observed since …Dr Watson and Cece Merritt     Pneumonia     As a child    PONV (postoperative nausea and vomiting)     Right shoulder pain     Seasonal allergies     Skin cancer     face/ BASAL     Ulcerative colitis     Colitis/DrKaplan    UTI (urinary tract infection)     HISTORY/OF      Allergies   Allergen Reactions    Imuran [Azathioprine] GI Intolerance    Bactrim [Sulfamethoxazole-Trimethoprim] Other (See Comments)     fatigue    Clindamycin/Lincomycin Diarrhea    Augmentin [Amoxicillin-Pot Clavulanate] Hives    Keflex [Cephalexin] Hives      Social History     Socioeconomic History    Marital status:    Tobacco Use    Smoking status: Former     Current packs/day: 0.00     Average packs/day: 2.0 packs/day for 12.7 years (25.3 ttl pk-yrs)     Types: Cigarettes     Start date: 1963     Quit date: 1976     Years since quittin.3    Smokeless tobacco: Never   Vaping Use    Vaping status: Never Used   Substance and Sexual Activity    Alcohol use: Not Currently     Comment: none in 3 years    Drug use: Never    Sexual activity: Not  Currently        Current Outpatient Medications:     amLODIPine (NORVASC) 5 MG tablet, Take 0.5 tablets by mouth., Disp: , Rfl:     aspirin 81 MG EC tablet, Take 1 tablet by mouth Daily. HOLDING FOR 7 DAYS PRIOR TO OR, Disp: , Rfl:     cetirizine (zyrTEC) 10 MG tablet, Take 1 tablet by mouth As Needed., Disp: , Rfl:     clobetasol (TEMOVATE) 0.05 % ointment, , Disp: , Rfl:     fluocinonide (LIDEX) 0.05 % cream, , Disp: , Rfl:     levothyroxine (Synthroid) 75 MCG tablet, Take 1 tablet by mouth Daily., Disp: 90 tablet, Rfl: 3    lisinopril-hydrochlorothiazide (Zestoretic) 20-12.5 MG per tablet, Take 1 tablet by mouth Daily., Disp: 90 tablet, Rfl: 1    rosuvastatin (Crestor) 5 MG tablet, Take 1 tablet by mouth Daily., Disp: 90 tablet, Rfl: 3    vitamin D3 (Vitamin D) 125 MCG (5000 UT) capsule capsule, , Disp: , Rfl:      Objective     History of Present Illness  She is here to follow-up on hypertension.    Dr. Rick her cardiologist has been monitoring her blood pressure as well.  He had checked her blood pressure cuff in his office.  He had found that it was accurate.    Her family has been sharing a viral infection.  She started having a cough and nasal congestion beginning about 5 days ago.  She is a bit better now than she was before.  She denies any dyspnea.  Coronary Artery Disease  Pertinent negatives include no shortness of breath.   URI   Associated symptoms include coughing.   Cough  Pertinent negatives include no shortness of breath.        Review of Systems   Constitutional: Negative.    HENT: Negative.     Respiratory:  Positive for cough. Negative for choking, shortness of breath and stridor.    Musculoskeletal: Negative.    Psychiatric/Behavioral: Negative.         Physical Exam  Vitals and nursing note reviewed.   Constitutional:       Appearance: Normal appearance.   Neck:      Vascular: No carotid bruit.   Cardiovascular:      Rate and Rhythm: Regular rhythm.      Heart sounds: Normal heart sounds. No  murmur heard.     No gallop.   Pulmonary:      Effort: No respiratory distress.      Breath sounds: Normal breath sounds. No wheezing or rales.   Neurological:      Mental Status: She is alert and oriented to person, place, and time.   Psychiatric:         Mood and Affect: Mood normal.         Behavior: Behavior normal.         Thought Content: Thought content normal.           Problems Addressed this Visit          Cardiac and Vasculature    Hypertension    Relevant Medications    amLODIPine (NORVASC) 5 MG tablet    Hypercholesterolemia       ENT    Viral URI with cough       Endocrine and Metabolic    Hypothyroidism    Hyperglycemia     Other Visit Diagnoses       Cough, unspecified type    -  Primary    Relevant Orders    POCT SARS-CoV-2 Antigen MANN + Flu (Completed)          Diagnoses         Codes Comments    Cough, unspecified type    -  Primary ICD-10-CM: R05.9  ICD-9-CM: 786.2     Viral URI with cough     ICD-10-CM: J06.9  ICD-9-CM: 465.9     Primary hypertension     ICD-10-CM: I10  ICD-9-CM: 401.9     Hyperglycemia     ICD-10-CM: R73.9  ICD-9-CM: 790.29     Postoperative hypothyroidism     ICD-10-CM: E89.0  ICD-9-CM: 244.0     Hypercholesterolemia     ICD-10-CM: E78.00  ICD-9-CM: 272.0           We tested her for COVID today she was negative.    PLAN  She continues to improve with her upper respiratory tract infection symptoms.  She will let me know if she fails to improve entirely.    Her hypertension is well-controlled.    She has hyperglycemia and this is well-controlled.  Will continue to monitor that.    She has hypercholesterolemia with excellent control on rosuvastatin 5 mg daily.    I asked her to follow-up with me for a Medicare annual wellness visit in about 4 months.  Fasting labs prior to that visit should include: Lipid profile, comprehensive metabolic panel, CBC, urinalysis.  And hemoglobin A1c  No follow-ups on file.  Answers submitted by the patient for this visit:  Other (Submitted on  5/31/2024)  Please describe your symptoms.: Follow up/blood pressure  Have you had these symptoms before?: Yes  How long have you been having these symptoms?: Greater than 2 weeks  Please list any medications you are currently taking for this condition.: Lisinopril HCTZ daily, Amlodopine as needed  Primary Reason for Visit (Submitted on 5/31/2024)  What is the primary reason for your visit?: Other

## 2024-06-21 ENCOUNTER — PATIENT MESSAGE (OUTPATIENT)
Dept: FAMILY MEDICINE CLINIC | Facility: CLINIC | Age: 79
End: 2024-06-21
Payer: MEDICARE

## 2024-08-20 ENCOUNTER — PATIENT MESSAGE (OUTPATIENT)
Dept: FAMILY MEDICINE CLINIC | Facility: CLINIC | Age: 79
End: 2024-08-20
Payer: MEDICARE

## 2024-08-29 ENCOUNTER — OFFICE VISIT (OUTPATIENT)
Dept: GASTROENTEROLOGY | Facility: CLINIC | Age: 79
End: 2024-08-29
Payer: MEDICARE

## 2024-08-29 VITALS
SYSTOLIC BLOOD PRESSURE: 149 MMHG | HEART RATE: 71 BPM | DIASTOLIC BLOOD PRESSURE: 77 MMHG | WEIGHT: 141 LBS | BODY MASS INDEX: 24.07 KG/M2 | HEIGHT: 64 IN

## 2024-08-29 DIAGNOSIS — K75.4 AUTOIMMUNE HEPATITIS: ICD-10-CM

## 2024-08-29 DIAGNOSIS — K59.00 CONSTIPATION, UNSPECIFIED CONSTIPATION TYPE: ICD-10-CM

## 2024-08-29 DIAGNOSIS — K86.2 PANCREATIC CYST: ICD-10-CM

## 2024-08-29 DIAGNOSIS — R10.30 LOWER ABDOMINAL PAIN: Primary | ICD-10-CM

## 2024-08-29 PROCEDURE — 99214 OFFICE O/P EST MOD 30 MIN: CPT | Performed by: NURSE PRACTITIONER

## 2024-08-29 PROCEDURE — 1159F MED LIST DOCD IN RCRD: CPT | Performed by: NURSE PRACTITIONER

## 2024-08-29 PROCEDURE — 3077F SYST BP >= 140 MM HG: CPT | Performed by: NURSE PRACTITIONER

## 2024-08-29 PROCEDURE — 1160F RVW MEDS BY RX/DR IN RCRD: CPT | Performed by: NURSE PRACTITIONER

## 2024-08-29 PROCEDURE — 3078F DIAST BP <80 MM HG: CPT | Performed by: NURSE PRACTITIONER

## 2024-08-29 RX ORDER — LISINOPRIL 20 MG/1
TABLET ORAL
COMMUNITY
Start: 2024-06-17

## 2024-08-29 NOTE — PROGRESS NOTES
Chief Complaint   Patient presents with    Constipation       HPI    Mami Srivatsava is a  79 y.o. female here for a follow up visit for constipation.    This patient has a past medical history of autoimmune hepatitis, pancreatic cyst, hypertension, migraines and osteopenia.    Patient last seen by Dr. Hein in winter 2023 at which time she was undergoing workup with surgical oncology for distal pancreatic resection due to cystic lesion in the pancreas.  Given stability and IPMN and low risk of malignancy versus operative risk surgical intervention is not recommended at this time.    On visit today she complains of periumbilical and lower abdominal pain that comes and goes onset several months ago.  Pain described as an aching sensation that can often be stabbing.  Associated mild constipation with a bowel movement every 1 to 3 days.  Denies rectal bleeding or rectal pain.  No weight loss.    Recent lab work with normal hemoglobin and LFTs.    Her last colonoscopy was 2018 with fair prep.  Diverticulosis otherwise normal.    Past Medical History:   Diagnosis Date    Allergic     Arthritis     Autoimmune hepatitis     C. difficile colitis 09/2020    Cholelithiasis 04/25/2018    Ultrasound    Coronary artery disease     Dr Luis Rick    Diverticulitis of colon 2005,2018    Diverticulosis 2005    Hemangioma of liver     Hernia 2017    Hiatal hernia-Fredislo test    Hyperlipidemia     Dr Luis Rick-atorvastatin    Hypertension     Hyperthyroidism     Hypothyroidism     Migraines     Osteopenia     Osteopenia after menopause     Pancreatitis     Cyst in pancreas being observed since 2018…Dr Watson and Cece Merritt 2022    Pneumonia     As a child    PONV (postoperative nausea and vomiting)     Right shoulder pain     Seasonal allergies     Skin cancer     face/ BASAL     Ulcerative colitis 2014    Colitis/DrKaplan    UTI (urinary tract infection)     HISTORY/OF       Past Surgical History:   Procedure  Laterality Date    BREAST SURGERY      Removal of cyst..rt breast    BUNIONECTOMY      COLONOSCOPY  2014    Diverticulosis in the sigmoid colon, in the descending colon, in the transverse colon and in the ascending colon (Pathology: Collagenous colitis)    COLONOSCOPY  2005    Hemorrhoids, pandiverticulosis, status post diverticulitis    COLONOSCOPY N/A 2018    fair prep, tics    FRACTURE SURGERY      Torn rt rotator cuff    LIVER BIOPSY      SHOULDER ARTHROSCOPY W/ ROTATOR CUFF REPAIR Right 2021    Procedure: SHOULDER ARTHROSCOPY WITH ROTATOR CUFF DEBRIDEMENT; SUBACROMIAL DECOMPRESSION; SYNOVECTOMY;  Surgeon: Chencho Craft MD;  Location: [facility] OR [facility];  Service: Orthopedics;  Laterality: Right;    THYROIDECTOMY         Scheduled Meds:     Continuous Infusions: No current facility-administered medications for this visit.      PRN Meds:     Allergies   Allergen Reactions    Imuran [Azathioprine] GI Intolerance    Bactrim [Sulfamethoxazole-Trimethoprim] Other (See Comments)     fatigue    Clindamycin/Lincomycin Diarrhea    Augmentin [Amoxicillin-Pot Clavulanate] Hives    Keflex [Cephalexin] Hives       Social History     Socioeconomic History    Marital status:    Tobacco Use    Smoking status: Former     Current packs/day: 0.00     Average packs/day: 2.0 packs/day for 12.7 years (25.3 ttl pk-yrs)     Types: Cigarettes     Start date: 1963     Quit date: 1976     Years since quittin.6    Smokeless tobacco: Never   Vaping Use    Vaping status: Never Used   Substance and Sexual Activity    Alcohol use: Not Currently     Comment: none in 3 years    Drug use: Never    Sexual activity: Not Currently       Family History   Problem Relation Age of Onset    Heart disease Mother     Hypertension Mother     Lung cancer Sister     Thyroid disease Sister     Lupus Sister     Thyroid disease Brother     Alcohol abuse Brother     Glaucoma Maternal Grandmother      Stomach cancer Maternal Grandmother          ’s    Vision loss Maternal Grandmother         Glaucoma    Cirrhosis Father              Liver disease Father     Alcohol abuse Father     Malig Hyperthermia Neg Hx        Review of Systems   Gastrointestinal:  Positive for abdominal pain and constipation.       Vitals:    24 0902   BP: 149/77   Pulse: 71       Physical Exam  Constitutional:       Appearance: She is well-developed.   Abdominal:      General: Bowel sounds are normal. There is no distension.      Palpations: Abdomen is soft. There is no mass.      Tenderness: There is abdominal tenderness. There is no guarding.      Hernia: No hernia is present.   Skin:     General: Skin is warm and dry.      Capillary Refill: Capillary refill takes less than 2 seconds.   Neurological:      Mental Status: She is alert and oriented to person, place, and time.   Psychiatric:         Behavior: Behavior normal.     Assessment    Diagnoses and all orders for this visit:    1. Lower abdominal pain (Primary)    2. Constipation, unspecified constipation type    3. Autoimmune hepatitis    4. Pancreatic cyst    Plan    Start MiraLAX 1 capful daily and monitor for symptom improvement  Schedule CT A/P with contrast for further evaluation of lower abdominal pain  Patient continues to follow with Dr. Merritt for pancreatic cyst surveillance  Stable autoimmune hepatitis  Further recommendations and follow-up pending imaging         RYAN Gonzales  [facility] ([facility]  [address] [place] [address]  Office: [phone]

## 2024-09-20 ENCOUNTER — HOSPITAL ENCOUNTER (OUTPATIENT)
Facility: HOSPITAL | Age: 79
Discharge: HOME | End: 2024-09-20
Payer: MEDICARE

## 2024-09-20 DIAGNOSIS — K59.00 CONSTIPATION, UNSPECIFIED CONSTIPATION TYPE: ICD-10-CM

## 2024-09-20 DIAGNOSIS — R10.30 LOWER ABDOMINAL PAIN: ICD-10-CM

## 2024-09-20 PROCEDURE — 74177 CT ABD & PELVIS W/CONTRAST: CPT

## 2024-09-20 PROCEDURE — 25510000001 IOPAMIDOL 61 % SOLUTION: Performed by: NURSE PRACTITIONER

## 2024-09-20 PROCEDURE — 0 DIATRIZOATE MEGLUMINE & SODIUM PER 1 ML: Performed by: NURSE PRACTITIONER

## 2024-09-20 RX ORDER — IOPAMIDOL 612 MG/ML
100 INJECTION, SOLUTION INTRAVASCULAR
Status: COMPLETED | OUTPATIENT
Start: 2024-09-20 | End: 2024-09-20

## 2024-09-20 RX ADMIN — IOPAMIDOL 85 ML: 612 INJECTION, SOLUTION INTRAVENOUS at 09:49

## 2024-09-20 RX ADMIN — DIATRIZOATE MEGLUMINE AND DIATRIZOATE SODIUM 30 ML: 600; 100 SOLUTION ORAL; RECTAL at 08:50

## 2024-09-26 RX ORDER — LISINOPRIL AND HYDROCHLOROTHIAZIDE 12.5; 2 MG/1; MG/1
1 TABLET ORAL DAILY
Qty: 90 TABLET | Refills: 1 | Status: SHIPPED | OUTPATIENT
Start: 2024-09-26

## 2024-10-04 ENCOUNTER — TELEPHONE (OUTPATIENT)
Dept: GASTROENTEROLOGY | Facility: CLINIC | Age: 79
End: 2024-10-04
Payer: MEDICARE

## 2024-10-04 NOTE — TELEPHONE ENCOUNTER
Rosina called. Advised as per Clara's note. She states she stop taking Miralax because of the slight nausea it gave her. Her bowels move when she eats cantaloupe. When she eats salads she develops diarrhea. Advised will senda n update to Clara.

## 2024-10-04 NOTE — TELEPHONE ENCOUNTER
Provider: THERESE DAVIS     Caller: DARREN ESTEVEZ     Relationship to Patient: SELF    Phone Number: [phone]     Reason for Call: PT IS CALLING AND IS WANTING TO KNOW HER CT RESULTS THAT WAS DONE ON 9/20/24 PLEASE ADVISE AND CALL PT BACK

## 2024-10-04 NOTE — TELEPHONE ENCOUNTER
Message from Clara:     Please inform the patient CT shows stable pancreatic cyst.  Small hiatal hernia.  Possible esophagitis.  Diverticulosis without evidence of diverticulitis.  Please check to see if she is having any GERD related symptoms.  Have constipation issues resolved with MiraLAX?

## 2024-10-07 RX ORDER — AMLODIPINE BESYLATE 5 MG/1
5 TABLET ORAL DAILY
Qty: 90 TABLET | Refills: 0 | Status: SHIPPED | OUTPATIENT
Start: 2024-10-07

## 2024-10-11 ENCOUNTER — FLU SHOT (OUTPATIENT)
Dept: FAMILY MEDICINE CLINIC | Facility: CLINIC | Age: 79
End: 2024-10-11
Payer: MEDICARE

## 2024-10-11 DIAGNOSIS — Z23 NEED FOR IMMUNIZATION AGAINST INFLUENZA: Primary | ICD-10-CM

## 2024-10-11 PROCEDURE — G0008 ADMIN INFLUENZA VIRUS VAC: HCPCS | Performed by: INTERNAL MEDICINE

## 2024-10-11 PROCEDURE — 90662 IIV NO PRSV INCREASED AG IM: CPT | Performed by: INTERNAL MEDICINE

## 2024-10-22 ENCOUNTER — OFFICE VISIT (OUTPATIENT)
Dept: FAMILY MEDICINE CLINIC | Facility: CLINIC | Age: 79
End: 2024-10-22
Payer: MEDICARE

## 2024-10-22 VITALS
RESPIRATION RATE: 15 BRPM | HEART RATE: 78 BPM | SYSTOLIC BLOOD PRESSURE: 140 MMHG | DIASTOLIC BLOOD PRESSURE: 78 MMHG | OXYGEN SATURATION: 96 % | BODY MASS INDEX: 23.66 KG/M2 | TEMPERATURE: 96.8 F | WEIGHT: 138.6 LBS | HEIGHT: 64 IN

## 2024-10-22 DIAGNOSIS — I10 PRIMARY HYPERTENSION: ICD-10-CM

## 2024-10-22 DIAGNOSIS — E78.00 HYPERCHOLESTEROLEMIA: Primary | ICD-10-CM

## 2024-10-22 PROCEDURE — 1125F AMNT PAIN NOTED PAIN PRSNT: CPT | Performed by: INTERNAL MEDICINE

## 2024-10-22 PROCEDURE — 3077F SYST BP >= 140 MM HG: CPT | Performed by: INTERNAL MEDICINE

## 2024-10-22 PROCEDURE — 3078F DIAST BP <80 MM HG: CPT | Performed by: INTERNAL MEDICINE

## 2024-10-22 PROCEDURE — 99213 OFFICE O/P EST LOW 20 MIN: CPT | Performed by: INTERNAL MEDICINE

## 2024-10-22 NOTE — PROGRESS NOTES
Subjective   Mami Srivastava is a 79 y.o. female. Patient is here today for   Chief Complaint   Patient presents with    Hyperlipidemia        History of Present Illness  History of Present Illness  The patient presents for evaluation of multiple medical concerns.    She reports intermittent issues with her blood pressure, which she believes may be partially due to allergies. Her blood pressure was 144 systolic this morning at 8:00 am. She has compared her home blood pressure cuff readings with those taken in the office, which were consistent. She notes that her blood pressure can sometimes drop significantly, as it did on 10/16/2024 when it was 107/56. She is currently taking amlodipine and has been advised to take half a tablet if her blood pressure is high. She has done this a few times, including last Sunday. She is also taking lisinopril and hydrochlorothiazide and has sufficient refills of these medications until 12/2024. She reports no swelling in her feet.    She experiences nighttime awakenings due to dryness and sneezing. She has not been using decongestants but has been using a nasal spray and a neti pot or saline solution. She has an order for Flonase.    She received her influenza vaccine today and had blood work done on 10/11/2024. She is planning a trip to [facility] over Thanksgiving weekend and is considering taking prescription for Paxlovid just in case she should develop COVID infection well gone on her trip.      Vitals:    10/22/24 0904   BP: 140/78   Pulse: 78   Resp: 15   Temp: 96.8 °F (36 °C)   SpO2: 96%     Body mass index is 23.78 kg/m².    Past Medical History:   Diagnosis Date    Allergic     Arthritis     Autoimmune hepatitis     C. difficile colitis 09/2020    Cholelithiasis 04/25/2018    Ultrasound    Coronary artery disease     Dr Luis Rick    Diverticulitis of colon 2005,2018    Diverticulosis 2005    Hemangioma of liver     Hernia 2017    Hiatal hernia-Prabhjot test     Hyperlipidemia     Dr Luis Rick-atorvastatin    Hypertension     Hyperthyroidism     Hypothyroidism     Migraines     Osteopenia     Osteopenia after menopause     Pancreatitis     Cyst in pancreas being observed since 2018…Dr Watson and Cece Merritt     Pneumonia     As a child    PONV (postoperative nausea and vomiting)     Right shoulder pain     Seasonal allergies     Skin cancer     face/ BASAL     Ulcerative colitis     Colitis/DrKaplan    UTI (urinary tract infection)     HISTORY/OF      Allergies   Allergen Reactions    Imuran [Azathioprine] GI Intolerance    Bactrim [Sulfamethoxazole-Trimethoprim] Other (See Comments)     fatigue    Clindamycin/Lincomycin Diarrhea    Augmentin [Amoxicillin-Pot Clavulanate] Hives    Keflex [Cephalexin] Hives      Social History     Socioeconomic History    Marital status:    Tobacco Use    Smoking status: Former     Current packs/day: 0.00     Average packs/day: 2.0 packs/day for 12.7 years (25.3 ttl pk-yrs)     Types: Cigarettes     Start date: 1963     Quit date: 1976     Years since quittin.7    Smokeless tobacco: Never   Vaping Use    Vaping status: Never Used   Substance and Sexual Activity    Alcohol use: Not Currently     Comment: none in 3 years    Drug use: Never    Sexual activity: Not Currently        Current Outpatient Medications:     amLODIPine (NORVASC) 5 MG tablet, TAKE 1 TABLET BY MOUTH DAILY, Disp: 90 tablet, Rfl: 0    aspirin 81 MG EC tablet, Take 1 tablet by mouth Daily. HOLDING FOR 7 DAYS PRIOR TO OR, Disp: , Rfl:     cetirizine (zyrTEC) 10 MG tablet, Take 1 tablet by mouth As Needed., Disp: , Rfl:     clobetasol (TEMOVATE) 0.05 % ointment, , Disp: , Rfl:     levothyroxine (Synthroid) 75 MCG tablet, Take 1 tablet by mouth Daily., Disp: 90 tablet, Rfl: 3    lisinopril-hydrochlorothiazide (PRINZIDE,ZESTORETIC) 20-12.5 MG per tablet, TAKE 1 TABLET BY MOUTH DAILY, Disp: 90 tablet, Rfl: 1    rosuvastatin (Crestor) 5 MG  tablet, Take 1 tablet by mouth Daily., Disp: 90 tablet, Rfl: 3    vitamin D3 (Vitamin D) 125 MCG (5000 UT) capsule capsule, , Disp: , Rfl:     Nirmatrelvir & Ritonavir, 300mg/100mg, (PAXLOVID), Take 3 tablets by mouth 2 (Two) Times a Day., Disp: 30 tablet, Rfl: 0     Objective     Review of Systems    Physical Exam  Physical Exam  No carotid bruits in the neck.  Heart sounds are good.    Vital Signs  Blood pressure is 156/76.    Results      Assessment & Plan    Problems Addressed this Visit          Cardiac and Vasculature    Hypertension    Hypercholesterolemia - Primary     Diagnoses         Codes Comments    Hypercholesterolemia    -  Primary ICD-10-CM: E78.00  ICD-9-CM: 272.0     Primary hypertension     ICD-10-CM: I10  ICD-9-CM: 401.9           Assessment & Plan  1. Hypertension.  Her blood pressure readings are elevated, with a target systolic pressure of less than 130. She reports using Flonase and nasal saline solutions, which are unlikely to contribute to this elevation. Afrin and combination medications such as Claritin-D and Allegra-D could potentially increase her blood pressure. She is advised to continue using Flonase and consider over-the-counter Claritin or Allegra without decongestant. She should take half a tablet of amlodipine 5 mg daily and monitor her blood pressure every 2 to 3 days. If her blood pressure becomes too low, she should reduce the amlodipine dosage. If her blood pressure does not improve within a week, she should follow up for a possible dosage increase to 5 mg.     2. Hypothyroidism.  A refill for her levothyroxine will be provided on November 29.    3. COVID-19 Prophylaxis.  A prescription for Paxlovid will be provided, with instructions to take 3 pills twice daily for 5 days. She should refrain from taking her cholesterol medication while on Paxlovid. The prescription will be sent to her pharmacy at [facility], [place] in [place].          No follow-ups on file.    Patient  or patient representative verbalized consent for the use of Ambient Listening during the visit with  Pradip Rosenberg MD for chart documentation. 10/22/2024  14:20 EDT  Answers submitted by the patient for this visit:  Other (Submitted on 10/20/2024)  Please describe your symptoms.: Wellness exam , review of blood test &  CT scan results, Discuss upcoming vacation issues  Have you had these symptoms before?: Yes  How long have you been having these symptoms?: Greater than 2 weeks  Primary Reason for Visit (Submitted on 10/20/2024)  What is the primary reason for your visit?: Problem Not Listed

## 2024-12-06 ENCOUNTER — OFFICE VISIT (OUTPATIENT)
Dept: FAMILY MEDICINE CLINIC | Facility: CLINIC | Age: 79
End: 2024-12-06
Payer: MEDICARE

## 2024-12-06 VITALS
HEART RATE: 68 BPM | WEIGHT: 145 LBS | TEMPERATURE: 97.7 F | RESPIRATION RATE: 16 BRPM | HEIGHT: 64 IN | OXYGEN SATURATION: 99 % | SYSTOLIC BLOOD PRESSURE: 136 MMHG | BODY MASS INDEX: 24.75 KG/M2 | DIASTOLIC BLOOD PRESSURE: 64 MMHG

## 2024-12-06 DIAGNOSIS — H65.02 ACUTE SEROUS OTITIS MEDIA OF LEFT EAR, RECURRENCE NOT SPECIFIED: ICD-10-CM

## 2024-12-06 DIAGNOSIS — R05.1 ACUTE COUGH: ICD-10-CM

## 2024-12-06 DIAGNOSIS — J02.9 PHARYNGITIS, UNSPECIFIED ETIOLOGY: ICD-10-CM

## 2024-12-06 DIAGNOSIS — J06.9 ACUTE URI: Primary | ICD-10-CM

## 2024-12-06 PROBLEM — I70.0 ARTERIOSCLEROSIS OF THORACIC AORTA: Chronic | Status: ACTIVE | Noted: 2024-04-22

## 2024-12-06 PROBLEM — F32.A DEPRESSIVE DISORDER: Status: ACTIVE | Noted: 2017-01-19

## 2024-12-06 LAB
EXPIRATION DATE: NORMAL
EXPIRATION DATE: NORMAL
FLUAV AG UPPER RESP QL IA.RAPID: NOT DETECTED
FLUBV AG UPPER RESP QL IA.RAPID: NOT DETECTED
INTERNAL CONTROL: NORMAL
INTERNAL CONTROL: NORMAL
Lab: NORMAL
Lab: NORMAL
S PYO AG THROAT QL: NEGATIVE
SARS-COV-2 AG UPPER RESP QL IA.RAPID: NOT DETECTED

## 2024-12-06 PROCEDURE — 3075F SYST BP GE 130 - 139MM HG: CPT | Performed by: NURSE PRACTITIONER

## 2024-12-06 PROCEDURE — 1160F RVW MEDS BY RX/DR IN RCRD: CPT | Performed by: NURSE PRACTITIONER

## 2024-12-06 PROCEDURE — 1159F MED LIST DOCD IN RCRD: CPT | Performed by: NURSE PRACTITIONER

## 2024-12-06 PROCEDURE — 3078F DIAST BP <80 MM HG: CPT | Performed by: NURSE PRACTITIONER

## 2024-12-06 PROCEDURE — 87880 STREP A ASSAY W/OPTIC: CPT | Performed by: NURSE PRACTITIONER

## 2024-12-06 PROCEDURE — 99213 OFFICE O/P EST LOW 20 MIN: CPT | Performed by: NURSE PRACTITIONER

## 2024-12-06 PROCEDURE — 87428 SARSCOV & INF VIR A&B AG IA: CPT | Performed by: NURSE PRACTITIONER

## 2024-12-06 PROCEDURE — 1125F AMNT PAIN NOTED PAIN PRSNT: CPT | Performed by: NURSE PRACTITIONER

## 2024-12-06 RX ORDER — AZELASTINE 1 MG/ML
1 SPRAY, METERED NASAL 2 TIMES DAILY
Qty: 1 EACH | Refills: 0 | Status: SHIPPED | OUTPATIENT
Start: 2024-12-06 | End: 2024-12-20

## 2024-12-06 NOTE — PROGRESS NOTES
Subjective     Mami Srivastava is a 79 y.o.. female.     Patient came back this Tuesday, 12/3/24, from vacation.     Sore Throat   Associated symptoms include congestion, coughing, ear pain and shortness of breath (with cough and exertion). Pertinent negatives include no diarrhea, headaches or vomiting.   Cough  Associated symptoms include ear pain, postnasal drip, rhinorrhea, a sore throat and shortness of breath (with cough and exertion). Pertinent negatives include no fever or headaches.   Earache   Associated symptoms include coughing, rhinorrhea and a sore throat. Pertinent negatives include no diarrhea, headaches or vomiting.   URI   This is a new problem. Episode onset: 4 days. The problem has been unchanged. There has been no fever. Associated symptoms include congestion, coughing, ear pain, rhinorrhea and a sore throat. Pertinent negatives include no diarrhea, headaches, nausea or vomiting. Treatments tried: otc cough/cold medication.       The following portions of the patient's history were reviewed and updated as appropriate: allergies, current medications, past family history, past medical history, past social history, past surgical history and problem list.    Past Medical History:   Diagnosis Date    Allergic     Arthritis     Autoimmune hepatitis     C. difficile colitis 09/2020    Cholelithiasis 04/25/2018    Ultrasound    Coronary artery disease     Dr Luis Rick    Diverticulitis of colon 2005,2018    Diverticulosis 2005    Hemangioma of liver     Hernia 2017    Hiatal hernia-Prabhjot test    Hyperlipidemia     Dr Luis Rick-atorvastatin    Hypertension     Hyperthyroidism     Hypothyroidism     Migraines     Osteopenia     Osteopenia after menopause     Pancreatitis     Cyst in pancreas being observed since 2018…Dr Watson and Cece Merritt 2022    Pneumonia     As a child    PONV (postoperative nausea and vomiting)     Right shoulder pain     Seasonal allergies     Skin cancer     face/  "BASAL     Ulcerative colitis 2014    Colitis/DrKaplan    UTI (urinary tract infection)     HISTORY/OF       Past Surgical History:   Procedure Laterality Date    BREAST SURGERY  1962    Removal of cyst..rt breast    BUNIONECTOMY      COLONOSCOPY  04/11/2014    Diverticulosis in the sigmoid colon, in the descending colon, in the transverse colon and in the ascending colon (Pathology: Collagenous colitis)    COLONOSCOPY  12/09/2005    Hemorrhoids, pandiverticulosis, status post diverticulitis    COLONOSCOPY N/A 11/02/2018    fair prep, tics    FRACTURE SURGERY  2021    Torn rt rotator cuff    LIVER BIOPSY      SHOULDER ARTHROSCOPY W/ ROTATOR CUFF REPAIR Right 11/09/2021    Procedure: SHOULDER ARTHROSCOPY WITH ROTATOR CUFF DEBRIDEMENT; SUBACROMIAL DECOMPRESSION; SYNOVECTOMY;  Surgeon: Chencho Craft MD;  [facility] [facility] [place] [facility];  Service: Orthopedics;  Laterality: Right;    THYROIDECTOMY  2008       Review of Systems   Constitutional:  Negative for fever.   HENT:  Positive for congestion, ear pain, postnasal drip, rhinorrhea and sore throat.    Respiratory:  Positive for cough and shortness of breath (with cough and exertion).    Gastrointestinal:  Negative for diarrhea, nausea and vomiting.   Neurological:  Negative for headaches.       Allergies   Allergen Reactions    Imuran [Azathioprine] GI Intolerance    Bactrim [Sulfamethoxazole-Trimethoprim] Other (See Comments)     fatigue    Clindamycin/Lincomycin Diarrhea    Augmentin [Amoxicillin-Pot Clavulanate] Hives    Keflex [Cephalexin] Hives       Objective     Vitals:    12/06/24 1029   BP: 136/64   BP Location: Right arm   Patient Position: Sitting   Cuff Size: Adult   Pulse: 68   Resp: 16   Temp: 97.7 °F (36.5 °C)   TempSrc: Temporal   SpO2: 99%   Weight: 65.8 kg (145 lb)   Height: 162.6 cm (64.02\")     Body mass index is 24.88 kg/m².    Physical Exam  Vitals reviewed.   Constitutional:       Appearance: She is well-developed.   HENT:      Head: Normocephalic " and atraumatic.      Right Ear: Tympanic membrane normal. Tympanic membrane is not erythematous.      Left Ear: Tympanic membrane normal. A middle ear effusion (clear fluid noted) is present. Tympanic membrane is not erythematous.      Nose: Nose normal. Congestion and rhinorrhea present.      Mouth/Throat:      Mouth: Mucous membranes are moist.      Pharynx: Postnasal drip present. No pharyngeal swelling, oropharyngeal exudate or posterior oropharyngeal erythema.   Eyes:      Conjunctiva/sclera: Conjunctivae normal.      Pupils: Pupils are equal, round, and reactive to light.   Cardiovascular:      Rate and Rhythm: Normal rate and regular rhythm.      Heart sounds: No murmur heard.  Pulmonary:      Effort: Pulmonary effort is normal. No accessory muscle usage or respiratory distress.      Breath sounds: No stridor. No decreased breath sounds, wheezing, rhonchi or rales.   Musculoskeletal:         General: Normal range of motion.   Lymphadenopathy:      Cervical: No cervical adenopathy.   Skin:     General: Skin is warm and dry.   Neurological:      Mental Status: She is alert and oriented to person, place, and time.           Current Outpatient Medications:     amLODIPine (NORVASC) 5 MG tablet, TAKE 1 TABLET BY MOUTH DAILY, Disp: 90 tablet, Rfl: 0    aspirin 81 MG EC tablet, Take 1 tablet by mouth Daily. HOLDING FOR 7 DAYS PRIOR TO OR, Disp: , Rfl:     cetirizine (zyrTEC) 10 MG tablet, Take 1 tablet by mouth As Needed., Disp: , Rfl:     clobetasol (TEMOVATE) 0.05 % ointment, , Disp: , Rfl:     levothyroxine (Synthroid) 75 MCG tablet, Take 1 tablet by mouth Daily., Disp: 90 tablet, Rfl: 3    lisinopril-hydrochlorothiazide (PRINZIDE,ZESTORETIC) 20-12.5 MG per tablet, TAKE 1 TABLET BY MOUTH DAILY, Disp: 90 tablet, Rfl: 1    Nirmatrelvir & Ritonavir, 300mg/100mg, (PAXLOVID), Take 3 tablets by mouth 2 (Two) Times a Day., Disp: 30 tablet, Rfl: 0    rosuvastatin (Crestor) 5 MG tablet, Take 1 tablet by mouth Daily., Disp:  90 tablet, Rfl: 3    vitamin D3 (Vitamin D) 125 MCG (5000 UT) capsule capsule, , Disp: , Rfl:     azelastine (ASTELIN) 0.1 % nasal spray, Administer 1 spray into the nostril(s) as directed by provider 2 (Two) Times a Day for 14 days. Use in each nostril as directed, Disp: 1 each, Rfl: 0    Recent Results (from the past week)   POCT rapid strep A    Collection Time: 12/06/24 10:51 AM    Specimen: Swab   Result Value Ref Range    Rapid Strep A Screen Negative Negative, VALID, INVALID, Not Performed    Internal Control Passed Passed    Lot Number [id number]     Expiration Date 10/30/2026    Covid-19 + Flu A&B AG, Veritor    Collection Time: 12/06/24 10:51 AM    Specimen: Swab   Result Value Ref Range    SARS Antigen Not Detected Not Detected, Presumptive Negative    Influenza A Antigen MANN Not Detected Not Detected    Influenza B Antigen MANN Not Detected Not Detected    Internal Control Passed Passed    Lot Number [id number]     Expiration Date 05/07/2025                Diagnoses and all orders for this visit:    1. Acute URI (Primary)    2. Pharyngitis, unspecified etiology  -     POCT rapid strep A  -     Covid-19 + Flu A&B AG, Veritor    3. Acute serous otitis media of left ear, recurrence not specified  -     azelastine (ASTELIN) 0.1 % nasal spray; Administer 1 spray into the nostril(s) as directed by provider 2 (Two) Times a Day for 14 days. Use in each nostril as directed  Dispense: 1 each; Refill: 0    4. Acute cough  -     Covid-19 + Flu A&B AG, Veritor        Patient Instructions   Drink plenty of fluids-water preferably, eat a heart healthy diet, get plenty of sleep and do warm salt water gargles twice a day until feeling better.     Return if symptoms worsen or fail to improve, for Dr. Rosenberg as needed/as recommended.

## 2025-01-03 RX ORDER — AMLODIPINE BESYLATE 5 MG/1
5 TABLET ORAL DAILY
Qty: 90 TABLET | Refills: 0 | Status: SHIPPED | OUTPATIENT
Start: 2025-01-03

## 2025-01-28 ENCOUNTER — LAB (OUTPATIENT)
Dept: FAMILY MEDICINE CLINIC | Facility: CLINIC | Age: 80
End: 2025-01-28
Payer: MEDICARE

## 2025-01-28 DIAGNOSIS — R73.9 HYPERGLYCEMIA: ICD-10-CM

## 2025-01-28 DIAGNOSIS — E78.00 HYPERCHOLESTEROLEMIA: Primary | ICD-10-CM

## 2025-01-29 LAB
ALBUMIN SERPL-MCNC: 4.2 G/DL (ref 3.5–5.2)
ALBUMIN/CREAT UR: 9 MG/G CREAT (ref 0–29)
ALBUMIN/GLOB SERPL: 1.6 G/DL
ALP SERPL-CCNC: 77 U/L (ref 39–117)
ALT SERPL-CCNC: 19 U/L (ref 1–33)
APPEARANCE UR: CLEAR
AST SERPL-CCNC: 18 U/L (ref 1–32)
BACTERIA #/AREA URNS HPF: ABNORMAL /HPF
BASOPHILS # BLD AUTO: 0.02 10*3/MM3 (ref 0–0.2)
BASOPHILS NFR BLD AUTO: 0.3 % (ref 0–1.5)
BILIRUB SERPL-MCNC: 0.9 MG/DL (ref 0–1.2)
BILIRUB UR QL STRIP: NEGATIVE
BUN SERPL-MCNC: 18 MG/DL (ref 8–23)
BUN/CREAT SERPL: 17.3 (ref 7–25)
CALCIUM SERPL-MCNC: 9.6 MG/DL (ref 8.6–10.5)
CASTS URNS MICRO: ABNORMAL
CHLORIDE SERPL-SCNC: 100 MMOL/L (ref 98–107)
CHOLEST SERPL-MCNC: 137 MG/DL (ref 0–200)
CO2 SERPL-SCNC: 26.7 MMOL/L (ref 22–29)
COLOR UR: YELLOW
CREAT SERPL-MCNC: 1.04 MG/DL (ref 0.57–1)
CREAT UR-MCNC: 92.6 MG/DL
EGFRCR SERPLBLD CKD-EPI 2021: 54.8 ML/MIN/1.73
EOSINOPHIL # BLD AUTO: 0.28 10*3/MM3 (ref 0–0.4)
EOSINOPHIL NFR BLD AUTO: 4.3 % (ref 0.3–6.2)
EPI CELLS #/AREA URNS HPF: ABNORMAL /HPF
ERYTHROCYTE [DISTWIDTH] IN BLOOD BY AUTOMATED COUNT: 12.5 % (ref 12.3–15.4)
GLOBULIN SER CALC-MCNC: 2.6 GM/DL
GLUCOSE SERPL-MCNC: 131 MG/DL (ref 65–99)
GLUCOSE UR QL STRIP: NEGATIVE
HBA1C MFR BLD: 6.8 % (ref 4.8–5.6)
HCT VFR BLD AUTO: 37.4 % (ref 34–46.6)
HDLC SERPL-MCNC: 47 MG/DL (ref 40–60)
HGB BLD-MCNC: 12.8 G/DL (ref 12–15.9)
HGB UR QL STRIP: NEGATIVE
IMM GRANULOCYTES # BLD AUTO: 0.02 10*3/MM3 (ref 0–0.05)
IMM GRANULOCYTES NFR BLD AUTO: 0.3 % (ref 0–0.5)
KETONES UR QL STRIP: NEGATIVE
LDLC SERPL CALC-MCNC: 68 MG/DL (ref 0–100)
LEUKOCYTE ESTERASE UR QL STRIP: ABNORMAL
LYMPHOCYTES # BLD AUTO: 1.85 10*3/MM3 (ref 0.7–3.1)
LYMPHOCYTES NFR BLD AUTO: 28.5 % (ref 19.6–45.3)
MCH RBC QN AUTO: 29.6 PG (ref 26.6–33)
MCHC RBC AUTO-ENTMCNC: 34.2 G/DL (ref 31.5–35.7)
MCV RBC AUTO: 86.4 FL (ref 79–97)
MICROALBUMIN UR-MCNC: 8.1 UG/ML
MONOCYTES # BLD AUTO: 0.43 10*3/MM3 (ref 0.1–0.9)
MONOCYTES NFR BLD AUTO: 6.6 % (ref 5–12)
NEUTROPHILS # BLD AUTO: 3.89 10*3/MM3 (ref 1.7–7)
NEUTROPHILS NFR BLD AUTO: 60 % (ref 42.7–76)
NITRITE UR QL STRIP: NEGATIVE
NRBC BLD AUTO-RTO: 0 /100 WBC (ref 0–0.2)
PH UR STRIP: 5.5 [PH] (ref 5–8)
PLATELET # BLD AUTO: 196 10*3/MM3 (ref 140–450)
POTASSIUM SERPL-SCNC: 4.4 MMOL/L (ref 3.5–5.2)
PROT SERPL-MCNC: 6.8 G/DL (ref 6–8.5)
PROT UR QL STRIP: NEGATIVE
RBC # BLD AUTO: 4.33 10*6/MM3 (ref 3.77–5.28)
RBC #/AREA URNS HPF: ABNORMAL /HPF
SODIUM SERPL-SCNC: 140 MMOL/L (ref 136–145)
SP GR UR STRIP: 1.01 (ref 1–1.03)
TRIGL SERPL-MCNC: 122 MG/DL (ref 0–150)
UROBILINOGEN UR STRIP-MCNC: ABNORMAL MG/DL
VLDLC SERPL CALC-MCNC: 22 MG/DL (ref 5–40)
WBC # BLD AUTO: 6.49 10*3/MM3 (ref 3.4–10.8)
WBC #/AREA URNS HPF: ABNORMAL /HPF

## 2025-02-05 ENCOUNTER — OFFICE VISIT (OUTPATIENT)
Dept: FAMILY MEDICINE CLINIC | Facility: CLINIC | Age: 80
End: 2025-02-05
Payer: MEDICARE

## 2025-02-05 VITALS
TEMPERATURE: 96.8 F | RESPIRATION RATE: 16 BRPM | BODY MASS INDEX: 24.48 KG/M2 | HEART RATE: 89 BPM | OXYGEN SATURATION: 99 % | DIASTOLIC BLOOD PRESSURE: 80 MMHG | HEIGHT: 64 IN | WEIGHT: 143.4 LBS | SYSTOLIC BLOOD PRESSURE: 162 MMHG

## 2025-02-05 DIAGNOSIS — Z00.00 ENCOUNTER FOR SUBSEQUENT ANNUAL WELLNESS VISIT (AWV) IN MEDICARE PATIENT: ICD-10-CM

## 2025-02-05 DIAGNOSIS — Z78.0 POSTMENOPAUSAL: Primary | ICD-10-CM

## 2025-02-05 DIAGNOSIS — E78.00 HYPERCHOLESTEROLEMIA: ICD-10-CM

## 2025-02-05 DIAGNOSIS — I10 PRIMARY HYPERTENSION: ICD-10-CM

## 2025-02-05 PROBLEM — E11.9 TYPE 2 DIABETES MELLITUS, WITHOUT LONG-TERM CURRENT USE OF INSULIN: Status: ACTIVE | Noted: 2025-02-05

## 2025-02-05 PROCEDURE — 3079F DIAST BP 80-89 MM HG: CPT | Performed by: INTERNAL MEDICINE

## 2025-02-05 PROCEDURE — 1125F AMNT PAIN NOTED PAIN PRSNT: CPT | Performed by: INTERNAL MEDICINE

## 2025-02-05 PROCEDURE — 3077F SYST BP >= 140 MM HG: CPT | Performed by: INTERNAL MEDICINE

## 2025-02-05 PROCEDURE — G0439 PPPS, SUBSEQ VISIT: HCPCS | Performed by: INTERNAL MEDICINE

## 2025-02-05 NOTE — PROGRESS NOTES
Subjective   The ABCs of the Annual Wellness Visit  Medicare Wellness Visit      Mami Srivastava is a 79 y.o. patient who presents for a Medicare Wellness Visit.    The following portions of the patient's history were reviewed and   updated as appropriate: allergies, current medications, past family history, past medical history, past social history, past surgical history, and problem list.    Compared to one year ago, the patient's physical   health is the same.  Compared to one year ago, the patient's mental   health is the same.    Recent Hospitalizations:  This patient has had a [facility] [place] admission record on file within the last 365 days.  Current Medical Providers:  Patient Care Team:  Pradip Rosenberg MD as PCP - General (Internal Medicine)  Tressa Howard MD as Consulting Physician (Internal Medicine)  Pradip Wilson MD (Dermatology)  Luis Rick MD as Consulting Physician (Cardiology)  Shiva Hein MD as Consulting Physician (Gastroenterology)    Outpatient Medications Prior to Visit   Medication Sig Dispense Refill    amLODIPine (NORVASC) 5 MG tablet TAKE 1 TABLET BY MOUTH DAILY 90 tablet 0    aspirin 81 MG EC tablet Take 1 tablet by mouth Daily. HOLDING FOR 7 DAYS PRIOR TO OR      cetirizine (zyrTEC) 10 MG tablet Take 1 tablet by mouth As Needed.      clobetasol (TEMOVATE) 0.05 % ointment       levothyroxine (Synthroid) 75 MCG tablet Take 1 tablet by mouth Daily. 90 tablet 3    lisinopril-hydrochlorothiazide (PRINZIDE,ZESTORETIC) 20-12.5 MG per tablet TAKE 1 TABLET BY MOUTH DAILY 90 tablet 1    rosuvastatin (Crestor) 5 MG tablet Take 1 tablet by mouth Daily. 90 tablet 3    vitamin D3 (Vitamin D) 125 MCG (5000 UT) capsule capsule       Nirmatrelvir & Ritonavir, 300mg/100mg, (PAXLOVID) Take 3 tablets by mouth 2 (Two) Times a Day. 30 tablet 0     No facility-administered medications prior to visit.     No opioid medication identified on active medication list. I  have reviewed chart for other potential  high risk medication/s and harmful drug interactions in the elderly.      Aspirin is on active medication list. Aspirin use is indicated based on review of current medical condition/s. Pros and cons of this therapy have been discussed today. Benefits of this medication outweigh potential harm.  Patient has been encouraged to continue taking this medication.  .      Patient Active Problem List   Diagnosis    S/P thyroidectomy    Mixed hyperlipidemia    Vitamin D deficiency    Hypothyroidism    Osteopenia of multiple sites    Hypertensive left ventricular hypertrophy, without heart failure    Chronic seasonal allergic rhinitis    History of heart disease    Hx of abnormal mammogram    Nodule of left lung    Hypertension    Hyperglycemia    Current chronic use of systemic steroids    Autoimmune hepatitis    Diverticulitis    Cholelithiasis    Irritable bowel syndrome with diarrhea    Urinary frequency    Pedal edema    Hiatal hernia    Liver hemangioma    Cystitis    C. difficile colitis    Bacterial UTI    Calcific coronary arteriosclerosis    Diastolic dysfunction    Dyslipidemia    Right shoulder pain    Pancreatic cyst    Urticaria    Posttraumatic headache    Arthralgia of both hands    Influenza A with respiratory manifestations    Intraductal papillary mucinous neoplasm of pancreas    Encounter for subsequent annual wellness visit (AWV) in Medicare patient    Viral URI with cough    Hypercholesterolemia    Abnormal finding on mammography    Arteriosclerosis of thoracic aorta    Depressive disorder    Electrocardiogram abnormal    Low back pain    Type 2 diabetes mellitus, without long-term current use of insulin     Advance Care Planning Advance Directive is on file.  ACP discussion was held with the patient during this visit. Patient has an advance directive in EMR which is still valid.             Objective   Vitals:    02/05/25 0925   BP: 162/80   BP Location: Right  "arm   Patient Position: Sitting   Cuff Size: Large Adult   Pulse: 89   Resp: 16   Temp: 96.8 °F (36 °C)   TempSrc: Temporal   SpO2: 99%   Weight: 65 kg (143 lb 6.4 oz)   Height: 162 cm (63.78\")       Estimated body mass index is 24.78 kg/m² as calculated from the following:    Height as of this encounter: 162 cm (63.78\").    Weight as of this encounter: 65 kg (143 lb 6.4 oz).    BMI is within normal parameters. No other follow-up for BMI required.           Does the patient have evidence of cognitive impairment? No  Lab Results   Component Value Date    CHLPL 137 2025    TRIG 122 2025    HDL 47 2025    LDL 68 2025    VLDL 22 2025    HGBA1C 6.80 (H) 2025                                                                                                Health  Risk Assessment    Smoking Status:  Social History     Tobacco Use   Smoking Status Former    Current packs/day: 0.00    Average packs/day: 2.0 packs/day for 12.7 years (25.3 ttl pk-yrs)    Types: Cigarettes    Start date: 1963    Quit date: 1976    Years since quittin.0    Passive exposure: Past   Smokeless Tobacco Never     Alcohol Consumption:  Social History     Substance and Sexual Activity   Alcohol Use Not Currently    Comment: none in 3 years       Fall Risk Screen  STEADI Fall Risk Assessment was completed, and patient is at LOW risk for falls.Assessment completed on:2025    Depression Screening   Little interest or pleasure in doing things? Not at all   Feeling down, depressed, or hopeless? Not at all   PHQ-2 Total Score 0      Health Habits and Functional and Cognitive Screenin/3/2025     3:29 PM   Functional & Cognitive Status   Do you have difficulty preparing food and eating? No    Do you have difficulty bathing yourself, getting dressed or grooming yourself? No    Do you have difficulty using the toilet? No    Do you have difficulty moving around from place to place? No    Do you have " trouble with steps or getting out of a bed or a chair? No    Current Diet Well Balanced Diet    Dental Exam Up to date    Eye Exam Up to date    Exercise (times per week) 2 times per week    Current Exercises Include No Regular Exercise;Treadmill;Walking;Yard Work    Do you need help using the phone?  No    Are you deaf or do you have serious difficulty hearing?  No    Do you need help to go to places out of walking distance? No    Do you need help shopping? No    Do you need help preparing meals?  No    Do you need help with housework?  No    Do you need help with laundry? No    Do you need help taking your medications? No    Do you need help managing money? No    Do you ever drive or ride in a car without wearing a seat belt? No    Have you felt unusual stress, anger or loneliness in the last month? Yes    Who do you live with? Alone    If you need help, do you have trouble finding someone available to you? Yes    Have you been bothered in the last four weeks by sexual problems? No    Do you have difficulty concentrating, remembering or making decisions? No        Patient-reported           Age-appropriate Screening Schedule:  Refer to the list below for future screening recommendations based on patient's age, sex and/or medical conditions. Orders for these recommended tests are listed in the plan section. The patient has been provided with a written plan.    Health Maintenance List  Health Maintenance   Topic Date Due    RSV Vaccine - Adults (1 - 1-dose 75+ series) Never done    DXA SCAN  10/08/2020    COVID-19 Vaccine (4 - 2024-25 season) 09/01/2024    LIPID PANEL  01/28/2026    ANNUAL WELLNESS VISIT  02/05/2026    COLORECTAL CANCER SCREENING  11/02/2028    TDAP/TD VACCINES (3 - Td or Tdap) 05/15/2032    HEPATITIS C SCREENING  Completed    INFLUENZA VACCINE  Completed    Pneumococcal Vaccine 65+  Completed    ZOSTER VACCINE  Completed    MAMMOGRAM  Discontinued                                                                                                                                                 CMS Preventative Services Quick Reference  Risk Factors Identified During Encounter  None Identified    The above risks/problems have been discussed with the patient.  Pertinent information has been shared with the patient in the After Visit Summary.  An After Visit Summary and PPPS were made available to the patient.    Follow Up:   Next Medicare Wellness visit to be scheduled in 1 year.         Additional E&M Note during same encounter follows:  Patient has additional, significant, and separately identifiable condition(s)/problem(s) that require work above and beyond the Medicare Wellness Visit     Chief Complaint  Medicare Wellness-subsequent    Subjective    She is here today for Medicare subsequent wellness visit.      Mami is also being seen today for additional medical problem/s.       The patient presents for evaluation of elevated hemoglobin A1c, weight gain, urinary discomfort, and health maintenance.    She has observed an increase in her hemoglobin A1c levels, which she attributes to recent weight gain and dietary indulgences, particularly in sweets. She reports no family history of diabetes. She does not experience any tingling or numbness in her feet.    She reports a weight gain of 7 pounds during a recent cruise, despite maintaining an active lifestyle with daily walks exceeding 14,000 steps. She engages in physical activities such as playing [employer] on Mondays and Thursdays at a [employer] center, followed by treadmill exercises or using a pull-up bar machine. She weighed 137 pounds at her last visit.    She experienced a transient period of urinary discomfort, described as a sensation of urinating on a raw nerve that radiated up her back. This symptom resolved after increasing her water intake. She currently does not experience any pain during urination.    She has been adhering to a regimen of baby aspirin.  "She has abstained from alcohol for the past 7 years due to a liver condition, with the exception of a single sip of wine. She does not use tobacco. She regularly visits her dentist and ophthalmologist. Her sleep pattern is generally good, with occasional disturbances not related to bathroom needs. She has an advanced directive in place.    SOCIAL HISTORY  She has not had a drop of liquor for 7 years except for one time when she had a sip of wine due to a liver issue. She does not smoke. She has 2 children.    FAMILY HISTORY  She does not have a family history of diabetes.    MEDICATIONS  Baby aspirin  Review of Systems   Constitutional: Negative.    HENT: Negative.     Respiratory: Negative.     Cardiovascular: Negative.    Musculoskeletal: Negative.    Psychiatric/Behavioral:          She is concerned about her son and his happiness.          Objective   Vital Signs:  /80 (BP Location: Right arm, Patient Position: Sitting, Cuff Size: Large Adult)   Pulse 89   Temp 96.8 °F (36 °C) (Temporal)   Resp 16   Ht 162 cm (63.78\")   Wt 65 kg (143 lb 6.4 oz)   SpO2 99%   BMI 24.78 kg/m²   Physical Exam  Vitals and nursing note reviewed.   Constitutional:       General: She is not in acute distress.     Appearance: Normal appearance. She is not ill-appearing, toxic-appearing or diaphoretic.      Comments: Pleasant, neatly groomed, no distress.  BMI 24.7.   HENT:      Head: Normocephalic and atraumatic.      Right Ear: Tympanic membrane, ear canal and external ear normal. There is no impacted cerumen.      Left Ear: Tympanic membrane, ear canal and external ear normal.   Neck:      Vascular: No carotid bruit.   Cardiovascular:      Rate and Rhythm: Regular rhythm.      Heart sounds: Normal heart sounds. No murmur heard.     No gallop.   Pulmonary:      Effort: No respiratory distress.      Breath sounds: Normal breath sounds. No wheezing or rales.   Neurological:      Mental Status: She is alert and oriented to " person, place, and time.   Psychiatric:         Mood and Affect: Mood normal.         Behavior: Behavior normal.         Thought Content: Thought content normal.           Lungs were auscultated.  Heart sounds are normal. No murmurs detected.    Vital Signs  Weight was 145 in December 2024 and 138 in October 2024.            Results  Laboratory Studies  Blood sugar was 131. Hemoglobin A1c is 6.8. Cholesterol levels were normal. Urinalysis showed a couple of white blood cells.              Assessment and Plan        1. Elevated hemoglobin A1c.  Her blood glucose levels have been consistently within the prediabetic range for an extended period. The most recent reading was 131, with a hemoglobin A1c of 6.8, which is an increase from the previous value of 6.1. She has occasionally recorded fasting glucose levels exceeding 126, with a notable instance being a reading of 149 a few years prior. Despite this, her overall blood glucose control is satisfactory, as evidenced by a hemoglobin A1c level below 7 percent. She was advised to limit her intake of sweets and soft drinks.    2. Weight gain.  She has gained 7 pounds recently, which may be contributing to her elevated blood glucose levels. Her weight was 145 in December 2024 and 138 in October 2024. She was advised to monitor her diet and increase physical activity to manage her weight.    3. Urinary discomfort.  She reported a previous episode of urinary discomfort, which resolved with increased water intake. Her urinalysis showed a couple of white blood cells. She was advised to continue drinking plenty of water to prevent recurrence.    4. Health maintenance.  Her cholesterol levels are within the normal range, and there is no evidence of anemia. She is due for a bone density test. A thyroid function test will be ordered. A bone density test will be scheduled to assess her risk for osteoporosis.            Follow Up   No follow-ups on file.  Patient was given  instructions and counseling regarding her condition or for health maintenance advice. Please see specific information pulled into the AVS if appropriate.  Patient or patient representative verbalized consent for the use of Ambient Listening during the visit with  Pradip Rosenberg MD for chart documentation. 2/5/2025  09:41 EST

## 2025-02-10 ENCOUNTER — OFFICE VISIT (OUTPATIENT)
Dept: FAMILY MEDICINE CLINIC | Facility: CLINIC | Age: 80
End: 2025-02-10
Payer: MEDICARE

## 2025-02-10 VITALS
TEMPERATURE: 96.6 F | BODY MASS INDEX: 24.62 KG/M2 | RESPIRATION RATE: 15 BRPM | SYSTOLIC BLOOD PRESSURE: 140 MMHG | WEIGHT: 144.2 LBS | DIASTOLIC BLOOD PRESSURE: 70 MMHG | HEIGHT: 64 IN | OXYGEN SATURATION: 98 % | HEART RATE: 75 BPM

## 2025-02-10 DIAGNOSIS — E03.9 HYPOTHYROIDISM, UNSPECIFIED TYPE: Primary | ICD-10-CM

## 2025-02-10 DIAGNOSIS — E03.9 ACQUIRED HYPOTHYROIDISM: ICD-10-CM

## 2025-02-10 PROCEDURE — 3077F SYST BP >= 140 MM HG: CPT | Performed by: INTERNAL MEDICINE

## 2025-02-10 PROCEDURE — 99213 OFFICE O/P EST LOW 20 MIN: CPT | Performed by: INTERNAL MEDICINE

## 2025-02-10 PROCEDURE — 3078F DIAST BP <80 MM HG: CPT | Performed by: INTERNAL MEDICINE

## 2025-02-10 PROCEDURE — 1125F AMNT PAIN NOTED PAIN PRSNT: CPT | Performed by: INTERNAL MEDICINE

## 2025-02-11 LAB
T4 FREE SERPL-MCNC: 1.58 NG/DL (ref 0.82–1.77)
TSH SERPL DL<=0.005 MIU/L-ACNC: 1.55 UIU/ML (ref 0.45–4.5)

## 2025-02-11 NOTE — PROGRESS NOTES
Subjective   Mami Srivastava is a 79 y.o. female. Patient is here today for   Chief Complaint   Patient presents with    Earache    Dizziness     Right ear pain since December 2024        History of Present Illness  She complains of pain in her right ear, maxillary sinus, right oropharynx since December.  She is not blowing anything out of her nose.  She has not noticed noted decreased auditory acuity.  Earache     Dizziness    History of Present Illness  The patient presents for evaluation of dizziness.    She experienced a transient episode of severe pain, which has since evolved into intermittent discomfort. She reports an unusual sensation in her ear but no associated pain. She also mentions pruritus in the ear, which she attributes to potential water exposure during a shower the previous day. She does not experience any palpitations, shortness of breath, or nausea. She recalls being in the presence of a dog and a gas fireplace at a friend's house the previous day, but she did not experience any symptoms at that time. She had a restful sleep from 11:00 PM to 6:30 AM without any disturbances. She speculates that her symptoms may be related to sinus or allergy issues. She typically takes cetirizine at night to aid sleep.    Upon awakening this morning, she experienced dizziness, which has since resolved. She took an allergy pill and half a tablet of amlodipine due to elevated blood pressure, enabling her to drive safely. Her blood pressure was 140 yesterday as well. She used to take lisinopril without HCTZ but switched to amlodipine, which caused her blood pressure to drop too low. She then stopped taking amlodipine and resumed lisinopril with HCTZ. Her cardiologist, Dr. Esteves, advised her last year to take her medication in the morning and again around 4 PM. If her blood pressure reached 140/85 in the afternoon, she was instructed to take half a tablet of amlodipine. However, she has been monitoring her blood  pressure and it has not reached that level.    MEDICATIONS  Current: Amlodipine, lisinopril/HCTZ, cetirizine.      Vitals:    02/10/25 1310   BP: 140/70   Pulse: 75   Resp: 15   Temp: 96.6 °F (35.9 °C)   SpO2: 98%     Body mass index is 24.92 kg/m².    Past Medical History:   Diagnosis Date    Allergic     Arthritis     Autoimmune hepatitis     C. difficile colitis 2020    Cholelithiasis 2018    Ultrasound    Coronary artery disease     Dr Luis Rick    Diverticulitis of colon ,    Diverticulosis     Hemangioma of liver     Hernia     Hiatal hernia-Prabhjot test    Hyperlipidemia     Dr Luis Rick-atorvastatin    Hypertension     Hyperthyroidism     Hypothyroidism     Migraines     Osteopenia     Osteopenia after menopause     Pancreatitis     Cyst in pancreas being observed since …Dr Watson and Cece Merritt     Pneumonia     As a child    PONV (postoperative nausea and vomiting)     Right shoulder pain     Seasonal allergies     Skin cancer     face/ BASAL     Type 2 diabetes mellitus, without long-term current use of insulin 2025    Ulcerative colitis     Colitis/DrKaplan    UTI (urinary tract infection)     HISTORY/OF      Allergies   Allergen Reactions    Imuran [Azathioprine] GI Intolerance    Bactrim [Sulfamethoxazole-Trimethoprim] Other (See Comments)     fatigue    Clindamycin/Lincomycin Diarrhea    Augmentin [Amoxicillin-Pot Clavulanate] Hives    Keflex [Cephalexin] Hives      Social History     Socioeconomic History    Marital status:    Tobacco Use    Smoking status: Former     Current packs/day: 0.00     Average packs/day: 2.0 packs/day for 12.7 years (25.3 ttl pk-yrs)     Types: Cigarettes     Start date: 1963     Quit date: 1976     Years since quittin.0     Passive exposure: Past    Smokeless tobacco: Never   Vaping Use    Vaping status: Never Used   Substance and Sexual Activity    Alcohol use: Not Currently     Comment: none in 3  years    Drug use: Never    Sexual activity: Not Currently        Current Outpatient Medications:     amLODIPine (NORVASC) 5 MG tablet, TAKE 1 TABLET BY MOUTH DAILY, Disp: 90 tablet, Rfl: 0    aspirin 81 MG EC tablet, Take 1 tablet by mouth Daily. HOLDING FOR 7 DAYS PRIOR TO OR, Disp: , Rfl:     cetirizine (zyrTEC) 10 MG tablet, Take 1 tablet by mouth As Needed., Disp: , Rfl:     clobetasol (TEMOVATE) 0.05 % ointment, , Disp: , Rfl:     levothyroxine (Synthroid) 75 MCG tablet, Take 1 tablet by mouth Daily., Disp: 90 tablet, Rfl: 3    lisinopril-hydrochlorothiazide (PRINZIDE,ZESTORETIC) 20-12.5 MG per tablet, TAKE 1 TABLET BY MOUTH DAILY, Disp: 90 tablet, Rfl: 1    rosuvastatin (Crestor) 5 MG tablet, Take 1 tablet by mouth Daily., Disp: 90 tablet, Rfl: 3    vitamin D3 (Vitamin D) 125 MCG (5000 UT) capsule capsule, , Disp: , Rfl:      Objective     Review of Systems   Constitutional: Negative.    HENT:  Positive for ear pain.    Musculoskeletal: Negative.    Neurological:  Positive for dizziness.   Psychiatric/Behavioral: Negative.         Physical Exam  Vitals and nursing note reviewed.   Constitutional:       Appearance: Normal appearance.      Comments: Pleasant, neatly groomed, is in no distress.   Cardiovascular:      Rate and Rhythm: Regular rhythm.      Heart sounds: Normal heart sounds. No murmur heard.     No gallop.   Pulmonary:      Effort: No respiratory distress.      Breath sounds: Normal breath sounds. No wheezing or rales.   Neurological:      Mental Status: She is alert and oriented to person, place, and time.   Psychiatric:         Mood and Affect: Mood normal.         Behavior: Behavior normal.         Thought Content: Thought content normal.       Physical Exam  The tympanic membrane appears normal. There is no retraction or redness in the middle ear.    Results      Assessment & Plan    Problems Addressed this Visit          Endocrine and Metabolic    Hypothyroidism - Primary    Relevant Orders     TSH    T4, free    T4, Free (Completed)    TSH (Completed)     Diagnoses         Codes Comments    Hypothyroidism, unspecified type    -  Primary ICD-10-CM: E03.9  ICD-9-CM: 244.9           Assessment & Plan  1. Dizziness.  Her symptoms may be indicative of eustachian tube dysfunction, which could potentially be alleviated by antihistamines or decongestants. The absence of any abnormalities in the tympanic membrane, such as retraction, redness, or signs of middle ear infection, suggests that the cetirizine may have effectively treated the eustachian tube dysfunction. It is also plausible that her symptoms are a manifestation of vertigo, which can be triggered by viral infections or middle ear issues. The itching in her ear is likely a result of the examination process. She is advised to take over-the-counter Claritin or Allegra once daily for a week, provided it does not induce excessive fatigue. If her symptoms persist beyond this period, she should discontinue the medication and inform us immediately. In the event of recurrent vertigo, the Seble-Hallpike maneuver could be considered as a potential treatment option.    2. Elevated blood pressure.  She reported her blood pressure was 140/85, which is higher than desired. She took half of amlodipine, which helped reduce her blood pressure. She is advised to continue monitoring her blood pressure and follow the cardiologist's recommendations on medication adjustments.      No follow-ups on file.    Patient or patient representative verbalized consent for the use of Ambient Listening during the visit with  Pradip Rosenberg MD for chart documentation. 2/11/2025  14:35 EST

## 2025-03-24 RX ORDER — LISINOPRIL AND HYDROCHLOROTHIAZIDE 12.5; 2 MG/1; MG/1
1 TABLET ORAL DAILY
Qty: 90 TABLET | Refills: 1 | Status: SHIPPED | OUTPATIENT
Start: 2025-03-24

## 2025-03-31 RX ORDER — ROSUVASTATIN CALCIUM 5 MG/1
5 TABLET, COATED ORAL DAILY
Qty: 90 TABLET | Refills: 1 | Status: SHIPPED | OUTPATIENT
Start: 2025-03-31

## 2025-04-07 RX ORDER — AMLODIPINE BESYLATE 5 MG/1
5 TABLET ORAL DAILY
Qty: 90 TABLET | Refills: 1 | Status: SHIPPED | OUTPATIENT
Start: 2025-04-07

## 2025-04-09 ENCOUNTER — TELEPHONE (OUTPATIENT)
Dept: FAMILY MEDICINE CLINIC | Facility: CLINIC | Age: 80
End: 2025-04-09

## 2025-04-09 ENCOUNTER — HOSPITAL ENCOUNTER (OUTPATIENT)
Dept: GENERAL RADIOLOGY | Facility: HOSPITAL | Age: 80
Discharge: HOME | End: 2025-04-09
Admitting: INTERNAL MEDICINE
Payer: MEDICARE

## 2025-04-09 ENCOUNTER — OFFICE VISIT (OUTPATIENT)
Dept: FAMILY MEDICINE CLINIC | Facility: CLINIC | Age: 80
End: 2025-04-09
Payer: MEDICARE

## 2025-04-09 VITALS
DIASTOLIC BLOOD PRESSURE: 78 MMHG | RESPIRATION RATE: 16 BRPM | WEIGHT: 144.6 LBS | HEIGHT: 64 IN | SYSTOLIC BLOOD PRESSURE: 144 MMHG | BODY MASS INDEX: 24.69 KG/M2

## 2025-04-09 DIAGNOSIS — R05.3 CHRONIC COUGH: ICD-10-CM

## 2025-04-09 DIAGNOSIS — R05.3 CHRONIC COUGH: Primary | ICD-10-CM

## 2025-04-09 DIAGNOSIS — J20.8 ACUTE BACTERIAL BRONCHITIS: ICD-10-CM

## 2025-04-09 DIAGNOSIS — B96.89 ACUTE BACTERIAL BRONCHITIS: ICD-10-CM

## 2025-04-09 PROCEDURE — 71046 X-RAY EXAM CHEST 2 VIEWS: CPT

## 2025-04-09 RX ORDER — DOXYCYCLINE 100 MG/1
100 CAPSULE ORAL 2 TIMES DAILY
Qty: 14 CAPSULE | Refills: 0 | Status: SHIPPED | OUTPATIENT
Start: 2025-04-09

## 2025-04-09 NOTE — PROGRESS NOTES
Subjective   Mami Srivastava is a 79 y.o. female. Patient is here today for   Chief Complaint   Patient presents with    URI     Sneezing, head congestion        URI       History of Present Illness  The patient presents for evaluation of a cough.    She reports a persistent cough, which she attributes to exposure to yellow dust from pine trees and live oaks in her vicinity. She has been experiencing fatigue and occasional wheezing at night. She recalls a similar episode in 12/2024, following a cruise trip where she was exposed to a coughing child on the return flight. She fell ill 3 days post-flight and believes she may have contracted an infection from the child. She did not seek antibiotic treatment at that time and has been dealing with a lingering cough since then. She is considering the use of Flonase as an alternative to her current antihistamine nasal spray.    Her blood pressure has been well-controlled, typically ranging between 110 and 120 in the mornings, although it was recorded as 144 upon her arrival today. She had previously consulted her pharmacist regarding the potential impact of decongestants on her blood pressure, who assured her that it would not be affected.    SOCIAL HISTORY  The patient smoked 50 years ago and quit 2 to 3 months before getting pregnant with her older son.    ALLERGIES  The patient is allergic to BACTRIM (sulfamethoxazole), CLINDAMYCIN (causes diarrhea), AUGMENTIN (causes hives), and KEFLEX (causes hives).    MEDICATIONS  Current: Allegra      Vitals:    04/09/25 0905   BP: 144/78   Resp: 16     Body mass index is 24.99 kg/m².    Past Medical History:   Diagnosis Date    Allergic     Arthritis     Autoimmune hepatitis     C. difficile colitis 09/2020    Cholelithiasis 04/25/2018    Ultrasound    Coronary artery disease     Dr Luis Rick    Diverticulitis of colon 2005,2018    Diverticulosis 2005    Hemangioma of liver     Hernia 2017    Hiatal hernia-Prabhjot test     Hyperlipidemia     Dr Luis Rick-atorvastatin    Hypertension     Hyperthyroidism     Hypothyroidism     Migraines     Osteopenia     Osteopenia after menopause     Pancreatitis     Cyst in pancreas being observed since 2018…Dr Watson and Cece Merritt     Pneumonia     As a child    PONV (postoperative nausea and vomiting)     Right shoulder pain     Seasonal allergies     Skin cancer     face/ BASAL     Type 2 diabetes mellitus, without long-term current use of insulin 2025    Ulcerative colitis     Colitis/[facility]Kaplan    UTI (urinary tract infection)     HISTORY/OF      Allergies   Allergen Reactions    Imuran [Azathioprine] GI Intolerance    Bactrim [Sulfamethoxazole-Trimethoprim] Other (See Comments)     fatigue    Clindamycin/Lincomycin Diarrhea    Augmentin [Amoxicillin-Pot Clavulanate] Hives    Keflex [Cephalexin] Hives      Social History     Socioeconomic History    Marital status:    Tobacco Use    Smoking status: Former     Current packs/day: 0.00     Average packs/day: 2.0 packs/day for 12.7 years (25.3 ttl pk-yrs)     Types: Cigarettes     Start date: 1963     Quit date: 1976     Years since quittin.2     Passive exposure: Past    Smokeless tobacco: Never   Vaping Use    Vaping status: Never Used   Substance and Sexual Activity    Alcohol use: Not Currently     Comment: none in 3 years    Drug use: Never    Sexual activity: Not Currently        Current Outpatient Medications:     amLODIPine (NORVASC) 5 MG tablet, TAKE 1 TABLET BY MOUTH DAILY, Disp: 90 tablet, Rfl: 1    aspirin 81 MG EC tablet, Take 1 tablet by mouth Daily. HOLDING FOR 7 DAYS PRIOR TO OR, Disp: , Rfl:     clobetasol (TEMOVATE) 0.05 % ointment, , Disp: , Rfl:     Cyanocobalamin (B-12 PO), Take  by mouth., Disp: , Rfl:     Fexofenadine HCl (ALLEGRA PO), Take  by mouth., Disp: , Rfl:     levothyroxine (Synthroid) 75 MCG tablet, Take 1 tablet by mouth Daily., Disp: 90 tablet, Rfl: 3     lisinopril-hydrochlorothiazide (PRINZIDE,ZESTORETIC) 20-12.5 MG per tablet, TAKE 1 TABLET BY MOUTH DAILY, Disp: 90 tablet, Rfl: 1    rosuvastatin (CRESTOR) 5 MG tablet, TAKE 1 TABLET BY MOUTH DAILY, Disp: 90 tablet, Rfl: 1    vitamin D3 (Vitamin D) 125 MCG (5000 UT) capsule capsule, , Disp: , Rfl:     cetirizine (zyrTEC) 10 MG tablet, Take 1 tablet by mouth As Needed. (Patient not taking: Reported on 4/9/2025), Disp: , Rfl:     doxycycline (VIBRAMYCIN) 100 MG capsule, Take 1 capsule by mouth 2 (Two) Times a Day., Disp: 14 capsule, Rfl: 0     Objective     Review of Systems    Physical Exam  Vitals and nursing note reviewed.   Constitutional:       Appearance: Normal appearance.      Comments: Pleasant, neatly groomed, no distress.   Cardiovascular:      Rate and Rhythm: Regular rhythm.      Heart sounds: Normal heart sounds. No murmur heard.     No gallop.   Pulmonary:      Effort: Pulmonary effort is normal. No respiratory distress.      Breath sounds: No stridor. Rhonchi present. No wheezing or rales.   Neurological:      Mental Status: She is alert.   Psychiatric:         Mood and Affect: Mood normal.         Behavior: Behavior normal.         Thought Content: Thought content normal.       Physical Exam  Vital Signs  Blood pressure is 144.    Results      Assessment & Plan    Problems Addressed this Visit          Pulmonary and Pneumonias    Acute bacterial bronchitis    Relevant Medications    Fexofenadine HCl (ALLEGRA PO)    doxycycline (VIBRAMYCIN) 100 MG capsule     Other Visit Diagnoses         Chronic cough    -  Primary    Relevant Orders    CBC No Differential    Comprehensive metabolic panel    XR Chest PA & Lateral          Diagnoses         Codes Comments      Chronic cough    -  Primary ICD-10-CM: R05.3  ICD-9-CM: 786.2       Acute bacterial bronchitis     ICD-10-CM: J20.8, B96.89  ICD-9-CM: 466.0, 041.9           Assessment & Plan  1. Cough.  The patient reports a persistent cough similar to one  experienced in December, possibly exacerbated by recent travel and exposure to pollen. She has been using an antihistamine nasal spray, which does not seem to be effective. A prescription for doxycycline, consisting of 14 tablets with no refills, will be sent to [facility]. She is advised to discontinue Allegra and start using Flonase nasal spray. A chest x-ray has been ordered, which can be completed at her convenience at the diagnostic center in [place]. Additionally, a CBC will be conducted today.    2. Elevated blood pressure.  Her blood pressure was 144 upon arrival today, though it usually ranges between 110 and 120 in the mornings. The decongestant she is taking may slightly elevate her blood pressure.      No follow-ups on file.    Patient or patient representative verbalized consent for the use of Ambient Listening during the visit with  Pradip Rosenberg MD for chart documentation. 4/9/2025  13:59 EDT

## 2025-04-09 NOTE — TELEPHONE ENCOUNTER
Caller: Mami Srivastava    Relationship: Self    Best call back number: [phone]    What is the best time to reach you: ANYTIME    Who are you requesting to speak with (clinical staff, provider,  specific staff member): CLINICAL     What was the call regarding: PATIENT STATES SHE HAD A CHEST XRAY DONE THAT DR. HOLCOMB HAD HER DONE. SHE WANTED TO LET HIM KNOW THAT THE TECHNICIAN TOLD HER THAT SHE WOULDN'T GET RESULTS FOR 2 BUSINESS DAYS. SHE WOULD LIKE TO LET DR. HOLCOMB KNOW BECAUSE SHE DIDN'T KNOW IF THAT'S HOW ITS SUPPOSED TO BE DONE. PLEASE CALL TO DISCUSS     Is it okay if the provider responds through "[employer]"hart: NO

## 2025-04-14 ENCOUNTER — TELEPHONE (OUTPATIENT)
Dept: FAMILY MEDICINE CLINIC | Facility: CLINIC | Age: 80
End: 2025-04-14

## 2025-04-14 NOTE — TELEPHONE ENCOUNTER
Caller: Darren Srivastava    Relationship: Self    Best call back number: [phone]     Caller requesting test results: DARREN    What test was performed: LABS    When was the test performed: 4/9/25    Where was the test performed: [facility]

## 2025-04-18 ENCOUNTER — TELEPHONE (OUTPATIENT)
Dept: FAMILY MEDICINE CLINIC | Facility: CLINIC | Age: 80
End: 2025-04-18

## 2025-04-18 NOTE — TELEPHONE ENCOUNTER
Caller: Mami Srivastava    Relationship: Self    Best call back number: [phone]     What was the call regarding: PATIENT STATED THAT SHE RECEIVED A MESSAGE FROM [facility] TODAY STATING THEY RECEIVED AN ELECTRONIC REQUEST FROM DR. HOLCOMB TO COMPLETE LAB WORK, BUT IT DOES NOT SHOW WHAT HE IS WANTING PATIENT TO HAVE DONE. PLEASE ADVISE.

## 2025-04-25 ENCOUNTER — HOSPITAL ENCOUNTER (OUTPATIENT)
Dept: BONE DENSITY | Facility: HOSPITAL | Age: 80
Discharge: HOME | End: 2025-04-25
Payer: MEDICARE

## 2025-04-25 PROCEDURE — 77080 DXA BONE DENSITY AXIAL: CPT

## 2025-05-09 ENCOUNTER — TELEPHONE (OUTPATIENT)
Dept: FAMILY MEDICINE CLINIC | Facility: CLINIC | Age: 80
End: 2025-05-09

## 2025-05-09 NOTE — TELEPHONE ENCOUNTER
Caller: Darren Srivastava    Relationship: Self    Best call back number: [phone]     What is the best time to reach you: ANY    Who are you requesting to speak with (clinical staff, provider,  specific staff member): [facility] STAFF     Do you know the name of the person who called: DARREN    What was the call regarding: PATIENT IS REQUESTING RESULTS FROM BONE DENSITY SCAN    Is it okay if the provider responds through MyChart: YES

## 2025-05-12 RX ORDER — LEVOTHYROXINE SODIUM 75 UG/1
75 TABLET ORAL DAILY
Qty: 90 TABLET | Refills: 0 | Status: SHIPPED | OUTPATIENT
Start: 2025-05-12

## 2025-05-14 ENCOUNTER — TELEPHONE (OUTPATIENT)
Dept: FAMILY MEDICINE CLINIC | Facility: CLINIC | Age: 80
End: 2025-05-14
Payer: MEDICARE

## 2025-05-14 NOTE — TELEPHONE ENCOUNTER
Patient called requesting information about Dexascan done April 25,2025 at [facility]. Patient would like to have a call back please.                       Thank you

## 2025-06-09 ENCOUNTER — APPOINTMENT (OUTPATIENT)
Dept: WOMENS IMAGING | Facility: HOSPITAL | Age: 80
End: 2025-06-09
Payer: MEDICARE

## 2025-06-09 PROCEDURE — 77067 SCR MAMMO BI INCL CAD: CPT | Performed by: RADIOLOGY

## 2025-06-09 PROCEDURE — 77063 BREAST TOMOSYNTHESIS BI: CPT | Performed by: RADIOLOGY

## 2025-08-11 RX ORDER — LEVOTHYROXINE SODIUM 75 UG/1
75 TABLET ORAL DAILY
Qty: 90 TABLET | Refills: 1 | Status: SHIPPED | OUTPATIENT
Start: 2025-08-11

## 2025-08-18 ENCOUNTER — OFFICE VISIT (OUTPATIENT)
Dept: FAMILY MEDICINE CLINIC | Facility: CLINIC | Age: 80
End: 2025-08-18
Payer: MEDICARE

## 2025-08-18 VITALS
HEIGHT: 64 IN | RESPIRATION RATE: 16 BRPM | OXYGEN SATURATION: 99 % | WEIGHT: 143.8 LBS | DIASTOLIC BLOOD PRESSURE: 72 MMHG | BODY MASS INDEX: 24.55 KG/M2 | SYSTOLIC BLOOD PRESSURE: 142 MMHG | HEART RATE: 61 BPM

## 2025-08-18 DIAGNOSIS — R06.00 DYSPNEA, UNSPECIFIED TYPE: Primary | ICD-10-CM

## 2025-08-18 DIAGNOSIS — R30.0 DYSURIA: ICD-10-CM

## 2025-08-18 DIAGNOSIS — E11.9 TYPE 2 DIABETES MELLITUS WITHOUT COMPLICATION, WITHOUT LONG-TERM CURRENT USE OF INSULIN: ICD-10-CM

## 2025-08-18 DIAGNOSIS — E78.00 HYPERCHOLESTEROLEMIA: ICD-10-CM

## 2025-08-18 DIAGNOSIS — E03.9 ACQUIRED HYPOTHYROIDISM: ICD-10-CM

## 2025-08-18 LAB
BILIRUB BLD-MCNC: NEGATIVE MG/DL
CLARITY, POC: CLEAR
COLOR UR: YELLOW
EXPIRATION DATE: ABNORMAL
GLUCOSE UR STRIP-MCNC: NEGATIVE MG/DL
KETONES UR QL: NEGATIVE
LEUKOCYTE EST, POC: NEGATIVE
Lab: ABNORMAL
NITRITE UR-MCNC: NEGATIVE MG/ML
PH UR: 6 [PH] (ref 5–8)
PROT UR STRIP-MCNC: NEGATIVE MG/DL
RBC # UR STRIP: ABNORMAL /UL
SP GR UR: 1.01 (ref 1–1.03)
UROBILINOGEN UR QL: ABNORMAL

## 2025-08-20 PROBLEM — R06.00 DYSPNEA: Status: ACTIVE | Noted: 2025-08-20

## 2025-08-20 PROBLEM — R30.0 DYSURIA: Status: ACTIVE | Noted: 2025-08-20

## 2025-08-20 LAB
BACTERIA UR CULT: NORMAL
BACTERIA UR CULT: NORMAL

## (undated) DEVICE — PREP SOL POVIDONE/IODINE BT 4OZ

## (undated) DEVICE — BITEBLOCK OMNI BLOC

## (undated) DEVICE — Device: Brand: DEFENDO AIR/WATER/SUCTION AND BIOPSY VALVE

## (undated) DEVICE — CANN TRPL DAM 7X7MM NO VLV

## (undated) DEVICE — IMMOB SHLDR PAD2 UNIV LG

## (undated) DEVICE — GLV SURG SIGNATURE ESSENTIAL PF LTX SZ7

## (undated) DEVICE — APPL CHLORAPREP HI/LITE 26ML ORNG

## (undated) DEVICE — SOL ISO/ALC RUB 70PCT 4OZ

## (undated) DEVICE — TUBING, SUCTION, 1/4" X 10', STRAIGHT: Brand: MEDLINE

## (undated) DEVICE — LN SMPL CO2 SHTRM SD STREAM W/M LUER

## (undated) DEVICE — SINGLE-USE BIOPSY FORCEPS: Brand: RADIAL JAW 4

## (undated) DEVICE — BLD SHAVER BONECUTTER 4MM 13CM

## (undated) DEVICE — BUR SHAVER CLEARCUT 12FLUT 5MM 13CM

## (undated) DEVICE — THE TORRENT IRRIGATION SCOPE CONNECTOR IS USED WITH THE TORRENT IRRIGATION TUBING TO PROVIDE IRRIGATION FLUIDS SUCH AS STERILE WATER DURING GASTROINTESTINAL ENDOSCOPIC PROCEDURES WHEN USED IN CONJUNCTION WITH AN IRRIGATION PUMP (OR ELECTROSURGICAL UNIT).: Brand: TORRENT

## (undated) DEVICE — ABL ASP APOLLO RF XL 90D

## (undated) DEVICE — TUBING SET, GRAVITY, 4-SPIKE

## (undated) DEVICE — GLV SURG BIOGEL LTX PF 6 1/2

## (undated) DEVICE — SUT ETHLN 3/0 PS1 18IN 1663H

## (undated) DEVICE — IMMOB SHLDR PAD2 UNIV SM

## (undated) DEVICE — DRAPE,U/ SHT,SPLIT,PLAS,STERIL: Brand: MEDLINE

## (undated) DEVICE — DRSNG WND GZ CURAD OIL EMULSION 3X3IN STRL

## (undated) DEVICE — PK ARTHSCP SHLDR TOWER 40

## (undated) DEVICE — POSTN ARMSLV LAT/TRACTION DISP

## (undated) DEVICE — CANN O2 ETCO2 FITS ALL CONN CO2 SMPL A/ 7IN DISP LF

## (undated) DEVICE — REAMR PILOTED HD 7.5MM

## (undated) DEVICE — GOWN,NON-REINFORCED,SIRUS,SET IN SLV,XXL: Brand: MEDLINE

## (undated) DEVICE — GLV SURG BIOGEL LTX PF 7

## (undated) DEVICE — EMERALD ARTHROSCOPY SHEET: Brand: CONVERTORS

## (undated) DEVICE — GLV SURG BIOGEL LTX PF 8 1/2

## (undated) DEVICE — ENDOSCOPIC ULTRASOUND ASPIRATION NEEDLE: Brand: EXPECT SLIMLINE SL

## (undated) DEVICE — SENSR O2 OXIMAX FNGR A/ 18IN NONSTR

## (undated) DEVICE — SKIN PREP TRAY W/CHG: Brand: MEDLINE INDUSTRIES, INC.

## (undated) DEVICE — ADAPT CLN BIOGUARD AIR/H2O DISP

## (undated) DEVICE — GLV SURG SIGNATURE ESSENTIAL PF LTX SZ8.5

## (undated) DEVICE — CANN NASL CO2 TRULINK W/O2 A/

## (undated) DEVICE — REAMR PILOTED HD 7MM